# Patient Record
Sex: FEMALE | Race: WHITE | NOT HISPANIC OR LATINO | Employment: UNEMPLOYED | URBAN - METROPOLITAN AREA
[De-identification: names, ages, dates, MRNs, and addresses within clinical notes are randomized per-mention and may not be internally consistent; named-entity substitution may affect disease eponyms.]

---

## 2017-01-10 ENCOUNTER — ALLSCRIPTS OFFICE VISIT (OUTPATIENT)
Dept: OTHER | Facility: OTHER | Age: 57
End: 2017-01-10

## 2017-01-10 DIAGNOSIS — M25.50 PAIN IN JOINT: ICD-10-CM

## 2017-01-10 DIAGNOSIS — R53.83 OTHER FATIGUE: ICD-10-CM

## 2017-01-10 DIAGNOSIS — E04.1 NONTOXIC SINGLE THYROID NODULE: ICD-10-CM

## 2017-01-27 ENCOUNTER — APPOINTMENT (OUTPATIENT)
Dept: LAB | Facility: CLINIC | Age: 57
End: 2017-01-27
Payer: COMMERCIAL

## 2017-01-27 ENCOUNTER — TRANSCRIBE ORDERS (OUTPATIENT)
Dept: LAB | Facility: CLINIC | Age: 57
End: 2017-01-27

## 2017-01-27 ENCOUNTER — ALLSCRIPTS OFFICE VISIT (OUTPATIENT)
Dept: OTHER | Facility: OTHER | Age: 57
End: 2017-01-27

## 2017-01-27 DIAGNOSIS — M25.50 PAIN IN JOINT: ICD-10-CM

## 2017-01-27 DIAGNOSIS — R53.83 OTHER FATIGUE: ICD-10-CM

## 2017-01-27 LAB
25(OH)D3 SERPL-MCNC: 29.8 NG/ML (ref 30–100)
ALBUMIN SERPL BCP-MCNC: 3.4 G/DL (ref 3.5–5)
ALP SERPL-CCNC: 78 U/L (ref 46–116)
ALT SERPL W P-5'-P-CCNC: 26 U/L (ref 12–78)
ANION GAP SERPL CALCULATED.3IONS-SCNC: 7 MMOL/L (ref 4–13)
AST SERPL W P-5'-P-CCNC: 13 U/L (ref 5–45)
BASOPHILS # BLD AUTO: 0.01 THOUSANDS/ΜL (ref 0–0.1)
BASOPHILS NFR BLD AUTO: 0 % (ref 0–1)
BILIRUB SERPL-MCNC: 0.3 MG/DL (ref 0.2–1)
BUN SERPL-MCNC: 15 MG/DL (ref 5–25)
CALCIUM SERPL-MCNC: 8.8 MG/DL (ref 8.3–10.1)
CHLORIDE SERPL-SCNC: 102 MMOL/L (ref 100–108)
CO2 SERPL-SCNC: 29 MMOL/L (ref 21–32)
CREAT SERPL-MCNC: 0.63 MG/DL (ref 0.6–1.3)
CRP SERPL QL: 32.2 MG/L
EOSINOPHIL # BLD AUTO: 0.13 THOUSAND/ΜL (ref 0–0.61)
EOSINOPHIL NFR BLD AUTO: 3 % (ref 0–6)
ERYTHROCYTE [DISTWIDTH] IN BLOOD BY AUTOMATED COUNT: 13 % (ref 11.6–15.1)
ERYTHROCYTE [SEDIMENTATION RATE] IN BLOOD: 32 MM/HOUR (ref 0–20)
GFR SERPL CREATININE-BSD FRML MDRD: >60 ML/MIN/1.73SQ M
GLUCOSE SERPL-MCNC: 97 MG/DL (ref 65–140)
HCT VFR BLD AUTO: 42.3 % (ref 34.8–46.1)
HGB BLD-MCNC: 14 G/DL (ref 11.5–15.4)
LYMPHOCYTES # BLD AUTO: 1.02 THOUSANDS/ΜL (ref 0.6–4.47)
LYMPHOCYTES NFR BLD AUTO: 20 % (ref 14–44)
MCH RBC QN AUTO: 28.7 PG (ref 26.8–34.3)
MCHC RBC AUTO-ENTMCNC: 33.1 G/DL (ref 31.4–37.4)
MCV RBC AUTO: 87 FL (ref 82–98)
MONOCYTES # BLD AUTO: 0.42 THOUSAND/ΜL (ref 0.17–1.22)
MONOCYTES NFR BLD AUTO: 8 % (ref 4–12)
NEUTROPHILS # BLD AUTO: 3.64 THOUSANDS/ΜL (ref 1.85–7.62)
NEUTS SEG NFR BLD AUTO: 69 % (ref 43–75)
PLATELET # BLD AUTO: 202 THOUSANDS/UL (ref 149–390)
PMV BLD AUTO: 9.6 FL (ref 8.9–12.7)
POTASSIUM SERPL-SCNC: 4.1 MMOL/L (ref 3.5–5.3)
PROT SERPL-MCNC: 7.7 G/DL (ref 6.4–8.2)
RBC # BLD AUTO: 4.88 MILLION/UL (ref 3.81–5.12)
SODIUM SERPL-SCNC: 138 MMOL/L (ref 136–145)
TSH SERPL DL<=0.05 MIU/L-ACNC: 3.1 UIU/ML (ref 0.36–3.74)
WBC # BLD AUTO: 5.22 THOUSAND/UL (ref 4.31–10.16)

## 2017-01-27 PROCEDURE — 86705 HEP B CORE ANTIBODY IGM: CPT

## 2017-01-27 PROCEDURE — 86235 NUCLEAR ANTIGEN ANTIBODY: CPT

## 2017-01-27 PROCEDURE — 85652 RBC SED RATE AUTOMATED: CPT

## 2017-01-27 PROCEDURE — 82306 VITAMIN D 25 HYDROXY: CPT

## 2017-01-27 PROCEDURE — 80053 COMPREHEN METABOLIC PANEL: CPT

## 2017-01-27 PROCEDURE — 86430 RHEUMATOID FACTOR TEST QUAL: CPT

## 2017-01-27 PROCEDURE — 87340 HEPATITIS B SURFACE AG IA: CPT

## 2017-01-27 PROCEDURE — 86038 ANTINUCLEAR ANTIBODIES: CPT

## 2017-01-27 PROCEDURE — 86803 HEPATITIS C AB TEST: CPT

## 2017-01-27 PROCEDURE — 86200 CCP ANTIBODY: CPT

## 2017-01-27 PROCEDURE — 36415 COLL VENOUS BLD VENIPUNCTURE: CPT

## 2017-01-27 PROCEDURE — 85025 COMPLETE CBC W/AUTO DIFF WBC: CPT

## 2017-01-27 PROCEDURE — 87389 HIV-1 AG W/HIV-1&-2 AB AG IA: CPT

## 2017-01-27 PROCEDURE — 86704 HEP B CORE ANTIBODY TOTAL: CPT

## 2017-01-27 PROCEDURE — 84443 ASSAY THYROID STIM HORMONE: CPT

## 2017-01-27 PROCEDURE — 86140 C-REACTIVE PROTEIN: CPT

## 2017-01-27 PROCEDURE — 81374 HLA I TYPING 1 ANTIGEN LR: CPT

## 2017-01-28 LAB
ENA SS-A AB SER-ACNC: <0.2 AI (ref 0–0.9)
ENA SS-B AB SER-ACNC: <0.2 AI (ref 0–0.9)
HBV CORE AB SER QL: NORMAL
HBV CORE IGM SER QL: NORMAL
HBV SURFACE AG SER QL: NORMAL
HCV AB SER QL: NORMAL

## 2017-01-30 LAB
CCP IGA+IGG SERPL IA-ACNC: 7 UNITS (ref 0–19)
HIV 1+2 AB+HIV1 P24 AG SERPL QL IA: NORMAL
RHEUMATOID FACT SER QL LA: NEGATIVE
RYE IGE QN: NEGATIVE

## 2017-02-01 LAB — HLA-B27 QL NAA+PROBE: NEGATIVE

## 2017-02-02 ENCOUNTER — GENERIC CONVERSION - ENCOUNTER (OUTPATIENT)
Dept: OTHER | Facility: OTHER | Age: 57
End: 2017-02-02

## 2017-03-09 ENCOUNTER — TRANSCRIBE ORDERS (OUTPATIENT)
Dept: ADMINISTRATIVE | Age: 57
End: 2017-03-09

## 2017-03-09 ENCOUNTER — APPOINTMENT (OUTPATIENT)
Dept: LAB | Age: 57
End: 2017-03-09
Payer: COMMERCIAL

## 2017-03-09 ENCOUNTER — GENERIC CONVERSION - ENCOUNTER (OUTPATIENT)
Dept: OTHER | Facility: OTHER | Age: 57
End: 2017-03-09

## 2017-03-09 ENCOUNTER — HOSPITAL ENCOUNTER (OUTPATIENT)
Dept: RADIOLOGY | Age: 57
Discharge: HOME | End: 2017-03-09
Payer: COMMERCIAL

## 2017-03-09 DIAGNOSIS — E04.1 NONTOXIC SINGLE THYROID NODULE: ICD-10-CM

## 2017-03-09 LAB — TSH SERPL DL<=0.05 MIU/L-ACNC: 2.62 UIU/ML (ref 0.36–3.74)

## 2017-03-09 PROCEDURE — 36415 COLL VENOUS BLD VENIPUNCTURE: CPT

## 2017-03-09 PROCEDURE — 84443 ASSAY THYROID STIM HORMONE: CPT

## 2017-03-09 PROCEDURE — 76536 US EXAM OF HEAD AND NECK: CPT

## 2017-03-10 ENCOUNTER — GENERIC CONVERSION - ENCOUNTER (OUTPATIENT)
Dept: OTHER | Facility: OTHER | Age: 57
End: 2017-03-10

## 2017-04-06 ENCOUNTER — TRANSCRIBE ORDERS (OUTPATIENT)
Dept: LAB | Facility: CLINIC | Age: 57
End: 2017-04-06

## 2017-04-06 ENCOUNTER — ALLSCRIPTS OFFICE VISIT (OUTPATIENT)
Dept: OTHER | Facility: OTHER | Age: 57
End: 2017-04-06

## 2017-04-06 ENCOUNTER — APPOINTMENT (OUTPATIENT)
Dept: LAB | Facility: CLINIC | Age: 57
End: 2017-04-06
Payer: COMMERCIAL

## 2017-04-06 DIAGNOSIS — M35.9 SYSTEMIC INVOLVEMENT OF CONNECTIVE TISSUE (HCC): ICD-10-CM

## 2017-04-06 DIAGNOSIS — E78.5 HYPERLIPIDEMIA: ICD-10-CM

## 2017-04-06 LAB
ALBUMIN SERPL BCP-MCNC: 3.6 G/DL (ref 3.5–5)
ALP SERPL-CCNC: 68 U/L (ref 46–116)
ALT SERPL W P-5'-P-CCNC: 25 U/L (ref 12–78)
ANION GAP SERPL CALCULATED.3IONS-SCNC: 9 MMOL/L (ref 4–13)
AST SERPL W P-5'-P-CCNC: 16 U/L (ref 5–45)
BASOPHILS # BLD AUTO: 0 THOUSANDS/ΜL (ref 0–0.1)
BASOPHILS NFR BLD AUTO: 0 % (ref 0–1)
BILIRUB SERPL-MCNC: 0.4 MG/DL (ref 0.2–1)
BUN SERPL-MCNC: 16 MG/DL (ref 5–25)
CALCIUM SERPL-MCNC: 9.1 MG/DL (ref 8.3–10.1)
CHLORIDE SERPL-SCNC: 105 MMOL/L (ref 100–108)
CO2 SERPL-SCNC: 28 MMOL/L (ref 21–32)
CREAT SERPL-MCNC: 0.83 MG/DL (ref 0.6–1.3)
CRP SERPL QL: 18.3 MG/L
EOSINOPHIL # BLD AUTO: 0.15 THOUSAND/ΜL (ref 0–0.61)
EOSINOPHIL NFR BLD AUTO: 4 % (ref 0–6)
ERYTHROCYTE [DISTWIDTH] IN BLOOD BY AUTOMATED COUNT: 13.4 % (ref 11.6–15.1)
ERYTHROCYTE [SEDIMENTATION RATE] IN BLOOD: 20 MM/HOUR (ref 0–20)
GFR SERPL CREATININE-BSD FRML MDRD: >60 ML/MIN/1.73SQ M
GLUCOSE SERPL-MCNC: 90 MG/DL (ref 65–140)
HCT VFR BLD AUTO: 43.6 % (ref 34.8–46.1)
HGB BLD-MCNC: 14.3 G/DL (ref 11.5–15.4)
LYMPHOCYTES # BLD AUTO: 0.9 THOUSANDS/ΜL (ref 0.6–4.47)
LYMPHOCYTES NFR BLD AUTO: 22 % (ref 14–44)
MCH RBC QN AUTO: 28.5 PG (ref 26.8–34.3)
MCHC RBC AUTO-ENTMCNC: 32.8 G/DL (ref 31.4–37.4)
MCV RBC AUTO: 87 FL (ref 82–98)
MONOCYTES # BLD AUTO: 0.44 THOUSAND/ΜL (ref 0.17–1.22)
MONOCYTES NFR BLD AUTO: 11 % (ref 4–12)
NEUTROPHILS # BLD AUTO: 2.54 THOUSANDS/ΜL (ref 1.85–7.62)
NEUTS SEG NFR BLD AUTO: 63 % (ref 43–75)
PLATELET # BLD AUTO: 166 THOUSANDS/UL (ref 149–390)
PMV BLD AUTO: 10.2 FL (ref 8.9–12.7)
POTASSIUM SERPL-SCNC: 4.3 MMOL/L (ref 3.5–5.3)
PROT SERPL-MCNC: 7.4 G/DL (ref 6.4–8.2)
RBC # BLD AUTO: 5.01 MILLION/UL (ref 3.81–5.12)
SODIUM SERPL-SCNC: 142 MMOL/L (ref 136–145)
WBC # BLD AUTO: 4.03 THOUSAND/UL (ref 4.31–10.16)

## 2017-04-06 PROCEDURE — 85652 RBC SED RATE AUTOMATED: CPT

## 2017-04-06 PROCEDURE — 80053 COMPREHEN METABOLIC PANEL: CPT

## 2017-04-06 PROCEDURE — 86140 C-REACTIVE PROTEIN: CPT

## 2017-04-06 PROCEDURE — 85025 COMPLETE CBC W/AUTO DIFF WBC: CPT

## 2017-04-06 PROCEDURE — 36415 COLL VENOUS BLD VENIPUNCTURE: CPT

## 2017-06-01 ENCOUNTER — APPOINTMENT (OUTPATIENT)
Dept: LAB | Facility: CLINIC | Age: 57
End: 2017-06-01
Payer: COMMERCIAL

## 2017-06-01 ENCOUNTER — TRANSCRIBE ORDERS (OUTPATIENT)
Dept: LAB | Facility: CLINIC | Age: 57
End: 2017-06-01

## 2017-06-01 ENCOUNTER — ALLSCRIPTS OFFICE VISIT (OUTPATIENT)
Dept: OTHER | Facility: OTHER | Age: 57
End: 2017-06-01

## 2017-06-01 DIAGNOSIS — M35.9 SYSTEMIC INVOLVEMENT OF CONNECTIVE TISSUE (HCC): ICD-10-CM

## 2017-06-01 DIAGNOSIS — E89.0 POSTSURGICAL HYPOTHYROIDISM: ICD-10-CM

## 2017-06-01 DIAGNOSIS — E89.0 POSTSURGICAL HYPOTHYROIDISM: Primary | ICD-10-CM

## 2017-06-01 DIAGNOSIS — E78.5 HYPERLIPIDEMIA: ICD-10-CM

## 2017-06-01 LAB
ALBUMIN SERPL BCP-MCNC: 3.5 G/DL (ref 3.5–5)
ALP SERPL-CCNC: 66 U/L (ref 46–116)
ALT SERPL W P-5'-P-CCNC: 29 U/L (ref 12–78)
ANION GAP SERPL CALCULATED.3IONS-SCNC: 6 MMOL/L (ref 4–13)
AST SERPL W P-5'-P-CCNC: 16 U/L (ref 5–45)
BASOPHILS # BLD AUTO: 0.01 THOUSANDS/ΜL (ref 0–0.1)
BASOPHILS NFR BLD AUTO: 0 % (ref 0–1)
BILIRUB SERPL-MCNC: 0.4 MG/DL (ref 0.2–1)
BUN SERPL-MCNC: 12 MG/DL (ref 5–25)
CALCIUM SERPL-MCNC: 9.2 MG/DL (ref 8.3–10.1)
CHLORIDE SERPL-SCNC: 105 MMOL/L (ref 100–108)
CO2 SERPL-SCNC: 31 MMOL/L (ref 21–32)
CREAT SERPL-MCNC: 0.86 MG/DL (ref 0.6–1.3)
CRP SERPL QL: 17.6 MG/L
EOSINOPHIL # BLD AUTO: 0.11 THOUSAND/ΜL (ref 0–0.61)
EOSINOPHIL NFR BLD AUTO: 3 % (ref 0–6)
ERYTHROCYTE [DISTWIDTH] IN BLOOD BY AUTOMATED COUNT: 13.4 % (ref 11.6–15.1)
ERYTHROCYTE [SEDIMENTATION RATE] IN BLOOD: 14 MM/HOUR (ref 0–20)
GFR SERPL CREATININE-BSD FRML MDRD: >60 ML/MIN/1.73SQ M
GLUCOSE P FAST SERPL-MCNC: 88 MG/DL (ref 65–99)
HCT VFR BLD AUTO: 42.7 % (ref 34.8–46.1)
HGB BLD-MCNC: 14.1 G/DL (ref 11.5–15.4)
LYMPHOCYTES # BLD AUTO: 1.05 THOUSANDS/ΜL (ref 0.6–4.47)
LYMPHOCYTES NFR BLD AUTO: 28 % (ref 14–44)
MCH RBC QN AUTO: 28.9 PG (ref 26.8–34.3)
MCHC RBC AUTO-ENTMCNC: 33 G/DL (ref 31.4–37.4)
MCV RBC AUTO: 88 FL (ref 82–98)
MONOCYTES # BLD AUTO: 0.43 THOUSAND/ΜL (ref 0.17–1.22)
MONOCYTES NFR BLD AUTO: 11 % (ref 4–12)
NEUTROPHILS # BLD AUTO: 2.16 THOUSANDS/ΜL (ref 1.85–7.62)
NEUTS SEG NFR BLD AUTO: 58 % (ref 43–75)
PLATELET # BLD AUTO: 163 THOUSANDS/UL (ref 149–390)
PMV BLD AUTO: 10.3 FL (ref 8.9–12.7)
POTASSIUM SERPL-SCNC: 4 MMOL/L (ref 3.5–5.3)
PROT SERPL-MCNC: 7.3 G/DL (ref 6.4–8.2)
RBC # BLD AUTO: 4.88 MILLION/UL (ref 3.81–5.12)
SODIUM SERPL-SCNC: 142 MMOL/L (ref 136–145)
TSH SERPL DL<=0.05 MIU/L-ACNC: 3.04 UIU/ML (ref 0.36–3.74)
WBC # BLD AUTO: 3.76 THOUSAND/UL (ref 4.31–10.16)

## 2017-06-01 PROCEDURE — 86140 C-REACTIVE PROTEIN: CPT

## 2017-06-01 PROCEDURE — 36415 COLL VENOUS BLD VENIPUNCTURE: CPT

## 2017-06-01 PROCEDURE — 85025 COMPLETE CBC W/AUTO DIFF WBC: CPT

## 2017-06-01 PROCEDURE — 80053 COMPREHEN METABOLIC PANEL: CPT

## 2017-06-01 PROCEDURE — 85652 RBC SED RATE AUTOMATED: CPT

## 2017-06-01 PROCEDURE — 84443 ASSAY THYROID STIM HORMONE: CPT

## 2017-06-02 ENCOUNTER — GENERIC CONVERSION - ENCOUNTER (OUTPATIENT)
Dept: OTHER | Facility: OTHER | Age: 57
End: 2017-06-02

## 2017-07-19 ENCOUNTER — ALLSCRIPTS OFFICE VISIT (OUTPATIENT)
Dept: OTHER | Facility: OTHER | Age: 57
End: 2017-07-19

## 2017-08-03 ENCOUNTER — APPOINTMENT (OUTPATIENT)
Dept: LAB | Facility: CLINIC | Age: 57
End: 2017-08-03
Payer: COMMERCIAL

## 2017-08-03 ENCOUNTER — ALLSCRIPTS OFFICE VISIT (OUTPATIENT)
Dept: OTHER | Facility: OTHER | Age: 57
End: 2017-08-03

## 2017-08-03 DIAGNOSIS — E78.5 HYPERLIPIDEMIA: ICD-10-CM

## 2017-08-03 DIAGNOSIS — M35.9 SYSTEMIC INVOLVEMENT OF CONNECTIVE TISSUE (HCC): ICD-10-CM

## 2017-08-03 LAB
ALBUMIN SERPL BCP-MCNC: 3.4 G/DL (ref 3.5–5)
ALP SERPL-CCNC: 71 U/L (ref 46–116)
ALT SERPL W P-5'-P-CCNC: 25 U/L (ref 12–78)
ANION GAP SERPL CALCULATED.3IONS-SCNC: 7 MMOL/L (ref 4–13)
AST SERPL W P-5'-P-CCNC: 13 U/L (ref 5–45)
BASOPHILS # BLD AUTO: 0.02 THOUSANDS/ΜL (ref 0–0.1)
BASOPHILS NFR BLD AUTO: 1 % (ref 0–1)
BILIRUB SERPL-MCNC: 0.5 MG/DL (ref 0.2–1)
BUN SERPL-MCNC: 14 MG/DL (ref 5–25)
CALCIUM SERPL-MCNC: 9.1 MG/DL (ref 8.3–10.1)
CHLORIDE SERPL-SCNC: 105 MMOL/L (ref 100–108)
CO2 SERPL-SCNC: 30 MMOL/L (ref 21–32)
CREAT SERPL-MCNC: 0.9 MG/DL (ref 0.6–1.3)
CRP SERPL QL: 12.8 MG/L
EOSINOPHIL # BLD AUTO: 0.11 THOUSAND/ΜL (ref 0–0.61)
EOSINOPHIL NFR BLD AUTO: 3 % (ref 0–6)
ERYTHROCYTE [DISTWIDTH] IN BLOOD BY AUTOMATED COUNT: 13.2 % (ref 11.6–15.1)
ERYTHROCYTE [SEDIMENTATION RATE] IN BLOOD: 16 MM/HOUR (ref 0–20)
GFR SERPL CREATININE-BSD FRML MDRD: 73 ML/MIN/1.73SQ M
GLUCOSE P FAST SERPL-MCNC: 92 MG/DL (ref 65–99)
HCT VFR BLD AUTO: 43.6 % (ref 34.8–46.1)
HGB BLD-MCNC: 14.1 G/DL (ref 11.5–15.4)
LYMPHOCYTES # BLD AUTO: 0.91 THOUSANDS/ΜL (ref 0.6–4.47)
LYMPHOCYTES NFR BLD AUTO: 26 % (ref 14–44)
MCH RBC QN AUTO: 28.7 PG (ref 26.8–34.3)
MCHC RBC AUTO-ENTMCNC: 32.3 G/DL (ref 31.4–37.4)
MCV RBC AUTO: 89 FL (ref 82–98)
MONOCYTES # BLD AUTO: 0.33 THOUSAND/ΜL (ref 0.17–1.22)
MONOCYTES NFR BLD AUTO: 9 % (ref 4–12)
NEUTROPHILS # BLD AUTO: 2.18 THOUSANDS/ΜL (ref 1.85–7.62)
NEUTS SEG NFR BLD AUTO: 61 % (ref 43–75)
PLATELET # BLD AUTO: 167 THOUSANDS/UL (ref 149–390)
PMV BLD AUTO: 9.8 FL (ref 8.9–12.7)
POTASSIUM SERPL-SCNC: 4.2 MMOL/L (ref 3.5–5.3)
PROT SERPL-MCNC: 7.3 G/DL (ref 6.4–8.2)
RBC # BLD AUTO: 4.91 MILLION/UL (ref 3.81–5.12)
SODIUM SERPL-SCNC: 142 MMOL/L (ref 136–145)
WBC # BLD AUTO: 3.55 THOUSAND/UL (ref 4.31–10.16)

## 2017-08-03 PROCEDURE — 36415 COLL VENOUS BLD VENIPUNCTURE: CPT

## 2017-08-03 PROCEDURE — 85652 RBC SED RATE AUTOMATED: CPT

## 2017-08-03 PROCEDURE — 85025 COMPLETE CBC W/AUTO DIFF WBC: CPT

## 2017-08-03 PROCEDURE — 80053 COMPREHEN METABOLIC PANEL: CPT

## 2017-08-03 PROCEDURE — 86140 C-REACTIVE PROTEIN: CPT

## 2017-10-26 ENCOUNTER — APPOINTMENT (OUTPATIENT)
Dept: LAB | Facility: CLINIC | Age: 57
End: 2017-10-26
Payer: COMMERCIAL

## 2017-10-26 ENCOUNTER — TRANSCRIBE ORDERS (OUTPATIENT)
Dept: LAB | Facility: CLINIC | Age: 57
End: 2017-10-26

## 2017-10-26 ENCOUNTER — ALLSCRIPTS OFFICE VISIT (OUTPATIENT)
Dept: OTHER | Facility: OTHER | Age: 57
End: 2017-10-26

## 2017-10-26 DIAGNOSIS — E78.5 HYPERLIPIDEMIA: ICD-10-CM

## 2017-10-26 DIAGNOSIS — M35.9 SYSTEMIC INVOLVEMENT OF CONNECTIVE TISSUE (HCC): ICD-10-CM

## 2017-10-26 LAB
ALBUMIN SERPL BCP-MCNC: 3.3 G/DL (ref 3.5–5)
ALP SERPL-CCNC: 84 U/L (ref 46–116)
ALT SERPL W P-5'-P-CCNC: 38 U/L (ref 12–78)
ANION GAP SERPL CALCULATED.3IONS-SCNC: 6 MMOL/L (ref 4–13)
AST SERPL W P-5'-P-CCNC: 13 U/L (ref 5–45)
BASOPHILS # BLD AUTO: 0.01 THOUSANDS/ΜL (ref 0–0.1)
BASOPHILS NFR BLD AUTO: 0 % (ref 0–1)
BILIRUB SERPL-MCNC: 0.4 MG/DL (ref 0.2–1)
BUN SERPL-MCNC: 16 MG/DL (ref 5–25)
CALCIUM SERPL-MCNC: 9 MG/DL (ref 8.3–10.1)
CHLORIDE SERPL-SCNC: 105 MMOL/L (ref 100–108)
CO2 SERPL-SCNC: 30 MMOL/L (ref 21–32)
CREAT SERPL-MCNC: 0.9 MG/DL (ref 0.6–1.3)
CRP SERPL QL: 12.9 MG/L
EOSINOPHIL # BLD AUTO: 0.12 THOUSAND/ΜL (ref 0–0.61)
EOSINOPHIL NFR BLD AUTO: 3 % (ref 0–6)
ERYTHROCYTE [DISTWIDTH] IN BLOOD BY AUTOMATED COUNT: 13.2 % (ref 11.6–15.1)
ERYTHROCYTE [SEDIMENTATION RATE] IN BLOOD: 11 MM/HOUR (ref 0–20)
GFR SERPL CREATININE-BSD FRML MDRD: 73 ML/MIN/1.73SQ M
GLUCOSE SERPL-MCNC: 76 MG/DL (ref 65–140)
HCT VFR BLD AUTO: 43.4 % (ref 34.8–46.1)
HGB BLD-MCNC: 13.9 G/DL (ref 11.5–15.4)
LYMPHOCYTES # BLD AUTO: 1.04 THOUSANDS/ΜL (ref 0.6–4.47)
LYMPHOCYTES NFR BLD AUTO: 28 % (ref 14–44)
MCH RBC QN AUTO: 28.7 PG (ref 26.8–34.3)
MCHC RBC AUTO-ENTMCNC: 32 G/DL (ref 31.4–37.4)
MCV RBC AUTO: 90 FL (ref 82–98)
MONOCYTES # BLD AUTO: 0.25 THOUSAND/ΜL (ref 0.17–1.22)
MONOCYTES NFR BLD AUTO: 7 % (ref 4–12)
NEUTROPHILS # BLD AUTO: 2.29 THOUSANDS/ΜL (ref 1.85–7.62)
NEUTS SEG NFR BLD AUTO: 62 % (ref 43–75)
PLATELET # BLD AUTO: 164 THOUSANDS/UL (ref 149–390)
PMV BLD AUTO: 9.9 FL (ref 8.9–12.7)
POTASSIUM SERPL-SCNC: 4 MMOL/L (ref 3.5–5.3)
PROT SERPL-MCNC: 7.1 G/DL (ref 6.4–8.2)
RBC # BLD AUTO: 4.85 MILLION/UL (ref 3.81–5.12)
SODIUM SERPL-SCNC: 141 MMOL/L (ref 136–145)
WBC # BLD AUTO: 3.71 THOUSAND/UL (ref 4.31–10.16)

## 2017-10-26 PROCEDURE — 80053 COMPREHEN METABOLIC PANEL: CPT

## 2017-10-26 PROCEDURE — 86140 C-REACTIVE PROTEIN: CPT

## 2017-10-26 PROCEDURE — 85652 RBC SED RATE AUTOMATED: CPT

## 2017-10-26 PROCEDURE — 36415 COLL VENOUS BLD VENIPUNCTURE: CPT

## 2017-10-26 PROCEDURE — 85025 COMPLETE CBC W/AUTO DIFF WBC: CPT

## 2017-12-20 ENCOUNTER — ALLSCRIPTS OFFICE VISIT (OUTPATIENT)
Dept: OTHER | Facility: OTHER | Age: 57
End: 2017-12-20

## 2017-12-20 DIAGNOSIS — Z12.31 ENCOUNTER FOR SCREENING MAMMOGRAM FOR MALIGNANT NEOPLASM OF BREAST: ICD-10-CM

## 2017-12-21 ENCOUNTER — LAB CONVERSION - ENCOUNTER (OUTPATIENT)
Dept: OTHER | Facility: OTHER | Age: 57
End: 2017-12-21

## 2017-12-21 LAB
ADDITIONAL INFORMATION (HISTORICAL): NORMAL
ADEQUACY: (HISTORICAL): NORMAL
COMMENT (HISTORICAL): NORMAL
CYTOTECHNOLOGIST: (HISTORICAL): NORMAL
INTERPRETATION (HISTORICAL): NORMAL
LMP (HISTORICAL): NORMAL
PREV. BX: (HISTORICAL): NORMAL
PREV. PAP (HISTORICAL): NORMAL
SOURCE (HISTORICAL): NORMAL

## 2017-12-21 NOTE — PROGRESS NOTES
Assessment   1  Encounter for routine gynecological examination (V72 31) (Z01 419)   2  Pap smear, as part of routine gynecological examination (V76 2) (Z01 419)   3  Visit for screening mammogram (V76 12) (Z12 31)    Plan   Encounter for routine gynecological examination    · Follow-up visit in 1 year Evaluation and Treatment  Follow-up  Status: Hold For -    Scheduling  Requested for: 43Jmp6392   Ordered; For: Encounter for routine gynecological examination; Ordered By: Dianne Hunt Performed:  Due: 20MTI6856  Pap smear, as part of routine gynecological examination    · (1) THIN PREP PAP WITH IMAGING; Status: In Progress - Specimen/Data    Collected,Retrospective By Protocol Authorization;   Done: 55TIV3399   Perform:[facility]; Order Comments:cervical, endocervical; Due:93Awi7183; Last Updated Lana Shelter; 12/20/2017 11:01:48 AM;Ordered; For:Pap smear, as part of routine gynecological examination; Ordered By:Crispin Alejandro; Maturation index required? : No  : 10/27/2017  HPV? : if ASCUS  Visit for screening mammogram    · * MAMMO SCREENING BILATERAL W CAD; Status:Hold For - Scheduling,Retrospective    By Protocol Authorization; Requested for:[id number];    Perform:[facility]; Due:48Zgv4960; Last Updated [facility]ana [facility]ter; 12/20/2017 11:01:49 AM;Ordered; For:Visit for screening mammogram; Ordered By:Crispin Alejandro; Discussion/Summary   healthy adult female Currently, she eats a healthy diet  cervical cancer screening is current Pap test was done today Breast cancer screening: monthly self breast exam was advised, mammogram is current and mammogram has been ordered  Colorectal cancer screening: the risks and benefits of colorectal cancer screening were discussed  Advice and education were given regarding nutrition, aerobic exercise, calcium supplements, vitamin D supplements and sunscreen use         Normal GYN Exam   Stressed   ordered   SMear DOne   GYN Exam in 1 year   if any problems develop during the interim        The patient has the current Goals: [address]  The patent has the current Barriers: None  Patient is able to Self-Care  PATIENT EDUCATION RECORD She was given the following educational materials:  Idea for a [employer]  The treatment plan was reviewed with the patient/guardian  The patient/guardian understands and agrees with the treatment plan       Self Referrals: No      Chief Complaint   ANNUAL EXAM  has no problem or concerns      History of Present Illness   HPI: Has had problems with her Knees this year     Occasional bleeding No other episodes     Some vaginal dryness     Hot flashes present     Normal Bowel and Bladder habits     No pelvic or abdominal pain          [facility]: The patient is being seen for a gynecology evaluation  The last health maintenance visit was 1 year(s) ago  General Health: The patient's health since the last visit is described as good  She has regular dental visits  -- She denies vision problems  -- She denies hearing loss  Lifestyle:  She consumes a diverse and healthy diet  -- She does not have any weight concerns  -- She exercises regularly  -- She does not use tobacco -- She denies drug use  Reproductive health: the patient is postmenopausal     Screening: cancer screening reviewed and current  metabolic screening reviewed and current  risk screening reviewed and current  Review of Systems        Constitutional: No fever, no chills, feels well, no tiredness, no recent weight gain or loss  ENT: no ear ache, no loss of hearing, no nosebleeds or nasal discharge, no sore throat or hoarseness  Cardiovascular: no complaints of slow or fast heart rate, no chest pain, no palpitations, no leg claudication or lower extremity edema  Respiratory: no complaints of shortness of breath, no wheezing, no dyspnea on exertion, no orthopnea or PND        Breasts: no complaints of breast pain, breast lump or nipple discharge  Gastrointestinal: no complaints of abdominal pain, no constipation, no nausea or diarrhea, no vomiting, no bloody stools  Genitourinary: no complaints of dysuria, no incontinence, no pelvic pain, no dysmenorrhea, no vaginal discharge or abnormal vaginal bleeding  Musculoskeletal: no complaints of arthralgia, no myalgia, no joint swelling or stiffness, no limb pain or swelling  Integumentary: no complaints of skin rash or lesion, no itching or dry skin, no skin wounds  Neurological: no complaints of headache, no confusion, no numbness or tingling, no dizziness or fainting  Active Problems   1  Abnormal uterine bleeding (AUB) (626 9) (N93 9)   2  Arthritis (716 90) (M19 90)   3  Arthritis of both knees (716 96) (M17 0)   4  Bilateral knee pain (719 46) (M25 561,M25 562)   5  Encounter for routine gynecological examination (V72 31) (Z01 419)   6  Encounter for screening colonoscopy (V76 51) (Z12 11)   7  Encounter for screening for malignant neoplasm of colon (V76 51) (Z12 11)   8  Fatigue (780 79) (R53 83)   9  Hyperlipidemia (272 4) (E78 5)   10  Impaired fasting glucose (790 21) (R73 01)   11  Irregular heart beats (427 9) (I49 9)   12  Laboratory examination ordered as part of a routine general medical examination      (V72 62) (Z00 00)   13  Left knee pain (719 46) (M25 562)   14  Menorrhagia (626 2) (N92 0)   15  Multiple thyroid nodules (241 1) (E04 2)   16  Need for prophylactic vaccination and inoculation against influenza (V04 81) (Z23)   17  Obesity (BMI 30-39 9) (278 00) (E66 9)   18  Pap smear, as part of routine gynecological examination (V76 2) (Z01 419)   19  Polyarthralgia (719 49) (M25 50)   20  Right knee pain (719 46) (M25 561)   21  Undifferentiated connective tissue disease (710 9) (M35 9)   22   Visit for screening mammogram (V76 12) (Z12 31)    Past Medical History    · History of Elevated blood sugar (790 29) (R73 9)   · History of [place] 7 (V22 2) (Z33 1)   · History of chest pain (V13 89) (A04 475)    Surgical History    · History of Biopsy Thyroid Using Percutaneous Core Needle   · History of  Section   · History of Foot Surgery   · History of Thyroid Surgery Thyroid Lobectomy Right Lobe    Family History   Mother    · Family history of Graves' disease (V18 19) (Z83 49)   · Family history of hyperlipidemia (V18 19) (Z83 49)   · Family history of hypertension (V17 49) (Z82 49)   · Family history of osteoporosis (V17 81) (Z80 61)  Father    · Family history of aortic stenosis (V17 49) (Z82 49)   · Family history of hyperlipidemia (V18 19) (Z83 49)   · Family history of hypertension (V17 49) (Z82 49)   · FH: CABG (coronary artery bypass surgery) (V17 3) (Z84 89)   · Family history of Pacemaker Placement   · Family history of S/P TAVR (transcatheter aortic valve replacement)  Grandfather    · Family history of myocardial infarction (V17 3) (Z82 49)   · FHx: stroke (V17 1) (Z82 3)    Social History    · Employed   · Feels safe at home   ·    · Never smoked cigarettes (V49 89) (Z78 9)   · No alcohol use   · Six children   · Denied: History of Tobacco use    Current Meds    1  Calcium-Magnesium 250-125 MG Oral Tablet; Take 1 daily; Therapy: 57MFO2824 to (Evaluate:2017) Recorded   2  Cranberry 300 MG Oral Tablet; TAKE AS DIRECTED; Therapy: 22IHQ1207 to Recorded   3  Daily Multiple Vitamins Oral Tablet; Therapy: (Recorded:07Ocj2515) to Recorded   4  Fiber CAPS; Therapy: (Recorded:90Ahz6090) to Recorded   5  Fish Oil CAPS; Therapy: (Recorded:67Bzb5629) to Recorded   6  Hydroxychloroquine Sulfate 200 MG Oral Tablet; take 2 tablets daily with food; Therapy: 80MSB5157 to (Evaluate:2018)  Requested for: 2017; Last     Rx:2017 Ordered   7  Magnesium TABS; Therapy: (Recorded:69Tsp0860) to Recorded   8  Motrin  MG Oral Tablet; TAKE 3 TABLET Twice daily PRN pain;      Therapy: (Recorded:2017) to Recorded   9  Probiotic Oral Capsule; Therapy: (Recorded:52Rdq1689) to Recorded   10  Vitamin C 500 MG Oral Tablet; TAKE 1 TABLET DAILY; Therapy: 52NPV2517 to Recorded   11  Vitamin D3 1000 UNIT Oral Tablet; TAKE 1 TABLET DAILY; Therapy: 72JLT8248 to (Evaluate:08Ykq9016) Recorded    Allergies   1  No Known Drug Allergies  2  No Known Environmental Allergies   3  No Known Food Allergies    Vitals    Recorded: 51PTU1330 18:92YD   Systolic 795   Diastolic 78   Height 4 ft 11 in   Weight 182 lb    BMI Calculated 36 76   BSA Calculated 1 77   LMP 22Oct2017     Physical Exam        Constitutional      General appearance: No acute distress, well appearing and well nourished  Neck      Neck: Normal, supple, trachea midline, no masses  Thyroid: Normal, no thyromegaly  Pulmonary      Respiratory effort: No increased work of breathing or signs of respiratory distress  Auscultation of lungs: Clear to auscultation  Cardiovascular      Auscultation of heart: Normal rate and rhythm, normal S1 and S2, no murmurs  Peripheral vascular exam: Normal pulses Throughout  Genitourinary      External genitalia: Normal and no lesions appreciated  Vagina: Normal, no lesions or dryness appreciated  Urethra: Normal        Urethral meatus: Normal        Bladder: Normal, soft, non-tender and no prolapse or masses appreciated  Cervix: Normal, no palpable masses  Uterus: Normal, non-tender, not enlarged, and no palpable masses  Adnexa/parametria: Normal, non-tender and no fullness or masses appreciated  Anus, perineum, and rectum: Normal sphincter tone, no masses, and no prolapse  Chest      Breasts: Normal and no dimpling or skin changes noted  Abdomen      Abdomen: Normal, non-tender, and no organomegaly noted  Liver and spleen: No hepatomegaly or splenomegaly  Examination for hernias: No hernias appreciated         Stool sample for occult blood: Negative  Lymphatic      Palpation of lymph nodes in neck, axillae, groin and/or other locations: No lymphadenopathy or masses noted  Skin      Skin and subcutaneous tissue: Normal skin turgor and no rashes  Palpation of skin and subcutaneous tissue: Normal        Psychiatric      Orientation to person, place, and time: Normal        Mood and affect: Normal        Provider Comments   Provider Comments: Son is applying to college  for    Daughter Lex Hooper got  [facility]  is a      Boy in [place] [facility] getting  in May in [place] 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

## 2018-01-01 DIAGNOSIS — E04.2 NONTOXIC MULTINODULAR GOITER: ICD-10-CM

## 2018-01-01 DIAGNOSIS — E78.5 HYPERLIPIDEMIA: ICD-10-CM

## 2018-01-09 NOTE — RESULT NOTES
Message   Her thyroid ultrasound shows stable nodules  Verified Results  US THYROID [id number] 09:02AM Christel Cornejo Order Number: [id number]    - Patient Instructions: To schedule this appointment, please contact Central Scheduling at [phone]  Test Name Result Flag Reference   US THYROID (Report)     THYROID ULTRASOUND     INDICATION: Follow-up nodules status post right hemithyroidectomy     COMPARISON: 7/12/2016     TECHNIQUE:  Ultrasound of the thyroid was performed with a high frequency linear transducer in transverse and sagittal planes including volumetric imaging sweeps as well as traditional still imaging technique  FINDINGS:   Normal homogeneous smooth echotexture  The patient is status post right hemithyroidectomy  Again seen is a hypoechoic nodule in the thyroidectomy bed measuring 0 5 x 0 6 x 1 0 cm, previously 0 6 x 0 6 x 1 0 cm  Left gland: 1 4 x 1 6 x 3 8 cm  Right upper pole nodule measuring 0 7 x 0 9 x 1 0 cm  Spongiform nodule  Unchanged from prior  VERY LOW SUSPICION PATTERN (estimated risk of malignancy < 3%) consider FNA >/= 2 cm versus observation  Isthmus:    No nodules  IMPRESSION:      1  Stable hypoechoic nodular focus in the right thyroidectomy bed  2  Stable spongiform nodule on the left               Workstation performed: [id number]     Signed by:   Lucy Sow MD   3/10/17       Signatures   Electronically signed by : VANESSA Sotomayor ; Mar 10 2017  1:45PM EST                       (Author)

## 2018-01-10 NOTE — RESULT NOTES
Message   #1  Please call the patient with the results of her thyroid test       #2  Her thyroid test is normal and I recommend that she continue with her current levothyroxine dosage, until her next office visit  #3  You may leave a message, if her communication consent allows for it  Verified Results  (1) TSH WITH FT4 REFLEX 25MME6515 01:16PM Aliveshoes     Test Name Result Flag Reference   TSH 2 180 uIU/mL  0 358-3 740   Patients undergoing fluorescein dye angiography may retain small amounts of fluorescein in the body for 48-72 hours post procedure  Samples containing fluorescein can produce falsely depressed TSH values  If the patient had this procedure,a specimen should be resubmitted post fluorescein clearance          The recommended reference ranges for TSH during pregnancy are as follows:  First trimester 0 1 to 2 5 uIU/mL  Second trimester  0 2 to 3 0 uIU/mL  Third trimester 0 3 to 3 0 uIU/m

## 2018-01-10 NOTE — RESULT NOTES
Verified Results  (1) TSH WITH FT4 REFLEX 30Jun2016 11:39AM Emigdio Mendez     Test Name Result Flag Reference   TSH 2 440 uIU/mL  0 358-3 740   Patients undergoing fluorescein dye angiography may retain small amounts of fluorescein in the body for 48-72 hours post procedure  Samples containing fluorescein can produce falsely depressed TSH values  If the patient had this procedure,a specimen should be resubmitted post fluorescein clearance            The recommended reference ranges for TSH during pregnancy are as follows:  First trimester 0 1 to 2 5 uIU/mL  Second trimester  0 2 to 3 0 uIU/mL  Third trimester 0 3 to 3 0 uIU/m

## 2018-01-10 NOTE — MISCELLANEOUS
Message  GI Reminder Recall [address] [place] [address] [address]:   Date: 09/29/2016   Dear Archie Gilmore:     Review of our records shows you are due for the following: Colonoscopy  Please call the following office to schedule your appointment:   [address] [facility], [address] [facility], [address] [facility] [phone]  We look forward to hearing from you!      Sincerely,         Signatures   Electronically signed by : Bipin Hunt, ; Sep 29 2016  1:51PM EST                       (Author)

## 2018-01-11 ENCOUNTER — LAB (OUTPATIENT)
Dept: LAB | Facility: CLINIC | Age: 58
End: 2018-01-11
Payer: COMMERCIAL

## 2018-01-11 ENCOUNTER — GENERIC CONVERSION - ENCOUNTER (OUTPATIENT)
Dept: OTHER | Facility: OTHER | Age: 58
End: 2018-01-11

## 2018-01-11 ENCOUNTER — ALLSCRIPTS OFFICE VISIT (OUTPATIENT)
Dept: OTHER | Facility: OTHER | Age: 58
End: 2018-01-11

## 2018-01-11 DIAGNOSIS — E78.5 HYPERLIPIDEMIA: ICD-10-CM

## 2018-01-11 DIAGNOSIS — E04.2 NONTOXIC MULTINODULAR GOITER: ICD-10-CM

## 2018-01-11 LAB
CHOLEST SERPL-MCNC: 229 MG/DL (ref 50–200)
HDLC SERPL-MCNC: 60 MG/DL (ref 40–60)
LDLC SERPL CALC-MCNC: 148 MG/DL (ref 0–100)
TRIGL SERPL-MCNC: 104 MG/DL
TSH SERPL DL<=0.05 MIU/L-ACNC: 2.6 UIU/ML (ref 0.36–3.74)

## 2018-01-11 PROCEDURE — 80061 LIPID PANEL: CPT

## 2018-01-11 PROCEDURE — 36415 COLL VENOUS BLD VENIPUNCTURE: CPT

## 2018-01-11 PROCEDURE — 84443 ASSAY THYROID STIM HORMONE: CPT

## 2018-01-11 NOTE — PROGRESS NOTES
Assessment    1  Undifferentiated connective tissue disease (710 9) (M35 9)   2  Hyperlipidemia (272 4) (E78 5)   3  Obesity (BMI 30-39 9) (278 00) (E66 9)   4  Bilateral knee pain (719 46) (M25 561,M25 562)    Plan    1  Call [phone] if: The pain seems worse ; Status:Complete;   Done: 29WWO6600   2  Call [phone] if: The symptoms seem worse ; Status:Complete;   Done:   53MZA3665   3  Call [phone] if: You have questions or concerns about your problem ;   Status:Complete;   Done: 49GCI6989   4  (1) CBC/PLT/DIFF; Status:Resulted - Requires Verification;   Done: 82JDH4198 10:16AM   5  (1) COMPREHENSIVE METABOLIC PANEL; Status:Resulted - Requires Verification;     Done: 48FBQ3351 10:16AM   6  (1) C-REACTIVE PROTEIN; Status:Resulted - Requires Verification;   Done: 92GJP4218   10:16AM   7  (1) SED RATE; Status:Resulted - Requires Verification;   Done: 73WPE9993 10:16AM   8  Follow-up visit in 3 months Evaluation and Treatment  Follow-up  Status: Complete    Done: 26ISZ7096    9  Hydroxychloroquine Sulfate 200 MG Oral Tablet; take 2 tablets daily with food    Discussion/Summary    This is a 80-year-old female presenting today for follow-up for an undifferentiated connective tissue disease  Patient states that she has been feeling better the last few weeks  She states that she continues to have discomfort in both knees but denies any further joint pain  She states that while on vacation in June she was walking a decent amount and did notice discomfort in the knees, ankles, and feet  She did have some swelling however that swelling did resolve and she has no obvious joint swelling at this time  She has morning stiffness in the knees lasting an hour and a half  She describes some nonrestorative sleep as well as fatigue but denies difficulty with sleep  She is utilizing hydroxychloroquine and her last eye exam was March 2017  On physical examination, there is no active synovitis   Patient has normal passive range of motion of both upper and lower extremities  There is crepitus of the knees  Patient has no joint tenderness at this time  Patient's most recent labs reveal a CRP to be elevated at 17 6 as well as leukocytosis  Otherwise patient's CBC, CMP, and ESR within normal range  At this time patient's history and physical examination is most consistent with an undifferentiated connective tissue disease which appears to be active at this time despite hydroxychloroquine  We will obtain a CBC, CMP, CRP, and ESR at this time for further evaluation as patient states that she has been feeling better  She was however provided with information about methotrexate at the last appointment however she would not like to proceed at this time  Patient will return to the office in 3 months time for further evaluation however contact the office in the interim if she has any further questions or concerns  Counseling  Rheumatology Counseling Documentation: The patient was counseled regarding diagnostic results, instructions for management, prognosis, patient and family education, risks and benefits of treatment options and importance of compliance with treatment  Chief Complaint  F/U UCTD   Patient is here today for follow up of chronic conditions described in HPI  History of Present Illness  Patient is in the office today for follow up for UCTD  feeling better the last few weeks  pain in the both knees  no other joint pain  On vacation in June with walking in the knees, ankles, and feet  no obvious joint swelling  +Am stiffness in the knees x 1 5 hours  no difficulty with sleep  +some non restorative sleep  +fatigue  Taking HCQ  Last eye exam: march, 2017  RAPID3: 5 3 /30      Review of Systems    Constitutional: fatigue, but no fever, no recent weight gain, no chills and no anorexia    The patient presents with complaints of recent 12 lb weight loss (intentional )     HEENT: blurred vision and dryness mouth, but no double vision, no amaurosis fugax, no eye pain, no erythema eye(s), no mouth sores, not feeling congested and no sore throat    The patient presents with complaints of dryness of the eyes  Symptoms are improved by artificial tears  Cardiovascular: swelling in the arms or legs, but no chest pain or pressure and no dyspnea on exertion  Respiratory: no shortness of breath and no pleurisy    The patient presents with complaints of unusual or persistent cough, described as dry  Gastrointestinal: constipation, but no abdominal pain, no nausea, no vomiting, no heartburn, no melena and no BRBPR    The patient presents with complaints of diarrhea (alternating with constipation )  Genitourinary: no foamy urine, but no dysuria and no hematuria  Musculoskeletal: as noted in HPI  Integumentary no rash, no Raynaud's, no alopecia, no nail changes and no photosensitivity  Endocrine no polyuria and no polydipsia  Hematologic/Lymphatic: no unusual bleeding and no tendency for easy bruising  Neurological: headache, tingling and weakness, but no vertigo or dizziness  Psychiatric: insomnia and non-restorative sleep  Active Problems    1  Abnormal uterine bleeding (AUB) (626 9) (N93 9)   2  Arthritis (716 90) (M19 90)   3  Arthritis of both knees (716 96) (M17 0)   4  Bilateral knee pain (719 46) (M25 561,M25 562)   5  Encounter for routine gynecological examination (V72 31) (Z01 419)   6  Encounter for screening colonoscopy (V76 51) (Z12 11)   7  Encounter for screening for malignant neoplasm of colon (V76 51) (Z12 11)   8  Fatigue (780 79) (R53 83)   9  Hyperlipidemia (272 4) (E78 5)   10  Impaired fasting glucose (790 21) (R73 01)   11  Irregular heart beats (427 9) (I49 9)   12  Laboratory examination ordered as part of a routine general medical examination    (V72 62) (Z00 00)   13  Left knee pain (719 46) (M25 562)   14  Menorrhagia (626 2) (N92 0)   15   Multiple thyroid nodules (241 1) (E04 2)   16  Need for prophylactic vaccination and inoculation against influenza (V04 81) (Z23)   17  Obesity (BMI 30-39 9) (278 00) (E66 9)   18  Pap smear, as part of routine gynecological examination (V76 2) (Z01 419)   19  Polyarthralgia (719 49) (M25 50)   20  Right knee pain (719 46) (M25 561)   21  Undifferentiated connective tissue disease (710 9) (M35 9)   22  Visit for screening mammogram (V76 12) (Z12 31)    Past Medical History    1  History of Elevated blood sugar (790 29) (R73 9)   2  History of  7 (V22 2) (Z33 1)   3  History of chest pain (V13 89) (Z14 161)    The active problems and past medical history were reviewed and updated today  Surgical History    1  History of Biopsy Thyroid Using Percutaneous Core Needle   2  History of  Section   3  History of Foot Surgery   4  History of Thyroid Surgery Thyroid Lobectomy Right Lobe    The surgical history was reviewed and updated today  Family History  Mother    1  Family history of Graves' disease (V18 19) (Z83 49)   2  Family history of hyperlipidemia (V18 19) (Z83 49)   3  Family history of hypertension (V17 49) (Z82 49)   4  Family history of osteoporosis (V17 81) (Z82 62)  Father    5  Family history of aortic stenosis (V17 49) (Z82 49)   6  Family history of hyperlipidemia (V18 19) (Z83 49)   7  Family history of hypertension (V17 49) (Z82 49)   8  FH: CABG (coronary artery bypass surgery) (V17 3) (Z84 89)   9  Family history of Pacemaker Placement   10  Family history of S/P TAVR (transcatheter aortic valve replacement)  Grandfather    11  Family history of myocardial infarction (V17 3) (Z82 49)   12  FHx: stroke (V17 1) (Z82 3)    The family history was reviewed and updated today  Social History    · Employed   · Feels safe at home   ·    · Never smoked cigarettes (V49 89) (Z78 9)   · No alcohol use   · Six children   · Denied: History of Tobacco use  The social history was reviewed and updated today   The social history was reviewed and is unchanged  Current Meds   1  Calcium-Magnesium 250-125 MG Oral Tablet; Take 1 daily; Therapy: 30QGW1803 to (Evaluate:04Jan2017) Recorded   2  Cranberry 300 MG Oral Tablet; TAKE AS DIRECTED; Therapy: 71OBP0514 to Recorded   3  Daily Multiple Vitamins Oral Tablet; Therapy: (Recorded:60Rfj2049) to Recorded   4  Fiber CAPS; Therapy: (Recorded:54Omv8524) to Recorded   5  Fish Oil CAPS; Therapy: (Recorded:98Ewa1474) to Recorded   6  Hydroxychloroquine Sulfate 200 MG Oral Tablet; take 2 tablets daily with food; Therapy: 19RGN6578 to (Evaluate:08Oct2017)  Requested for: 69EUB4927; Last   Rx:10Rbb6692 Ordered   7  Magnesium TABS; Therapy: (Recorded:83Lax2825) to Recorded   8  Motrin  MG Oral Tablet; TAKE 3 TABLET Twice daily PRN pain; Therapy: ([phone]) to Recorded   9  Probiotic Oral Capsule; Therapy: (Recorded:58Miu5948) to Recorded   10  Vitamin C 500 MG Oral Tablet; TAKE 1 TABLET DAILY; Therapy: 47ZBM2219 to Recorded   11  Vitamin D3 1000 UNIT Oral Tablet; TAKE 1 TABLET DAILY; Therapy: 17NOO3532 to (Jose Juan Solorzano) Recorded    The medication list was reviewed and updated today  Immunizations  Influenza --- Melchor Webster: Temporarily Deferred: Patient reports item recently done, Through work - Fall  of 2015     Allergies    1  No Known Drug Allergies    2  No Known Environmental Allergies   3  No Known Food Allergies    Vitals  Signs   Recorded: 03Aug2017 09:57AM   Heart Rate: 80  Systolic: 149  Diastolic: 70  Height: 4 ft 11 in  Weight: 176 lb 6 oz  BMI Calculated: 35 62  BSA Calculated: 1 75    Physical Exam    Constitutional   General appearance: Abnormal   obese  Eyes   Conjunctiva and lids: No swelling, erythema or discharge  Pupils and irises: Equal, round and reactive to light      Ears, Nose, Mouth, and Throat   External inspection of ears and nose: Normal     Oropharynx: Normal with no erythema, edema, exudate lesions, or ulcers  Pulmonary   Respiratory effort: No increased work of breathing or signs of respiratory distress  Auscultation of lungs: Clear to auscultation  Cardiovascular   Auscultation of heart: Normal rate and rhythm, normal S1 and S2, without murmurs  Examination of extremities for edema and/or varicosities: Normal     Lymphatic   Palpation of lymph nodes in neck: No lymphadenopathy  Psychiatric   Orientation to person, place, and time: Normal     Mood and affect: Normal         Right Upper Extremity: Normal ROM  Left Upper Extremity: Normal ROM  Right Lower Extremity: Normal ROM  Left Lower Extremity: Normal ROM  Musculoskeletal - Joints, bones, and muscles: Abnormal  Palpation - C5, C6 and C7 tenderness, bilateral knee increased warmth and bilateral knee crepitus  Right hand: All MCP, PIP and DIP joints are normal  Left Hand: All MCP, PIP and DIP joints are normal    Right foot: All MTP, PIP and DIP joints are normal  Left foot: All MTP, PIP and DIP joints are normal       Results/Data  (1) CBC/PLT/DIFF 47Roj2054 10:16AM Andrew Petit   TW Order Number: [id number]     Test Name Result Flag Reference   WBC COUNT 3 55 Thousand/uL L 4 31-10 16   RBC COUNT 4 91 Million/uL  3 81-5 12   HEMOGLOBIN 14 1 g/dL  11 5-15 4   HEMATOCRIT 43 6 %  34 8-46  1   MCV 89 fL  82-98   MCH 28 7 pg  26 8-34 3   MCHC 32 3 g/dL  31 4-37 4   RDW 13 2 %  11 6-15 1   MPV 9 8 fL  8 9-12 7   PLATELET COUNT 147 Thousands/uL  149-390   NEUTROPHILS RELATIVE PERCENT 61 %  43-75   LYMPHOCYTES RELATIVE PERCENT 26 %  14-44   MONOCYTES RELATIVE PERCENT 9 %  4-12   EOSINOPHILS RELATIVE PERCENT 3 %  0-6   BASOPHILS RELATIVE PERCENT 1 %  0-1   NEUTROPHILS ABSOLUTE COUNT 2 18 Thousands/? ??L  1 85-7 62   LYMPHOCYTES ABSOLUTE COUNT 0 91 Thousands/? ??L  0 60-4 47   MONOCYTES ABSOLUTE COUNT 0 33 Thousand/? ??L  0 17-1 22   EOSINOPHILS ABSOLUTE COUNT 0 11 Thousand/? ??L  0 00-0 61   BASOPHILS ABSOLUTE COUNT 0 02 Thousands/? ? ? L 0  00-0 10     (1) COMPREHENSIVE METABOLIC PANEL 42LVG7126 14:06KM Geo[employer] Order Number: [id number]     Test Name Result Flag Reference   SODIUM 142 mmol/L  136-145   POTASSIUM 4 2 mmol/L  3 5-5 3   CHLORIDE 105 mmol/L  100-108   CARBON DIOXIDE 30 mmol/L  21-32   ANION GAP (CALC) 7 mmol/L  4-13   BLOOD UREA NITROGEN 14 mg/dL  5-25   CREATININE 0 90 mg/dL  0 60-1 30   Standardized to IDMS reference method   CALCIUM 9 1 mg/dL  8 3-10 1   BILI, TOTAL 0 50 mg/dL  0 20-1 00   ALK PHOSPHATAS 71 U/L     ALT (SGPT) 25 U/L  12-78   AST(SGOT) 13 U/L  5-45   ALBUMIN 3 4 g/dL L 3 5-5 0   TOTAL PROTEIN 7 3 g/dL  6 4-8 2   eGFR 73 ml/min/1 73sq m     National Kidney Disease Education Program recommendations are as follows:  GFR calculation is accurate only with a steady state creatinine  Chronic Kidney disease less than 60 ml/min/1 73 sq  meters  Kidney failure less than 15 ml/min/1 73 sq  meters  GLUCOSE FASTING 92 mg/dL  65-99     (1) C-REACTIVE PROTEIN 42Gfy8672 10:16AM GeoP[employer] Order Number: [id number]     Test Name Result Flag Reference   C-REACT PROTEIN 12 8 mg/L H <3 0     (1) SED RATE 29Amr2191 10:16AM Geo[employer] Order Number: [id number]     Test Name Result Flag Reference   SED RATE 16 mm/hour  0-20     (1) CBC/PLT/DIFF 63GQX4627 11:31AM Geo[employer] Order Number: [id number]     Test Name Result Flag Reference   WBC COUNT 3 76 Thousand/uL L 4 31-10 16   RBC COUNT 4 88 Million/uL  3 81-5 12   HEMOGLOBIN 14 1 g/dL  11 5-15 4   HEMATOCRIT 42 7 %  34 8-46  1   MCV 88 fL  82-98   MCH 28 9 pg  26 8-34 3   MCHC 33 0 g/dL  31 4-37 4   RDW 13 4 %  11 6-15 1   MPV 10 3 fL  8 9-12 7   PLATELET COUNT 874 Thousands/uL  149-390   NEUTROPHILS RELATIVE PERCENT 58 %  43-75   LYMPHOCYTES RELATIVE PERCENT 28 %  14-44   MONOCYTES RELATIVE PERCENT 11 %  4-12   EOSINOPHILS RELATIVE PERCENT 3 %  0-6   BASOPHILS RELATIVE PERCENT 0 %  0-1   NEUTROPHILS ABSOLUTE COUNT 2 16 Thousands/? ??L  1 85-7 62   LYMPHOCYTES ABSOLUTE COUNT 1 05 Thousands/? ??L  0 60-4 47   MONOCYTES ABSOLUTE COUNT 0 43 Thousand/? ??L  0 17-1 22   EOSINOPHILS ABSOLUTE COUNT 0 11 Thousand/? ??L  0 00-0 61   BASOPHILS ABSOLUTE COUNT 0 01 Thousands/? ??L  0 00-0 10     (1) COMPREHENSIVE METABOLIC PANEL 76IEA9333 47:37HY Fly Virgen   TW Order Number: [id number]     Test Name Result Flag Reference   SODIUM 142 mmol/L  136-145   POTASSIUM 4 0 mmol/L  3 5-5 3   CHLORIDE 105 mmol/L  100-108   CARBON DIOXIDE 31 mmol/L  21-32   ANION GAP (CALC) 6 mmol/L  4-13   BLOOD UREA NITROGEN 12 mg/dL  5-25   CREATININE 0 86 mg/dL  0 60-1 30   Standardized to IDMS reference method   CALCIUM 9 2 mg/dL  8 3-10 1   BILI, TOTAL 0 40 mg/dL  0 20-1 00   ALK PHOSPHATAS 66 U/L     ALT (SGPT) 29 U/L  12-78   AST(SGOT) 16 U/L  5-45   ALBUMIN 3 5 g/dL  3 5-5 0   TOTAL PROTEIN 7 3 g/dL  6 4-8 2   eGFR Non-African American      >60 0 ml/min/1 73sq [facility] Disease Education Program recommendations are as follows:  GFR calculation is accurate only with a steady state creatinine  Chronic Kidney disease less than 60 ml/min/1 73 sq  meters  Kidney failure less than 15 ml/min/1 73 sq  meters  GLUCOSE FASTING 88 mg/dL  65-99     (1) C-REACTIVE PROTEIN 01Jun2017 11:31AM Fyl Virgen   TW Order Number: [id number]     Test Name Result Flag Reference   C-REACT PROTEIN 17 6 mg/L H <3 0     (1) SED RATE 01Jun2017 11:31AM Fly Virgen   TW Order Number: [id number]     Test Name Result Flag Reference   SED RATE 14 mm/hour  0-20       Attending Note  Collaborating Physician: I did not interview and examine the patient, I discussed the case with the Advanced Practitioner and reviewed the note and I agree with the Advanced Practitioner note        Future Appointments    Date/Time Provider Specialty Site   10/26/2017 09:20 AM Fly Virgen, [facility]   01/11/2018 10:00 AM VANESSA Jackson  Internal Medicine [facility]     Signatures   Electronically signed by : Akshat Villa [facility];  Aug  3 2017 10:36AM EST                       (Author)    Electronically signed by : Escobar Martinez DO; Aug  3 2017 12:43PM EST                       (Author)

## 2018-01-11 NOTE — PROGRESS NOTES
Assessment    1  Undifferentiated connective tissue disease (710 9) (M35 9)   2  Hyperlipidemia (272 4) (E78 5)   3  Obesity (BMI 30-39 9) (278 00) (E66 9)   4  Bilateral knee pain (719 46) (M25 561,M25 562)    Plan     1  Call [phone] if: The pain seems worse ; Status:Complete;   Done: 2017   2  Call [phone] if: The symptoms seem worse ; Status:Complete;   Done:   2017   3  Call [phone] if: You have questions or concerns about your problem ;   Status:Complete;   Done: 2017   4  Follow-up visit in 3 months Evaluation and Treatment  Follow-up  Status: Complete    Done: [id number]    7  Hydroxychloroquine Sulfate 200 MG Oral Tablet; take 2 tablets daily with food    (1) CBC/PLT/DIFF; Status:Resulted - Requires Verification;   Done: 23KCD5953 12:00AM  [id number]; Ordered;    For:Hyperlipidemia, Undifferentiated connective tissue disease; Ordered By:Maria Alejandra Valiente;   (1) COMPREHENSIVE METABOLIC PANEL; Status:Resulted - Requires Verification;   Done: 99SAM2665 12:00AM  [id number]; Ordered;    For:Hyperlipidemia, Undifferentiated connective tissue disease; Ordered By:Maria Alejandra Valiente;   (1) C-REACTIVE PROTEIN; Status:Resulted - Requires Verification;   Done: 28JKZ7096 12:00AM  [id number]; Ordered;    For:Hyperlipidemia, Undifferentiated connective tissue disease; Ordered By:Maria Alejandra Valiente;   (1) SED RATE; Status:Resulted - Requires Verification;   Done: 19ERM2564 12:00AM  [id number]; Ordered;    For:Hyperlipidemia, Undifferentiated connective tissue disease; Ordered By:Maria Alejandra Valiente; Discussion/Summary    This is a 72-year-old female presenting today for follow-up for an undifferentiated connective tissue disease  Patient states that she's been feeling well since last appointment however continues to have pain mostly with walking and activity  She describes pain to be mostly in her knees and feet but she does have occasional wrist discomfort   She does note numbness in the feet as well as weakness of the risks and the ankles intermittently  She does note swelling of the knees and wrists as well as morning stiffness lasting about an hour  She also describes stiffness with prolonged sitting  She is no difficulty with sleep but does describe nonrestorative sleep as well as fatigue  Her last eye exam was within last year  She is utilizing hydroxychloroquine  On physical examination, there is no active synovitis  Patient has normal passive range of motion of both upper and lower extremities  She does have some cervical spine tenderness as well as increased warmth of both knees  There is crepitus in the knees  At this time patient's history and physical examination is most consistent with an undifferentiated connective tissue disease which appears to be active at this time with the use of hydroxychloroquine  The patient does not wish to make any further changes at this time in her medication regimen  She was given options of methotrexate at the last appointment  Patient will plan to return to the office in 3 months time for further evaluation however contact the office in the interim if she has any further questions or concerns  We will obtain a CBC, CMP, CRP, and ESR before her next follow-up however she will contact our office if she has any further questions or concerns or would like to starting a medication in the interim  Counseling  Rheumatology Counseling Documentation: The patient was counseled regarding instructions for management, prognosis, patient and family education, risks and benefits of treatment options and importance of compliance with treatment  Chief Complaint  F/U UCTD   Patient is here today for follow up of chronic conditions described in HPI  History of Present Illness  Patient is in the office today for follow up for UCTD  Feeling okay at this time  Still with pain with walking and activity  Pain in the knees and the feet  Numbness in the feet   +Some fatigue  Weakness in the wrists and ankles intermittently  +Swelling in the knees and wrists  +Am stiffness or prolonged sitting x 1 hour  No difficulty with sleep  +Non restorative sleep  Last eye exam: within the year  Taking HCQ  RAPID3: 11 3/30      Review of Systems    Constitutional: "hot flashes", recent 6 lb weight gain and fatigue, but no fever, no chills and no anorexia    The patient presents with complaints of no recent weight loss (intentional )  HEENT: blurred vision and dryness mouth, but no double vision, no amaurosis fugax, no eye pain, no erythema eye(s), no mouth sores, not feeling congested and no sore throat    The patient presents with complaints of dryness of the eyes  Symptoms are improved by artificial tears  Cardiovascular: dyspnea on exertion and swelling in the arms or legs    The patient presents with complaints of chest pain or pressure (related to stress)  Respiratory: no shortness of breath and no pleurisy    The patient presents with complaints of no unusual or persistent cough, described as dry  Gastrointestinal: constipation, but no abdominal pain, no nausea, no vomiting, no heartburn, no melena and no BRBPR    The patient presents with complaints of diarrhea (alternating with constipation )  Genitourinary: no foamy urine, but no dysuria and no hematuria  Musculoskeletal: as noted in HPI  Integumentary rash, but no Raynaud's, no alopecia, no nail changes and no photosensitivity  Endocrine no polyuria and no polydipsia  Hematologic/Lymphatic: no unusual bleeding and no tendency for easy bruising  Neurological: headache, tingling and weakness, but no vertigo or dizziness  Psychiatric: insomnia and non-restorative sleep  Active Problems    1  Abnormal uterine bleeding (AUB) (626 9) (N93 9)   2  Arthritis (716 90) (M19 90)   3  Arthritis of both knees (716 96) (M17 0)   4  Bilateral knee pain (719 46) (M25 561,M25 562)   5   Encounter for routine gynecological examination (V72 31) (Z01 419)   6  Encounter for screening colonoscopy (V76 51) (Z12 11)   7  Encounter for screening for malignant neoplasm of colon (V76 51) (Z12 11)   8  Fatigue (780 79) (R53 83)   9  Hyperlipidemia (272 4) (E78 5)   10  Impaired fasting glucose (790 21) (R73 01)   11  Irregular heart beats (427 9) (I49 9)   12  Laboratory examination ordered as part of a routine general medical examination    (V72 62) (Z00 00)   13  Left knee pain (719 46) (M25 562)   14  Menorrhagia (626 2) (N92 0)   15  Multiple thyroid nodules (241 1) (E04 2)   16  Need for prophylactic vaccination and inoculation against influenza (V04 81) (Z23)   17  Obesity (BMI 30-39 9) (278 00) (E66 9)   18  Pap smear, as part of routine gynecological examination (V76 2) (Z01 419)   19  Polyarthralgia (719 49) (M25 50)   20  Right knee pain (719 46) (M25 561)   21  Undifferentiated connective tissue disease (710 9) (M35 9)   22  Visit for screening mammogram (V76 12) (Z12 31)    Past Medical History    1  History of Elevated blood sugar (790 29) (R73 9)   2  History of  7 (V22 2) (Z33 1)   3  History of chest pain (V13 89) (Z41 371)    The active problems and past medical history were reviewed and updated today  Surgical History    1  History of Biopsy Thyroid Using Percutaneous Core Needle   2  History of  Section   3  History of Foot Surgery   4  History of Thyroid Surgery Thyroid Lobectomy Right Lobe    The surgical history was reviewed and updated today  Family History  Mother    1  Family history of Graves' disease (V18 19) (Z83 49)   2  Family history of hyperlipidemia (V18 19) (Z83 49)   3  Family history of hypertension (V17 49) (Z82 49)   4  Family history of osteoporosis (V17 81) (Z82 62)  Father    5  Family history of aortic stenosis (V17 49) (Z82 49)   6  Family history of hyperlipidemia (V18 19) (Z83 49)   7  Family history of hypertension (V17 49) (Z82 49)   8   FH: CABG (coronary artery bypass surgery) (V17 3) (Z84 89)   9  Family history of Pacemaker Placement   10  Family history of S/P TAVR (transcatheter aortic valve replacement)  Grandfather    11  Family history of myocardial infarction (V17 3) (Z82 49)   12  FHx: stroke (V17 1) (Z82 3)    The family history was reviewed and updated today  Social History    · Employed   · Feels safe at home   ·    · Never smoked cigarettes (V49 89) (Z78 9)   · No alcohol use   · Six children   · Denied: History of Tobacco use  The social history was reviewed and updated today  The social history was reviewed and is unchanged  Current Meds   1  Calcium-Magnesium 250-125 MG Oral Tablet; Take 1 daily; Therapy: 25QSD7888 to (Evaluate:04Jan2017) Recorded   2  Cranberry 300 MG Oral Tablet; TAKE AS DIRECTED; Therapy: 18TCE3119 to Recorded   3  Daily Multiple Vitamins Oral Tablet; Therapy: (Recorded:02Ilo2585) to Recorded   4  Fiber CAPS; Therapy: (Recorded:21Dmv8569) to Recorded   5  Fish Oil CAPS; Therapy: (Recorded:55Twv2555) to Recorded   6  Hydroxychloroquine Sulfate 200 MG Oral Tablet; take 2 tablets daily with food; Therapy: 71CXQ2546 to (Paty Carrillo)  Requested for: 03Aug2017; Last   Rx:03Aug2017 Ordered   7  Magnesium TABS; Therapy: (Recorded:64Kue8175) to Recorded   8  Motrin  MG Oral Tablet; TAKE 3 TABLET Twice daily PRN pain; Therapy: (Jorge Pina) to Recorded   9  Probiotic Oral Capsule; Therapy: (Recorded:06Eek5211) to Recorded   10  Vitamin C 500 MG Oral Tablet; TAKE 1 TABLET DAILY; Therapy: 51ZML2914 to Recorded   11  Vitamin D3 1000 UNIT Oral Tablet; TAKE 1 TABLET DAILY; Therapy: 73ADD3691 to (Alexis Genao) Recorded    The medication list was reviewed and updated today  Immunizations  Influenza --- Samantha Maryann: Temporarily Deferred: Patient reports item recently done, Through work - Fall  of 2015     Allergies    1  No Known Drug Allergies    2   No Known Environmental Allergies   3  No Known Food Allergies    Vitals  Signs   Recorded: 18GCX8592 09:32AM   Heart Rate: 74  Systolic: 705  Diastolic: 70  Height: 4 ft 11 in  Weight: 182 lb   BMI Calculated: 36 76  BSA Calculated: 1 77    Physical Exam    Constitutional   General appearance: Abnormal   obese  Eyes   Conjunctiva and lids: No swelling, erythema or discharge  Pupils and irises: Equal, round and reactive to light  Ears, Nose, Mouth, and Throat   External inspection of ears and nose: Normal     Oropharynx: Normal with no erythema, edema, exudate lesions, or ulcers  Pulmonary   Respiratory effort: No increased work of breathing or signs of respiratory distress  Auscultation of lungs: Clear to auscultation  Cardiovascular   Auscultation of heart: Normal rate and rhythm, normal S1 and S2, without murmurs  Examination of extremities for edema and/or varicosities: Normal     Lymphatic   Palpation of lymph nodes in neck: No lymphadenopathy  Psychiatric   Orientation to person, place, and time: Normal     Mood and affect: Normal         Right Upper Extremity: Normal ROM  Left Upper Extremity: Normal ROM  Right Lower Extremity: Normal ROM  Left Lower Extremity: Normal ROM  Musculoskeletal - Joints, bones, and muscles: Abnormal  Palpation - C5, C6 and C7 tenderness, bilateral knee increased warmth and bilateral knee crepitus  Right hand: All MCP, PIP and DIP joints are normal  Left Hand: All MCP, PIP and DIP joints are normal    Right foot: All MTP, PIP and DIP joints are normal  Left foot: All MTP, PIP and DIP joints are normal       Future Appointments    Date/Time Provider Specialty Site   01/25/2018 09:30 AM Yessi Rebolledo [facility]   01/11/2018 10:00 AM VANESSA Silver   Internal Medicine [facility]     Signatures   Electronically signed by : Rosanne Middleton [facility]; Oct 26 2017  9:53AM EST                       (Author)    Electronically signed by :  VANESSA Lundberg ; Nov 7 2017  6:16PM EST                       (Author)

## 2018-01-12 VITALS
WEIGHT: 182 LBS | DIASTOLIC BLOOD PRESSURE: 70 MMHG | BODY MASS INDEX: 36.69 KG/M2 | SYSTOLIC BLOOD PRESSURE: 118 MMHG | HEIGHT: 59 IN | HEART RATE: 74 BPM

## 2018-01-12 NOTE — PROGRESS NOTES
Assessment    1  Undifferentiated connective tissue disease (710 9) (M35 9)   2  Hyperlipidemia (272 4) (E78 5)   3  Obesity (BMI 30-39 9) (278 00) (E66 9)   4  Bilateral knee pain (719 46) (M25 561,M25 562)    Plan    1  (1) CBC/PLT/DIFF; Status:Active; Requested for:99Ccr8889;    2  (1) COMPREHENSIVE METABOLIC PANEL; Status:Active; Requested for:13Ctu0182;    3  (1) C-REACTIVE PROTEIN; Status:Active; Requested for:29Unb7734;    4  (1) SED RATE; Status:Active; Requested for:06Apr2017;    5  Call [phone] if: The pain seems worse ; Status:Complete;   Done: 93MXN9008   6  Call [phone] if: The symptoms seem worse ; Status:Complete;   Done:   20FSU1941   7  Call [phone] if: You have questions or concerns about your problem ;   Status:Complete;   Done: 88AYN4365   8  Follow-up visit in 2 months Evaluation and Treatment  Follow-up  Status: Complete    Done: 45BHA5796    Discussion/Summary    This is a 51-year-old female presenting today for an undifferentiated connective tissue disease  Patient states that she did not start hydroxychloroquine as she did not feel the need to  She states that she was feeling well up until last week when she developed more severe joint pain  She describes her pain to be in bilateral knees, ankles, feet, and wrists  She also describes left hip pain  She denies any obvious joint swelling but does describe morning stiffness to last about an hour and a half  She does describe severe fatigue  She denies difficulty with sleep or nonrestorative sleep  She's been utilizing Aleve or Motrin or Tylenol depending on the day for her joint pain with minimal relief  On physical examination, there is no active synovitis  Patient does have tenderness of the MCPs and PIPs bilaterally  She also has tenderness of bilateral wrists as well as the left knee and left ankle  Patient does have increased warmth of the left knee as well as crepitus of knees   Patient's labs reveal a significantly elevated CRP of 32 2 as well as a slightly elevated sedimentation rate of 32  Patient's vitamin D was slightly low at 29 8  All other autoimmune serologies were negative and patient had a negative hepatitis panel and HIV  At this time patient's history and physical examination as well as laboratory studies are most consistent with an undifferentiated connective tissue disease which appears to be active at this time off of treatment  Patient would like to repeat blood work including a CBC, CMP, CRP, and ESR at this time  Patient states that she would like to wait until she receives the results of her updated blood work and if inflammation remains elevated she would like to proceed with the start of hydroxychloroquine  The medication was discussed at length with her and she was provided with a handout about hydroxychloroquine  She was made aware she needs an eye exam at least once a year and she did have one last week  Patient will plan to return to the office in 2 months time however will contact the office in the interim to follow-up on her lab work as well as for questions or concerns  Counseling  Rheumatology Counseling Documentation: The patient was counseled regarding diagnostic results, instructions for management, prognosis, patient and family education, risks and benefits of treatment options and importance of compliance with treatment  The following educational handouts were provided: Hydroxychloroquine  Chief Complaint  F/U UCTD   Patient is here today for follow up of chronic conditions described in HPI  History of Present Illness  Patient is in the office today for follow up for UCTD  Did not start HCQ due to feeling okay  Last week started to get worse  Pain in the knees, wrists, ankle, and feet  Also with left hip pain  +Fatigue  no obvious joint swelling  AM stiffness x 1 5 hour  No difficulty with sleep  No non restorative sleep   For severe pain she is using Aleve or Motrin or Tylenol with minimal relief  RAPID3: 8 5/30      Review of Systems    Constitutional: fatigue and recent 4 lb weight loss, but no fever, no recent weight gain, no chills and no anorexia  HEENT: no blurred vision, no double vision, no amaurosis fugax, no eye pain, no erythema eye(s), no dryness mouth, no mouth sores, not feeling congested and no sore throat    The patient presents with complaints of dryness of the eyes  Symptoms are improved by artificial tears  Cardiovascular: dyspnea on exertion, but no chest pain or pressure and no swelling in the arms or legs  Respiratory: no unusual or persistent cough, no shortness of breath and no pleurisy  Gastrointestinal: no abdominal pain, no nausea, no vomiting, no heartburn, no diarrhea, no constipation, no melena and no BRBPR  Genitourinary: No foamy urine, but no dysuria and no hematuria  Musculoskeletal: as noted in HPI  Integumentary no rash, no Raynaud's, no alopecia, no nail changes and no photosensitivity  Endocrine no polyuria and no polydipsia  Hematologic/Lymphatic: no unusual bleeding and no tendency for easy bruising  Neurological: headache, tingling and weakness, but no vertigo or dizziness  Psychiatric: no insomnia and no non-restorative sleep  Active Problems    1  Abnormal uterine bleeding (AUB) (626 9) (N93 9)   2  Arthritis (716 90) (M19 90)   3  Arthritis of both knees (716 96) (M17 0)   4  Bilateral knee pain (719 46) (M25 561,M25 562)   5  Encounter for routine gynecological examination (V72 31) (Z01 419)   6  Encounter for screening colonoscopy (V76 51) (Z12 11)   7  Encounter for screening for malignant neoplasm of colon (V76 51) (Z12 11)   8  Fatigue (780 79) (R53 83)   9  Hyperlipidemia (272 4) (E78 5)   10  Impaired fasting glucose (790 21) (R73 01)   11  Irregular heart beats (427 9) (I49 9)   12  Laboratory examination ordered as part of a routine general medical examination    (V72 62) (Z00 00)   13   Left knee pain (719 46) (M25 562)   14  Left thyroid nodule (241 0) (E04 1)   15  Menorrhagia (626 2) (N92 0)   16  Need for prophylactic vaccination and inoculation against influenza (V04 81) (Z23)   17  Obesity (BMI 30-39 9) (278 00) (E66 9)   18  Pap smear, as part of routine gynecological examination (V76 2) (Z01 419)   19  Polyarthralgia (719 49) (M25 50)   20  Postsurgical hypothyroidism (244 0) (E89 0)   21  Right knee pain (719 46) (M25 561)   22  Undifferentiated connective tissue disease (710 9) (M35 9)   23  Visit for screening mammogram (V76 12) (Z12 31)    Past Medical History    1  History of Elevated blood sugar (790 29) (R73 9)   2  History of  7 (V22 2) (Z33 1)   3  History of chest pain (V13 89) (W24 215)    The active problems and past medical history were reviewed and updated today  Surgical History    1  History of Biopsy Thyroid Using Percutaneous Core Needle   2  History of  Section   3  History of Foot Surgery   4  History of Thyroid Surgery Thyroid Lobectomy Right Lobe    The surgical history was reviewed and updated today  Family History  Mother    1  Family history of Graves' disease (V18 19) (Z83 49)   2  Family history of hyperlipidemia (V18 19) (Z83 49)   3  Family history of hypertension (V17 49) (Z82 49)   4  Family history of osteoporosis (V17 81) (Z82 62)  Father    5  Family history of aortic stenosis (V17 49) (Z82 49)   6  Family history of hyperlipidemia (V18 19) (Z83 49)   7  Family history of hypertension (V17 49) (Z82 49)   8  FH: CABG (coronary artery bypass surgery) (V17 3) (Z84 89)   9  Family history of Pacemaker Placement   10  Family history of S/P TAVR (transcatheter aortic valve replacement)  Grandfather    11  Family history of myocardial infarction (V17 3) (Z82 49)   12  FHx: stroke (V17 1) (Z82 3)    The family history was reviewed and updated today         Social History    · Employed   · Feels safe at home   ·    · Never smoked cigarettes (V49 89) (Z78 9)   · No alcohol use   · Six children   · Denied: History of Tobacco use  The social history was reviewed and updated today  The social history was reviewed and is unchanged  Current Meds   1  Calcium-Magnesium 250-125 MG Oral Tablet; Take 1 daily; Therapy: 53OTY5326 to (Evaluate:75Epm9474) Recorded   2  Cranberry 300 MG Oral Tablet; TAKE AS DIRECTED; Therapy: 30CUC4473 to Recorded   3  Daily Multiple Vitamins Oral Tablet; Therapy: (Recorded:41Stl0475) to Recorded   4  Fiber CAPS; Therapy: (Recorded:38Sdv5046) to Recorded   5  Fish Oil CAPS; Therapy: (Recorded:02Oqb4140) to Recorded   6  Magnesium TABS; Therapy: (Recorded:44Mbt1647) to Recorded   7  Motrin  MG Oral Tablet; TAKE 3 TABLET Twice daily PRN pain; Therapy: (608 676 542) to Recorded   8  Probiotic Oral Capsule; Therapy: (Recorded:50Nwq2511) to Recorded   9  Vitamin C 500 MG Oral Tablet; TAKE 1 TABLET DAILY; Therapy: 19YVL8917 to Recorded   10  Vitamin D3 1000 UNIT Oral Tablet; TAKE 1 TABLET DAILY; Therapy: 37GFG7592 to (Stephanie Ang) Recorded    The medication list was reviewed and updated today  Immunizations  Influenza --- [place]: Temporarily Deferred: Patient reports item recently done, Through work - Fall  of 2015     Allergies    1  No Known Drug Allergies    2  No Known Environmental Allergies   3  No Known Food Allergies    Vitals  Signs   Recorded: 24HLF1742 10:02AM   Heart Rate: 82  Systolic: 623  Diastolic: 74  Weight: 302 lb   BMI Calculated: 38 17  BSA Calculated: 1 8    Physical Exam    Constitutional   General appearance: Abnormal   obese  Eyes   Conjunctiva and lids: No swelling, erythema or discharge  Pupils and irises: Equal, round and reactive to light  Ears, Nose, Mouth, and Throat   External inspection of ears and nose: Normal     Oropharynx: Normal with no erythema, edema, exudate lesions, or ulcers      Pulmonary   Respiratory effort: No increased work of breathing or signs of respiratory distress  Auscultation of lungs: Clear to auscultation  Cardiovascular   Auscultation of heart: Normal rate and rhythm, normal S1 and S2, without murmurs  Examination of extremities for edema and/or varicosities: Normal     Lymphatic   Palpation of lymph nodes in neck: No lymphadenopathy  Psychiatric   Orientation to person, place, and time: Normal     Mood and affect: Normal         Right wrist tenderness  Left wrist tenderness  Left ankle tenderness  Left knee tenderness  Right Upper Extremity: Normal ROM  Left Upper Extremity: Normal ROM  Right Lower Extremity: Normal ROM  Left Lower Extremity: Normal ROM  Musculoskeletal - Joints, bones, and muscles: Abnormal  Palpation - left knee increased warmth and bilateral knee crepitus  The patient has tenderness in all MCP and PIP joints of the right hand  The patient has tenderness in all MCP and PIP joints of the left hand  Right foot: All MTP, PIP and DIP joints are normal  Left foot: All MTP, PIP and DIP joints are normal       Results/Data  (1) DELIA SCREEN W/REFLEX TO TITER/PATTERN 93WLD3297 10:26AM [facility] Order Number: [id number]     Test Name Result Flag Reference   DELIA SCREEN  Negative  Negative     (1) CBC/PLT/DIFF 52EDH1007 10:26AM [facility] Order Number: [id number]     Test Name Result Flag Reference   WBC COUNT 5 22 Thousand/uL  4 31-10 16   RBC COUNT 4 88 Million/uL  3 81-5 12   HEMOGLOBIN 14 0 g/dL  11 5-15 4   HEMATOCRIT 42 3 %  34 8-46  1   MCV 87 fL  82-98   MCH 28 7 pg  26 8-34 3   MCHC 33 1 g/dL  31 4-37 4   RDW 13 0 %  11 6-15 1   MPV 9 6 fL  8 9-12 7   PLATELET COUNT 622 Thousands/uL  149-390   NEUTROPHILS RELATIVE PERCENT 69 %  43-75   LYMPHOCYTES RELATIVE PERCENT 20 %  14-44   MONOCYTES RELATIVE PERCENT 8 %  4-12   EOSINOPHILS RELATIVE PERCENT 3 %  0-6   BASOPHILS RELATIVE PERCENT 0 %  0-1   NEUTROPHILS ABSOLUTE COUNT 3 64 Thousands/? L 1  85-7 62   LYMPHOCYTES ABSOLUTE COUNT 1 02 Thousands/?L  0 60-4 47   MONOCYTES ABSOLUTE COUNT 0 42 Thousand/?L  0 17-1 22   EOSINOPHILS ABSOLUTE COUNT 0 13 Thousand/?L  0 00-0 61   BASOPHILS ABSOLUTE COUNT 0 01 Thousands/?L  0 00-0 10   - Patient Instructions: This bloodwork is non-fasting  Please drink two glasses of water morning of bloodwork  - Patient Instructions: This bloodwork is non-fasting  Please drink two glasses of water morning of bloodwork  (1) CHRONIC HEPATITIS PANEL 28BFL9794 10:26AM Racquel Medrano    Order Number: [id number]     Test Name Result Flag Reference   HEPATITIS B SURFACE ANTIGEN Non-reactive  Non-reactive, NonReactive - Confirmed   HEPATITIS C ANTIBODY Non-reactive  Non-reactive   HEPATITIS B CORE IGM ANTIBODY Non-reactive  Non-reactive   HEPATITIS B CORE TOTAL ANTIBODY Non-reactive  Non-reactive     (1) COMPREHENSIVE METABOLIC PANEL 40MUG8625 23:42NR Olga Lidia Clifton Order Number: [id number]     Test Name Result Flag Reference   GLUCOSE,RANDM 97 mg/dL     If the patient is fasting, the ADA then defines impaired fasting glucose as > 100 mg/dL and diabetes as > or equal to 123 mg/dL     SODIUM 138 mmol/L  136-145   POTASSIUM 4 1 mmol/L  3 5-5 3   CHLORIDE 102 mmol/L  100-108   CARBON DIOXIDE 29 mmol/L  21-32   ANION GAP (CALC) 7 mmol/L  4-13   BLOOD UREA NITROGEN 15 mg/dL  5-25   CREATININE 0 63 mg/dL  0 60-1 30   Standardized to IDMS reference method   CALCIUM 8 8 mg/dL  8 3-10 1   BILI, TOTAL 0 30 mg/dL  0 20-1 00   ALK PHOSPHATAS 78 U/L     ALT (SGPT) 26 U/L  12-78   AST(SGOT) 13 U/L  5-45   ALBUMIN 3 4 g/dL L 3 5-5 0   TOTAL PROTEIN 7 7 g/dL  6 4-8 2   eGFR Non-African American      >60 0 ml/min/1 73sq [facility] Disease Education Program recommendations are as follows:  GFR calculation is accurate only with a steady state creatinine  Chronic Kidney disease less than 60 ml/min/1 73 sq  meters  Kidney failure less than 15 ml/min/1 73 sq  meters  (1) C-REACTIVE PROTEIN 82CZN7737 10:26AM [facility] Order Number: [id number]     Test Name Result Flag Reference   C-REACT PROTEIN 32 2 mg/L H <5 8     (1) CYCLIC CITRULLINATED PEPTIDE 45HAV5125 10:26AM [facility]   TW Order Number: [id number]     Test Name Result Flag Reference   CYCLIC CITRULLINATED PEPTIDE 7 units  0 - 19   Negative               <20                            Weak positive      20 - 39                            Moderate positive  40 - 59                            Strong positive        >59    Performed at:  [address]  [id number]  : Claudia Rasmussen MD, Phone:  [phone]     (1) HIV AG/AB Francisco Ip GEN 06PTC9037 10:26AM [facility] Order Number: [id number]     Test Name Result Flag Reference   HIV 1/2 AND P24 Non-Reactive  Non-Reactive   This test detects HIV 1, HIV2 and p24 Antigen  (1) HLA B27 26QIH7319 10:26AM [facility] Order Number: [id number]     Test Name Result Flag Reference   HLA B27 Negative     HLA-B*27 Negative  HLA allele interpretation for all loci based on IMGT/HLA  database version 3 25  [facility] CLIA ID Number [id number]  This test was performed using PCR (Polymerase Chain Reaction)/SSOP  (Sequence Specific Oligonucleotide Probes) technique  SBT (Sequence  Based Typing) and/or SSP (Sequence Specific Primers) may be used as  supplemental methods when necessary  Please contact HLA Customer  Service at [phone] if you have any questions     Director of [address] [place] [address],[address] Floor Laboratory   Dr Jacklyn Salazar, PhD    Performed at:  [address]  [id number]  : Suly Cano PhD, Phone:  [phone]     (1) RHEUMATOID FACTOR SCREEN 31XHY8858 10:26AM [facility]der [facility] Order Number: [id number]     Test Name Result Flag Reference   RHEUMATOID FACTOR Negative  Negative     (1) SED RATE 98EDW1108 10:26AM Katia Lindo    Order Number: [id number]     Test Name Result Flag Reference   SED RATE 32 mm/hour H 0-20     (1) SJOGRENS ANTIBODIES 14FEZ6576 10:26AM Katia Lindo    Order Number: [id number]     Test Name Result Flag Reference   SS-A <0 2 AI  0 0 - 0 9   SS-B <0 2 AI  0 0 - 0 9   Performed at:  [facility] [address]  [id number]  : Matheus Thomas MD, Phone:  [phone]     (1) TSH WITH FT4 REFLEX 63IPS2673 10:26AM Katia Lindo    Order Number: [id number]     Test Name Result Flag Reference   TSH 3 097 uIU/mL  0 358-3 740   Patients undergoing fluorescein dye angiography may retain small amounts of fluorescein in the body for 48-72 hours post procedure  Samples containing fluorescein can produce falsely depressed TSH values  If the patient had this procedure,a specimen should be resubmitted post fluorescein clearance  The recommended reference ranges for TSH during pregnancy are as follows:  First trimester 0 1 to 2 5 uIU/mL  Second trimester  0 2 to 3 0 uIU/mL  Third trimester 0 3 to 3 0 uIU/m     (1) VITAMIN D 25-HYDROXY 27Jan2017 10:26AM Katia Lindo    Order Number: [id number]     Test Name Result Flag Reference   VIT D 25-HYDROX 29 8 ng/mL L 30 0-100 0   This assay is a certified procedure of the CDC Vitamin D Standardization Certification Program (VDSCP)     Deficiency <20ng/ml   Insufficiency 20-30ng/ml   Sufficient  ng/ml     *Patients undergoing fluorescein dye angiography may retain small amounts of fluorescein in the body for 48-72 hours post procedure  Samples containing fluorescein can produce falsely elevated Vitamin D values  If the patient had this procedure, a specimen should be resubmitted post fluorescein clearance         Attending Note  Collaborating Physician: I did not interview and examine the patient, I discussed the case with the Advanced Practitioner and reviewed the note and I agree with the Advanced Practitioner note  Future Appointments    Date/Time Provider Specialty Site   06/01/2017 10:20 AM LUCY Novak Rheumatology [facility]   07/14/2017 10:30 AM VANESSA Jackson  [facility]   Electronically signed by : LUCY Dee; Apr 6 2017 10:35AM EST                       (Author)    Electronically signed by : Escobar Martinez DO;  Apr 6 2017 11:15AM EST                       (Author)

## 2018-01-12 NOTE — PROGRESS NOTES
Assessment    1  Undifferentiated connective tissue disease (710 9) (M35 9)   2  Hyperlipidemia (272 4) (E78 5)   3  Obesity (BMI 30-39 9) (278 00) (E66 9)   4  Bilateral knee pain (719 46) (M25 561,M25 562)    Plan    1  (1) CBC/PLT/DIFF; Status:Active; Requested JHO:24UJA8648;    2  (1) COMPREHENSIVE METABOLIC PANEL; Status:Active; Requested SIN:37SLC4628;    3  (1) C-REACTIVE PROTEIN; Status:Active; Requested XWU:14REO8657;    4  (1) SED RATE; Status:Active; Requested DMJ:49QYU4229;    5  Call [phone] if: The pain seems worse ; Status:Complete;   Done: 49GFD0937   6  Call [phone] if: The symptoms seem worse ; Status:Complete;   Done:   54FXM2557   7  Call [phone] if: You have questions or concerns about your problem ;   Status:Complete;   Done: 42LJA7306   8  Follow-up visit in 2 months Evaluation and Treatment  Follow-up  Status: Hold For -   Scheduling  Requested for: 01Jun2017    9  Hydroxychloroquine Sulfate 200 MG Oral Tablet; TAKE 2 TABLETS DAILY WITH   FOOD    Discussion/Summary    This is a 61-year-old female presenting today for follow-up for an undifferentiated connective tissue disease  The patient states that she's been feeling about the same since last appointment  She's been utilizing hydroxychloroquine 400 mg for the last 2 months  She is uncertain whether she has noticed any change in her pain  She does note discomfort in her knees as well as her ankles, hands, and wrists  She does note swelling in her knees as well but denies any further obvious joint swelling  She does have morning stiffness lasting about an hour and does note stiffness with prolonged sitting  She does describe nonrestorative sleep as well as fatigue but denies difficulty with sleep  Her last eye exam was 2 months ago   On physical examination, patient does have mild synovitis of the second MCP of the left hand as well as the left wrist  In addition she does have tenderness of the cervical spine as well as bilateral knees  She does have increased warmth and crepitus of the knees with mild synovitis and tenderness of the ankles  At this time patient's history and physical examination is most consistent with an undifferentiated connective tissue disease which appears to be mildly active at this time with the use of hydroxychloroquine  Patient will have updated lab work including a CBC, CMP, CRP, and ESR  She will continue on her current dose of hydroxychloroquine and she was provided with information in regards to methotrexate  The medication was discussed with her and if patient's inflammatory markers continue to remain elevated and she continues to have joint pain we may consider this medication at the next appointment  She will plan to return to the office in 2 months time however contact the office in the interim if she has any further questions or concerns  Counseling  Rheumatology Counseling Documentation: The patient was counseled regarding instructions for management, prognosis, patient and family education, risks and benefits of treatment options and importance of compliance with treatment  The following educational handouts were provided: Methotrexate  Chief Complaint  F/U UCTD   Patient is here today for follow up of chronic conditions described in HPI  History of Present Illness  Patient is in the office today for follow up for UCTD  Feeling about the same at this time  has been taking HCQ x 2 months  pain in the ankles and knees and also with hands and wrists  +Swelling in the knees  no other obvious joint swelling  +Am stiffness x 1 hour  Also with prolonged sitting  No difficulty with sleep  +non restorative sleep  +fatigue  Last eye exam: About two months ago  RAPID3:8 7 /30      Review of Systems    Constitutional: "hot flashes", fatigue and recent 10 lb weight loss, but no fever, no recent weight gain, no chills and no anorexia     HEENT: erythema eye(s), but no blurred vision, no double vision, no amaurosis fugax, no eye pain, no dryness mouth, no mouth sores, not feeling congested and no sore throat    The patient presents with complaints of dryness of the eyes  Symptoms are improved by artificial tears  Cardiovascular: swelling in the arms or legs, but no chest pain or pressure and no dyspnea on exertion  Respiratory: no unusual or persistent cough, no shortness of breath and no pleurisy  Gastrointestinal: no abdominal pain, no nausea, no vomiting, no heartburn, no diarrhea, no constipation, no melena and no BRBPR  Genitourinary: No foamy urine, but no dysuria and no hematuria  Musculoskeletal: as noted in HPI  Integumentary no rash, no Raynaud's, no alopecia, no nail changes and no photosensitivity  Endocrine no polyuria and no polydipsia  Hematologic/Lymphatic: no unusual bleeding and no tendency for easy bruising  Neurological: headache, tingling and weakness, but no vertigo or dizziness  Psychiatric: insomnia and non-restorative sleep  Active Problems    1  Abnormal uterine bleeding (AUB) (626 9) (N93 9)   2  Arthritis (716 90) (M19 90)   3  Arthritis of both knees (716 96) (M17 0)   4  Bilateral knee pain (719 46) (M25 561,M25 562)   5  Encounter for routine gynecological examination (V72 31) (Z01 419)   6  Encounter for screening colonoscopy (V76 51) (Z12 11)   7  Encounter for screening for malignant neoplasm of colon (V76 51) (Z12 11)   8  Fatigue (780 79) (R53 83)   9  Hyperlipidemia (272 4) (E78 5)   10  Impaired fasting glucose (790 21) (R73 01)   11  Irregular heart beats (427 9) (I49 9)   12  Laboratory examination ordered as part of a routine general medical examination    (V72 62) (Z00 00)   13  Left knee pain (719 46) (M25 562)   14  Left thyroid nodule (241 0) (E04 1)   15  Menorrhagia (626 2) (N92 0)   16  Need for prophylactic vaccination and inoculation against influenza (V04 81) (Z23)   17  Obesity (BMI 30-39 9) (278 00) (E66 9)   18   Pap smear, as part of routine gynecological examination (V76 2) (Z01 419)   19  Polyarthralgia (719 49) (M25 50)   20  Postsurgical hypothyroidism (244 0) (E89 0)   21  Right knee pain (719 46) (M25 561)   22  Undifferentiated connective tissue disease (710 9) (M35 9)   23  Visit for screening mammogram (V76 12) (Z12 31)    Past Medical History    1  History of Elevated blood sugar (790 29) (R73 9)   2  History of  7 (V22 2) (Z33 1)   3  History of chest pain (V13 89) (S83 217)    The active problems and past medical history were reviewed and updated today  Surgical History    1  History of Biopsy Thyroid Using Percutaneous Core Needle   2  History of  Section   3  History of Foot Surgery   4  History of Thyroid Surgery Thyroid Lobectomy Right Lobe    The surgical history was reviewed and updated today  Family History  Mother    1  Family history of Graves' disease (V18 19) (Z83 49)   2  Family history of hyperlipidemia (V18 19) (Z83 49)   3  Family history of hypertension (V17 49) (Z82 49)   4  Family history of osteoporosis (V17 81) (Z82 62)  Father    5  Family history of aortic stenosis (V17 49) (Z82 49)   6  Family history of hyperlipidemia (V18 19) (Z83 49)   7  Family history of hypertension (V17 49) (Z82 49)   8  FH: CABG (coronary artery bypass surgery) (V17 3) (Z84 89)   9  Family history of Pacemaker Placement   10  Family history of S/P TAVR (transcatheter aortic valve replacement)  Grandfather    11  Family history of myocardial infarction (V17 3) (Z82 49)   12  FHx: stroke (V17 1) (Z82 3)    The family history was reviewed and updated today  Social History    · Employed   · Feels safe at home   ·    · Never smoked cigarettes (V49 89) (Z78 9)   · No alcohol use   · Six children   · Denied: History of Tobacco use  The social history was reviewed and updated today  The social history was reviewed and is unchanged  Current Meds   1   Calcium-Magnesium 250-125 MG Oral Tablet; Take 1 daily; Therapy: 16ZQR4186 to (Evaluate:04Jan2017) Recorded   2  Cranberry 300 MG Oral Tablet; TAKE AS DIRECTED; Therapy: 21LJD6095 to Recorded   3  Daily Multiple Vitamins Oral Tablet; Therapy: (Recorded:53Wls1970) to Recorded   4  Fiber CAPS; Therapy: (Recorded:15Tyx5816) to Recorded   5  Fish Oil CAPS; Therapy: (Recorded:64Iuo6226) to Recorded   6  Hydroxychloroquine Sulfate 200 MG Oral Tablet; TAKE 2 TABLETS DAILY WITH FOOD; Therapy: 70OWY1598 to (Evaluate:85Wib4574); Last Rx:06Apr2017 Ordered   7  Magnesium TABS; Therapy: (Recorded:24Bgz3817) to Recorded   8  Motrin  MG Oral Tablet; TAKE 3 TABLET Twice daily PRN pain; Therapy: (Sandy Resendez) to Recorded   9  Probiotic Oral Capsule; Therapy: (Recorded:87Smk8893) to Recorded   10  Vitamin C 500 MG Oral Tablet; TAKE 1 TABLET DAILY; Therapy: 59GHV9869 to Recorded   11  Vitamin D3 1000 UNIT Oral Tablet; TAKE 1 TABLET DAILY; Therapy: 22GFM6599 to (Ricardo Airam) Recorded    The medication list was reviewed and updated today  Immunizations  Influenza --- Zeny Husk: Temporarily Deferred: Patient reports item recently done, Through work - Fall  of 2015     Allergies    1  No Known Drug Allergies    2  No Known Environmental Allergies   3  No Known Food Allergies    Vitals  Signs   Recorded: 01Jun2017 10:36AM   Heart Rate: 80  Systolic: 780  Diastolic: 68  Height: 4 ft 11 in  Weight: 179 lb 2 oz  BMI Calculated: 36 18  BSA Calculated: 1 76    Physical Exam    Constitutional   General appearance: Abnormal   obese  Eyes   Conjunctiva and lids: No swelling, erythema or discharge  Pupils and irises: Equal, round and reactive to light  Ears, Nose, Mouth, and Throat   External inspection of ears and nose: Normal     Oropharynx: Normal with no erythema, edema, exudate lesions, or ulcers  Pulmonary   Respiratory effort: No increased work of breathing or signs of respiratory distress      Auscultation of lungs: Clear to auscultation  Cardiovascular   Auscultation of heart: Normal rate and rhythm, normal S1 and S2, without murmurs  Examination of extremities for edema and/or varicosities: Normal     Lymphatic   Palpation of lymph nodes in neck: No lymphadenopathy  Psychiatric   Orientation to person, place, and time: Normal     Mood and affect: Normal       Left 2nd MCP tenderness and swelling  Left wrist tenderness and swelling  Right knee tenderness  Left knee tenderness  Musculoskeletal - Joints, bones, and muscles: Abnormal  Palpation - C5, C6 and C7 tenderness, bilateral knee increased warmth and bilateral knee crepitus  Right foot: All MTP, PIP and DIP joints are normal  Left foot: All MTP, PIP and DIP joints are normal       Attending Note  Collaborating Physician: I did not interview and examine the patient, I discussed the case with the Advanced Practitioner and reviewed the note and I agree with the Advanced Practitioner note  Future Appointments    Date/Time Provider Specialty Site   08/03/2017 09:40 AM Rowan Luque, [facility] Rheumatology [facility]   07/14/2017 10:30 AM VANESSA Jackson   [facility]   Electronically signed by : Akshat Villa [facility]; Jun 1 2017 10:54AM EST                       (Author)    Electronically signed by : Escobar Martinez DO; Jun 1 2017 12:09PM EST                       (Author)

## 2018-01-12 NOTE — RESULT NOTES
Verified Results  (1) TSH WITH FT4 REFLEX [id number] 09:25AM [place]   TW Order Number: [id number]     Test Name Result Flag Reference   TSH 2 620 uIU/mL  0 358-3 740   Patients undergoing fluorescein dye angiography may retain small amounts of fluorescein in the body for 48-72 hours post procedure  Samples containing fluorescein can produce falsely depressed TSH values  If the patient had this procedure,a specimen should be resubmitted post fluorescein clearance            The recommended reference ranges for TSH during pregnancy are as follows:  First trimester 0 1 to 2 5 uIU/mL  Second trimester  0 2 to 3 0 uIU/mL  Third trimester 0 3 to 3 0 uIU/m       Discussion/Summary   Your thyroid function test is normal      Signatures   Electronically signed by : VANESSA Funk ; Mar  9 2017  3:48PM EST                       (Author)

## 2018-01-13 VITALS
RESPIRATION RATE: 6 BRPM | OXYGEN SATURATION: 98 % | SYSTOLIC BLOOD PRESSURE: 111 MMHG | BODY MASS INDEX: 38.51 KG/M2 | TEMPERATURE: 98.2 F | HEIGHT: 59 IN | WEIGHT: 191 LBS | HEART RATE: 96 BPM | DIASTOLIC BLOOD PRESSURE: 54 MMHG

## 2018-01-13 NOTE — MISCELLANEOUS
Message   Recorded as Task   Date: 01/10/2016 01:49 AM, Created By: Taryn Sr   Task Name: Follow Up   Assigned To: Taryn Sr   Regarding Patient: Melba Dolan, Status: In Progress   Marquez Scruggs - 10 Saud 2016 1:49 AM     TASK CREATED  #1  Please check for a tetanus vaccination that she states she received at a visit to [facility] when she received stitches a few years ago  Alcario Olson - 11 Jan 2016 4:47 PM     TASK IN PROGRESS   Tatiana Majano - 19 Jan 2016 10:32 AM     TASK EDITED  No record of vaccine   Dara Osorio - 19 Jan 2016 11:10 AM     TASK REASSIGNED: Previously Assigned To [address] [place] [address] - 21 Jan 2016 11:04 AM     TASK EDITED  [address] HAS NO RECORD OF TETANUS VACCINE   Taryn Sr - 13 Mar 2016 3:27 PM     TASK EDITED  CTN        Active Problems    1  Abnormal uterine bleeding (AUB) (626 9) (N93 9)   2  Arthritis (716 90) (M19 90)   3  Bilateral knee pain (719 46) (M25 561,M25 562)   4  Encounter for routine gynecological examination (V72 31) (Z01 419)   5  Fatigue (780 79) (R53 83)   6  Hyperlipidemia (272 4) (E78 5)   7  Irregular heart beats (427 9) (I49 9)   8  Left thyroid nodule (241 0) (E04 1)   9  Menorrhagia (626 2) (N92 0)   10  Need for prophylactic vaccination and inoculation against influenza (V04 81) (Z23)   11  Pap smear, as part of routine gynecological examination (V76 2) (Z01 419)   12  Postsurgical hypothyroidism (244 0) (E89 0)   13  Visit for screening mammogram (V76 12) (Z12 31)    Current Meds   1  Calcium TABS; Therapy: (Recorded:51Pwb9781) to Recorded   2  Calcium-Magnesium 250-125 MG Oral Tablet; Take 1 daily; Therapy: 10QOF2907 to (Evaluate:04Jan2017) Recorded   3  Cranberry 300 MG Oral Tablet; TAKE AS DIRECTED; Therapy: 44ENN9919 to Recorded   4  Daily Multiple Vitamins Oral Tablet; Therapy: (Recorded:95Rdw2310) to Recorded   5  Fiber CAPS; Therapy: (Recorded:99Wln8552) to Recorded   6   Fish Oil CAPS;   Therapy: (Recorded:03Uxq4252) to Recorded   7  Levothyroxine Sodium 50 MCG Oral Tablet; Take 1 tablet daily on an empty stomach; Therapy: 43KLV8287 to (Sera Varma)  Requested for: 96OAY4731; Last   NH:95PYO9748 Ordered   8  Magnesium TABS; Therapy: (Recorded:29Rzp0368) to Recorded   9  Probiotic Oral Capsule; Therapy: (Recorded:17Zgo2438) to Recorded   10  Vitamin C 500 MG Oral Tablet; TAKE 1 TABLET DAILY; Therapy: 50WDG8564 to Recorded   11  Vitamin D3 1000 UNIT Oral Tablet; TAKE 1 TABLET DAILY; Therapy: 52XZG6043 to (Evaluate:64Uie1231) Recorded    Allergies    1  No Known Drug Allergies    2  No Known Environmental Allergies   3  No Known Food Allergies    Signatures   Electronically signed by :  Raul Wilkinson MD; Mar 13 2016  3:27PM EST

## 2018-01-13 NOTE — PROGRESS NOTES
Assessment   1  Undifferentiated connective tissue disease (710 9) (M35 9)   2  Fatigue (780 79) (R53 83)   3  Obesity (BMI 30-39 9) (278 00) (E66 9)   4  Hyperlipidemia (272 4) (E78 5)   5  Multiple thyroid nodules (241 1) (E04 2)    Plan   Multiple thyroid nodules    · US THYROID; Status:Hold For - Scheduling; Requested for:01Mar2018; Undifferentiated connective tissue disease    · 2 - Odell FINK, [facility]  (Rheumatology) Co-Management  *  Status: Hold For - Scheduling     Requested for: 28TVX8318  Care Summary provided  : Yes   · 2 - Arpan FINK, [facility]  (Rheumatology) Co-Management  *  Status: Hold For - Scheduling     Requested for: 54LUJ4351  Care Summary provided  : Yes    Discussion/Summary   Discussion Summary:    She will have the lipids and TSH checked as previously ordered for thyroid US in March stressed the importance of weight loss to her to improve her joint aches, fatigue is thinking of stopping the Plaquenil with rheum CRP remains elevated, she did not want to try MTX 6months      Counseling Documentation With Imm: The patient was counseled regarding diagnostic results,-- impressions  Medication SE Review and Pt Understands Tx: Possible side effects of new medications were reviewed with the patient/guardian today  The treatment plan was reviewed with the patient/guardian  The patient/guardian understands and agrees with the treatment plan      Chief Complaint   Chief Complaint Free Text Note Form: Patient presents to office today for a follow-up  Chief Complaint Chronic Condition [facility]: Patient is here today for follow up of chronic conditions described in HPI  History of Present Illness   HPI: Joint aches dexter the knees have not improved with Plaquenil into f/u with new rheumatologist  aches limit her exercise feels tired even with working part time to poor diet            Review of Systems   Complete-Female:      Constitutional: recent weight gain-- and-- feeling tired  Cardiovascular: no chest pain-- and-- no palpitations  Respiratory: no shortness of breath-- and-- no cough  Gastrointestinal: no abdominal pain  Musculoskeletal: as noted in HPI  Active Problems   1  Abnormal uterine bleeding (AUB) (626 9) (N93 9)   2  Arthritis of both knees (716 96) (M17 0)   3  Bilateral knee pain (719 46) (M25 561,M25 562)   4  Encounter for routine gynecological examination (V72 31) (Z01 419)   5  Encounter for screening colonoscopy (V76 51) (Z12 11)   6  Encounter for screening for malignant neoplasm of colon (V76 51) (Z12 11)   7  Fatigue (780 79) (R53 83)   8  Hyperlipidemia (272 4) (E78 5)   9  Impaired fasting glucose (790 21) (R73 01)   10  Irregular heart beats (427 9) (I49 9)   11  Laboratory examination ordered as part of a routine general medical examination (V72 62) (Z00 00)   12  Left knee pain (719 46) (M25 562)   13  Menorrhagia (626 2) (N92 0)   14  Multiple thyroid nodules (241 1) (E04 2)   15  Need for prophylactic vaccination and inoculation against influenza (V04 81) (Z23)   16  Obesity (BMI 30-39 9) (278 00) (E66 9)   17  Pap smear, as part of routine gynecological examination (V76 2) (Z01 419)   18  Polyarthralgia (719 49) (M25 50)   19  Right knee pain (719 46) (M25 561)   20  Undifferentiated connective tissue disease (710 9) (M35 9)   21  Visit for screening mammogram (V76 12) (Z12 31)    Past Medical History   1  History of Elevated blood sugar (790 29) (R73 9)   2  History of  7 (V22 2) (Z33 1)   3  History of chest pain (V13 89) (A51 377)  Active Problems And Past Medical History Reviewed: The active problems and past medical history were reviewed and updated today  Surgical History   1  History of Biopsy Thyroid Using Percutaneous Core Needle   2  History of  Section   3  History of Foot Surgery   4  History of Thyroid Surgery Thyroid Lobectomy Right Lobe  Surgical History Reviewed:     The surgical history was reviewed and updated today  Family History   Mother    1  Family history of Graves' disease (V18 19) (Z83 49)   2  Family history of hyperlipidemia (V18 19) (Z83 49)   3  Family history of hypertension (V17 49) (Z82 49)   4  Family history of osteoporosis (V17 81) (Z82 62)  Father    5  Family history of aortic stenosis (V17 49) (Z82 49)   6  Family history of hyperlipidemia (V18 19) (Z83 49)   7  Family history of hypertension (V17 49) (Z82 49)   8  FH: CABG (coronary artery bypass surgery) (V17 3) (Z84 89)   9  Family history of Pacemaker Placement   10  Family history of S/P TAVR (transcatheter aortic valve replacement)  Grandfather    11  Family history of myocardial infarction (V17 3) (Z82 49)   12  FHx: stroke (V17 1) (Z82 3)  Family History Reviewed: The family history was reviewed and updated today  Social History    · Employed   · Feels safe at home   ·    · Never smoked cigarettes (V49 89) (Z78 9)   · No alcohol use   · Six children   · Denied: History of Tobacco use  Social History Reviewed: The social history was reviewed and updated today  Current Meds    1  Calcium-Magnesium 250-125 MG Oral Tablet; Take 1 daily; Therapy: 10NHZ4412 to (Evaluate:04Jan2017) Recorded   2  Cranberry 300 MG Oral Tablet; TAKE AS DIRECTED; Therapy: 43PPI4500 to Recorded   3  Daily Multiple Vitamins Oral Tablet; Therapy: (Recorded:44Tcy7598) to Recorded   4  Fiber CAPS; Therapy: (Recorded:06Jdj1123) to Recorded   5  Fish Oil CAPS; Therapy: (Recorded:47Ypm6763) to Recorded   6  Hydroxychloroquine Sulfate 200 MG Oral Tablet; take 2 tablets daily with food; Therapy: 79TED7881 to (Evaluate:24Jan2018)  Requested for: 26Oct2017; Last Rx:26Oct2017     Ordered   7  Magnesium TABS; Therapy: (Recorded:68Kex0143) to Recorded   8  Motrin  MG Oral Tablet; TAKE 3 TABLET Twice daily PRN pain; Therapy: (77 385 106) to Recorded   9  Probiotic Oral Capsule;      Therapy: (Recorded:97Sqa0231) to Recorded   10  Vitamin C 500 MG Oral Tablet; TAKE 1 TABLET DAILY; Therapy: 36ZTT7491 to Recorded   11  Vitamin D3 1000 UNIT Oral Tablet; TAKE 1 TABLET DAILY; Therapy: 15PPL2013 to (Tiffani Zamora) Recorded  Medication List Reviewed: The medication list was reviewed and updated today  Allergies   1  No Known Drug Allergies  2  No Known Environmental Allergies   3  No Known Food Allergies    Vitals   Vital Signs    Recorded: 06WWD1023 10:12AM   Heart Rate 87   Systolic 182   Diastolic 74   Height 4 ft 11 in   Weight 189 lb 6 oz   BMI Calculated 38 25   BSA Calculated 1 8   O2 Saturation 97     Physical Exam        Constitutional      General appearance: No acute distress, well appearing and well nourished  obese  Ears, Nose, Mouth, and Throat      Otoscopic examination: Tympanic membranes translucent with normal light reflex  Canals patent without erythema  Oropharynx: Normal with no erythema, edema, exudate or lesions  Pulmonary      Respiratory effort: No increased work of breathing or signs of respiratory distress  Auscultation of lungs: Clear to auscultation  Cardiovascular      Auscultation of heart: Normal rate and rhythm, normal S1 and S2, without murmurs  Examination of extremities for edema and/or varicosities: Normal        Abdomen      Abdomen: Non-tender, no masses  Lymphatic      Palpation of lymph nodes in neck: No lymphadenopathy         Musculoskeletal      Gait and station: Normal        Psychiatric      Orientation to person, place, and time: Normal        Mood and affect: Normal           Signatures    Electronically signed by : VANESSA Estrada ; Jan 12 2018 12:34AM EST                       (Author)

## 2018-01-13 NOTE — MISCELLANEOUS
Message  Spoke with patient via telephone regarding her lab work  Patient would like to begin hydroxychloroquine 400 mg daily at this time  Medication was discussed at length with her and she was made aware that she will need an eye exam at least once a year  She states that her last eye exam was in January 2016  The patient's prescription was sent to the pharmacy and she will schedule for a two-month follow-up  Patient voiced understanding and thanked me        Signatures   Electronically signed by : LUCY Schilling; Feb 2 2017  1:48PM EST                       (Author)

## 2018-01-13 NOTE — RESULT NOTES
Message   #1  Please call the patient with the results of her laboratory testing  #2  Her thyroid test, TSH, is NORMAL         #3  I recommend that she continue WITHOUT medication and repeat another thyroid function blood test in 2 months  #4  It is okay to leave a message, if her communication consent allows it  Verified Results  (1) TSH WITH FT4 REFLEX [id number] 11:53AM Ane Stack   Patients undergoing fluorescein dye angiography may retain small amounts of fluorescein in the body for 48-72 hours post procedure  Samples containing fluorescein can produce falsely depressed TSH values  If the patient had this procedure,a specimen should be resubmitted post fluorescein clearance          The recommended reference ranges for TSH during pregnancy are as follows:  First trimester 0 1 to 2 5 uIU/mL  Second trimester  0 2 to 3 0 uIU/mL  Third trimester 0 3 to 3 0 uIU/m     Test Name Result Flag Reference   TSH 2 630 uIU/mL  0 358-3 740

## 2018-01-14 VITALS
WEIGHT: 179.13 LBS | DIASTOLIC BLOOD PRESSURE: 68 MMHG | HEART RATE: 80 BPM | HEIGHT: 59 IN | BODY MASS INDEX: 36.11 KG/M2 | SYSTOLIC BLOOD PRESSURE: 106 MMHG

## 2018-01-14 VITALS
SYSTOLIC BLOOD PRESSURE: 102 MMHG | WEIGHT: 176.38 LBS | HEART RATE: 80 BPM | BODY MASS INDEX: 35.56 KG/M2 | HEIGHT: 59 IN | DIASTOLIC BLOOD PRESSURE: 70 MMHG

## 2018-01-14 VITALS
DIASTOLIC BLOOD PRESSURE: 74 MMHG | SYSTOLIC BLOOD PRESSURE: 110 MMHG | HEART RATE: 82 BPM | BODY MASS INDEX: 38.17 KG/M2 | WEIGHT: 189 LBS

## 2018-01-14 VITALS
WEIGHT: 177.25 LBS | HEIGHT: 59 IN | DIASTOLIC BLOOD PRESSURE: 72 MMHG | BODY MASS INDEX: 35.73 KG/M2 | OXYGEN SATURATION: 98 % | HEART RATE: 82 BPM | SYSTOLIC BLOOD PRESSURE: 116 MMHG

## 2018-01-14 VITALS
DIASTOLIC BLOOD PRESSURE: 78 MMHG | SYSTOLIC BLOOD PRESSURE: 122 MMHG | WEIGHT: 193 LBS | HEART RATE: 80 BPM | BODY MASS INDEX: 38.98 KG/M2

## 2018-01-14 NOTE — RESULT NOTES
Message   #1  Please call the patient with the results of her laboratory testing  #2  Her thyroid function test looks well  #3  I advise her to continue with her current medication and repeat another blood test for her thyroid function in March 2016  #4  She should have a lab slip, already  Verified Results  (1) TSH WITH FT4 REFLEX 17MKY6405 10:01PM Clarissa Batch   Patients undergoing fluorescein dye angiography may retain small amounts of fluorescein in the body for 48-72 hours post procedure  Samples containing fluorescein can produce falsely depressed TSH values  If the patient had this procedure,a specimen should be resubmitted post fluorescein clearance          The recommended reference ranges for TSH during pregnancy are as follows:  First trimester 0 1 to 2 5 uIU/mL  Second trimester  0 2 to 3 0 uIU/mL  Third trimester 0 3 to 3 0 uIU/m     Test Name Result Flag Reference   TSH 2 470 uIU/mL  0 358-3 740

## 2018-01-14 NOTE — PROGRESS NOTES
Chief Complaint  Patient called in with complaints of pain in her knees, right calf and the pain is affecting her left hip, patient says she's had the pain for over a month and its gradually getting worst  patient states the pain in the calf is worst when she's walking, she also states that she has swelling in her calf  Per U71 if the patient has swelling he advise she goes to the nearest ER, if she does not have swelling we can set her up for an appointment  Patient then stated that she does not have swelling, she refused ER and wants to be seen in our office  Patient advised we will call he tomorrow if any openings are available  Addendum-she was scheduled for most visit on May 19 at 11 AM  BK  Active Problems    1  Abnormal uterine bleeding (AUB) (626 9) (N93 9)   2  Arthritis (716 90) (M19 90)   3  Bilateral knee pain (719 46) (M25 561,M25 562)   4  Encounter for routine gynecological examination (V72 31) (Z01 419)   5  Fatigue (780 79) (R53 83)   6  Hyperlipidemia (272 4) (E78 5)   7  Irregular heart beats (427 9) (I49 9)   8  Left thyroid nodule (241 0) (E04 1)   9  Menorrhagia (626 2) (N92 0)   10  Need for prophylactic vaccination and inoculation against influenza (V04 81) (Z23)   11  Pap smear, as part of routine gynecological examination (V76 2) (Z01 419)   12  Postsurgical hypothyroidism (244 0) (E89 0)   13  Visit for screening mammogram (V76 12) (Z12 31)    Current Meds   1  Calcium TABS; Therapy: (Recorded:70Vjc8776) to Recorded   2  Calcium-Magnesium 250-125 MG Oral Tablet; Take 1 daily; Therapy: 96ETH7619 to (Evaluate:95Ion5084) Recorded   3  Cranberry 300 MG Oral Tablet; TAKE AS DIRECTED; Therapy: 97IYQ0473 to Recorded   4  Daily Multiple Vitamins Oral Tablet; Therapy: (Recorded:84Wgv8500) to Recorded   5  Fiber CAPS; Therapy: (Recorded:56Ijs6010) to Recorded   6  Fish Oil CAPS; Therapy: (Recorded:80Etq3776) to Recorded   7  Levothyroxine Sodium 50 MCG Oral Tablet;  Take 1 tablet daily on an empty stomach; Therapy: 00PYM7088 to (Radha Talbot)  Requested for: 34MQL9956; Last   RD:99KZR1301 Ordered   8  Magnesium TABS; Therapy: (Recorded:21Pey6117) to Recorded   9  Probiotic Oral Capsule; Therapy: (Recorded:96Qqt5792) to Recorded   10  Vitamin C 500 MG Oral Tablet; TAKE 1 TABLET DAILY; Therapy: 57KXA1832 to Recorded   11  Vitamin D3 1000 UNIT Oral Tablet; TAKE 1 TABLET DAILY; Therapy: 54IKK4412 to (Evaluate:25Tbu0582) Recorded    Allergies    1  No Known Drug Allergies    2  No Known Environmental Allergies   3  No Known Food Allergies    Future Appointments    Date/Time Provider Specialty Site   05/19/2016 11:00 AM Shila Mancilla MD [facility]   07/05/2016 10:30 AM Shila Mancilla MD [facility]     Signatures   Electronically signed by :  Melvin Eisenmenger, MD; May 18 2016 10:31PM EST                       (Author)

## 2018-01-14 NOTE — PROGRESS NOTES
Assessment    1  Arthritis (716 90) (M19 90)   2  Polyarthralgia (719 49) (M25 50)   3  Fatigue (780 79) (R53 83)   4  Left thyroid nodule (241 0) (E04 1)   5  Obesity (BMI 30-39 9) (278 00) (E66 9)    Plan  Fatigue, Polyarthralgia    · (1) DELIA SCREEN W/REFLEX TO TITER/PATTERN; Status:Active; Requested  [id number];    · (1) CBC/PLT/DIFF; Status:Active; Requested EMJ:21VHT3374;    · (1) CHRONIC HEPATITIS PANEL; Status:Active; Requested NGN:49NBN0556;    · (1) COMPREHENSIVE METABOLIC PANEL; Status:Active; Requested DXP:23KNA3279;    · (1) C-REACTIVE PROTEIN; Status:Active; Requested ZPO:81JFR2214;    · (1) CYCLIC CITRULLINATED PEPTIDE; Status:Active; Requested FZT:59JEM6510;    · (1) HIV AG/AB COMBO, 4TH GEN; [Do Not Release]; Status:Active; Requested  XFI:20YVZ5639;    · (1) HLA B27; Status:Active; Requested LLY:10OKC3945;    · (1) RHEUMATOID FACTOR SCREEN; Status:Active; Requested LXD:75OIE0691;    · (1) SED RATE; Status:Active; Requested [id number];    · (1) SJOGRENS ANTIBODIES; Status:Active; Requested LLE:15GGJ4592;    · (1) TSH WITH FT4 REFLEX; Status:Active; Requested IQW:77RWY2417;    · (1) VITAMIN D 25-HYDROXY; Status:Active; Requested RQS:16UBK2923;    · Follow-up PRN Evaluation and Treatment  Follow-up  Status: Complete  Done:  57ALD2307  Polyarthralgia    · Call [phone] if: The pain seems worse ; Status:Complete;   Done: 86AFE5414   · Call [phone] if: The symptoms seem worse ; Status:Complete;   Done:  11USA4409   · Call [phone] if: You have questions or concerns about your problem ;  Status:Complete;   Done: 82VSN3857    Discussion/Summary  Patient is able to Self-Care  Counseling  Rheumatology Counseling Documentation: The patient was counseled regarding diagnostic results, instructions for management and impressions        Chief Complaint  patient is here to establish care      History of Present Illness  Patient reports that about 3 years ago she returned to working part time  She worked at [employer] and because the job required her to be on her feet constantly, she developed knee pain  She ended up receiving a cortisone injection to help with the pain  Three to four months later, she started developing knee pain bilaterally  She underwent xrays of her knees b/l and it showed "bone on bone " She also admits to follow up with Dr Lanis Opitz and Dr Christian Quiles to receive injections of her knees and tells me they did not alleviate any of her pain  She currently uses meloxicam, Tylenol, Motrin and naproxen prn for pain  She usually takes arthritis Tylenol 2x/day x 1-2 times per week  She has a difficult time walking up and down the stairs because of the pain  Cold and rainy weather makes it worse  She admits to stiffness in the morning lasting about 2 hours  She admits to feeling tired and fatigued, b/l ankle pain, pain in her feet, pain in both knees, pain in her wrists and fingers  Expresses she has a history of osteoarthritis in her feet, which she follows up with Pod for  Review of Systems    Constitutional: no fever and no chills  HEENT: no blurred vision, no double vision, no dryness of the eyes, no hearing loss, no dryness mouth and no mouth sores  Cardiovascular: no chest pain or pressure and no palpitations present  Respiratory: no shortness of breath and no wheezing  Gastrointestinal: no nausea, no vomiting, no diarrhea, no constipation, no melena and no BRBPR  Genitourinary: no dysuria and no hematuria  Musculoskeletal: +hip pain occasionally, arthralgias, joint swelling and pain while walking  Integumentary no rash, no Raynaud's, no alopecia and no nail changes  Endocrine no polyuria, no polydipsia and no polyphagia  Hematologic/Lymphatic: no unusual bleeding and no tendency for easy bruising  Neurological: headache  Psychiatric: anxiety, but no depression  ROS reviewed  Active Problems    1   Abnormal uterine bleeding (AUB) (353 6) (N93 9)   2  Arthritis (716 90) (M19 90)   3  Arthritis of both knees (716 96) (M19 90)   4  Bilateral knee pain (719 46) (M25 561,M25 562)   5  Encounter for routine gynecological examination (V72 31) (Z01 419)   6  Encounter for screening colonoscopy (V76 51) (Z12 11)   7  Encounter for screening for malignant neoplasm of colon (V76 51) (Z12 11)   8  Fatigue (780 79) (R53 83)   9  Hyperlipidemia (272 4) (E78 5)   10  Impaired fasting glucose (790 21) (R73 01)   11  Irregular heart beats (427 9) (I49 9)   12  Laboratory examination ordered as part of a routine general medical examination    (V72 62) (Z00 00)   13  Left knee pain (719 46) (M25 562)   14  Left thyroid nodule (241 0) (E04 1)   15  Menorrhagia (626 2) (N92 0)   16  Need for prophylactic vaccination and inoculation against influenza (V04 81) (Z23)   17  Obesity (BMI 30-39 9) (278 00) (E66 9)   18  Pap smear, as part of routine gynecological examination (V76 2) (Z01 419)   19  Postsurgical hypothyroidism (244 0) (E89 0)   20  Right knee pain (719 46) (M25 561)   21  Visit for screening mammogram (V76 12) (Z12 31)    Past Medical History    1  History of Elevated blood sugar (790 29) (R73 9)   2  History of  7 (V22 2) (Z33 1)   3  History of chest pain (V13 89) (A51 963)    Surgical History    1  History of Biopsy Thyroid Using Percutaneous Core Needle   2  History of  Section   3  History of Foot Surgery   4  History of Thyroid Surgery Thyroid Lobectomy Right Lobe    Family History  Mother    1  Family history of Graves' disease (V18 19) (Z83 49)   2  Family history of hyperlipidemia (V18 19) (Z83 49)   3  Family history of hypertension (V17 49) (Z82 49)   4  Family history of osteoporosis (V17 81) (Z82 62)  Father    5  Family history of aortic stenosis (V17 49) (Z82 49)   6  Family history of hyperlipidemia (V18 19) (Z83 49)   7  Family history of hypertension (V17 49) (Z82 49)   8   FH: CABG (coronary artery bypass surgery) (V17 3) (Z84 89)   9  Family history of Pacemaker Placement   10  Family history of S/P TAVR (transcatheter aortic valve replacement)  Grandfather    11  Family history of myocardial infarction (V17 3) (Z82 49)   12  FHx: stroke (V17 1) (Z82 3)    Social History    · Employed   · Feels safe at home   ·    · Never smoked cigarettes (V49 89) (Z78 9)   · No alcohol use   · Six children   · Denied: History of Tobacco use    Current Meds   1  Calcium-Magnesium 250-125 MG Oral Tablet; Take 1 daily; Therapy: 49MWU0774 to (Evaluate:04Jan2017) Recorded   2  Cranberry 300 MG Oral Tablet; TAKE AS DIRECTED; Therapy: 98QIJ7480 to Recorded   3  Daily Multiple Vitamins Oral Tablet; Therapy: (Recorded:12Fac8416) to Recorded   4  Fiber CAPS; Therapy: (Recorded:83Hdc4661) to Recorded   5  Fish Oil CAPS; Therapy: (Recorded:81Pwx5309) to Recorded   6  Magnesium TABS; Therapy: (Recorded:68Pyi3390) to Recorded   7  Motrin  MG Oral Tablet; TAKE 3 TABLET Twice daily PRN pain; Therapy: (Colletta Leavell) to Recorded   8  Probiotic Oral Capsule; Therapy: (Recorded:55Ols3147) to Recorded   9  Vitamin C 500 MG Oral Tablet; TAKE 1 TABLET DAILY; Therapy: 23DUR9795 to Recorded   10  Vitamin D3 1000 UNIT Oral Tablet; TAKE 1 TABLET DAILY; Therapy: 57QZG5988 to (Evaluate:68Ndi2594) Recorded    Allergies    1  No Known Drug Allergies    2  No Known Environmental Allergies   3  No Known Food Allergies    Vitals  Signs   Recorded: 78DWT0617 09:17AM   Heart Rate: 80  Systolic: 482  Diastolic: 78  Weight: 043 lb   BMI Calculated: 38 98  BSA Calculated: 1 82    Physical Exam    Constitutional   General appearance: No acute distress, well appearing and well nourished  Eyes   Conjunctiva and lids: No swelling, erythema or discharge   +glasses  Pupils and irises: Equal, round and reactive to light      Ears, Nose, Mouth, and Throat   External inspection of ears and nose: Normal     Otoscopic examination: Tympanic membranes translucent with normal light reflex  Canals patent without erythema  Oropharynx: Normal with no erythema, edema, exudate lesions, or ulcers  Pulmonary   Respiratory effort: No increased work of breathing or signs of respiratory distress  Auscultation of lungs: Clear to auscultation  Cardiovascular   Auscultation of heart: Normal rate and rhythm, normal S1 and S2, without murmurs  Psychiatric   Orientation to person, place, and time: Normal     Mood and affect: Abnormal   +flat mood; flat affect  Additional Exam:  ABD: +obese  Right Upper Extremity: Normal examination  Normal ROM  Normal strength  Left Upper Extremity: Normal examination  Normal ROM  Normal strength  Right Lower Extremity: Normal examination  Normal ROM  Normal strength  Left Lower Extremity: Normal examination  Normal ROM  Normal strength  Constitutional - General appearance: Normal    Skin - Skin and subcutaneous tissue: Normal    Neurologic - Cranial nerves: Normal    Additional Findings - +warmth over both knees  Right hand: All MCP, PIP and DIP joints are normal  Left Hand: All MCP, PIP and DIP joints are normal    Right foot: All MTP, PIP and DIP joints are normal  Left foot: All MTP, PIP and DIP joints are normal       Attending Note  Attending Note: I interviewed and examined the patient, I discussed the case with the Resident and reviewed the Resident's note, I supervised the Resident and I agree with the Resident management plan as it was presented to me  Level of Participation: I was present in clinic and examined the patient  Patient's History: Ms Eugene Dailey is a 45-year-old  female who presents the office with a history of multiple arthralgias which have developed over the last 3 years  She states that she was previously working at [employer] her she was standing on her feet, at which time she developed knee pain   The symptoms initially began in her right knee, at which time she received a cortisone injection with some relief  Several months later, she began to develop pain in both of her knees  She had x-rays, and was told that she had "bone on bone" arthritis  She did see Dr Gene Galvan as well as Dr Sam Aponte and has received cortisone injections as well as viscosupplementation injections without any relief of her pain  She is currently alternating meloxicam, Tylenol, Motrin, and naproxen as needed for her pain  She does feel the Motrin is most helpful  She does have difficulty going up and down stairs because of her pain  She also reports that inclement weather seems to worsen her pain  She also reports morning stiffness typically lasting 2 hours before improvement  She reports significant fatigue, as well as pain in her ankles, feet, wrists, and hands  She has been told that she has arthritis in her feet, for which she follows with podiatry  Key Parts of the Exam: On exam, there is no active synovitis  She does have mild tenderness to palpation of the PIPs of the hands, as well as the bilateral knees  There is also mild tenderness to palpation of the bilateral ankles  No recent labs were available for review today  I personally reviewed an x-ray of her left knee which showed mild tricompartmental osteoarthritis, but joint space was relatively preserved  Diagnosis and Plan: At this time, her history and exam are not consistent with any inflammatory arthropathy, but she does raise concerns about a possible autoimmune cause for her symptoms  Therefore, I will check some laboratory studies to further investigate this  She does feel the Motrin is helpful at this time, so I will not make any changes to her medications  I advised her to contact me approximately 1 week after the laboratory studies are drawn so we may review them and make further clinical decisions   If she does have any abnormalities that suggest an underlying autoimmune arthropathy, we will address it accordingly and schedule her for a follow-up evaluation  She will call if she has any additional questions or concerns  I agree with the Resident's note  Future Appointments    Date/Time Provider Specialty Site   07/14/2017 10:30 AM VANESSA Coulter   [facility]     Signatures   Electronically signed by : Anitha Wooten DO; Jan 27 2017  2:02PM EST                       (Author)

## 2018-01-15 NOTE — RESULT NOTES
Message   #1  Please call the patient with the results of her thyroid blood test    #2  Her thyroid function test is normal    #3  I advise her to continue with her current care, until her office visit next month with Dr Maisha Resendiz  #4  You may leave a message, if her communication consent allows for it  Verified Results  (1) TSH WITH FT4 REFLEX [id number] 11:09AM Anuja Villavicencio     Test Name Result Flag Reference   TSH 2 300 uIU/mL  0 358-3 740   Patients undergoing fluorescein dye angiography may retain small amounts of fluorescein in the body for 48-72 hours post procedure  Samples containing fluorescein can produce falsely depressed TSH values  If the patient had this procedure,a specimen should be resubmitted post fluorescein clearance            The recommended reference ranges for TSH during pregnancy are as follows:  First trimester 0 1 to 2 5 uIU/mL  Second trimester  0 2 to 3 0 uIU/mL  Third trimester 0 3 to 3 0 uIU/m

## 2018-01-15 NOTE — RESULT NOTES
Verified Results  (1) TSH WITH FT4 REFLEX 42Otg4910 01:26PM Lyndon Turner     Test Name Result Flag Reference   TSH 1 970 uIU/mL  0 358-3 740   Patients undergoing fluorescein dye angiography may retain small amounts of fluorescein in the body for 48-72 hours post procedure  Samples containing fluorescein can produce falsely depressed TSH values  If the patient had this procedure,a specimen should be resubmitted post fluorescein clearance  The recommended reference ranges for TSH during pregnancy are as follows:  First trimester 0 1 to 2 5 uIU/mL  Second trimester  0 2 to 3 0 uIU/mL  Third trimester 0 3 to 3 0 uIU/m     (1) CBC/PLT/DIFF 22Rad2070 10:52AM Elizabeth Shaper Order Number: [id number]  TW Order Number: [id number]     Test Name Result Flag Reference   WBC COUNT 4 30 Thousand/uL L 4 31-10 16   RBC COUNT 4 62 Million/uL  3 81-5 12   HEMOGLOBIN 13 3 g/dL  11 5-15 4   HEMATOCRIT 40 4 %  34 8-46  1   MCV 87 fL  82-98   MCH 28 8 pg  26 8-34 3   MCHC 32 9 g/dL  31 4-37 4   RDW 13 5 %  11 6-15 1   MPV 10 1 fL  8 9-12 7   PLATELET COUNT 763 Thousands/uL  149-390   nRBC AUTOMATED 0 /100 WBCs     NEUTROPHILS RELATIVE PERCENT 63 %  43-75   LYMPHOCYTES RELATIVE PERCENT 25 %  14-44   MONOCYTES RELATIVE PERCENT 9 %  4-12   EOSINOPHILS RELATIVE PERCENT 3 %  0-6   BASOPHILS RELATIVE PERCENT 0 %  0-1   NEUTROPHILS ABSOLUTE COUNT 2 70 Thousands/?L  1 85-7 62   LYMPHOCYTES ABSOLUTE COUNT 1 09 Thousands/?L  0 60-4 47   MONOCYTES ABSOLUTE COUNT 0 37 Thousand/?L  0 17-1 22   EOSINOPHILS ABSOLUTE COUNT 0 12 Thousand/?L  0 00-0 61   BASOPHILS ABSOLUTE COUNT 0 01 Thousands/?L  0 00-0 10     (1) COMPREHENSIVE METABOLIC PANEL 45UEZ3879 90:78JW Elizabeth Hermanr Order Number: [id number]   Order Number: [id number] Order Number: [id number]     Test Name Result Flag Reference   GLUCOSE,RANDM 85 mg/dL     If the patient is fasting, the ADA then defines impaired fasting glucose as > 100 mg/dL and diabetes as > or equal to 123 mg/dL  SODIUM 137 mmol/L  136-145   POTASSIUM 4 2 mmol/L  3 5-5 3   CHLORIDE 105 mmol/L  100-108   CARBON DIOXIDE 27 mmol/L  21-32   ANION GAP (CALC) 5 mmol/L  4-13   BLOOD UREA NITROGEN 12 mg/dL  5-25   CREATININE 0 72 mg/dL  0 60-1 30   Standardized to IDMS reference method   CALCIUM 8 5 mg/dL  8 3-10 1   BILI, TOTAL 0 39 mg/dL  0 20-1 00   ALK PHOSPHATAS 68 U/L     ALT (SGPT) 21 U/L  12-78   AST(SGOT) 12 U/L  5-45   ALBUMIN 3 2 g/dL L 3 5-5 0   TOTAL PROTEIN 7 2 g/dL  6 4-8 2   eGFR Non-African American      >60 0 ml/min/1 73sq [facility] Disease Education Program recommendations are as follows:  GFR calculation is accurate only with a steady state creatinine  Chronic Kidney disease less than 60 ml/min/1 73 sq  meters  Kidney failure less than 15 ml/min/1 73 sq  meters       (1) HEP C ANTIBODY 25Vuh7582 10:52AM Harleen Poplin Order Number: [id number]  TW Order Number: [id number]     Test Name Result Flag Reference   HEPATITIS C ANTIBODY Non-reactive  Non-reactive     (1) LIPID PANEL FASTING W DIRECT LDL REFLEX 19AXE4003 10:52AM Harleen Poplin Order Number: [id number]  TW Order Number: [id number] Order Number: [id number]     Test Name Result Flag Reference   CHOLESTEROL 222 mg/dL H    LDL CHOLESTEROL CALCULATED 154 mg/dL H 0-100   Triglyceride:         Normal              <150 mg/dl       Borderline High    150-199 mg/dl       High               200-499 mg/dl       Very High          >499 mg/dl  Cholesterol:         Desirable        <200 mg/dl      Borderline High  200-239 mg/dl      High             >239 mg/dl  HDL Cholesterol:        High    >59 mg/dL      Low     <41 mg/dL  LDL Cholesterol:        Optimal          <100 mg/dl        Near Optimal     100-129 mg/dl        Above Optimal          Borderline High   130-159 mg/dl          High 160-189 mg/dl          Very High        >189 mg/dl  LDL CALCULATED:    This screening LDL is a calculated result  It does not have the accuracy of the Direct Measured LDL in the monitoring of patients with hyperlipidemia and/or statin therapy  Direct Measure LDL (KHS588) must be ordered separately in these patients  TRIGLYCERIDES 122 mg/dL  <=150   Specimen collection should occur prior to N-Acetylcysteine or Metamizole administration due to the potential for falsely depressed results  HDL,DIRECT 44 mg/dL  40-60   Specimen collection should occur prior to Metamizole administration due to the potential for falsely depressed results  US THYROID [id number] 10:32AM Vazquez Renteria Order Number: [id number]     Test Name Result Flag Reference   US THYROID (Report)     THYROID ULTRASOUND     INDICATION: Nontoxic single thyroid nodule  History of partial right-sided thyroidectomy  COMPARISON: Outside ultrasound April 9, 2014     TECHNIQUE:  Ultrasound of the thyroid was performed with a high frequency linear transducer in transverse and sagittal planes including volumetric imaging sweeps as well as traditional still imaging technique  FINDINGS:   Thyroid parenchyma is diffusely heterogeneous in echotexture  Right gland:    Postoperative changes, consistent with partial thyroidectomy  There has been excision of the previously identified nodule  No significant residual thyroid tissue is seen  There is a 1 0 x 0 6 x 0 6 cm hypoechoic nodule in the right thyroid bed    (previously 1 0 x 0 6 x 1 0 cm)  Left gland: 3 8 x 1 5 x 1 4 cm  1 1 x 1 0 x 0 7 cm hypoechoic, solid nodule with intranodular flow in the upper pole with faint microcalcifications (previously 0 9 x 0 6 x 0 9 cm)  Isthmus: 0 3 cm in AP dimension  No dominant nodules  IMPRESSION:   1  Postoperative changes with partial right-sided thyroidectomy  No normal-appearing tissue is seen in the right thyroid bed  Stable hypoechoic nodule  2  No significant change in left-sided thyroid nodule  (According to the society of Radiologists In [address] published in Radiology in December 2005 [phone]) ultrasound guided FNA should be considered for nodules with microcalcifications greater than or equal to 1 cm,    predominantly or completely solid nodules or nodules with coarse calcifications greater than or equal to 1 5 cm, and mixed cystic and solid nodules greater than or equal to 2 cm   Substantial growth from the prior ultrasound may warrant ultrasound guided    FNA as well )       Workstation performed: [id number]     Signed by:   La Noonan DO   7/13/16

## 2018-01-16 NOTE — RESULT NOTES
Verified Results  (1) TSH WITH FT4 REFLEX 01Jun2017 11:31AM Whitney Weldon     Test Name Result Flag Reference   TSH 3 043 uIU/mL  0 358-3 740   Patients undergoing fluorescein dye angiography may retain small amounts of fluorescein in the body for 48-72 hours post procedure  Samples containing fluorescein can produce falsely depressed TSH values  If the patient had this procedure,a specimen should be resubmitted post fluorescein clearance            The recommended reference ranges for TSH during pregnancy are as follows:  First trimester 0 1 to 2 5 uIU/mL  Second trimester  0 2 to 3 0 uIU/mL  Third trimester 0 3 to 3 0 uIU/m       Discussion/Summary   Your blood test for the thyroid is normal     Signatures   Electronically signed by : VANESSA Hernandez ; Jun 2 2017 12:53PM EST                       (Author)

## 2018-01-17 NOTE — PROGRESS NOTES
Assessment    1  Encounter for preventive health examination (V70 0) (Z00 00)   2  History of Thyroid Surgery Thyroid Lobectomy Right Lobe   3  History of Foot Surgery   · Bunionectomy   4  History of  Section   5     6  Never smoked cigarettes (V49 89) (Z78 9)   7  No alcohol use   8  Family history of hyperlipidemia (V18 19) (Z83 49) : Mother, Father   5  Family history of hypertension (V17 49) (Z82 49) : Mother, Father   8  Family history of Graves' disease (V18 19) (Z83 49) : Mother   6  Family history of osteoporosis (V17 81) (Z82 62) : Mother   15  Family history of S/P TAVR (transcatheter aortic valve replacement) : Father   15  FH: CABG (coronary artery bypass surgery) (V17 3) (B37 36) : Father   15  Family history of aortic stenosis (V17 49) (Z82 49) : Father   13  Family history of Pacemaker Placement : Father   12  FHx: stroke (V17 1) (Z82 3) : Grandfather   17  Left thyroid nodule (241 0) (E04 1)   18  Impaired fasting glucose (790 21) (R73 01)   19  Hyperlipidemia (272 4) (E78 5)   · borderline   20  Laboratory examination ordered as part of a routine general medical examination    (V72 62) (Z00 00)   21  Encounter for screening for malignant neoplasm of colon (V76 51) (Z12 11)   22  Postsurgical hypothyroidism (244 0) (E89 0)    Plan  Encounter for screening for malignant neoplasm of colon    · COLONOSCOPY; Status:Active;  Requested for:03Xlu5338;    · 1 - Richy Richmond DO (Gastroenterology) Physician Referral  Consult  Status: Active   Requested for: [address] Summary provided  : Yes  Health Maintenance    · Always use a seat belt and shoulder strap when riding or driving a motor vehicle ;  Status:Complete;   Done: 35DTG5994   · Brush your teeth freq1 and floss at least once a day ; Status:Complete;   Done:  33TEQ3605   · Use a sun block product with an SPF of 15 or more ; Status:Complete;   Done: 68YIG6734   · We recommend a colonoscopy ; Status:Complete;   Done: 42PIE8344   · We recommend routine visits to a dentist ; Status:Complete;   Done: 84IYK4761   · Call [phone] if: You find a new or different kind of lump in your breast ;  Status:Complete;   Done: 62IPT8261   · Call [phone] if: You have any bleeding from the vagina ; Status:Complete;    Done: 17SHG1176   · Call [phone] if: You have any warning signs of skin cancer ; Status:Complete;    Done: 28JWB2078   · Call 911 if: You experience a new kind of chest pain (angina) or pressure ;  Status:Complete;   Done: 64TUD4382  Health Maintenance, Hyperlipidemia, Impaired fasting glucose, Laboratory examination  ordered as part of a routine general medical examination    · (1) HEP C ANTIBODY; Status:Active; Requested for:51Wej6340;    · (1) LIPID PANEL FASTING W DIRECT LDL REFLEX; Status:Active; Requested  for:40Ztb4778;   Hyperlipidemia, Impaired fasting glucose, Laboratory examination ordered as part of a  routine general medical examination    · (1) CBC/PLT/DIFF; Status:Active; Requested for:44Ajd1434;    · (1) COMPREHENSIVE METABOLIC PANEL; Status:Active; Requested for:92Riq3811;   Left thyroid nodule    · US THYROID; Status:Hold For - Scheduling; Requested for:22Bvw2577;   Postsurgical hypothyroidism    · (1) TSH WITH FT4 REFLEX; Status:Active; Requested for:26Dwu5962;     Discussion/Summary  health maintenance visit healthy adult female Currently, she eats a healthy diet and has an inadequate exercise regimen  cervical cancer screening is current Breast cancer screening: mammogram is current  Colorectal cancer screening: the risks and benefits of colorectal cancer screening were discussed and colonoscopy has been ordered  The immunizations are up to date and tetnus booster at [facility] for dog bite  Advice and education were given regarding weight loss, sunscreen use and seat belt use  Patient discussion: discussed with the patient  Chief Complaint  The patient presents for a wellness visit  History of Present Illness  HM, Adult Female: The patient is being seen for a health maintenance evaluation  The last health maintenance visit was 1 year(s) ago  Social History: Household members include spouse, 3 daughter(s) and 1 son(s)  She is   Work status: working part-time and occupation: Cardiac tech  The patient has never smoked cigarettes  She reports rare alcohol use  She has never used illicit drugs  General Health: The patient's health since the last visit is described as good  She has regular dental visits  The patient flosses occasionally  Dental problems: no tooth pain and no caries  She denies vision problems  Vision care includes wearing glasses  She denies hearing loss  Immunizations status: up to date  Lifestyle:  She consumes a diverse and healthy diet  She has weight concerns  Weight control issues: overweight  She does not exercise regularly  She does not use tobacco  She denies alcohol use  She denies drug use  Reproductive health: the patient is perimenopausal   she reports abnormal menses  Screening: Cervical cancer screening includes a pap smear performed last year  Breast cancer screening includes a mammogram performed last year  She hasn't been previously screened for colorectal cancer  Safety elements used: seat belt, safe driving habits, sunscreen, smoke detector and carbon monoxide detector  HPI: She is a pleasant 53-year-old lady who presents for a wellness visit  She is following with her orthopedic specialist regarding her right knee pain  She is doing physical therapy and feeling better by report  She notes that her heart rate rises when she is under emotional stress  It generally runs in the 90s by report  She reports seeing a cardiologist years ago and having a normal stress echocardiogram performed  She reports receiving a tetanus booster at [facility] after being bitten by a dog  She has been monitoring her TSH level   She has not needed to start thyroid supplementation  She received the flu shot at work  She works as a cardiology technician performing stress tests  She Did Look into [employer] and the Medical Weight Loss Program Being Offered by [facility]  The Distance Is an Issue by Report  Her Daughter is Being Treated for Anorexia  She Is Following with Her Gynecologist on a Regular Basis  She Has Never Had a Colonoscopy Performed  Review of Systems    Cardiovascular: fast heart rate, but as noted in HPI, the heart rate was not slow, no chest pain, no intermittent leg claudication, no palpitations and no lower extremity edema  Respiratory: No complaints of shortness of breath, no wheezing, no cough, no SOB on exertion, no orthopnea, no PND  Gastrointestinal: No complaints of abdominal pain, no constipation, no nausea or vomiting, no diarrhea, no bloody stools  Genitourinary: unexplained vaginal bleeding, but no dysuria, no pelvic pain, no vaginal discharge, no incontinence and no dysmenorrhea  ROS reviewed  Active Problems    1  Abnormal uterine bleeding (AUB) (626 9) (N93 9)   2  Arthritis (716 90) (M19 90)   3  Bilateral knee pain (719 46) (M25 561,M25 562)   4  Encounter for routine gynecological examination () (Z01 419)   5  Fatigue (780 79) (R53 83)   6  Hyperlipidemia (272 4) (E78 5)   7  Irregular heart beats (427 9) (I49 9)   8  Left thyroid nodule (241 0) (E04 1)   9  Menorrhagia (626 2) (N92 0)   10  Need for prophylactic vaccination and inoculation against influenza (V04 81) (Z23)   11  Pap smear, as part of routine gynecological examination (V76 2) (Z01 419)   12  Postsurgical hypothyroidism (244 0) (E89 0)   13  Right knee pain (719 46) (M25 561)   14   Visit for screening mammogram (V76 12) (Z12 31)    Past Medical History    · History of Elevated blood sugar (790 29) (R73 9)   · History of  7 (V22 2) (Z33 1)   · History of chest pain (V13 89) (M17 787)    Surgical History    · History of Biopsy Thyroid Using Percutaneous Core Needle   · History of  Section   · History of Foot Surgery   · History of Thyroid Surgery Thyroid Lobectomy Right Lobe    Family History  Mother    · Family history of Graves' disease (V18 19) (Z83 49)   · Family history of hyperlipidemia (V18 19) (Z83 49)   · Family history of hypertension (V17 49) (Z82 49)   · Family history of osteoporosis (V17 81) (Z80 61)  Father    · Family history of aortic stenosis (V17 49) (Z82 49)   · Family history of hyperlipidemia (V18 19) (Z83 49)   · Family history of hypertension (V17 49) (Z82 49)   · FH: CABG (coronary artery bypass surgery) (V17 3) (Z84 89)   · Family history of Pacemaker Placement   · Family history of S/P TAVR (transcatheter aortic valve replacement)  Grandfather    · Family history of myocardial infarction (V17 3) (Z82 49)   · FHx: stroke (V17 1) (Z82 3)    Social History    · Employed   · Feels safe at home   ·    · Never smoked cigarettes (V49 89) (Z78 9)   · No alcohol use   · Six children   · Denied: History of Tobacco use    Current Meds   1  Calcium-Magnesium 250-125 MG Oral Tablet; Take 1 daily; Therapy: 64TWW1745 to (Evaluate:2017) Recorded   2  Cranberry 300 MG Oral Tablet; TAKE AS DIRECTED; Therapy: 96JLH4008 to Recorded   3  Daily Multiple Vitamins Oral Tablet; Therapy: (Recorded:48Oiw9315) to Recorded   4  Fiber CAPS; Therapy: (Recorded:24Kes8131) to Recorded   5  Fish Oil CAPS; Therapy: (Recorded:50Xyu8439) to Recorded   6  Magnesium TABS; Therapy: (Recorded:66Yuo8476) to Recorded   7  Probiotic Oral Capsule; Therapy: (Recorded:82Jhs8689) to Recorded   8  Vitamin C 500 MG Oral Tablet; TAKE 1 TABLET DAILY; Therapy: 89DGS6324 to Recorded   9  Vitamin D3 1000 UNIT Oral Tablet; TAKE 1 TABLET DAILY; Therapy: 21ZYU0507 to (Evaluate:14Pzb5808) Recorded    Allergies    1  No Known Drug Allergies    2  No Known Environmental Allergies   3   No Known Food Allergies    Vitals   Recorded: 63RSE0107 11:53AM Recorded: 05BXW5487 10:59AM   Temperature  97 7 F, Oral   Heart Rate  78   Respiration  16   Systolic  803, RUE, Sitting   Diastolic  74, RUE, Sitting   Height 4 ft 11 in 4 ft 8 5 in   Weight  198 lb    BMI Calculated 39 99 43 61   BSA Calculated 1 84 1 78     Physical Exam    Constitutional   General appearance: No acute distress, well appearing and well nourished  well developed, appears healthy, comfortable, well nourished, clothing appropriate, rested, not exhausted, well hydrated and appearance reflects stated age  Eyes   Conjunctiva and lids: No swelling, erythema or discharge  Ears, Nose, Mouth, and Throat   Hearing: Normal     Neck   Neck: Supple, symmetric, trachea midline, no masses  The neck was supple  The neck was not swollen and was not warm  the trachea was midline  Thyroid: Normal, no thyromegaly  There were no thyroid nodules  Pulmonary   Respiratory effort: No increased work of breathing or signs of respiratory distress  Respiratory rate: normal  Assessment of respiratory effort revealed normal rhythm and effort  Auscultation of lungs: Clear to auscultation  no rales or crackles were heard bilaterally  no rhonchi  no friction rub  no wheezing  no diminished breath sounds  no bronchial breath sounds  Cardiovascular   Auscultation of heart: Normal rate and rhythm, normal S1 and S2, no murmurs  The heart rate was normal  The rhythm was regular  no murmurs were heard  Examination of extremities for edema and/or varicosities: Normal   no pretibial edema  Abdomen   Abdomen: Non-tender, no masses  The abdomen was flat  The abdomen was soft and nontender  no masses palpated  Liver and spleen: No hepatomegaly or splenomegaly  No hepatosplenomegaly     Psychiatric   Judgment and insight: Normal     Mood and affect: Normal        Results/Data  (1) TSH WITH FT4 REFLEX 30Jun2016 11:39AM Zennaomie Pentbeti     Test Name Result Flag Reference   TSH 2 440 uIU/mL  0 358-3 740   Patients undergoing fluorescein dye angiography may retain small amounts of fluorescein in the body for 48-72 hours post procedure  Samples containing fluorescein can produce falsely depressed TSH values  If the patient had this procedure,a specimen should be resubmitted post fluorescein clearance  The recommended reference ranges for TSH during pregnancy are as follows:  First trimester 0 1 to 2 5 uIU/mL  Second trimester  0 2 to 3 0 uIU/mL  Third trimester 0 3 to 3 0 uIU/m       Provider Comments   #1  Health maintenance-a history and a physical exam were performed for her during her office visit  Her allergy history, medication history, social history, family history, and past surgical history were reviewed with her and updated, as needed  A TLC care guide from the computer program was given to her to take to review  Laboratory testings were ordered  She will continue with annual wellness visits  #2  Postsurgical hypothyroidism-her TSH level has been normal without medication  She will continue with clinical and laboratory surveillance  #3  GYN/breast care-she is following with her gynecologist for preventative care  #4  Colorectal cancer screening-I advised her to have a colonoscopy performed and referred her to a GI specialist for preventative care  #5  Vaccine care-she is up-to-date on her tetanus vaccination by report  She will receive the seasonal influenza vaccine at work by report  #6  Knee pain/issues-she is following with orthopedic specialist for care  #7  Report of her heart rate being fast when she is under emotional stress-she reports being evaluated by a cardiologist  We will continue withh clinical surveillance  #8  Impaired fasting glucose-her current status is unknown  She will perform laboratory testing  #9  Hyperlipidemia-her current status is unknown  She will perform laboratory testing  I did give her a step one American Heart Association diet to take to review and follow   She will continue with TLC and laboratory surveillance  She is not interested in taking medication on a preventative basis by report  #10  Weight issues/obesity-she is pursuing TLC for weight loss  She is considering the medical weight loss program at [address]  #11  Left-sided thyroid nodules-I will have her perform a thyroid ultrasound to update her imaging  #12  I advised her to follow-up in 6 months or sooner, if needed  This note was proofread on July 12, 2016  Future Appointments    Date/Time Provider Specialty Site   01/10/2017 09:30 AM VANESSA Aldrich  [facility]     Signatures   Electronically signed by :  Sanchez Ribera MD; Jul 12 2016  7:19AM EST                       (Author)

## 2018-01-23 VITALS
SYSTOLIC BLOOD PRESSURE: 116 MMHG | BODY MASS INDEX: 38.18 KG/M2 | DIASTOLIC BLOOD PRESSURE: 74 MMHG | HEIGHT: 59 IN | WEIGHT: 189.38 LBS | OXYGEN SATURATION: 97 % | HEART RATE: 87 BPM

## 2018-01-23 VITALS
WEIGHT: 182 LBS | HEIGHT: 59 IN | DIASTOLIC BLOOD PRESSURE: 78 MMHG | BODY MASS INDEX: 36.69 KG/M2 | SYSTOLIC BLOOD PRESSURE: 116 MMHG

## 2018-02-26 NOTE — RESULT NOTES
Verified Results  (1) LIPID PANEL FASTING W DIRECT LDL REFLEX [id number] 11:02AM Ruthe Rings Order Number: [id number]     Test Name Result Flag Reference   CHOLESTEROL 229 mg/dL H    Cholesterol:    Desirable <200 mg/dl    Borderline 200-239 mg/dl    High>239   LDL CHOLESTEROL CALCULATED 148 mg/dL H 0-100   This screening LDL is a calculated result  It does not have the accuracy of the Direct Measured LDL in the monitoring of patients with hyperlipidemia and/or statin therapy  Direct Measure LDL (BNG362) must be ordered separately in these patients  Triglyceride:        Normal <150 mg/dl   Borderline High 150-199 mg/dl   High 200-499 mg/dl   Very High >499 mg/dl   TRIGLYCERIDES 104 mg/dL  <=150   Specimen collection should occur prior to N-Acetylcysteine or Metamizole administration due to the potential for falsely depressed results  HDL,DIRECT 60 mg/dL  40-60   HDL Cholesterol:    High>59 mg/dL    Low <41 mg/dL  HDL Cholesterol:    High>59 mg/dL    Low <41 mg/dL     (1) TSH WITH FT4 REFLEX [id number] 11:02AM [facility] [email or web address] Order Number: [id number]     Test Name Result Flag Reference   TSH 2 600 uIU/mL  0 358-3 740   Patients undergoing fluorescein dye angiography may retain small amounts of fluorescein in the body for 48-72 hours post procedure  Samples containing fluorescein can produce falsely depressed TSH values  If the patient had this procedure,a specimen should be resubmitted post fluorescein clearance  The recommended reference ranges for TSH during pregnancy are as follows:  First trimester 0 1 to 2 5 uIU/mL  Second trimester  0 2 to 3 0 uIU/mL  Third trimester 0 3 to 3 0 uIU/m       Discussion/Summary   Your cholesterol is slightly elevated   Your thyroid function is normal      Signatures   Electronically signed by : VANESSA Valdivia ; Jan 11 2018 10:31PM EST                       (Author)

## 2018-02-28 ENCOUNTER — HOSPITAL ENCOUNTER (OUTPATIENT)
Dept: RADIOLOGY | Age: 58
Discharge: HOME | End: 2018-02-28
Payer: COMMERCIAL

## 2018-02-28 ENCOUNTER — TRANSCRIBE ORDERS (OUTPATIENT)
Dept: ADMINISTRATIVE | Age: 58
End: 2018-02-28

## 2018-02-28 ENCOUNTER — APPOINTMENT (OUTPATIENT)
Dept: LAB | Age: 58
End: 2018-02-28
Payer: COMMERCIAL

## 2018-02-28 DIAGNOSIS — M17.0 PRIMARY OSTEOARTHRITIS OF BOTH KNEES: ICD-10-CM

## 2018-02-28 DIAGNOSIS — E04.2 NONTOXIC MULTINODULAR GOITER: ICD-10-CM

## 2018-02-28 DIAGNOSIS — R53.83 OTHER FATIGUE: ICD-10-CM

## 2018-02-28 DIAGNOSIS — E55.9 AVITAMINOSIS D: ICD-10-CM

## 2018-02-28 DIAGNOSIS — M06.4 INFLAMMATORY POLYARTHROPATHY (HCC): ICD-10-CM

## 2018-02-28 DIAGNOSIS — M17.0 PRIMARY OSTEOARTHRITIS OF BOTH KNEES: Primary | ICD-10-CM

## 2018-02-28 LAB
25(OH)D3 SERPL-MCNC: 30.2 NG/ML (ref 30–100)
CRP SERPL QL: 11.5 MG/L
ERYTHROCYTE [SEDIMENTATION RATE] IN BLOOD: 16 MM/HOUR (ref 0–20)

## 2018-02-28 PROCEDURE — 36415 COLL VENOUS BLD VENIPUNCTURE: CPT

## 2018-02-28 PROCEDURE — 86800 THYROGLOBULIN ANTIBODY: CPT

## 2018-02-28 PROCEDURE — 86430 RHEUMATOID FACTOR TEST QUAL: CPT

## 2018-02-28 PROCEDURE — 86235 NUCLEAR ANTIGEN ANTIBODY: CPT

## 2018-02-28 PROCEDURE — 86140 C-REACTIVE PROTEIN: CPT

## 2018-02-28 PROCEDURE — 85652 RBC SED RATE AUTOMATED: CPT

## 2018-02-28 PROCEDURE — 82306 VITAMIN D 25 HYDROXY: CPT

## 2018-02-28 PROCEDURE — 76536 US EXAM OF HEAD AND NECK: CPT

## 2018-02-28 PROCEDURE — 86038 ANTINUCLEAR ANTIBODIES: CPT

## 2018-03-01 DIAGNOSIS — E04.2 NONTOXIC MULTINODULAR GOITER: ICD-10-CM

## 2018-03-01 LAB
ENA SS-A AB SER-ACNC: <0.2 AI (ref 0–0.9)
ENA SS-B AB SER-ACNC: <0.2 AI (ref 0–0.9)
RHEUMATOID FACT SER QL LA: NEGATIVE
THYROGLOB AB SERPL-ACNC: <1 IU/ML (ref 0–0.9)

## 2018-03-02 LAB — RYE IGE QN: NEGATIVE

## 2018-06-19 ENCOUNTER — TRANSCRIBE ORDERS (OUTPATIENT)
Dept: LAB | Age: 58
End: 2018-06-19

## 2018-06-19 ENCOUNTER — APPOINTMENT (OUTPATIENT)
Dept: LAB | Age: 58
End: 2018-06-19
Payer: COMMERCIAL

## 2018-06-19 DIAGNOSIS — M17.0 PRIMARY OSTEOARTHRITIS OF BOTH KNEES: ICD-10-CM

## 2018-06-19 DIAGNOSIS — R53.83 FATIGUE, UNSPECIFIED TYPE: ICD-10-CM

## 2018-06-19 DIAGNOSIS — M17.0 PRIMARY OSTEOARTHRITIS OF BOTH KNEES: Primary | ICD-10-CM

## 2018-06-19 LAB — CRP SERPL QL: 14.5 MG/L

## 2018-06-19 PROCEDURE — 86140 C-REACTIVE PROTEIN: CPT

## 2018-06-19 PROCEDURE — 36415 COLL VENOUS BLD VENIPUNCTURE: CPT

## 2018-07-03 ENCOUNTER — OFFICE VISIT (OUTPATIENT)
Dept: OBGYN CLINIC | Facility: CLINIC | Age: 58
End: 2018-07-03
Payer: COMMERCIAL

## 2018-07-03 VITALS — WEIGHT: 188 LBS | BODY MASS INDEX: 37.97 KG/M2 | SYSTOLIC BLOOD PRESSURE: 116 MMHG | DIASTOLIC BLOOD PRESSURE: 60 MMHG

## 2018-07-03 DIAGNOSIS — Z01.419 ENCOUNTER FOR GYNECOLOGICAL EXAMINATION: ICD-10-CM

## 2018-07-03 DIAGNOSIS — R39.9 UTI SYMPTOMS: Primary | ICD-10-CM

## 2018-07-03 LAB
SL AMB  POCT GLUCOSE, UA: NEGATIVE
SL AMB LEUKOCYTE ESTERASE,UA: NEGATIVE
SL AMB POCT BILIRUBIN,UA: NEGATIVE
SL AMB POCT BLOOD,UA: POSITIVE
SL AMB POCT CLARITY,UA: NEGATIVE
SL AMB POCT COLOR,UA: NEGATIVE
SL AMB POCT KETONES,UA: NEGATIVE
SL AMB POCT NITRITE,UA: NEGATIVE
SL AMB POCT PH,UA: NEGATIVE
SL AMB POCT SPECIFIC GRAVITY,UA: NEGATIVE
SL AMB POCT URINE PROTEIN: NEGATIVE
SL AMB POCT UROBILINOGEN: NEGATIVE

## 2018-07-03 PROCEDURE — 81002 URINALYSIS NONAUTO W/O SCOPE: CPT | Performed by: OBSTETRICS & GYNECOLOGY

## 2018-07-03 PROCEDURE — 99214 OFFICE O/P EST MOD 30 MIN: CPT | Performed by: OBSTETRICS & GYNECOLOGY

## 2018-07-03 RX ORDER — MULTIVITAMIN WITH IRON
TABLET ORAL
COMMUNITY

## 2018-07-03 RX ORDER — ASCORBIC ACID 100 MG
1 TABLET,CHEWABLE ORAL DAILY
COMMUNITY
Start: 2016-01-10

## 2018-07-03 RX ORDER — IBUPROFEN 200 MG
TABLET ORAL 2 TIMES DAILY PRN
COMMUNITY
End: 2019-01-11 | Stop reason: ALTCHOICE

## 2018-07-03 RX ORDER — CALCIUM POLYCARBOPHIL 625 MG 625 MG/1
TABLET ORAL
COMMUNITY
End: 2019-01-11 | Stop reason: ALTCHOICE

## 2018-07-03 RX ORDER — NITROFURANTOIN 25; 75 MG/1; MG/1
100 CAPSULE ORAL 2 TIMES DAILY
Qty: 14 CAPSULE | Refills: 1 | Status: SHIPPED | OUTPATIENT
Start: 2018-07-03 | End: 2019-11-04 | Stop reason: ALTCHOICE

## 2018-07-03 NOTE — PATIENT INSTRUCTIONS
Importance of vitamin D and calcium supplements  Discussed importance of sun block use during periods of prolonged sun exposure  Encouraged increase PO fluid intake and cranberry juice  Patient will call office for any problems, concerns, or issues which may arise during the interim

## 2018-07-03 NOTE — PROGRESS NOTES
Assessment/Plan:      Diagnoses and all orders for this visit:    UTI symptoms  -     POCT urine dip  - send for C&S   -     nitrofurantoin (MACROBID) 100 mg capsule; Take 1 capsule (100 mg total) by mouth 2 (two) times a day with one refill if needed  - Follow up if no symptom relief     Other orders  -     Calcium Carbonate-Vit D-Min (CALCIUM 1200 PO); Take by mouth daily  -     Cholecalciferol (VITAMIN D3) 1000 UNIT/SPRAY LIQD; Take 5,000 Units by mouth  -     Cranberry 300 MG tablet; Take by mouth  -     Multiple Vitamins-Minerals (MULTIVITAL-M) TABS; Take by mouth  -     calcium polycarbophil (FIBER-CAPS) 625 mg tablet; Take by mouth  -     Omega-3 Fatty Acids (FISH OIL) 645 MG CAPS; Take by mouth  -     Magnesium 100 MG TABS; Take by mouth  -     ibuprofen (MOTRIN IB) 200 mg tablet; Take by mouth 2 (two) times a day as needed  -     Probiotic Product (PROBIOTIC-10) CAPS; Take by mouth  -     Ascorbic Acid (VITAMIN C) 100 MG CHEW; Chew 1 tablet daily          Subjective:     Patient ID: Shayna Stuart is a 56 y o  female  Ms Mukund Villavicencio is a 55 yo female complaining of urinary burning, frequency, and urgency starting 3 days ago  Patient reports blood in urine, pelvic pain, and back pain, ongoing for past 3 days  Denies fever and chills  Has taken no medications for burning or pain  Reports no recent urinary tract infections or other urinary issues for past several years  No menses since last October  Denies changes in bowel habits  No recent hospitalizations or surgeries in the last year  Patient denies any history of diabetes  Review of Systems   Constitutional: Negative  Gastrointestinal: Positive for abdominal distention and abdominal pain  Genitourinary: Positive for dysuria, frequency, hematuria, pelvic pain and urgency  Objective:     Physical Exam   Constitutional: She is oriented to person, place, and time  She appears well-developed and well-nourished  No distress     HENT: Head: Normocephalic and atraumatic  Eyes: Conjunctivae are normal    Cardiovascular: Normal rate, regular rhythm and normal heart sounds  Exam reveals no gallop and no friction rub  No murmur heard  Pulmonary/Chest: Effort normal and breath sounds normal  No respiratory distress  She has no wheezes  She has no rales  She exhibits no tenderness  Abdominal: Soft  Normal appearance and bowel sounds are normal  She exhibits no mass  There is tenderness  There is no rebound and no guarding  Mild pelvic on palpation, negative costovertebral angle tenderness bilaterally   Genitourinary: Rectum normal, vagina normal and uterus normal  Pelvic exam was performed with patient supine  Right adnexum displays no mass, no tenderness and no fullness  Left adnexum displays no mass, no tenderness and no fullness  No vaginal discharge found  Genitourinary Comments: No blood noted in vagina   Lymphadenopathy:     She has no cervical adenopathy  She has no axillary adenopathy  Right: No inguinal and no supraclavicular adenopathy present  Left: No inguinal and no supraclavicular adenopathy present  Neurological: She is alert and oriented to person, place, and time  Skin: Skin is intact  No rash noted  She is not diaphoretic  Psychiatric: She has a normal mood and affect   Her speech is normal and behavior is normal  Judgment and thought content normal  Cognition and memory are normal

## 2018-07-27 ENCOUNTER — OFFICE VISIT (OUTPATIENT)
Dept: INTERNAL MEDICINE CLINIC | Facility: CLINIC | Age: 58
End: 2018-07-27
Payer: COMMERCIAL

## 2018-07-27 VITALS
SYSTOLIC BLOOD PRESSURE: 118 MMHG | WEIGHT: 190.2 LBS | OXYGEN SATURATION: 98 % | BODY MASS INDEX: 38.34 KG/M2 | HEART RATE: 64 BPM | HEIGHT: 59 IN | DIASTOLIC BLOOD PRESSURE: 66 MMHG

## 2018-07-27 DIAGNOSIS — R31.0 GROSS HEMATURIA: ICD-10-CM

## 2018-07-27 DIAGNOSIS — M25.50 POLYARTHRALGIA: Primary | ICD-10-CM

## 2018-07-27 DIAGNOSIS — E04.2 NONTOXIC MULTINODULAR GOITER: ICD-10-CM

## 2018-07-27 DIAGNOSIS — R73.01 IMPAIRED FASTING GLUCOSE: ICD-10-CM

## 2018-07-27 DIAGNOSIS — E78.2 MIXED HYPERLIPIDEMIA: ICD-10-CM

## 2018-07-27 DIAGNOSIS — E66.9 OBESITY (BMI 30-39.9): ICD-10-CM

## 2018-07-27 DIAGNOSIS — R53.83 OTHER FATIGUE: ICD-10-CM

## 2018-07-27 PROBLEM — M35.9 UNDIFFERENTIATED CONNECTIVE TISSUE DISEASE (HCC): Status: ACTIVE | Noted: 2017-02-02

## 2018-07-27 LAB
BILIRUB UR QL STRIP: NEGATIVE
CLARITY UR: CLEAR
COLOR UR: YELLOW
GLUCOSE UR STRIP-MCNC: NEGATIVE MG/DL
HGB UR QL STRIP.AUTO: NEGATIVE
KETONES UR STRIP-MCNC: NEGATIVE MG/DL
LEUKOCYTE ESTERASE UR QL STRIP: NEGATIVE
NITRITE UR QL STRIP: NEGATIVE
PH UR STRIP.AUTO: 6.5 [PH] (ref 4.5–8)
PROT UR STRIP-MCNC: NEGATIVE MG/DL
SP GR UR STRIP.AUTO: 1.01 (ref 1–1.03)
UROBILINOGEN UR QL STRIP.AUTO: 0.2 E.U./DL

## 2018-07-27 PROCEDURE — 99214 OFFICE O/P EST MOD 30 MIN: CPT | Performed by: INTERNAL MEDICINE

## 2018-07-27 PROCEDURE — 3008F BODY MASS INDEX DOCD: CPT | Performed by: INTERNAL MEDICINE

## 2018-07-27 PROCEDURE — 1036F TOBACCO NON-USER: CPT | Performed by: INTERNAL MEDICINE

## 2018-07-27 PROCEDURE — 81003 URINALYSIS AUTO W/O SCOPE: CPT | Performed by: INTERNAL MEDICINE

## 2018-07-27 NOTE — PROGRESS NOTES
Assessment/Plan:  Understands importance of weight loss to improve her symptoms  Obtain labs     Problem List Items Addressed This Visit     Fatigue    Hyperlipidemia    Relevant Orders    Lipid panel    Impaired fasting glucose    Nontoxic multinodular goiter    Relevant Orders    CBC    Comprehensive metabolic panel    TSH, 3rd generation with Free T4 reflex    T3    T4, free    Obesity (BMI 30-39  9)    Polyarthralgia - Primary    Gross hematuria            Subjective:      Patient ID: Duke Russo is a 56 y o  female  HPI  Since her last visit, she stopped Plaquenil and saw Dr Sally Laurent  She was taken off Plaquenil  Previously diagnosed with undifferentiated CTD  CRP is high but she says she was not convinced she has an autoimmune disorder  Recent episode of gross hematuria treated with Macrobid  Resolved   Denies dysuria  Would like T4 and T3 checked  She tried [employer] and lost 8lbs only over 3months  Frustrated with her weight    The following portions of the patient's history were reviewed and updated as appropriate: allergies, current medications, past family history, past medical history, past social history, past surgical history and problem list     Review of Systems   Constitutional: Negative for fatigue, fever and unexpected weight change  HENT: Negative for ear pain, hearing loss, sinus pain, sinus pressure and sore throat  Respiratory: Negative for cough, shortness of breath and wheezing  Cardiovascular: Positive for palpitations (occasional )  Negative for chest pain and leg swelling  Gastrointestinal: Negative for abdominal pain, constipation, diarrhea, nausea and vomiting  Musculoskeletal: Positive for arthralgias  Negative for myalgias  Objective:      /66   Pulse 64   Ht 4' 11" (1 499 m)   Wt 86 3 kg (190 lb 3 2 oz)   SpO2 98%   BMI 38 42 kg/m²          Physical Exam   Constitutional: She is oriented to person, place, and time   She appears well-developed and well-nourished  HENT:   Head: Normocephalic and atraumatic  Right Ear: External ear normal    Left Ear: External ear normal    Mouth/Throat: Oropharynx is clear and moist    Eyes: Conjunctivae are normal    Neck: Neck supple  Cardiovascular: Normal rate, regular rhythm and normal heart sounds  No murmur heard  Pulmonary/Chest: Effort normal and breath sounds normal  No respiratory distress  She has no wheezes  She has no rales  Abdominal: Soft  Bowel sounds are normal  She exhibits no distension and no mass  There is no tenderness  There is no rebound and no guarding  Musculoskeletal: Normal range of motion  Neurological: She is alert and oriented to person, place, and time  Skin: Skin is warm and dry  Psychiatric: She has a normal mood and affect   Her behavior is normal  Judgment and thought content normal

## 2018-10-12 ENCOUNTER — TRANSCRIBE ORDERS (OUTPATIENT)
Dept: LAB | Facility: CLINIC | Age: 58
End: 2018-10-12

## 2018-10-12 ENCOUNTER — APPOINTMENT (OUTPATIENT)
Dept: LAB | Facility: CLINIC | Age: 58
End: 2018-10-12
Payer: COMMERCIAL

## 2018-10-12 DIAGNOSIS — M17.0 PRIMARY OSTEOARTHRITIS OF BOTH KNEES: Primary | ICD-10-CM

## 2018-10-12 DIAGNOSIS — R53.83 FATIGUE, UNSPECIFIED TYPE: ICD-10-CM

## 2018-10-12 DIAGNOSIS — R79.82 ELEVATED C-REACTIVE PROTEIN (CRP): ICD-10-CM

## 2018-10-12 LAB
ALBUMIN SERPL BCP-MCNC: 3.6 G/DL (ref 3.5–5)
ALP SERPL-CCNC: 74 U/L (ref 46–116)
ALT SERPL W P-5'-P-CCNC: 28 U/L (ref 12–78)
ANION GAP SERPL CALCULATED.3IONS-SCNC: 1 MMOL/L (ref 4–13)
AST SERPL W P-5'-P-CCNC: 12 U/L (ref 5–45)
BILIRUB SERPL-MCNC: 0.53 MG/DL (ref 0.2–1)
BUN SERPL-MCNC: 16 MG/DL (ref 5–25)
CALCIUM SERPL-MCNC: 8.9 MG/DL (ref 8.3–10.1)
CHLORIDE SERPL-SCNC: 105 MMOL/L (ref 100–108)
CHOLEST SERPL-MCNC: 190 MG/DL (ref 50–200)
CO2 SERPL-SCNC: 30 MMOL/L (ref 21–32)
CREAT SERPL-MCNC: 0.83 MG/DL (ref 0.6–1.3)
CRP SERPL QL: 13.2 MG/L
ERYTHROCYTE [DISTWIDTH] IN BLOOD BY AUTOMATED COUNT: 12.6 % (ref 11.6–15.1)
GFR SERPL CREATININE-BSD FRML MDRD: 80 ML/MIN/1.73SQ M
GLUCOSE P FAST SERPL-MCNC: 98 MG/DL (ref 65–99)
HCT VFR BLD AUTO: 44.9 % (ref 34.8–46.1)
HDLC SERPL-MCNC: 41 MG/DL (ref 40–60)
HGB BLD-MCNC: 14.5 G/DL (ref 11.5–15.4)
LDLC SERPL CALC-MCNC: 128 MG/DL (ref 0–100)
MCH RBC QN AUTO: 29.2 PG (ref 26.8–34.3)
MCHC RBC AUTO-ENTMCNC: 32.3 G/DL (ref 31.4–37.4)
MCV RBC AUTO: 91 FL (ref 82–98)
NONHDLC SERPL-MCNC: 149 MG/DL
PLATELET # BLD AUTO: 170 THOUSANDS/UL (ref 149–390)
PMV BLD AUTO: 10.6 FL (ref 8.9–12.7)
POTASSIUM SERPL-SCNC: 4.4 MMOL/L (ref 3.5–5.3)
PROT SERPL-MCNC: 7.7 G/DL (ref 6.4–8.2)
RBC # BLD AUTO: 4.96 MILLION/UL (ref 3.81–5.12)
SODIUM SERPL-SCNC: 136 MMOL/L (ref 136–145)
T3 SERPL-MCNC: 1 NG/ML (ref 0.6–1.8)
T4 FREE SERPL-MCNC: 0.89 NG/DL (ref 0.76–1.46)
TRIGL SERPL-MCNC: 105 MG/DL
TSH SERPL DL<=0.05 MIU/L-ACNC: 2.97 UIU/ML (ref 0.36–3.74)
WBC # BLD AUTO: 4.09 THOUSAND/UL (ref 4.31–10.16)

## 2018-10-12 PROCEDURE — 80061 LIPID PANEL: CPT | Performed by: INTERNAL MEDICINE

## 2018-10-12 PROCEDURE — 80053 COMPREHEN METABOLIC PANEL: CPT | Performed by: INTERNAL MEDICINE

## 2018-10-12 PROCEDURE — 36415 COLL VENOUS BLD VENIPUNCTURE: CPT | Performed by: INTERNAL MEDICINE

## 2018-10-12 PROCEDURE — 84439 ASSAY OF FREE THYROXINE: CPT | Performed by: INTERNAL MEDICINE

## 2018-10-12 PROCEDURE — 85027 COMPLETE CBC AUTOMATED: CPT | Performed by: INTERNAL MEDICINE

## 2018-10-12 PROCEDURE — 86140 C-REACTIVE PROTEIN: CPT

## 2018-10-12 PROCEDURE — 84443 ASSAY THYROID STIM HORMONE: CPT | Performed by: INTERNAL MEDICINE

## 2018-10-12 PROCEDURE — 84480 ASSAY TRIIODOTHYRONINE (T3): CPT | Performed by: INTERNAL MEDICINE

## 2018-10-22 ENCOUNTER — TELEPHONE (OUTPATIENT)
Dept: OBGYN CLINIC | Facility: CLINIC | Age: 58
End: 2018-10-22

## 2018-10-22 NOTE — TELEPHONE ENCOUNTER
Left message for patient, she will need to schedule appt, cannot refill medication without being seen

## 2019-02-01 ENCOUNTER — OFFICE VISIT (OUTPATIENT)
Dept: INTERNAL MEDICINE CLINIC | Facility: CLINIC | Age: 59
End: 2019-02-01
Payer: COMMERCIAL

## 2019-02-01 VITALS
HEIGHT: 59 IN | HEART RATE: 76 BPM | BODY MASS INDEX: 37.17 KG/M2 | WEIGHT: 184.4 LBS | SYSTOLIC BLOOD PRESSURE: 114 MMHG | OXYGEN SATURATION: 99 % | DIASTOLIC BLOOD PRESSURE: 78 MMHG | TEMPERATURE: 98.7 F

## 2019-02-01 DIAGNOSIS — M25.50 POLYARTHRALGIA: ICD-10-CM

## 2019-02-01 DIAGNOSIS — E04.2 NONTOXIC MULTINODULAR GOITER: ICD-10-CM

## 2019-02-01 DIAGNOSIS — E66.9 OBESITY (BMI 30-39.9): Primary | ICD-10-CM

## 2019-02-01 DIAGNOSIS — M35.9 UNDIFFERENTIATED CONNECTIVE TISSUE DISEASE (HCC): ICD-10-CM

## 2019-02-01 PROBLEM — R31.0 GROSS HEMATURIA: Status: RESOLVED | Noted: 2018-07-27 | Resolved: 2019-02-01

## 2019-02-01 PROCEDURE — 3008F BODY MASS INDEX DOCD: CPT | Performed by: INTERNAL MEDICINE

## 2019-02-01 PROCEDURE — 99214 OFFICE O/P EST MOD 30 MIN: CPT | Performed by: INTERNAL MEDICINE

## 2019-02-01 NOTE — ASSESSMENT & PLAN NOTE
BMI Counseling: Body mass index is 37 24 kg/m²  Discussed the patient's BMI with her  The BMI is above average  BMI counseling and education was provided to the patient  Nutrition recommendations include moderation in carbohydrate intake

## 2019-02-01 NOTE — PATIENT INSTRUCTIONS
Look into Adacel /Tdap (tetanus + whooping cough)  Vaccine  Consider getting Shingrix (new shingles vaccine)

## 2019-02-01 NOTE — PROGRESS NOTES
Assessment/Plan:    Nontoxic multinodular goiter  Continue to monitor with US  TSH monitored too    Obesity (BMI 30-39  9)  BMI Counseling: Body mass index is 37 24 kg/m²  Discussed the patient's BMI with her  The BMI is above average  BMI counseling and education was provided to the patient  Nutrition recommendations include moderation in carbohydrate intake  Undifferentiated connective tissue disease ([address] )  Used to be on Plaquenil which was discontinued by her rheumatologist  No active joint pain    Discussed Adacel especially since she is in contact with infants in the hospital  She will look into this at [facility]  Consider Shingrix     Problem List Items Addressed This Visit        Endocrine    Nontoxic multinodular goiter     Continue to monitor with US  TSH monitored too         Relevant Orders    US thyroid    TSH, 3rd generation with Free T4 reflex       Other    Polyarthralgia    Relevant Orders    CBC and differential    Comprehensive metabolic panel    Lipid panel    TSH, 3rd generation with Free T4 reflex    Obesity (BMI 30-39 9) - Primary     BMI Counseling: Body mass index is 37 24 kg/m²  Discussed the patient's BMI with her  The BMI is above average  BMI counseling and education was provided to the patient  Nutrition recommendations include moderation in carbohydrate intake  Relevant Orders    CBC and differential    Comprehensive metabolic panel    Lipid panel    TSH, 3rd generation with Free T4 reflex    Undifferentiated connective tissue disease ([address] )     Used to be on Plaquenil which was discontinued by her rheumatologist  No active joint pain                 Subjective:      Patient ID: Armani Howe is a 66 y o  female  HPI  Here for a follow up  Doing well overall  She has had issues with her weight   She was on Nutrisystem and lost a little weight   She then followed Weight Watchers and lost more weight  She is not following a diet at this time      Switched jobs  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[employer] [facility] ([employer])  She was diagnosed with undifferentiated CTD and  was on Plaquenil  Dr Saul Galvan eventually discontinued this  She denies any active joint aches, swelling  Recent abscess that was I and D'd at urgent care  She has finished a round of PCN    The following portions of the patient's history were reviewed and updated as appropriate: allergies, current medications, past family history, past social history, past surgical history and problem list     Review of Systems   Constitutional: Negative for fatigue, fever and unexpected weight change  HENT: Negative for ear pain, hearing loss, sinus pain, sinus pressure and sore throat  Respiratory: Negative for cough, shortness of breath and wheezing  Cardiovascular: Negative for chest pain, palpitations and leg swelling  Gastrointestinal: Negative for abdominal pain, constipation, diarrhea, nausea and vomiting  Musculoskeletal: Negative for arthralgias and myalgias  Neurological: Negative for dizziness and headaches  Objective:      /78   Pulse 76   Temp 98 7 °F (37 1 °C)   Ht 4' 11" (1 499 m)   Wt 83 6 kg (184 lb 6 4 oz)   SpO2 99%   BMI 37 24 kg/m²          Physical Exam   Constitutional: She is oriented to person, place, and time  She appears well-developed and well-nourished  HENT:   Head: Normocephalic and atraumatic  Right Ear: External ear normal    Left Ear: External ear normal    Mouth/Throat: Oropharynx is clear and moist    Eyes: Conjunctivae are normal    Neck: Neck supple  Cardiovascular: Normal rate, regular rhythm and normal heart sounds  No murmur heard  Pulmonary/Chest: Effort normal and breath sounds normal  No respiratory distress  She has no wheezes  She has no rales  Abdominal: Soft  Bowel sounds are normal  She exhibits no distension and no mass  There is no tenderness  There is no rebound and no guarding  Musculoskeletal: Normal range of motion  Neurological: She is alert and oriented to person, place, and time  Skin: Skin is warm and dry  Healed  Closed wound on the left inner thigh   Psychiatric: She has a normal mood and affect   Her behavior is normal  Judgment and thought content normal

## 2019-03-27 ENCOUNTER — HOSPITAL ENCOUNTER (OUTPATIENT)
Dept: RADIOLOGY | Age: 59
Discharge: HOME | End: 2019-03-27
Payer: COMMERCIAL

## 2019-03-27 DIAGNOSIS — E04.2 NONTOXIC MULTINODULAR GOITER: ICD-10-CM

## 2019-03-27 DIAGNOSIS — E04.2 NONTOXIC MULTINODULAR GOITER: Primary | ICD-10-CM

## 2019-03-27 PROCEDURE — 76536 US EXAM OF HEAD AND NECK: CPT

## 2019-04-06 DIAGNOSIS — Z12.31 ENCOUNTER FOR SCREENING MAMMOGRAM FOR MALIGNANT NEOPLASM OF BREAST: ICD-10-CM

## 2019-04-09 ENCOUNTER — ANNUAL EXAM (OUTPATIENT)
Dept: OBGYN CLINIC | Facility: CLINIC | Age: 59
End: 2019-04-09
Payer: COMMERCIAL

## 2019-04-09 VITALS — BODY MASS INDEX: 38.78 KG/M2 | WEIGHT: 192 LBS | DIASTOLIC BLOOD PRESSURE: 76 MMHG | SYSTOLIC BLOOD PRESSURE: 118 MMHG

## 2019-04-09 DIAGNOSIS — Z12.39 BREAST CANCER SCREENING: ICD-10-CM

## 2019-04-09 DIAGNOSIS — Z01.419 ENCOUNTER FOR GYNECOLOGICAL EXAMINATION: ICD-10-CM

## 2019-04-09 DIAGNOSIS — Z01.419 PAP SMEAR, AS PART OF ROUTINE GYNECOLOGICAL EXAMINATION: Primary | ICD-10-CM

## 2019-04-09 DIAGNOSIS — R39.9 LOWER URINARY TRACT SYMPTOMS (LUTS): ICD-10-CM

## 2019-04-09 LAB
SL AMB  POCT GLUCOSE, UA: ABNORMAL
SL AMB LEUKOCYTE ESTERASE,UA: 70
SL AMB POCT BILIRUBIN,UA: 1
SL AMB POCT BLOOD,UA: ABNORMAL
SL AMB POCT CLARITY,UA: ABNORMAL
SL AMB POCT COLOR,UA: YELLOW
SL AMB POCT KETONES,UA: 5
SL AMB POCT NITRITE,UA: ABNORMAL
SL AMB POCT PH,UA: 5
SL AMB POCT SPECIFIC GRAVITY,UA: 1.01
SL AMB POCT URINE PROTEIN: 15
SL AMB POCT UROBILINOGEN: 0.2

## 2019-04-09 PROCEDURE — 81002 URINALYSIS NONAUTO W/O SCOPE: CPT | Performed by: OBSTETRICS & GYNECOLOGY

## 2019-04-09 PROCEDURE — 99396 PREV VISIT EST AGE 40-64: CPT | Performed by: OBSTETRICS & GYNECOLOGY

## 2019-04-09 RX ORDER — GLUCOSAMINE/CHONDR SU A SOD 750-600 MG
1 TABLET ORAL
COMMUNITY
End: 2021-04-21

## 2019-04-09 RX ORDER — MELOXICAM 15 MG/1
15 TABLET ORAL
COMMUNITY
End: 2019-11-04 | Stop reason: ALTCHOICE

## 2019-04-11 ENCOUNTER — TELEPHONE (OUTPATIENT)
Dept: OBGYN CLINIC | Facility: CLINIC | Age: 59
End: 2019-04-11

## 2019-04-12 LAB
CLINICAL INFO: NORMAL
CYTO CVX: NORMAL
DATE PREVIOUS BX: NORMAL
HPV I/H RISK 1 DNA CVX QL PROBE+SIG AMP: NOT DETECTED
LMP START DATE: NORMAL
SL AMB PREV. PAP:: NORMAL
SPECIMEN SOURCE CVX/VAG CYTO: NORMAL

## 2019-10-17 ENCOUNTER — APPOINTMENT (OUTPATIENT)
Dept: LAB | Age: 59
End: 2019-10-17
Payer: COMMERCIAL

## 2019-10-17 DIAGNOSIS — E66.9 OBESITY (BMI 30-39.9): ICD-10-CM

## 2019-10-17 DIAGNOSIS — M25.50 POLYARTHRALGIA: ICD-10-CM

## 2019-10-17 DIAGNOSIS — E04.2 NONTOXIC MULTINODULAR GOITER: ICD-10-CM

## 2019-10-17 LAB
ALBUMIN SERPL BCP-MCNC: 4 G/DL (ref 3.5–5)
ALP SERPL-CCNC: 76 U/L (ref 46–116)
ALT SERPL W P-5'-P-CCNC: 24 U/L (ref 12–78)
ANION GAP SERPL CALCULATED.3IONS-SCNC: 6 MMOL/L (ref 4–13)
AST SERPL W P-5'-P-CCNC: 13 U/L (ref 5–45)
BASOPHILS # BLD AUTO: 0.01 THOUSANDS/ΜL (ref 0–0.1)
BASOPHILS NFR BLD AUTO: 0 % (ref 0–1)
BILIRUB SERPL-MCNC: 0.48 MG/DL (ref 0.2–1)
BUN SERPL-MCNC: 12 MG/DL (ref 5–25)
CALCIUM SERPL-MCNC: 9.1 MG/DL (ref 8.3–10.1)
CHLORIDE SERPL-SCNC: 110 MMOL/L (ref 100–108)
CHOLEST SERPL-MCNC: 261 MG/DL (ref 50–200)
CO2 SERPL-SCNC: 25 MMOL/L (ref 21–32)
CREAT SERPL-MCNC: 0.74 MG/DL (ref 0.6–1.3)
EOSINOPHIL # BLD AUTO: 0.12 THOUSAND/ΜL (ref 0–0.61)
EOSINOPHIL NFR BLD AUTO: 3 % (ref 0–6)
ERYTHROCYTE [DISTWIDTH] IN BLOOD BY AUTOMATED COUNT: 12.5 % (ref 11.6–15.1)
GFR SERPL CREATININE-BSD FRML MDRD: 91 ML/MIN/1.73SQ M
GLUCOSE P FAST SERPL-MCNC: 98 MG/DL (ref 65–99)
HCT VFR BLD AUTO: 45.5 % (ref 34.8–46.1)
HDLC SERPL-MCNC: 52 MG/DL (ref 40–60)
HGB BLD-MCNC: 14.7 G/DL (ref 11.5–15.4)
IMM GRANULOCYTES # BLD AUTO: 0.02 THOUSAND/UL (ref 0–0.2)
IMM GRANULOCYTES NFR BLD AUTO: 1 % (ref 0–2)
LDLC SERPL CALC-MCNC: 187 MG/DL (ref 0–100)
LYMPHOCYTES # BLD AUTO: 0.94 THOUSANDS/ΜL (ref 0.6–4.47)
LYMPHOCYTES NFR BLD AUTO: 24 % (ref 14–44)
MCH RBC QN AUTO: 28.9 PG (ref 26.8–34.3)
MCHC RBC AUTO-ENTMCNC: 32.3 G/DL (ref 31.4–37.4)
MCV RBC AUTO: 89 FL (ref 82–98)
MONOCYTES # BLD AUTO: 0.39 THOUSAND/ΜL (ref 0.17–1.22)
MONOCYTES NFR BLD AUTO: 10 % (ref 4–12)
NEUTROPHILS # BLD AUTO: 2.39 THOUSANDS/ΜL (ref 1.85–7.62)
NEUTS SEG NFR BLD AUTO: 62 % (ref 43–75)
NONHDLC SERPL-MCNC: 209 MG/DL
NRBC BLD AUTO-RTO: 0 /100 WBCS
PLATELET # BLD AUTO: 170 THOUSANDS/UL (ref 149–390)
PMV BLD AUTO: 9.7 FL (ref 8.9–12.7)
POTASSIUM SERPL-SCNC: 4.1 MMOL/L (ref 3.5–5.3)
PROT SERPL-MCNC: 7.6 G/DL (ref 6.4–8.2)
RBC # BLD AUTO: 5.09 MILLION/UL (ref 3.81–5.12)
SODIUM SERPL-SCNC: 141 MMOL/L (ref 136–145)
TRIGL SERPL-MCNC: 112 MG/DL
TSH SERPL DL<=0.05 MIU/L-ACNC: 1.64 UIU/ML (ref 0.36–3.74)
WBC # BLD AUTO: 3.87 THOUSAND/UL (ref 4.31–10.16)

## 2019-10-17 PROCEDURE — 80053 COMPREHEN METABOLIC PANEL: CPT

## 2019-10-17 PROCEDURE — 80061 LIPID PANEL: CPT

## 2019-10-17 PROCEDURE — 85025 COMPLETE CBC W/AUTO DIFF WBC: CPT

## 2019-10-17 PROCEDURE — 36415 COLL VENOUS BLD VENIPUNCTURE: CPT

## 2019-10-17 PROCEDURE — 84443 ASSAY THYROID STIM HORMONE: CPT

## 2019-11-04 ENCOUNTER — OFFICE VISIT (OUTPATIENT)
Dept: INTERNAL MEDICINE CLINIC | Facility: CLINIC | Age: 59
End: 2019-11-04
Payer: COMMERCIAL

## 2019-11-04 VITALS
DIASTOLIC BLOOD PRESSURE: 76 MMHG | SYSTOLIC BLOOD PRESSURE: 110 MMHG | HEIGHT: 59 IN | BODY MASS INDEX: 40.08 KG/M2 | WEIGHT: 198.8 LBS | OXYGEN SATURATION: 98 % | HEART RATE: 94 BPM

## 2019-11-04 DIAGNOSIS — R30.0 DYSURIA: ICD-10-CM

## 2019-11-04 DIAGNOSIS — E04.2 NONTOXIC MULTINODULAR GOITER: ICD-10-CM

## 2019-11-04 DIAGNOSIS — M35.9 UNDIFFERENTIATED CONNECTIVE TISSUE DISEASE (HCC): ICD-10-CM

## 2019-11-04 DIAGNOSIS — R73.01 IMPAIRED FASTING GLUCOSE: ICD-10-CM

## 2019-11-04 DIAGNOSIS — E66.01 MORBID OBESITY (HCC): Primary | ICD-10-CM

## 2019-11-04 DIAGNOSIS — E78.2 MIXED HYPERLIPIDEMIA: ICD-10-CM

## 2019-11-04 LAB
BACTERIA UR QL AUTO: ABNORMAL /HPF
BILIRUB UR QL STRIP: NEGATIVE
CLARITY UR: ABNORMAL
COLOR UR: YELLOW
GLUCOSE UR STRIP-MCNC: NEGATIVE MG/DL
HGB UR QL STRIP.AUTO: NEGATIVE
HYALINE CASTS #/AREA URNS LPF: ABNORMAL /LPF
KETONES UR STRIP-MCNC: NEGATIVE MG/DL
LEUKOCYTE ESTERASE UR QL STRIP: ABNORMAL
NITRITE UR QL STRIP: NEGATIVE
NON-SQ EPI CELLS URNS QL MICRO: ABNORMAL /HPF
PH UR STRIP.AUTO: 7.5 [PH]
PROT UR STRIP-MCNC: NEGATIVE MG/DL
RBC #/AREA URNS AUTO: ABNORMAL /HPF
SL AMB  POCT GLUCOSE, UA: NEGATIVE
SL AMB LEUKOCYTE ESTERASE,UA: ABNORMAL
SL AMB POCT BILIRUBIN,UA: NEGATIVE
SL AMB POCT BLOOD,UA: NEGATIVE
SL AMB POCT CLARITY,UA: ABNORMAL
SL AMB POCT COLOR,UA: YELLOW
SL AMB POCT KETONES,UA: NEGATIVE
SL AMB POCT NITRITE,UA: NEGATIVE
SL AMB POCT PH,UA: 7.5
SL AMB POCT SPECIFIC GRAVITY,UA: 1.01
SL AMB POCT URINE PROTEIN: NEGATIVE
SL AMB POCT UROBILINOGEN: NEGATIVE
SP GR UR STRIP.AUTO: 1.01 (ref 1–1.03)
UROBILINOGEN UR QL STRIP.AUTO: 1 E.U./DL
WBC #/AREA URNS AUTO: ABNORMAL /HPF

## 2019-11-04 PROCEDURE — 81001 URINALYSIS AUTO W/SCOPE: CPT | Performed by: INTERNAL MEDICINE

## 2019-11-04 PROCEDURE — 87086 URINE CULTURE/COLONY COUNT: CPT | Performed by: INTERNAL MEDICINE

## 2019-11-04 PROCEDURE — 3008F BODY MASS INDEX DOCD: CPT | Performed by: INTERNAL MEDICINE

## 2019-11-04 PROCEDURE — 81003 URINALYSIS AUTO W/O SCOPE: CPT | Performed by: INTERNAL MEDICINE

## 2019-11-04 PROCEDURE — 99214 OFFICE O/P EST MOD 30 MIN: CPT | Performed by: INTERNAL MEDICINE

## 2019-11-04 RX ORDER — TURMERIC ROOT EXTRACT 500 MG
1 TABLET ORAL
COMMUNITY

## 2019-11-04 RX ORDER — PYRIDOXINE HCL (VITAMIN B6) 100 MG
1 TABLET ORAL
COMMUNITY

## 2019-11-04 NOTE — ASSESSMENT & PLAN NOTE
Stable  BMI Counseling: Body mass index is 40 15 kg/m²  The BMI is above normal  Nutrition recommendations include moderation in carbohydrate intake  Patient referred to nutritionist due to patient being morbidly obese  Patient referred to bariatric surgery due to patient being morbidly obese

## 2019-11-04 NOTE — PROGRESS NOTES
Assessment/Plan:    Impaired fasting glucose  Stable  BMI Counseling: Body mass index is 40 15 kg/m²  The BMI is above normal  Nutrition recommendations include moderation in carbohydrate intake  Patient referred to nutritionist due to patient being morbidly obese  Patient referred to bariatric surgery due to patient being morbidly obese  Nontoxic multinodular goiter  US in 2021    Hyperlipidemia  10y CVD risk 2 2 %  BMI Counseling: Body mass index is 40 15 kg/m²  The BMI is above normal  Nutrition recommendations include reducing intake of saturated fat and trans fat and reducing intake of cholesterol  Problem List Items Addressed This Visit        Endocrine    Impaired fasting glucose     Stable  BMI Counseling: Body mass index is 40 15 kg/m²  The BMI is above normal  Nutrition recommendations include moderation in carbohydrate intake  Patient referred to nutritionist due to patient being morbidly obese  Patient referred to bariatric surgery due to patient being morbidly obese  Nontoxic multinodular goiter     US in 2021            Other    Undifferentiated connective tissue disease ([facility] )    Relevant Orders    C-reactive protein    Sedimentation rate, automated    Hyperlipidemia     10y CVD risk 2 2 %  BMI Counseling: Body mass index is 40 15 kg/m²  The BMI is above normal  Nutrition recommendations include reducing intake of saturated fat and trans fat and reducing intake of cholesterol  Relevant Orders    Lipid panel    Basic metabolic panel    Morbid obesity ([facility] ) - Primary    Relevant Orders    Ambulatory referral to Bariatric Surgery      Other Visit Diagnoses     Dysuria        Relevant Orders    Ambulatory referral to Urology    UA w Reflex to Microscopic w Reflex to Culture - Clinic Collect    POCT urine dip auto non-scope (Completed)            Subjective:      Patient ID: Ana Leblanc is a 66 y o  female      HPI  Here for a follow up  Cholesterol is very high TC 261  HDL 52   + weight gain, poor diet when under stress and is under stress at work  Knee pain also limiting exercise  She has been on a few commercial diets and is unable to sustain them  She is interested in weight loss surgery  Older daughter with anorexia and she has a teenage daughter living with her at home  She is worried about what this would mean to them if she had surgery  Dysuria, frequency, urgency on and off x 2 weeks  She has had this in the past and culture was negative    The following portions of the patient's history were reviewed and updated as appropriate: allergies, current medications, past family history, past social history, past surgical history and problem list     Review of Systems   Constitutional: Negative for fatigue, fever and unexpected weight change  HENT: Negative for ear pain, hearing loss, sinus pressure, sinus pain and sore throat  Respiratory: Negative for cough, shortness of breath and wheezing  Cardiovascular: Negative for chest pain, palpitations and leg swelling  Gastrointestinal: Negative for abdominal pain, constipation, diarrhea, nausea and vomiting  Musculoskeletal: Positive for arthralgias  Negative for myalgias  Neurological: Negative for dizziness and headaches  Objective:      /76   Pulse 94   Ht 4' 11" (1 499 m)   Wt 90 2 kg (198 lb 12 8 oz)   SpO2 98%   BMI 40 15 kg/m²          Physical Exam   Constitutional: She is oriented to person, place, and time  She appears well-developed and well-nourished  HENT:   Head: Normocephalic and atraumatic  Right Ear: External ear normal    Left Ear: External ear normal    Mouth/Throat: Oropharynx is clear and moist    Eyes: Conjunctivae are normal    Neck: Neck supple  Cardiovascular: Normal rate, regular rhythm and normal heart sounds  No murmur heard  Pulmonary/Chest: Effort normal and breath sounds normal  No respiratory distress  She has no wheezes  She has no rales  Abdominal: Soft  She exhibits no distension and no mass  There is no tenderness  There is no rebound and no guarding  Neurological: She is alert and oriented to person, place, and time  Skin: Skin is warm and dry  Psychiatric: She has a normal mood and affect   Her behavior is normal  Judgment and thought content normal

## 2019-11-04 NOTE — ASSESSMENT & PLAN NOTE
10y CVD risk 2 2 %  BMI Counseling: Body mass index is 40 15 kg/m²  The BMI is above normal  Nutrition recommendations include reducing intake of saturated fat and trans fat and reducing intake of cholesterol

## 2019-11-05 LAB — BACTERIA UR CULT: NORMAL

## 2019-12-11 NOTE — PROGRESS NOTES
12/12/2019    Christiana Rucker  1960  [id number]        Assessment  -Dysuria    Discussion/Plan  Sarah Soto is a 64 y o  female who presents in consultation     -Urine dip in office today shows no evidence of infection  Trace blood noted will send for UA with microscopic and culture  Advised patient that someone from office will call with any significant findings      -We discussed that cause of dysuria without presence of urinary tract infection is likely secondary to vaginal atrophy  Patient is postmenopausal   I recommend starting topical Estrace , however she defers and is not interested in this medication  She is primarily concerned about occasional suprapubic pressure and bloating  I recommend obtaining a renal ultrasound PVR to ensure there is no anatomical cause for her symptoms  If findings are negative, I recommend follow up with GYN  -Patient will return in 3 months for re-evaluation and to review results of ultrasound  She was instructed to call with any issues   -All questions answered, patients agree with plan     History of Present Illness  64 y o  female who presents in consultation for dysuria  Patient reports longstanding symptom of dysuria, suprapubic pressure, and bloating for at least 2 years  She states it is intermittent and can last from a few hours to a few weeks  Patient states that PCP obtains urine testing during episode, but is always negative for infection  She was recently evaluated by PCP on 11/4/19  Urine microscopic and culture showed no evidence of hematuria or infection  Patient denies any additional lower urinary tract symptoms, or gross hematuria  She denies any prior urologic history, surgical intervention, or instrumentation  Patient asymptomatic at this time  Review of Systems  Review of Systems   Constitutional: Negative  HENT: Negative  Respiratory: Negative  Cardiovascular: Negative  Gastrointestinal: Negative      Genitourinary: Negative for decreased urine volume, difficulty urinating, flank pain, frequency, hematuria and urgency  Musculoskeletal: Negative  Skin: Negative  Neurological: Negative  Psychiatric/Behavioral: Negative          Past Medical History  Past Medical History:   Diagnosis Date    Chest pain 07/01/2014    Elevated blood sugar 07/01/2014       Past Social History  Past Surgical History:   Procedure Laterality Date    BIOPSY CORE NEEDLE      Thyroid Using Percutaneous Core Needle    FOOT SURGERY      As a teenager - Bunionectomy - Dr Emil Salvador - Last Assessed: July 5, 2016    THYROID LOBECTOMY Right 09/01/2015    Dr Luther Lloyd: Right Lobe - Last Assessed: July 5, 2016 Dr Emil Salvador       Past Family History  Family History   Problem Relation Age of Onset   Gema Fu' disease Mother     Hyperlipidemia Mother     Hypertension Mother     Osteoporosis Mother     Aortic stenosis Father     Hyperlipidemia Father     Hypertension Father     Coronary artery disease Father         Coronary Artery Bypass Graft (CABG)    Heart disease Father         Pacemaker Placement    Valvular heart disease Father         S/P Transcatheter Aortic Valve Replacement (TAVR)    Heart attack Other         Myocardial Infarction    Stroke Other        Past Social history  Social History     Socioeconomic History    Marital status: /Civil Union     Spouse name: Not on file    Number of children: 10    Years of education: Not on file    Highest education level: Not on file   Occupational History    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   Social Needs    Financial resource strain: Not on file    Food insecurity:     Worry: Not on file     Inability: Not on file    Transportation needs:     Medical: Not on file     Non-medical: Not on file   Tobacco Use    Smoking status: Never Smoker    Smokeless tobacco: Never Used   Substance and Sexual Activity    Alcohol use: No    Drug use: No    Sexual activity: Not on file   Lifestyle    Physical activity:     Days per week: Not on file     Minutes per session: Not on file    Stress: Not on file   Relationships    Social connections:     Talks on phone: Not on file     Gets together: Not on file     Attends Orthodox service: Not on file     Active member of club or organization: Not on file     Attends meetings of clubs or organizations: Not on file     Relationship status: Not on file    Intimate partner violence:     Fear of current or ex partner: Not on file     Emotionally abused: Not on file     Physically abused: Not on file     Forced sexual activity: Not on file   Other Topics Concern    Not on file   Social History Narrative    Feels safe at home       Current Medications  Current Outpatient Medications   Medication Sig Dispense Refill    Ascorbic Acid (VITAMIN C) 100 MG CHEW Chew 1 tablet daily      Calcium Carbonate-Vit D-Min (CALCIUM 1200 PO) Take by mouth daily      Cholecalciferol 82149 units TABS Take 200 Units by mouth       Cranberry 500 MG CAPS Take 1 capsule by mouth      Glucosamine HCl 1500 MG TABS Take 1 tablet by mouth      IRON PO 1 tablet      LACTOBACILLUS PO Take 1 capsule by mouth      Magnesium 250 MG TABS Take by mouth       Multiple Vitamins-Minerals (MULTIVITAL-M) TABS Take by mouth      OMEGA-3 FATTY ACIDS  mg      Probiotic Product (PROBIOTIC-10) CAPS Take by mouth      psyllium (METAMUCIL) 0 52 g capsule Take 0 52 g by mouth daily      Turmeric 500 MG TABS Take 1 capsule by mouth       No current facility-administered medications for this visit  Allergies  No Known Allergies    Past medical history, social history, family history, medications and allergies were reviewed      Vitals  Vitals:    12/12/19 1508   BP: 120/80   BP Location: Left arm   Patient Position: Sitting   Cuff Size: Adult   Pulse: 95   Weight: 90 7 kg (200 lb)   Height: 4' 11" (1 499 m)       Physical Exam  Physical Exam   Constitutional: She is oriented to person, place, and time  She appears well-developed and well-nourished  HENT:   Head: Normocephalic  Eyes: Pupils are equal, round, and reactive to light  Neck: Normal range of motion  Cardiovascular: Normal rate and regular rhythm  Pulmonary/Chest: Effort normal    Abdominal: Soft  Normal appearance  She exhibits no distension  There is no tenderness  There is no CVA tenderness  Genitourinary:   Genitourinary Comments: No suprapubic discomfort or distention   Musculoskeletal: Normal range of motion  Neurological: She is alert and oriented to person, place, and time  Skin: Skin is warm and dry  Psychiatric: She has a normal mood and affect   Her behavior is normal  Judgment and thought content normal        Results    I have personally reviewed all pertinent lab results and reviewed with patient  Lab Results   Component Value Date    CALCIUM 9 1 10/17/2019    K 4 1 10/17/2019    CO2 25 10/17/2019     (H) 10/17/2019    BUN 12 10/17/2019    CREATININE 0 74 10/17/2019     Lab Results   Component Value Date    WBC 3 87 (L) 10/17/2019    HGB 14 7 10/17/2019    HCT 45 5 10/17/2019    MCV 89 10/17/2019     10/17/2019     Recent Results (from the past 1 hour(s))   POCT urine dip    Collection Time: 12/12/19  3:19 PM   Result Value Ref Range    LEUKOCYTE ESTERASE,UA neg     NITRITE,UA neg     SL AMB POCT UROBILINOGEN 0 2     POCT URINE PROTEIN neg      PH,UA 6 0     BLOOD,UA trace     SPECIFIC GRAVITY,UA 1 005     KETONES,UA neg     BILIRUBIN,UA neg     GLUCOSE, UA neg      COLOR,UA bright yellow     CLARITY,UA clear

## 2019-12-12 ENCOUNTER — CONSULT (OUTPATIENT)
Dept: UROLOGY | Facility: AMBULATORY SURGERY CENTER | Age: 59
End: 2019-12-12
Payer: COMMERCIAL

## 2019-12-12 VITALS
HEART RATE: 95 BPM | WEIGHT: 200 LBS | BODY MASS INDEX: 40.32 KG/M2 | HEIGHT: 59 IN | SYSTOLIC BLOOD PRESSURE: 120 MMHG | DIASTOLIC BLOOD PRESSURE: 80 MMHG

## 2019-12-12 DIAGNOSIS — R30.0 DYSURIA: Primary | ICD-10-CM

## 2019-12-12 LAB
BACTERIA UR QL AUTO: ABNORMAL /HPF
BILIRUB UR QL STRIP: NEGATIVE
CLARITY UR: CLEAR
COLOR UR: YELLOW
GLUCOSE UR STRIP-MCNC: NEGATIVE MG/DL
HGB UR QL STRIP.AUTO: NEGATIVE
HYALINE CASTS #/AREA URNS LPF: ABNORMAL /LPF
KETONES UR STRIP-MCNC: NEGATIVE MG/DL
LEUKOCYTE ESTERASE UR QL STRIP: ABNORMAL
NITRITE UR QL STRIP: NEGATIVE
NON-SQ EPI CELLS URNS QL MICRO: ABNORMAL /HPF
PH UR STRIP.AUTO: 6.5 [PH]
PROT UR STRIP-MCNC: NEGATIVE MG/DL
RBC #/AREA URNS AUTO: ABNORMAL /HPF
SL AMB  POCT GLUCOSE, UA: NORMAL
SL AMB LEUKOCYTE ESTERASE,UA: NORMAL
SL AMB POCT BILIRUBIN,UA: NORMAL
SL AMB POCT BLOOD,UA: NORMAL
SL AMB POCT CLARITY,UA: CLEAR
SL AMB POCT COLOR,UA: NORMAL
SL AMB POCT KETONES,UA: NORMAL
SL AMB POCT NITRITE,UA: NORMAL
SL AMB POCT PH,UA: 6
SL AMB POCT SPECIFIC GRAVITY,UA: 1
SL AMB POCT URINE PROTEIN: NORMAL
SL AMB POCT UROBILINOGEN: 0.2
SP GR UR STRIP.AUTO: 1.01 (ref 1–1.03)
UROBILINOGEN UR QL STRIP.AUTO: 0.2 E.U./DL
WBC #/AREA URNS AUTO: ABNORMAL /HPF

## 2019-12-12 PROCEDURE — 81002 URINALYSIS NONAUTO W/O SCOPE: CPT | Performed by: NURSE PRACTITIONER

## 2019-12-12 PROCEDURE — 99204 OFFICE O/P NEW MOD 45 MIN: CPT | Performed by: NURSE PRACTITIONER

## 2019-12-12 PROCEDURE — 81001 URINALYSIS AUTO W/SCOPE: CPT | Performed by: NURSE PRACTITIONER

## 2019-12-12 PROCEDURE — 87086 URINE CULTURE/COLONY COUNT: CPT | Performed by: NURSE PRACTITIONER

## 2019-12-13 LAB — BACTERIA UR CULT: NORMAL

## 2019-12-17 ENCOUNTER — HOSPITAL ENCOUNTER (OUTPATIENT)
Dept: RADIOLOGY | Age: 59
Discharge: HOME | End: 2019-12-17
Payer: COMMERCIAL

## 2019-12-17 DIAGNOSIS — R30.0 DYSURIA: ICD-10-CM

## 2019-12-17 PROCEDURE — 51798 US URINE CAPACITY MEASURE: CPT

## 2019-12-24 ENCOUNTER — TELEPHONE (OUTPATIENT)
Dept: UROLOGY | Facility: AMBULATORY SURGERY CENTER | Age: 59
End: 2019-12-24

## 2019-12-24 NOTE — TELEPHONE ENCOUNTER
Patient recently seen in consultation on 12/12/2019  She had reported suprapubic pressure and dysuria  Inform patient that results of renal ultrasound was unremarkable  Recommend follow up with GYN  Patient's next appointment in February 2020  Will reassess her symptoms at that time

## 2019-12-24 NOTE — TELEPHONE ENCOUNTER
Seda Cherry called back and results were given that uu/s was normal and that she should follow up with gyn - She understood

## 2019-12-30 ENCOUNTER — CLINICAL SUPPORT (OUTPATIENT)
Dept: BARIATRICS | Facility: CLINIC | Age: 59
End: 2019-12-30

## 2019-12-30 VITALS
BODY MASS INDEX: 40.52 KG/M2 | HEIGHT: 59 IN | DIASTOLIC BLOOD PRESSURE: 80 MMHG | WEIGHT: 201 LBS | HEART RATE: 77 BPM | TEMPERATURE: 96 F | SYSTOLIC BLOOD PRESSURE: 124 MMHG

## 2019-12-30 DIAGNOSIS — E66.01 MORBID OBESITY (HCC): Primary | ICD-10-CM

## 2019-12-30 PROCEDURE — RECHECK: Performed by: DIETITIAN, REGISTERED

## 2019-12-30 NOTE — PROGRESS NOTES
Bariatric Nutrition Assessment Note    Type of surgery    Preop 4 months  Surgery Date: TBD- Tentative May 2020  Surgeon: Dr Vinny Shine  64 y o   female     Wt with BMI of 25: 122 4lbs  Pre-Op Excess Wt: 78 6lbs  /80 (BP Location: Right arm, Patient Position: Sitting)   Pulse 77   Temp (!) 96 °F (35 6 °C) (Tympanic)   Ht 4' 10 75" (1 492 m)   Wt 91 2 kg (201 lb)   BMI 40 94 kg/m²     [facility] Equation:  0929 kcal/day=weight maintenance  1178 kcal/day= 1lb/wk wt loss      Weight History   Onset of Obesity: Adult: after having children  Family history of obesity: Yes: grandmother  Wt Loss Attempts: Behavioral Counseling (Hypnosis, Overeaters Anonymous, etc)  Commercial Programs ([employer], Michell Strange, etc )  FAD Diets (Cabbage soup, Grapefruit, Cleanse, etc )  High Protein/Low CHO diets (Atkins, Union, etc )  Meal Replacements (Medifast, Slim Fast, etc )  Nutrition Counseling with RD  Self Created Diets (Portion Control, Healthy Food Choices, etc )  Patient has tried the above for 6 months or more with insufficient weight loss or weight regain, which is why patient has requested to be evaluated for weight loss surgery today  Maximum Wt Lost: 30lbs      Review of History and Medications   Past Medical History:   Diagnosis Date    Chest pain 2014    Disease of thyroid gland     Elevated blood sugar 2014    Hypercholesteremia     Hyperlipidemia     Osteoarthritis      Past Surgical History:   Procedure Laterality Date    BIOPSY CORE NEEDLE      Thyroid Using Percutaneous Core Needle     SECTION      FOOT SURGERY      As a teenager - Bunionectomy - Dr Mike Gonsales - Last Assessed: 2016    THYROID LOBECTOMY Right 2015    Dr Isabela Giraldo: Right Lobe - Last Assessed: 2016 Dr Cramer Ket History     Socioeconomic History    Marital status: /Civil Union     Spouse name: None    Number of children: 10    Years of education: None    Highest education level: None   Occupational History    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   Social Needs    Financial resource strain: None    Food insecurity:     Worry: None     Inability: None    Transportation needs:     Medical: None     Non-medical: None   Tobacco Use    Smoking status: Never Smoker    Smokeless tobacco: Never Used   Substance and Sexual Activity    Alcohol use: No    Drug use: No    Sexual activity: None   Lifestyle    Physical activity:     Days per week: None     Minutes per session: None    Stress: None   Relationships    Social connections:     Talks on phone: None     Gets together: None     Attends Anglican service: None     Active member of club or organization: None     Attends meetings of clubs or organizations: None     Relationship status: None    Intimate partner violence:     Fear of current or ex partner: None     Emotionally abused: None     Physically abused: None     Forced sexual activity: None   Other Topics Concern    None   Social History Narrative    Feels safe at home       Current Outpatient Medications:     Ascorbic Acid (VITAMIN C) 100 MG CHEW, Chew 1 tablet daily, Disp: , Rfl:     Calcium Carbonate-Vit D-Min (CALCIUM 1200 PO), Take by mouth daily, Disp: , Rfl:     Cholecalciferol 46109 units TABS, Take 200 Units by mouth , Disp: , Rfl:     Cranberry 500 MG CAPS, Take 1 capsule by mouth, Disp: , Rfl:     LACTOBACILLUS PO, Take 1 capsule by mouth, Disp: , Rfl:     Magnesium 250 MG TABS, Take by mouth , Disp: , Rfl:     Multiple Vitamins-Minerals (MULTIVITAL-M) TABS, Take by mouth, Disp: , Rfl:     OMEGA-3 FATTY ACIDS PO, 500 mg, Disp: , Rfl:     Probiotic Product (PROBIOTIC-10) CAPS, Take by mouth, Disp: , Rfl:     Turmeric 500 MG TABS, Take 1 capsule by mouth, Disp: , Rfl:     Glucosamine HCl 1500 MG TABS, Take 1 tablet by mouth, Disp: , Rfl:     IRON PO, 1 tablet, Disp: , Rfl:     psyllium (METAMUCIL) 0 52 g capsule, Take 0 52 g by mouth daily, Disp: , Rfl:   Food Intake and Lifestyle Assessment   Food Intake Assessment completed via food log brought by patient  Breakfast: large mug hot tea with stevia and milk or honey  1-2 piece toast with butter, 4oz OJ  Snack: Kind bar   Lunch: lentil soup with crackers OR sweet and sour chicken with tortilla chips   Snack: pretzels OR banana OR yogurt  Dinner: water, pasta with meat sauce OR beef with broccoli OR pork chop with bakes potato  Snack: 1 mug of hot tea, water  Beverage intake: water, juice, diet soda and coffee/tea  Protein supplement: none  Estimated protein intake per day: 30-60g  Estimated fluid intake per day: 64oz  Meals eaten away from home: 1-2 lunches, 1 dinner weekly  Typical meal pattern: 3 meals per day and 2 snacks per day  Eating Behaviors: Consumption of high calorie/ high fat foods, Large portion sizes, Frequent snacking/ grazing and Mindless eating  Food allergies or intolerances: No Known Allergies  Cultural or Protestant considerations: none noted    Physical Assessment  Physical Activity  Types of exercise: None  Housework  Current physical limitations: knee pain- states she needs B/L TKR  Has treadmill and bike in her basement  Psychosocial Assessment   Support systems: friend(s) relative(s)  Socioeconomic factors: none noted    Nutrition Diagnosis  Diagnosis: Overweight / Obesity (NC-3 3) and Excessive energy intake (NI-1 5)  Related to: Physical inactivity and Excessive energy intake  As Evidenced by: BMI >25 and Excessive energy intake     Nutrition Prescription: Recommend the following diet  Regular    Interventions and Teaching   Discussed pre-op and post-op nutrition guidelines  Patient educated and handouts provided    Surgical changes to stomach / GI  Capacity of post-surgery stomach  Diet progression  Adequate hydration  Sugar and fat restriction to decrease "dumping syndrome"  Expected weight loss  Weight loss plateaus/ possibility of weight regain  Exercise  Suggestions for pre-op diet  Nutrition considerations after surgery  Protein supplements  Meal planning and preparation  Appropriate carbohydrate, protein, and fat intake, and food/fluid choices to maximize safe weight loss, nutrient intake, and tolerance   Dietary and lifestyle changes  Possible problems with poor eating habits  Techniques for self monitoring and keeping daily food journal  Potential for food intolerance after surgery, and ways to deal with them including: lactose intolerance, nausea, reflux, vomiting, diarrhea, food intolerance, appetite changes, gas  Vitamin / Mineral supplementation:  Pt is currently taking: Vitamin C, Calcium, Vitamin D3, Cranberry, Glucosamine, Iron, Lactobacillus, Magnesium, Omega-3, Turmeric, Metamucil, Multivitamin  Patient provided with gym coupon for [employer] Assessment: Yes    Education provided to: patient    Barriers to learning: No barriers identified  Readiness to change: contemplation and preparation    Prior research on procedure: internet and friends or family    Comprehension: needs reinforcement and verbalizes understanding     Expected Compliance: good  Recommendations  Pt is an appropriate candidate for surgery   Yes    Evaluation / Monitoring  Dietitian to Monitor: Eating pattern as discussed Tolerance of nutrition prescription Body weight Lab values Physical activity    Goals  Eliminate sugar sweetened beverages, Food journal, Exercise 30 minutes 5 times per week, Complete lession plans 1-6, Eat 3 meals per day and Eliminate mindless snacking    Time Spent:   1 Hour

## 2019-12-30 NOTE — PROGRESS NOTES
Bariatric Behavioral Health Evaluation    Presenting Problem patient here to improve health, increase mobility, reduce chronic pain and prevent family disease  Is the patient seeking Bariatric Surgery Eval? Yes  If yes how long have you researched this surgery option  Patient has two friends who had surgery and did well  Realizes Post- Op Requirements? Yes     Pre-morbid level of function and history of present illness: patient has medical issues  Psychiatric/Psychological Treatment Diagnosis: Patient denies Axis I diagnosis and treatment  Outpatient Counselor No    Psychiatrist No     Have you had Inpatient Treatment? No    Family Constellation (include relationship with each and Psych/Med HX)    Domestic Violence No    Additional comments/stressors related to family/relationships/peer support     Physical/Psychological Assessment:     Appearance: appropriate  Sociability: average  Affect: appropriate  Mood: calm  Thought Process: coherent  Speech: normal  Content: no impairment  Orientation: person  Yes , place  Yes , time  Yes , normal attention span  Yes , normal memory  Yes   and normal judgement  Yes   Insight: emotional  fair    Risk Assessment:     none    Recommendations: Recommended for surgery  yes    Risk of Harm to Self or Others: none reported     Observation:     Interviews this interview only  Access to weapons no     Based on the previous information, the client presents the following risk of harm to self or others: low     Note Patient denies Axis I diagnosis and treatment  Patient educated regarding the impact of nicotine and alcohol on the post surgery bariatric patient  Patient meets criteria for surgery at this program and is referred to the surgeon       BARIATRIC SURGERY EDUCATION CHECKLIST    I have received education related to my bariatric surgery process and understand:    Patients may be required to complete a psychiatric evaluation and receive clearance for surgery from their psychiatrist     Patients who undergo weight loss surgery are at higher risk of increased mental health concerns and suicide attempts  Patients may be required to complete a full substance abuse evaluation and then complete all treatment recommendations prior to surgery  If diagnosis of abuse/dependence results, patient may be required to remain sober for one (1) year before having bariatric surgery  Patients on psychiatric medications should check with their provider to discuss psychiatric medications and the changes in absorption  Patient should discuss all time release medications with provider and take all medications as prescribed  The recommendation is that there is no use of  any tobacco products, Hookah or  vapes for the bariatric post-operation patient  Bariatric surgery patients should not consume alcohol as a post-operative patient as it may increase risk of numerous health conditions including but not limited to alcohol abuse and ulcers  There is a possibility of weight regain if patient does not follow all program guidelines and recommendations  Bariatric surgery patients should exercise thirty (30) to sixty (60) minutes per day to maintain post-surgical weight loss  Research indicates that bariatric patients are more successful when they see a therapist for up to two (2) years post-op  Patients will follow all medical and dietary recommendations provided  Patient will keep all scheduled appointments and follow up with their physician for a minimum of five (5) years  Patient will take all vitamins as recommended  Post-operative vitamins are life-long  Patient reviewed Bariatric Surgery Education Checklist and agrees they have received education on these issues

## 2020-05-12 ENCOUNTER — OFFICE VISIT (OUTPATIENT)
Dept: INTERNAL MEDICINE CLINIC | Facility: CLINIC | Age: 60
End: 2020-05-12
Payer: COMMERCIAL

## 2020-05-12 VITALS
HEIGHT: 58 IN | SYSTOLIC BLOOD PRESSURE: 124 MMHG | BODY MASS INDEX: 39.67 KG/M2 | WEIGHT: 189 LBS | OXYGEN SATURATION: 99 % | HEART RATE: 81 BPM | DIASTOLIC BLOOD PRESSURE: 72 MMHG | TEMPERATURE: 96.7 F

## 2020-05-12 DIAGNOSIS — Z12.31 ENCOUNTER FOR SCREENING MAMMOGRAM FOR BREAST CANCER: ICD-10-CM

## 2020-05-12 DIAGNOSIS — R53.83 FATIGUE, UNSPECIFIED TYPE: Primary | ICD-10-CM

## 2020-05-12 DIAGNOSIS — Z23 NEED FOR VACCINATION: ICD-10-CM

## 2020-05-12 DIAGNOSIS — M35.9 UNDIFFERENTIATED CONNECTIVE TISSUE DISEASE (HCC): ICD-10-CM

## 2020-05-12 DIAGNOSIS — E66.01 MORBID OBESITY (HCC): ICD-10-CM

## 2020-05-12 DIAGNOSIS — E04.2 NONTOXIC MULTINODULAR GOITER: ICD-10-CM

## 2020-05-12 PROCEDURE — 3008F BODY MASS INDEX DOCD: CPT | Performed by: INTERNAL MEDICINE

## 2020-05-12 PROCEDURE — 90715 TDAP VACCINE 7 YRS/> IM: CPT

## 2020-05-12 PROCEDURE — 1036F TOBACCO NON-USER: CPT | Performed by: INTERNAL MEDICINE

## 2020-05-12 PROCEDURE — 99214 OFFICE O/P EST MOD 30 MIN: CPT | Performed by: INTERNAL MEDICINE

## 2020-05-12 PROCEDURE — 90471 IMMUNIZATION ADMIN: CPT

## 2020-09-16 ENCOUNTER — APPOINTMENT (OUTPATIENT)
Dept: LAB | Age: 60
End: 2020-09-16
Payer: COMMERCIAL

## 2020-09-16 DIAGNOSIS — E78.2 MIXED HYPERLIPIDEMIA: ICD-10-CM

## 2020-09-16 DIAGNOSIS — M35.9 UNDIFFERENTIATED CONNECTIVE TISSUE DISEASE (HCC): ICD-10-CM

## 2020-09-16 LAB
ANION GAP SERPL CALCULATED.3IONS-SCNC: 3 MMOL/L (ref 4–13)
BUN SERPL-MCNC: 16 MG/DL (ref 5–25)
CALCIUM SERPL-MCNC: 9 MG/DL (ref 8.3–10.1)
CHLORIDE SERPL-SCNC: 108 MMOL/L (ref 100–108)
CHOLEST SERPL-MCNC: 235 MG/DL (ref 50–200)
CO2 SERPL-SCNC: 28 MMOL/L (ref 21–32)
CREAT SERPL-MCNC: 0.74 MG/DL (ref 0.6–1.3)
CRP SERPL QL: 18.2 MG/L
ERYTHROCYTE [SEDIMENTATION RATE] IN BLOOD: 25 MM/HOUR (ref 0–29)
GFR SERPL CREATININE-BSD FRML MDRD: 90 ML/MIN/1.73SQ M
GLUCOSE P FAST SERPL-MCNC: 96 MG/DL (ref 65–99)
HDLC SERPL-MCNC: 54 MG/DL
LDLC SERPL CALC-MCNC: 158 MG/DL (ref 0–100)
NONHDLC SERPL-MCNC: 181 MG/DL
POTASSIUM SERPL-SCNC: 4.2 MMOL/L (ref 3.5–5.3)
SODIUM SERPL-SCNC: 139 MMOL/L (ref 136–145)
TRIGL SERPL-MCNC: 114 MG/DL
TSH SERPL DL<=0.05 MIU/L-ACNC: 3.03 UIU/ML (ref 0.36–3.74)

## 2020-09-16 PROCEDURE — 80061 LIPID PANEL: CPT

## 2020-09-16 PROCEDURE — 85652 RBC SED RATE AUTOMATED: CPT

## 2020-09-16 PROCEDURE — 84443 ASSAY THYROID STIM HORMONE: CPT | Performed by: INTERNAL MEDICINE

## 2020-09-16 PROCEDURE — 80048 BASIC METABOLIC PNL TOTAL CA: CPT

## 2020-09-16 PROCEDURE — 86140 C-REACTIVE PROTEIN: CPT

## 2020-09-16 PROCEDURE — 36415 COLL VENOUS BLD VENIPUNCTURE: CPT

## 2020-10-22 ENCOUNTER — ANNUAL EXAM (OUTPATIENT)
Dept: OBGYN CLINIC | Facility: CLINIC | Age: 60
End: 2020-10-22
Payer: COMMERCIAL

## 2020-10-22 VITALS
SYSTOLIC BLOOD PRESSURE: 130 MMHG | WEIGHT: 199 LBS | HEIGHT: 59 IN | TEMPERATURE: 98.2 F | BODY MASS INDEX: 40.12 KG/M2 | DIASTOLIC BLOOD PRESSURE: 82 MMHG

## 2020-10-22 DIAGNOSIS — Z01.419 PAP SMEAR, AS PART OF ROUTINE GYNECOLOGICAL EXAMINATION: ICD-10-CM

## 2020-10-22 DIAGNOSIS — Z12.31 ENCOUNTER FOR SCREENING MAMMOGRAM FOR MALIGNANT NEOPLASM OF BREAST: ICD-10-CM

## 2020-10-22 DIAGNOSIS — Z01.419 ENCNTR FOR GYN EXAM (GENERAL) (ROUTINE) W/O ABN FINDINGS: Primary | ICD-10-CM

## 2020-10-22 PROCEDURE — 99396 PREV VISIT EST AGE 40-64: CPT | Performed by: OBSTETRICS & GYNECOLOGY

## 2020-10-22 PROCEDURE — 1036F TOBACCO NON-USER: CPT | Performed by: OBSTETRICS & GYNECOLOGY

## 2020-10-28 LAB
CLINICAL INFO: NORMAL
CYTO CVX: NORMAL
DATE PREVIOUS BX: NORMAL
LMP START DATE: NORMAL
SL AMB PREV. PAP:: NORMAL
SPECIMEN SOURCE CVX/VAG CYTO: NORMAL

## 2020-11-04 DIAGNOSIS — Z12.31 ENCOUNTER FOR SCREENING MAMMOGRAM FOR MALIGNANT NEOPLASM OF BREAST: ICD-10-CM

## 2021-01-21 DIAGNOSIS — Z23 ENCOUNTER FOR IMMUNIZATION: ICD-10-CM

## 2021-01-29 ENCOUNTER — IMMUNIZATIONS (OUTPATIENT)
Dept: FAMILY MEDICINE CLINIC | Facility: HOSPITAL | Age: 61
End: 2021-01-29

## 2021-01-29 DIAGNOSIS — Z23 ENCOUNTER FOR IMMUNIZATION: Primary | ICD-10-CM

## 2021-01-29 PROCEDURE — 91301 SARS-COV-2 / COVID-19 MRNA VACCINE (MODERNA) 100 MCG: CPT

## 2021-01-29 PROCEDURE — 0011A SARS-COV-2 / COVID-19 MRNA VACCINE (MODERNA) 100 MCG: CPT

## 2021-02-26 ENCOUNTER — IMMUNIZATIONS (OUTPATIENT)
Dept: FAMILY MEDICINE CLINIC | Facility: HOSPITAL | Age: 61
End: 2021-02-26

## 2021-02-26 DIAGNOSIS — Z23 ENCOUNTER FOR IMMUNIZATION: Primary | ICD-10-CM

## 2021-02-26 PROCEDURE — 91301 SARS-COV-2 / COVID-19 MRNA VACCINE (MODERNA) 100 MCG: CPT

## 2021-02-26 PROCEDURE — 0012A SARS-COV-2 / COVID-19 MRNA VACCINE (MODERNA) 100 MCG: CPT

## 2021-04-21 ENCOUNTER — CONSULT (OUTPATIENT)
Dept: SURGERY | Facility: CLINIC | Age: 61
End: 2021-04-21
Payer: COMMERCIAL

## 2021-04-21 ENCOUNTER — OFFICE VISIT (OUTPATIENT)
Dept: INTERNAL MEDICINE CLINIC | Facility: CLINIC | Age: 61
End: 2021-04-21
Payer: COMMERCIAL

## 2021-04-21 VITALS
HEIGHT: 59 IN | HEART RATE: 83 BPM | BODY MASS INDEX: 36.45 KG/M2 | SYSTOLIC BLOOD PRESSURE: 108 MMHG | DIASTOLIC BLOOD PRESSURE: 72 MMHG | OXYGEN SATURATION: 99 % | WEIGHT: 180.8 LBS

## 2021-04-21 VITALS — WEIGHT: 190 LBS | BODY MASS INDEX: 38.3 KG/M2 | HEIGHT: 59 IN

## 2021-04-21 DIAGNOSIS — L02.416 ABSCESS OF LEFT THIGH: Primary | ICD-10-CM

## 2021-04-21 PROCEDURE — 3008F BODY MASS INDEX DOCD: CPT | Performed by: SURGERY

## 2021-04-21 PROCEDURE — 10060 I&D ABSCESS SIMPLE/SINGLE: CPT | Performed by: SURGERY

## 2021-04-21 PROCEDURE — 99213 OFFICE O/P EST LOW 20 MIN: CPT | Performed by: INTERNAL MEDICINE

## 2021-04-21 PROCEDURE — 3725F SCREEN DEPRESSION PERFORMED: CPT | Performed by: INTERNAL MEDICINE

## 2021-04-21 PROCEDURE — 99242 OFF/OP CONSLTJ NEW/EST SF 20: CPT | Performed by: SURGERY

## 2021-04-21 PROCEDURE — 1036F TOBACCO NON-USER: CPT | Performed by: INTERNAL MEDICINE

## 2021-04-21 RX ORDER — DOXYCYCLINE HYCLATE 100 MG/1
100 CAPSULE ORAL EVERY 12 HOURS SCHEDULED
Qty: 20 CAPSULE | Refills: 0 | Status: SHIPPED | OUTPATIENT
Start: 2021-04-21 | End: 2021-05-01

## 2021-04-21 NOTE — PROGRESS NOTES
Assessment/Plan:       Problem List Items Addressed This Visit     None      Visit Diagnoses     Abscess of left thigh    -  Primary    Relevant Medications    doxycycline hyclate (VIBRAMYCIN) 100 mg capsule    Other Relevant Orders    Ambulatory referral to General Surgery            Subjective:      Patient ID: Blanca Staley is a 64 y o  female  HPI  A few days ago started with an abscess in the left inner thigh  This has grown in size  Using neosporin and warm compress  Temps 99s  Similar issue 2 years ang I and D at urgent care    The following portions of the patient's history were reviewed and updated as appropriate: allergies, current medications, past family history, past medical history, past social history, past surgical history and problem list     Review of Systems   Constitutional: Positive for fever  Negative for chills  Skin:        abscess         Objective:      /72   Pulse 83   Ht 4' 11" (1 499 m)   Wt 82 kg (180 lb 12 8 oz)   SpO2 99%   BMI 36 52 kg/m²          Physical Exam  Constitutional:       General: She is not in acute distress  Appearance: She is obese  She is not ill-appearing, toxic-appearing or diaphoretic  Pulmonary:      Effort: No respiratory distress  Skin:     Comments: Large abscess left inner thigh   Neurological:      Mental Status: She is oriented to person, place, and time     Psychiatric:         Mood and Affect: Mood normal          Behavior: Behavior normal

## 2021-04-21 NOTE — PROGRESS NOTES
Assessment/Plan:    Diagnoses and all orders for this visit:    Abscess of left thigh  -     Ambulatory referral to General Surgery      Incision and drainage performed here in the office with patient's permission  Packing placed  Local wound care instructions given  Will see back in 3 weeks further evaluate    Subjective:      Patient ID: Mar White is a 64 y o  female  Patient presents for evaluation of an abscess on her upper inner left thigh  She has had the abscess for 3 to 4 days  The abscess is red, sore and painful  Abscess  This is a new problem  The current episode started in the past 7 days  The problem occurs daily  The problem has been gradually worsening  The following portions of the patient's history were reviewed and updated as appropriate:     She  has a past medical history of Chest pain (2014), Disease of thyroid gland, Elevated blood sugar (2014), Hypercholesteremia, Hyperlipidemia, and Osteoarthritis  She  has a past surgical history that includes BIOPSY CORE NEEDLE; Foot surgery; Thyroid lobectomy (Right, 2015); and  section  Her family history includes Aortic stenosis in her father; Coronary artery disease in her father; Kate Rothschild' disease in her mother; Heart attack in her other; Heart disease in her father; Hyperlipidemia in her father and mother; Hypertension in her father and mother; Osteoporosis in her mother; Stroke in her other; Valvular heart disease in her father  She  reports that she has never smoked  She has never used smokeless tobacco  She reports that she does not drink alcohol or use drugs    Current Outpatient Medications   Medication Sig Dispense Refill    Ascorbic Acid (VITAMIN C) 100 MG CHEW Chew 1 tablet daily      Calcium Carbonate-Vit D-Min (CALCIUM 1200 PO) Take by mouth daily      Cholecalciferol 60229 units TABS Take 200 Units by mouth       Cranberry 500 MG CAPS Take 1 capsule by mouth      doxycycline hyclate (VIBRAMYCIN) 100 mg capsule Take 1 capsule (100 mg total) by mouth every 12 (twelve) hours for 10 days 20 capsule 0    LACTOBACILLUS PO Take 1 capsule by mouth      Magnesium 250 MG TABS Take by mouth       Multiple Vitamins-Minerals (MULTIVITAL-M) TABS Take by mouth      OMEGA-3 FATTY ACIDS  mg      Probiotic Product (PROBIOTIC-10) CAPS Take by mouth      psyllium (METAMUCIL) 0 52 g capsule Take 0 52 g by mouth daily      Turmeric 500 MG TABS Take 1 capsule by mouth       No current facility-administered medications for this visit  She has No Known Allergies       Review of Systems      Objective:      Ht 4' 11" (1 499 m)   Wt 86 2 kg (190 lb)   BMI 38 38 kg/m²          Physical Exam  Skin:     General: Skin is warm and dry  Comments: Abscess of in her upper left thigh  Risks and benefits for incision and drainage were discussed with her and she agreed to proceed  Incision and Drainage    Date/Time: 4/21/2021 4:26 PM  Performed by: Sunshine Waddell DO  Authorized by: Sunshine Waddell DO   Universal Protocol:  Consent: Verbal consent obtained  Risks and benefits: risks, benefits and alternatives were discussed  Consent given by: patient  Time out: Immediately prior to procedure a "time out" was called to verify the correct patient, procedure, equipment, support staff and site/side marked as required  Patient understanding: patient states understanding of the procedure being performed  Patient identity confirmed: verbally with patient      Location:     Type:  Abscess    Location:  Lower extremity    Lower extremity location:  L leg  Pre-procedure details:     Procedure prep: Alcohol  Procedure details:     Complexity:  Intermediate    Incision types:  Cruciate    Scalpel blade:  11    Approach:  Open    Incision depth:  Subcutaneous    Wound management:  Probed and deloculated    Drainage:  Bloody and purulent    Drainage amount:   Moderate    Wound treatment:  Wound left open Packing materials:  1/2 in gauze  Post-procedure details:     Patient tolerance of procedure:   Tolerated well, no immediate complications

## 2021-04-23 ENCOUNTER — HOSPITAL ENCOUNTER (OUTPATIENT)
Dept: RADIOLOGY | Age: 61
Discharge: HOME | End: 2021-04-23
Payer: COMMERCIAL

## 2021-04-23 DIAGNOSIS — E04.2 NONTOXIC MULTINODULAR GOITER: ICD-10-CM

## 2021-04-23 PROCEDURE — 76536 US EXAM OF HEAD AND NECK: CPT

## 2021-05-12 ENCOUNTER — PROCEDURE VISIT (OUTPATIENT)
Dept: SURGERY | Facility: CLINIC | Age: 61
End: 2021-05-12
Payer: COMMERCIAL

## 2021-05-12 DIAGNOSIS — L02.416 ABSCESS OF LEFT THIGH: Primary | ICD-10-CM

## 2021-05-12 PROCEDURE — 99212 OFFICE O/P EST SF 10 MIN: CPT | Performed by: SURGERY

## 2021-05-12 NOTE — LETTER
May 12, 2021     [place]  [address] [address]  [address] [place] [address]    Patient: Sintia Hamm   YOB: 1960   Date of Visit: 2021       Dear Dr Geri Farias: Thank you for referring Sintia Hamm to me for evaluation  Below are my notes for this consultation  If you have questions, please do not hesitate to call me  I look forward to following your patient along with you  Sincerely,        Nuvia Mcconnell DO        CC: No Recipients  Nuvia Mcconnell DO  2021  1:00 PM  Sign when Signing Visit  Assessment/Plan:    Diagnoses and all orders for this visit:    Abscess of left thigh      Well-healed incision and drainage site  Offered excision of this area as this was the 2nd time this has occurred for her  She opts for observation  We will be happy to see her if she has any changes in the future  Subjective:      Patient ID: Sintia Hamm is a 64 y o  female  Presents for follow up to I&D left thigh abscess 2021  Well healed, denies redness or drainage  Dramatic improvement  She had a similar infectious process several years ago which required drainage  The following portions of the patient's history were reviewed and updated as appropriate:     She  has a past medical history of Chest pain (2014), Disease of thyroid gland, Elevated blood sugar (2014), Hypercholesteremia, Hyperlipidemia, and Osteoarthritis  She  has a past surgical history that includes BIOPSY CORE NEEDLE; Foot surgery; Thyroid lobectomy (Right, 2015); and  section  Her family history includes Aortic stenosis in her father; Coronary artery disease in her father; Ivory Traore' disease in her mother; Heart attack in her other; Heart disease in her father; Hyperlipidemia in her father and mother; Hypertension in her father and mother; Osteoporosis in her mother; Stroke in her other; Valvular heart disease in her father  She  reports that she has never smoked   She has never used smokeless tobacco  She reports that she does not drink alcohol or use drugs  Current Outpatient Medications   Medication Sig Dispense Refill    Ascorbic Acid (VITAMIN C) 100 MG CHEW Chew 1 tablet daily      Calcium Carbonate-Vit D-Min (CALCIUM 1200 PO) Take by mouth daily      Cholecalciferol 08744 units TABS Take 200 Units by mouth       Cranberry 500 MG CAPS Take 1 capsule by mouth      LACTOBACILLUS PO Take 1 capsule by mouth      Magnesium 250 MG TABS Take by mouth       Multiple Vitamins-Minerals (MULTIVITAL-M) TABS Take by mouth      OMEGA-3 FATTY ACIDS  mg      Probiotic Product (PROBIOTIC-10) CAPS Take by mouth      psyllium (METAMUCIL) 0 52 g capsule Take 0 52 g by mouth daily      Turmeric 500 MG TABS Take 1 capsule by mouth       No current facility-administered medications for this visit  She has No Known Allergies       Review of Systems      Objective: There were no vitals taken for this visit  Physical Exam  Skin:     General: Skin is warm and dry  Comments: Well-healed incision and drainage site of mid inner left thigh    No signs of infection

## 2021-05-12 NOTE — PROGRESS NOTES
Assessment/Plan:    Diagnoses and all orders for this visit:    Abscess of left thigh      Well-healed incision and drainage site  Offered excision of this area as this was the 2nd time this has occurred for her  She opts for observation  We will be happy to see her if she has any changes in the future  Subjective:      Patient ID: Jennifer Glass is a 64 y o  female  Presents for follow up to I&D left thigh abscess 2021  Well healed, denies redness or drainage  Dramatic improvement  She had a similar infectious process several years ago which required drainage  The following portions of the patient's history were reviewed and updated as appropriate:     She  has a past medical history of Chest pain (2014), Disease of thyroid gland, Elevated blood sugar (2014), Hypercholesteremia, Hyperlipidemia, and Osteoarthritis  She  has a past surgical history that includes BIOPSY CORE NEEDLE; Foot surgery; Thyroid lobectomy (Right, 2015); and  section  Her family history includes Aortic stenosis in her father; Coronary artery disease in her father; Kennis Chalk' disease in her mother; Heart attack in her other; Heart disease in her father; Hyperlipidemia in her father and mother; Hypertension in her father and mother; Osteoporosis in her mother; Stroke in her other; Valvular heart disease in her father  She  reports that she has never smoked  She has never used smokeless tobacco  She reports that she does not drink alcohol or use drugs    Current Outpatient Medications   Medication Sig Dispense Refill    Ascorbic Acid (VITAMIN C) 100 MG CHEW Chew 1 tablet daily      Calcium Carbonate-Vit D-Min (CALCIUM 1200 PO) Take by mouth daily      Cholecalciferol 19309 units TABS Take 200 Units by mouth       Cranberry 500 MG CAPS Take 1 capsule by mouth      LACTOBACILLUS PO Take 1 capsule by mouth      Magnesium 250 MG TABS Take by mouth       Multiple Vitamins-Minerals (MULTIVITAL-M) TABS Take by mouth      OMEGA-3 FATTY ACIDS  mg      Probiotic Product (PROBIOTIC-10) CAPS Take by mouth      psyllium (METAMUCIL) 0 52 g capsule Take 0 52 g by mouth daily      Turmeric 500 MG TABS Take 1 capsule by mouth       No current facility-administered medications for this visit  She has No Known Allergies       Review of Systems      Objective: There were no vitals taken for this visit  Physical Exam  Skin:     General: Skin is warm and dry  Comments: Well-healed incision and drainage site of mid inner left thigh    No signs of infection

## 2021-05-25 ENCOUNTER — TELEPHONE (OUTPATIENT)
Dept: OBGYN CLINIC | Facility: HOSPITAL | Age: 61
End: 2021-05-25

## 2021-05-25 ENCOUNTER — OFFICE VISIT (OUTPATIENT)
Dept: RHEUMATOLOGY | Facility: CLINIC | Age: 61
End: 2021-05-25
Payer: COMMERCIAL

## 2021-05-25 ENCOUNTER — TRANSCRIBE ORDERS (OUTPATIENT)
Dept: LAB | Facility: CLINIC | Age: 61
End: 2021-05-25

## 2021-05-25 ENCOUNTER — APPOINTMENT (OUTPATIENT)
Dept: LAB | Facility: CLINIC | Age: 61
End: 2021-05-25
Payer: COMMERCIAL

## 2021-05-25 VITALS
WEIGHT: 190 LBS | HEIGHT: 59 IN | BODY MASS INDEX: 38.3 KG/M2 | SYSTOLIC BLOOD PRESSURE: 142 MMHG | DIASTOLIC BLOOD PRESSURE: 99 MMHG | HEART RATE: 65 BPM

## 2021-05-25 DIAGNOSIS — M17.10 ARTHRITIS OF KNEE: ICD-10-CM

## 2021-05-25 DIAGNOSIS — M35.9 UNDIFFERENTIATED CONNECTIVE TISSUE DISEASE (HCC): ICD-10-CM

## 2021-05-25 DIAGNOSIS — M17.10 ARTHRITIS OF KNEE: Primary | ICD-10-CM

## 2021-05-25 LAB
ALBUMIN SERPL BCP-MCNC: 3.6 G/DL (ref 3.5–5)
ALP SERPL-CCNC: 82 U/L (ref 46–116)
ALT SERPL W P-5'-P-CCNC: 25 U/L (ref 12–78)
ANION GAP SERPL CALCULATED.3IONS-SCNC: 7 MMOL/L (ref 4–13)
AST SERPL W P-5'-P-CCNC: 17 U/L (ref 5–45)
BACTERIA UR QL AUTO: ABNORMAL /HPF
BASOPHILS # BLD AUTO: 0.01 THOUSANDS/ΜL (ref 0–0.1)
BASOPHILS NFR BLD AUTO: 0 % (ref 0–1)
BILIRUB SERPL-MCNC: 0.38 MG/DL (ref 0.2–1)
BILIRUB UR QL STRIP: NEGATIVE
BUN SERPL-MCNC: 13 MG/DL (ref 5–25)
C3 SERPL-MCNC: 132 MG/DL (ref 90–180)
C4 SERPL-MCNC: 34 MG/DL (ref 10–40)
CALCIUM SERPL-MCNC: 9.3 MG/DL (ref 8.3–10.1)
CHLORIDE SERPL-SCNC: 103 MMOL/L (ref 100–108)
CK SERPL-CCNC: 76 U/L (ref 26–192)
CLARITY UR: CLEAR
CO2 SERPL-SCNC: 29 MMOL/L (ref 21–32)
COLOR UR: YELLOW
CREAT SERPL-MCNC: 0.78 MG/DL (ref 0.6–1.3)
CRP SERPL QL: 29.1 MG/L
EOSINOPHIL # BLD AUTO: 0.14 THOUSAND/ΜL (ref 0–0.61)
EOSINOPHIL NFR BLD AUTO: 3 % (ref 0–6)
ERYTHROCYTE [DISTWIDTH] IN BLOOD BY AUTOMATED COUNT: 13.1 % (ref 11.6–15.1)
ERYTHROCYTE [SEDIMENTATION RATE] IN BLOOD: 53 MM/HOUR (ref 0–29)
GFR SERPL CREATININE-BSD FRML MDRD: 84 ML/MIN/1.73SQ M
GLUCOSE P FAST SERPL-MCNC: 93 MG/DL (ref 65–99)
GLUCOSE UR STRIP-MCNC: NEGATIVE MG/DL
HCT VFR BLD AUTO: 44.6 % (ref 34.8–46.1)
HGB BLD-MCNC: 14.9 G/DL (ref 11.5–15.4)
HGB UR QL STRIP.AUTO: NEGATIVE
IMM GRANULOCYTES # BLD AUTO: 0.01 THOUSAND/UL (ref 0–0.2)
IMM GRANULOCYTES NFR BLD AUTO: 0 % (ref 0–2)
KETONES UR STRIP-MCNC: NEGATIVE MG/DL
LEUKOCYTE ESTERASE UR QL STRIP: ABNORMAL
LYMPHOCYTES # BLD AUTO: 1.24 THOUSANDS/ΜL (ref 0.6–4.47)
LYMPHOCYTES NFR BLD AUTO: 25 % (ref 14–44)
MCH RBC QN AUTO: 29.9 PG (ref 26.8–34.3)
MCHC RBC AUTO-ENTMCNC: 33.4 G/DL (ref 31.4–37.4)
MCV RBC AUTO: 89 FL (ref 82–98)
MONOCYTES # BLD AUTO: 0.44 THOUSAND/ΜL (ref 0.17–1.22)
MONOCYTES NFR BLD AUTO: 9 % (ref 4–12)
NEUTROPHILS # BLD AUTO: 3.12 THOUSANDS/ΜL (ref 1.85–7.62)
NEUTS SEG NFR BLD AUTO: 63 % (ref 43–75)
NITRITE UR QL STRIP: NEGATIVE
NON-SQ EPI CELLS URNS QL MICRO: ABNORMAL /HPF
NRBC BLD AUTO-RTO: 0 /100 WBCS
PH UR STRIP.AUTO: 6 [PH]
PLATELET # BLD AUTO: 188 THOUSANDS/UL (ref 149–390)
PMV BLD AUTO: 10 FL (ref 8.9–12.7)
POTASSIUM SERPL-SCNC: 4.1 MMOL/L (ref 3.5–5.3)
PROT SERPL-MCNC: 7.9 G/DL (ref 6.4–8.2)
PROT UR STRIP-MCNC: NEGATIVE MG/DL
RBC # BLD AUTO: 4.99 MILLION/UL (ref 3.81–5.12)
RBC #/AREA URNS AUTO: ABNORMAL /HPF
SODIUM SERPL-SCNC: 139 MMOL/L (ref 136–145)
SP GR UR STRIP.AUTO: 1.01 (ref 1–1.03)
TSH SERPL DL<=0.05 MIU/L-ACNC: 2.32 UIU/ML (ref 0.36–3.74)
UROBILINOGEN UR QL STRIP.AUTO: 0.2 E.U./DL
VIT B12 SERPL-MCNC: 1047 PG/ML (ref 100–900)
WBC # BLD AUTO: 4.96 THOUSAND/UL (ref 4.31–10.16)
WBC #/AREA URNS AUTO: ABNORMAL /HPF

## 2021-05-25 PROCEDURE — 82085 ASSAY OF ALDOLASE: CPT

## 2021-05-25 PROCEDURE — 85652 RBC SED RATE AUTOMATED: CPT

## 2021-05-25 PROCEDURE — 82550 ASSAY OF CK (CPK): CPT

## 2021-05-25 PROCEDURE — 84443 ASSAY THYROID STIM HORMONE: CPT

## 2021-05-25 PROCEDURE — 85025 COMPLETE CBC W/AUTO DIFF WBC: CPT

## 2021-05-25 PROCEDURE — 36415 COLL VENOUS BLD VENIPUNCTURE: CPT

## 2021-05-25 PROCEDURE — 86200 CCP ANTIBODY: CPT

## 2021-05-25 PROCEDURE — 86160 COMPLEMENT ANTIGEN: CPT

## 2021-05-25 PROCEDURE — 82607 VITAMIN B-12: CPT

## 2021-05-25 PROCEDURE — 86140 C-REACTIVE PROTEIN: CPT

## 2021-05-25 PROCEDURE — 86038 ANTINUCLEAR ANTIBODIES: CPT

## 2021-05-25 PROCEDURE — 86235 NUCLEAR ANTIGEN ANTIBODY: CPT

## 2021-05-25 PROCEDURE — 99244 OFF/OP CNSLTJ NEW/EST MOD 40: CPT | Performed by: INTERNAL MEDICINE

## 2021-05-25 PROCEDURE — 80053 COMPREHEN METABOLIC PANEL: CPT

## 2021-05-25 PROCEDURE — 86430 RHEUMATOID FACTOR TEST QUAL: CPT

## 2021-05-25 PROCEDURE — 81001 URINALYSIS AUTO W/SCOPE: CPT | Performed by: INTERNAL MEDICINE

## 2021-05-25 RX ORDER — HYDROXYCHLOROQUINE SULFATE 200 MG/1
200 TABLET, FILM COATED ORAL
Qty: 90 TABLET | Refills: 1 | Status: SHIPPED | OUTPATIENT
Start: 2021-05-25 | End: 2021-08-03

## 2021-05-25 NOTE — LETTER
May 25, 2021     Lance Emanuel MD  [address]  [address] [place] [address]    Patient: Aniket Arroyo   YOB: 1960   Date of Visit: 5/25/2021       Dear Dr Frida Ellington: Thank you for referring Aniket Arroyo to me for evaluation  Below are my notes for this consultation  If you have questions, please do not hesitate to call me  I look forward to following your patient along with you  Sincerely,        Tha Cox MD        CC: No Recipients  Tha Cox MD  5/25/2021  2:13 PM  Signed  Rheumatology Consult   Aniket Arroyo 64 y o  female 1960    DATE: 5/25/2021    Reason for Consult: UCTD seen in the past by rheumatology  Assessment and Plan:  Undifferentiated connective tiss  disease (UCTD)--continue diet/exercise, weight control  Anti-inflammatory diet discussed  Topical NSAIDs/analgesics  Consider resuming Plaquenil  Rx called into pharmacy  Routine, yearly eye examinations  History of Present Illness:  Aniket Arroyo is a 64 y o  female Diagnosed with UCTD by rheumatology several years ago and treated successfully with Plaquenil  She had persistent hand pain and stiffness and bilateral knee pain and stiffness  Of note, her esr was normal, however, her CRP was persistently elevated  She is here to reestablish care  All previous rheum  notes reviewed in detail  All labs for the past 3 yrs  reviewed in detail as well  She was seen by the dermatologist on May 19th and that consult was reviewed as well  Review of Systems  Review of Systems   Constitutional: Negative for chills, fatigue, fever and unexpected weight change  HENT: Negative for mouth sores and trouble swallowing  Eyes: Negative for pain and visual disturbance  Respiratory: Negative for cough and shortness of breath  Cardiovascular: Negative for chest pain and leg swelling  Gastrointestinal: Negative for abdominal pain, blood in stool, constipation, diarrhea and nausea  Musculoskeletal: Positive for arthralgias and myalgias  Negative for back pain and joint swelling  Skin: Negative for color change and rash  Neurological: Negative for weakness and numbness  Hematological: Negative for adenopathy  Psychiatric/Behavioral: Negative for sleep disturbance  Allergies  No Known Allergies    Current Medications      Past Medical History  Past Medical History:   Diagnosis Date    Chest pain 2014    Disease of thyroid gland     Elevated blood sugar 2014    Hypercholesteremia     Hyperlipidemia     Osteoarthritis        Past Surgical History  Past Surgical History:   Procedure Laterality Date    BIOPSY CORE NEEDLE      Thyroid Using Percutaneous Core Needle     SECTION      FOOT SURGERY      As a teenager - Bunionectomy - Dr Riley Gillespie - Last Assessed: 2016    THYROID LOBECTOMY Right 2015    Dr Givens Plume: Right Lobe - Last Assessed: 2016 Dr Riley Gillespie       Family History  No known autoimmune or inflammatory diseases in the family  Family History   Problem Relation Age of Onset   Bridger Meals' disease Mother     Hyperlipidemia Mother     Hypertension Mother     Osteoporosis Mother     Aortic stenosis Father     Hyperlipidemia Father     Hypertension Father     Coronary artery disease Father         Coronary Artery Bypass Graft (CABG)    Heart disease Father         Pacemaker Placement    Valvular heart disease Father         S/P Transcatheter Aortic Valve Replacement (TAVR)    Heart attack Other         Myocardial Infarction    Stroke Other        Social History  Occupation: worked at [address] [place] [address] [employer] History     Substance and Sexual Activity   Alcohol Use No     Social History     Substance and Sexual Activity   Drug Use No     Social History     Tobacco Use   Smoking Status Never Smoker   Smokeless Tobacco Never Used        Objective: There were no vitals taken for this visit      Physical Exam  Musculoskeletal:        Legs:             Lab Results: I have personally reviewed pertinent reports        CBC:   , Chemistry Profile:       Invalid input(s): ALBUMIN, Coagulation Studies:   , Cardiac Studies:   , Additional Labs:   , iSTAT CHEM 8:       Invalid input(s): POTASSIUMIS, ABG:   , Toxicology:   , Last A1C/Lipid Panel/Thyroid Panel:   Lab Results   Component Value Date    TRIG 114 09/16/2020    TRIG 112 10/17/2019    CHOL 232 06/19/2015    HDL 54 09/16/2020    HDL 52 10/17/2019    LDLCALC 158 (H) 09/16/2020    LDLCALC 187 (H) 10/17/2019    EMA9AKAOXGTL 3 030 09/16/2020    Y4NORIA 1 00 10/12/2018    FREET4 0 89 10/12/2018     Lab Results   Component Value Date    CRP 18 2 (H) 09/16/2020    DELIA Negative 02/28/2018    RF Negative 02/28/2018    HEPBIGM Non-reactive 01/27/2017    HEPBCAB Non-reactive 01/27/2017    HEPCAB Non-reactive 01/27/2017           Invalid input(s): URIBILINOGEN         Imaging: I have personally reviewed pertinent films in PACS

## 2021-05-25 NOTE — PROGRESS NOTES
Rheumatology Consult   Kavon Mata 64 y o  female 1960    DATE: 5/25/2021    Reason for Consult: UCTD seen in the past by rheumatology  Assessment and Plan:  Undifferentiated connective tiss  disease (UCTD)--continue diet/exercise, weight control  Anti-inflammatory diet discussed  Topical NSAIDs/analgesics  Consider resuming Plaquenil  Rx called into pharmacy  Routine, yearly eye examinations  History of Present Illness:  Kavon Mata is a 64 y o  female Diagnosed with UCTD by rheumatology several years ago and treated successfully with Plaquenil  She had persistent hand pain and stiffness and bilateral knee pain and stiffness  Of note, her esr was normal, however, her CRP was persistently elevated  She is here to reestablish care  All previous rheum  notes reviewed in detail  All labs for the past 3 yrs  reviewed in detail as well  She was seen by the dermatologist on May 19th and that consult was reviewed as well  Review of Systems  Review of Systems   Constitutional: Negative for chills, fatigue, fever and unexpected weight change  HENT: Negative for mouth sores and trouble swallowing  Eyes: Negative for pain and visual disturbance  Respiratory: Negative for cough and shortness of breath  Cardiovascular: Negative for chest pain and leg swelling  Gastrointestinal: Negative for abdominal pain, blood in stool, constipation, diarrhea and nausea  Musculoskeletal: Positive for arthralgias and myalgias  Negative for back pain and joint swelling  Skin: Negative for color change and rash  Neurological: Negative for weakness and numbness  Hematological: Negative for adenopathy  Psychiatric/Behavioral: Negative for sleep disturbance         Allergies  No Known Allergies    Current Medications      Past Medical History  Past Medical History:   Diagnosis Date    Chest pain 07/01/2014    Disease of thyroid gland     Elevated blood sugar 07/01/2014    Hypercholesteremia     Hyperlipidemia     Osteoarthritis        Past Surgical History  Past Surgical History:   Procedure Laterality Date    BIOPSY CORE NEEDLE      Thyroid Using Percutaneous Core Needle     SECTION      FOOT SURGERY      As a teenager - Bunionectomy - Dr Abreu Morton: 2016    THYROID LOBECTOMY Right 2015    Dr Soy Aguilar: Right Lobe - Last Assessed: 2016 Dr Mckenna Llamas       Family History  No known autoimmune or inflammatory diseases in the family  Family History   Problem Relation Age of Onset   Barbara Dunnings' disease Mother     Hyperlipidemia Mother     Hypertension Mother     Osteoporosis Mother     Aortic stenosis Father     Hyperlipidemia Father     Hypertension Father     Coronary artery disease Father         Coronary Artery Bypass Graft (CABG)    Heart disease Father         Pacemaker Placement    Valvular heart disease Father         S/P Transcatheter Aortic Valve Replacement (TAVR)    Heart attack Other         Myocardial Infarction    Stroke Other        Social History  Occupation: worked at [address] [place] [address] [employer] History     Substance and Sexual Activity   Alcohol Use No     Social History     Substance and Sexual Activity   Drug Use No     Social History     Tobacco Use   Smoking Status Never Smoker   Smokeless Tobacco Never Used        Objective: There were no vitals taken for this visit  Physical Exam  Musculoskeletal:        Legs:             Lab Results: I have personally reviewed pertinent reports        CBC:   , Chemistry Profile:       Invalid input(s): ALBUMIN, Coagulation Studies:   , Cardiac Studies:   , Additional Labs:   , iSTAT CHEM 8:       Invalid input(s): POTASSIUMIS, ABG:   , Toxicology:   , Last A1C/Lipid Panel/Thyroid Panel:   Lab Results   Component Value Date    TRIG 114 2020    TRIG 112 10/17/2019    CHOL 232 2015    HDL 54 2020    HDL 52 10/17/2019    LDLCALC 158 (H) 2020 LDLCALC 187 (H) 10/17/2019    [id number] 3 030 09/16/2020    X7DWOTX 1 00 10/12/2018    FREET4 0 89 10/12/2018     Lab Results   Component Value Date    CRP 18 2 (H) 09/16/2020    DELIA Negative 02/28/2018    RF Negative 02/28/2018    HEPBIGM Non-reactive 01/27/2017    HEPBCAB Non-reactive 01/27/2017    HEPCAB Non-reactive 01/27/2017           Invalid input(s): URIBILINOGEN         Imaging: I have personally reviewed pertinent films in PACS

## 2021-05-25 NOTE — PATIENT INSTRUCTIONS
Try over the counter Aspercreme with lidocaine and apply it to your painful joints 2 times per day  You can also try over the counter Voltaren gel and apply it to your painful joints up to 4 times per day  Continue to eat healthy, stick with weight watchers and use the stationary bicycle  Start taking the hydroxychloroquine one tablet daily for the arthritis  I ordered blood work for you and I'll see you back in the office in 3 months

## 2021-05-26 LAB
ALDOLASE SERPL-CCNC: 4.7 U/L (ref 3.3–10.3)
CCP AB SER IA-ACNC: 0.8
CENTROMERE B AB SER-ACNC: <0.2 AI (ref 0–0.9)
ENA SCL70 AB SER-ACNC: <0.2 AI (ref 0–0.9)
ENA SS-A AB SER-ACNC: <0.2 AI (ref 0–0.9)
ENA SS-B AB SER-ACNC: <0.2 AI (ref 0–0.9)
RHEUMATOID FACT SER QL LA: NEGATIVE
RYE IGE QN: NEGATIVE

## 2021-06-02 ENCOUNTER — OFFICE VISIT (OUTPATIENT)
Dept: INTERNAL MEDICINE CLINIC | Facility: CLINIC | Age: 61
End: 2021-06-02
Payer: COMMERCIAL

## 2021-06-02 VITALS
WEIGHT: 186.6 LBS | BODY MASS INDEX: 37.62 KG/M2 | HEART RATE: 71 BPM | HEIGHT: 59 IN | OXYGEN SATURATION: 98 % | SYSTOLIC BLOOD PRESSURE: 110 MMHG | DIASTOLIC BLOOD PRESSURE: 68 MMHG

## 2021-06-02 DIAGNOSIS — E66.01 MORBID OBESITY (HCC): ICD-10-CM

## 2021-06-02 DIAGNOSIS — E78.2 MIXED HYPERLIPIDEMIA: ICD-10-CM

## 2021-06-02 DIAGNOSIS — M35.9 UNDIFFERENTIATED CONNECTIVE TISSUE DISEASE (HCC): ICD-10-CM

## 2021-06-02 DIAGNOSIS — E04.2 NONTOXIC MULTINODULAR GOITER: ICD-10-CM

## 2021-06-02 DIAGNOSIS — Z00.00 ANNUAL PHYSICAL EXAM: Primary | ICD-10-CM

## 2021-06-02 PROCEDURE — 3008F BODY MASS INDEX DOCD: CPT | Performed by: INTERNAL MEDICINE

## 2021-06-02 PROCEDURE — 1036F TOBACCO NON-USER: CPT | Performed by: INTERNAL MEDICINE

## 2021-06-02 PROCEDURE — 99396 PREV VISIT EST AGE 40-64: CPT | Performed by: INTERNAL MEDICINE

## 2021-06-02 NOTE — PROGRESS NOTES
[facility]    NAME: Lulu Alexis  AGE: 64 y o  SEX: female  : 1960     DATE: 2021     Assessment and Plan:     Problem List Items Addressed This Visit        Endocrine    Nontoxic multinodular goiter     Stable nodule on right thyroid bed on US in April              Other    Hyperlipidemia    Relevant Orders    Lipid Panel with Direct LDL reflex    Morbid obesity ([facility] [address] )     BMI Counseling: Body mass index is 37 69 kg/m²  The BMI is above normal  Nutrition recommendations include decreasing overall calorie intake  Exercise recommendations include exercising 3-5 times per week  Continue [employer]         Undifferentiated connective tissue disease ([facility] [address] )     F/U with rheum  Up to date with eye exam           Other Visit Diagnoses     Annual physical exam    -  Primary          Immunizations and preventive care screenings were discussed with patient today  Appropriate education was printed on patient's after visit summary  Counseling:  · Exercise: the importance of regular exercise/physical activity was discussed  Recommend exercise 3-5 times per week for at least 30 minutes  Return in about 1 year (around 2022) for Annual physical      Chief Complaint:     Chief Complaint   Patient presents with    Annual Exam      History of Present Illness:     Adult Annual Physical   Patient here for a comprehensive physical exam  The patient reports no problems  Diet and Physical Activity  · Diet/Nutrition: Weight Watchers  · Exercise: regular   Depression Screening  PHQ-9 Depression Screening    PHQ-9:   Frequency of the following problems over the past two weeks:      Little interest or pleasure in doing things: 0 - not at all  Feeling down, depressed, or hopeless: 0 - not at all       [address] [address]  · Sleep: sleeps well     · Hearing: normal - bilateral   · Vision: most recent eye exam <1 year ago and wears glasses  · Dental: regular dental visits  /GYN Health  · Patient is: postmenopausal  ·      Review of Systems:     Review of Systems   Constitutional: Negative for chills, fever and unexpected weight change  HENT: Negative for congestion and rhinorrhea  Respiratory: Negative for cough and shortness of breath  Cardiovascular: Negative for chest pain and palpitations  Gastrointestinal: Negative for abdominal pain, blood in stool, constipation, diarrhea, nausea and vomiting  Genitourinary: Negative for difficulty urinating, hematuria and vaginal bleeding  Musculoskeletal: Positive for arthralgias  Neurological: Negative for dizziness and headaches        Past Medical History:     Past Medical History:   Diagnosis Date    Chest pain 2014    Disease of thyroid gland     Elevated blood sugar 2014    Hypercholesteremia     Hyperlipidemia     Osteoarthritis       Past Surgical History:     Past Surgical History:   Procedure Laterality Date    BIOPSY CORE NEEDLE      Thyroid Using Percutaneous Core Needle     SECTION      FOOT SURGERY      As a teenager - Bunionectomy - Dr Deal Chaim: 2016    THYROID LOBECTOMY Right 2015    Dr Sifuentes Grammes: Right Lobe - Last Assessed: 2016 Dr Kalani Loza      Social History:        Social History     Socioeconomic History    Marital status: /Civil Union     Spouse name: None    Number of children: 6    Years of education: None    Highest education level: None   Occupational History    Occupation: Employed - Cardiology Technician - 2016   Social Needs    Financial resource strain: None    Food insecurity     Worry: None     Inability: None    Transportation needs     Medical: None     Non-medical: None   Tobacco Use    Smoking status: Never Smoker    Smokeless tobacco: Never Used   Substance and Sexual Activity    Alcohol use: No    Drug use: No    Sexual activity: Yes   Lifestyle    Physical activity     Days per week: None     Minutes per session: None    Stress: None   Relationships    Social connections     Talks on phone: None     Gets together: None     Attends Mandaeism service: None     Active member of club or organization: None     Attends meetings of clubs or organizations: None     Relationship status: None    Intimate partner violence     Fear of current or ex partner: None     Emotionally abused: None     Physically abused: None     Forced sexual activity: None   Other Topics Concern    None   Social History Narrative    Feels safe at home      Family History:     Family History   Problem Relation Age of Onset   Doyal Dense' disease Mother     Hyperlipidemia Mother     Hypertension Mother     Osteoporosis Mother     Aortic stenosis Father     Hyperlipidemia Father     Hypertension Father     Coronary artery disease Father         Coronary Artery Bypass Graft (CABG)    Heart disease Father         Pacemaker Placement    Valvular heart disease Father         S/P Transcatheter Aortic Valve Replacement (TAVR)    Heart attack Other         Myocardial Infarction    Stroke Other       Current Medications:     Current Outpatient Medications   Medication Sig Dispense Refill    Ascorbic Acid (VITAMIN C) 100 MG CHEW Chew 1 tablet daily      Calcium Carbonate-Vit D-Min (CALCIUM 1200 PO) Take by mouth daily      Cholecalciferol 62290 units TABS Take 200 Units by mouth       Cranberry 500 MG CAPS Take 1 capsule by mouth      hydroxychloroquine (PLAQUENIL) 200 mg tablet Take 1 tablet (200 mg total) by mouth daily with breakfast 90 tablet 1    LACTOBACILLUS PO Take 1 capsule by mouth      Magnesium 250 MG TABS Take by mouth       Multiple Vitamins-Minerals (MULTIVITAL-M) TABS Take by mouth      OMEGA-3 FATTY ACIDS  mg      Probiotic Product (PROBIOTIC-10) CAPS Take by mouth      psyllium (METAMUCIL) 0 52 g capsule Take 0 52 g by mouth daily      Turmeric 500 MG TABS Take 1 capsule by mouth       No current facility-administered medications for this visit  Allergies:     No Known Allergies   Physical Exam:     /68   Pulse 71   Ht 4' 11" (1 499 m)   Wt 84 6 kg (186 lb 9 6 oz)   SpO2 98%   BMI 37 69 kg/m²     Physical Exam  Constitutional:       General: She is not in acute distress  Appearance: She is well-developed  She is obese  She is not ill-appearing, toxic-appearing or diaphoretic  HENT:      Head: Normocephalic and atraumatic  Eyes:      Conjunctiva/sclera: Conjunctivae normal    Neck:      Musculoskeletal: Neck supple  Cardiovascular:      Rate and Rhythm: Normal rate and regular rhythm  Heart sounds: Normal heart sounds  No murmur  Pulmonary:      Effort: Pulmonary effort is normal  No respiratory distress  Breath sounds: Normal breath sounds  No stridor  No wheezing or rales  Abdominal:      General: There is no distension  Palpations: Abdomen is soft  There is no mass  Tenderness: There is no abdominal tenderness  There is no guarding or rebound  Skin:     General: Skin is warm and dry  Neurological:      Mental Status: She is alert and oriented to person, place, and time  Psychiatric:         Mood and Affect: Mood normal          Behavior: Behavior normal          Thought Content:  Thought content normal          Judgment: Judgment normal           Merlinda Abraham, MD  [facility]

## 2021-06-02 NOTE — ASSESSMENT & PLAN NOTE
BMI Counseling: Body mass index is 37 69 kg/m²  The BMI is above normal  Nutrition recommendations include decreasing overall calorie intake  Exercise recommendations include exercising 3-5 times per week    Continue [employer]

## 2021-06-02 NOTE — PATIENT INSTRUCTIONS

## 2021-08-03 ENCOUNTER — APPOINTMENT (OUTPATIENT)
Dept: LAB | Facility: AMBULARY SURGERY CENTER | Age: 61
End: 2021-08-03
Payer: COMMERCIAL

## 2021-08-03 ENCOUNTER — OFFICE VISIT (OUTPATIENT)
Dept: RHEUMATOLOGY | Facility: CLINIC | Age: 61
End: 2021-08-03
Payer: COMMERCIAL

## 2021-08-03 VITALS
BODY MASS INDEX: 37.5 KG/M2 | DIASTOLIC BLOOD PRESSURE: 60 MMHG | WEIGHT: 186 LBS | HEIGHT: 59 IN | SYSTOLIC BLOOD PRESSURE: 115 MMHG | HEART RATE: 65 BPM

## 2021-08-03 DIAGNOSIS — R53.83 FATIGUE, UNSPECIFIED TYPE: ICD-10-CM

## 2021-08-03 DIAGNOSIS — M17.10 ARTHRITIS OF KNEE: ICD-10-CM

## 2021-08-03 DIAGNOSIS — M35.9 UNDIFFERENTIATED CONNECTIVE TISSUE DISEASE (HCC): Primary | ICD-10-CM

## 2021-08-03 DIAGNOSIS — M35.9 UNDIFFERENTIATED CONNECTIVE TISSUE DISEASE (HCC): ICD-10-CM

## 2021-08-03 LAB
ALBUMIN SERPL BCP-MCNC: 3.5 G/DL (ref 3.5–5)
ALP SERPL-CCNC: 67 U/L (ref 46–116)
ALT SERPL W P-5'-P-CCNC: 27 U/L (ref 12–78)
ANION GAP SERPL CALCULATED.3IONS-SCNC: 6 MMOL/L (ref 4–13)
AST SERPL W P-5'-P-CCNC: 12 U/L (ref 5–45)
BASOPHILS # BLD AUTO: 0.01 THOUSANDS/ΜL (ref 0–0.1)
BASOPHILS NFR BLD AUTO: 0 % (ref 0–1)
BILIRUB SERPL-MCNC: 0.39 MG/DL (ref 0.2–1)
BUN SERPL-MCNC: 18 MG/DL (ref 5–25)
CALCIUM SERPL-MCNC: 9 MG/DL (ref 8.3–10.1)
CHLORIDE SERPL-SCNC: 105 MMOL/L (ref 100–108)
CO2 SERPL-SCNC: 28 MMOL/L (ref 21–32)
CREAT SERPL-MCNC: 0.68 MG/DL (ref 0.6–1.3)
CRP SERPL QL: 14.7 MG/L
EOSINOPHIL # BLD AUTO: 0.1 THOUSAND/ΜL (ref 0–0.61)
EOSINOPHIL NFR BLD AUTO: 3 % (ref 0–6)
ERYTHROCYTE [DISTWIDTH] IN BLOOD BY AUTOMATED COUNT: 12.8 % (ref 11.6–15.1)
ERYTHROCYTE [SEDIMENTATION RATE] IN BLOOD: 22 MM/HOUR (ref 0–29)
GFR SERPL CREATININE-BSD FRML MDRD: 97 ML/MIN/1.73SQ M
GLUCOSE SERPL-MCNC: 90 MG/DL (ref 65–140)
HCT VFR BLD AUTO: 43.2 % (ref 34.8–46.1)
HGB BLD-MCNC: 13.8 G/DL (ref 11.5–15.4)
IMM GRANULOCYTES # BLD AUTO: 0.01 THOUSAND/UL (ref 0–0.2)
IMM GRANULOCYTES NFR BLD AUTO: 0 % (ref 0–2)
LYMPHOCYTES # BLD AUTO: 1.03 THOUSANDS/ΜL (ref 0.6–4.47)
LYMPHOCYTES NFR BLD AUTO: 27 % (ref 14–44)
MCH RBC QN AUTO: 29.6 PG (ref 26.8–34.3)
MCHC RBC AUTO-ENTMCNC: 31.9 G/DL (ref 31.4–37.4)
MCV RBC AUTO: 93 FL (ref 82–98)
MONOCYTES # BLD AUTO: 0.45 THOUSAND/ΜL (ref 0.17–1.22)
MONOCYTES NFR BLD AUTO: 12 % (ref 4–12)
NEUTROPHILS # BLD AUTO: 2.23 THOUSANDS/ΜL (ref 1.85–7.62)
NEUTS SEG NFR BLD AUTO: 58 % (ref 43–75)
NRBC BLD AUTO-RTO: 0 /100 WBCS
PLATELET # BLD AUTO: 169 THOUSANDS/UL (ref 149–390)
PMV BLD AUTO: 10.2 FL (ref 8.9–12.7)
POTASSIUM SERPL-SCNC: 3.9 MMOL/L (ref 3.5–5.3)
PROT SERPL-MCNC: 7.3 G/DL (ref 6.4–8.2)
RBC # BLD AUTO: 4.67 MILLION/UL (ref 3.81–5.12)
SODIUM SERPL-SCNC: 139 MMOL/L (ref 136–145)
TSH SERPL DL<=0.05 MIU/L-ACNC: 2.76 UIU/ML (ref 0.36–3.74)
VIT B12 SERPL-MCNC: 905 PG/ML (ref 100–900)
WBC # BLD AUTO: 3.83 THOUSAND/UL (ref 4.31–10.16)

## 2021-08-03 PROCEDURE — 86140 C-REACTIVE PROTEIN: CPT

## 2021-08-03 PROCEDURE — 99214 OFFICE O/P EST MOD 30 MIN: CPT | Performed by: INTERNAL MEDICINE

## 2021-08-03 PROCEDURE — 36415 COLL VENOUS BLD VENIPUNCTURE: CPT

## 2021-08-03 PROCEDURE — 85025 COMPLETE CBC W/AUTO DIFF WBC: CPT

## 2021-08-03 PROCEDURE — 85652 RBC SED RATE AUTOMATED: CPT

## 2021-08-03 PROCEDURE — 80053 COMPREHEN METABOLIC PANEL: CPT

## 2021-08-03 PROCEDURE — 84443 ASSAY THYROID STIM HORMONE: CPT

## 2021-08-03 PROCEDURE — 82607 VITAMIN B-12: CPT

## 2021-08-03 PROCEDURE — 3008F BODY MASS INDEX DOCD: CPT | Performed by: INTERNAL MEDICINE

## 2021-08-03 PROCEDURE — 1036F TOBACCO NON-USER: CPT | Performed by: INTERNAL MEDICINE

## 2021-08-03 RX ORDER — HYDROXYCHLOROQUINE SULFATE 200 MG/1
400 TABLET, FILM COATED ORAL
Qty: 180 TABLET | Refills: 1 | Status: SHIPPED | OUTPATIENT
Start: 2021-08-03 | End: 2021-12-14 | Stop reason: SDUPTHER

## 2021-08-03 NOTE — PATIENT INSTRUCTIONS
Please start taking hydroxychloroquine 200mg twice every other day  Please obtain blood work prior to next visit in 3 months  Please continue with an anti-inflammatory diet and physical activity, weight loss  You may use Voltaren gel and Aspercreme topically 4x daily as needed on affected painful areas  Please obtain an ophthalmology exam as soon as possible

## 2021-08-03 NOTE — PROGRESS NOTES
Rheumatology Consult   Shade Dancer 64 y o  female 1960    DATE: 8/3/2021    Reason for Consult: UCTD seen in the past by rheumatology  Assessment and Plan:  Undifferentiated connective tiss  disease (UCTD)--continue diet/exercise, weight control  Anti-inflammatory diet discussed  Topical NSAIDs/analgesics  Increase Plaquenil to 200mg twice daily every other day  Routine, yearly eye examinations  Obtain labs prior to next visit in 3 months      History of Present Illness:  Shade Dancer is a 64 y o  female Pt is a 65yo F, Diagnosed with UCTD by rheumatology several years ago and treated successfully with Plaquenil  She had persistent hand pain and stiffness and bilateral knee pain and stiffness  Of note, her esr was normal, however, her CRP was persistently elevated  She has reestablished care with us at last visit  All previous rheum  notes reviewed in detail  All labs for the past 3 yrs  reviewed in detail as well  She was seen by the dermatologist on May 19th and that consult was reviewed as well  8/3/21: Today, still complaining of fatigue and some visual changes  Encouraged pt to obtain ophthalmology evaluation  Will check labs  Encouraged lifestyle modifications  Review of Systems  Review of Systems   Constitutional: Positive for fatigue  Negative for chills, fever and unexpected weight change  HENT: Negative for mouth sores and trouble swallowing  Eyes: Positive for visual disturbance  Negative for pain  Respiratory: Negative for cough and shortness of breath  Cardiovascular: Negative for chest pain and leg swelling  Gastrointestinal: Negative for abdominal pain, blood in stool, constipation, diarrhea and nausea  Musculoskeletal: Positive for arthralgias and myalgias  Negative for back pain and joint swelling  Skin: Negative for color change and rash  Neurological: Negative for weakness and numbness  Hematological: Negative for adenopathy  Psychiatric/Behavioral: Negative for sleep disturbance  Allergies  No Known Allergies    Current Medications      Past Medical History  Past Medical History:   Diagnosis Date    Chest pain 2014    Disease of thyroid gland     Elevated blood sugar 2014    Hypercholesteremia     Hyperlipidemia     Osteoarthritis        Past Surgical History  Past Surgical History:   Procedure Laterality Date    BIOPSY CORE NEEDLE      Thyroid Using Percutaneous Core Needle     SECTION      FOOT SURGERY      As a teenager - Bunionectomy - Dr Elizabeth Gill - Last Assessed: 2016    THYROID LOBECTOMY Right 2015    Dr Momo Coleman: Right Lobe - Last Assessed: 2016 Dr Elizabeth Gill       Family History  No known autoimmune or inflammatory diseases in the family  Family History   Problem Relation Age of Onset   Serena Bleak' disease Mother     Hyperlipidemia Mother     Hypertension Mother     Osteoporosis Mother     Aortic stenosis Father     Hyperlipidemia Father     Hypertension Father     Coronary artery disease Father         Coronary Artery Bypass Graft (CABG)    Heart disease Father         Pacemaker Placement    Valvular heart disease Father         S/P Transcatheter Aortic Valve Replacement (TAVR)    Heart attack Other         Myocardial Infarction    Stroke Other        Social History  Occupation: worked at [employer] History     Substance and Sexual Activity   Alcohol Use No     Social History     Substance and Sexual Activity   Drug Use No     Social History     Tobacco Use   Smoking Status Never Smoker   Smokeless Tobacco Never Used        Objective:  /60   Pulse 65   Ht 4' 11" (1 499 m)   Wt 84 4 kg (186 lb)   BMI 37 57 kg/m²     Physical Exam  Vitals and nursing note reviewed  Constitutional:       General: She is not in acute distress  Appearance: Normal appearance  She is obese  She is not ill-appearing or toxic-appearing     HENT: Head: Normocephalic and atraumatic  Eyes:      General: No scleral icterus  Conjunctiva/sclera: Conjunctivae normal    Cardiovascular:      Rate and Rhythm: Normal rate and regular rhythm  Pulses: Normal pulses  Heart sounds: Normal heart sounds  No murmur heard  Pulmonary:      Effort: Pulmonary effort is normal  No respiratory distress  Breath sounds: Normal breath sounds  No wheezing  Abdominal:      General: Bowel sounds are normal  There is no distension  Palpations: Abdomen is soft  Tenderness: There is no abdominal tenderness  Musculoskeletal:         General: No swelling or tenderness  Cervical back: Normal range of motion and neck supple  Legs:    Skin:     General: Skin is warm and dry  Capillary Refill: Capillary refill takes less than 2 seconds  Neurological:      General: No focal deficit present  Mental Status: She is alert and oriented to person, place, and time  Psychiatric:         Mood and Affect: Mood normal          Behavior: Behavior normal          Thought Content: Thought content normal          Judgment: Judgment normal             Lab Results: I have personally reviewed pertinent reports        CBC:   , Chemistry Profile:       Invalid input(s): ALBUMIN, Coagulation Studies:   , Cardiac Studies:   , Additional Labs:   , iSTAT CHEM 8:       Invalid input(s): POTASSIUMIS, ABG:   , Toxicology:   , Last A1C/Lipid Panel/Thyroid Panel:   Lab Results   Component Value Date    TRIG 114 09/16/2020    TRIG 112 10/17/2019    CHOL 232 06/19/2015    HDL 54 09/16/2020    HDL 52 10/17/2019    LDLCALC 158 (H) 09/16/2020    LDLCALC 187 (H) 10/17/2019    [id number] 2 323 05/25/2021    Z2VLEPL 1 00 10/12/2018    FREET4 0 89 10/12/2018     Lab Results   Component Value Date    CRP 29 1 (H) 05/25/2021    DELIA Negative 05/25/2021    RF Negative 05/25/2021    HEPBIGM Non-reactive 01/27/2017    HEPBCAB Non-reactive 01/27/2017    HEPCAB Non-reactive 01/27/2017           Invalid input(s): URIBILINOGEN         Imaging: I have personally reviewed pertinent films in PACS

## 2021-10-12 ENCOUNTER — APPOINTMENT (OUTPATIENT)
Dept: URGENT CARE | Facility: CLINIC | Age: 61
End: 2021-10-12

## 2021-10-12 DIAGNOSIS — Z00.00 PHYSICAL EXAM: ICD-10-CM

## 2021-10-12 PROCEDURE — 86735 MUMPS ANTIBODY: CPT | Performed by: PHYSICIAN ASSISTANT

## 2021-10-12 PROCEDURE — 86480 TB TEST CELL IMMUN MEASURE: CPT | Performed by: PHYSICIAN ASSISTANT

## 2021-10-12 PROCEDURE — 86762 RUBELLA ANTIBODY: CPT | Performed by: PHYSICIAN ASSISTANT

## 2021-10-12 PROCEDURE — 86787 VARICELLA-ZOSTER ANTIBODY: CPT | Performed by: PHYSICIAN ASSISTANT

## 2021-10-12 PROCEDURE — 86765 RUBEOLA ANTIBODY: CPT | Performed by: PHYSICIAN ASSISTANT

## 2021-10-13 LAB — RUBV IGG SERPL IA-ACNC: 105.2 IU/ML

## 2021-10-14 LAB
GAMMA INTERFERON BACKGROUND BLD IA-ACNC: 0.03 IU/ML
M TB IFN-G BLD-IMP: NEGATIVE
M TB IFN-G CD4+ BCKGRND COR BLD-ACNC: 0.01 IU/ML
M TB IFN-G CD4+ BCKGRND COR BLD-ACNC: 0.01 IU/ML
MITOGEN IGNF BCKGRD COR BLD-ACNC: >10 IU/ML

## 2021-10-20 LAB
MEV IGG SER QL: NORMAL
MUV IGG SER QL: NORMAL

## 2021-10-21 LAB — VZV IGG SER IA-ACNC: NORMAL

## 2021-12-14 ENCOUNTER — APPOINTMENT (OUTPATIENT)
Dept: LAB | Facility: CLINIC | Age: 61
End: 2021-12-14
Payer: COMMERCIAL

## 2021-12-14 ENCOUNTER — OFFICE VISIT (OUTPATIENT)
Dept: RHEUMATOLOGY | Facility: CLINIC | Age: 61
End: 2021-12-14
Payer: COMMERCIAL

## 2021-12-14 VITALS — BODY MASS INDEX: 37.5 KG/M2 | WEIGHT: 186 LBS | HEIGHT: 59 IN

## 2021-12-14 DIAGNOSIS — E78.2 MIXED HYPERLIPIDEMIA: ICD-10-CM

## 2021-12-14 DIAGNOSIS — M35.9 UNDIFFERENTIATED CONNECTIVE TISSUE DISEASE (HCC): ICD-10-CM

## 2021-12-14 DIAGNOSIS — M25.512 CHRONIC PAIN OF BOTH SHOULDERS: Primary | ICD-10-CM

## 2021-12-14 DIAGNOSIS — M25.511 CHRONIC PAIN OF BOTH SHOULDERS: Primary | ICD-10-CM

## 2021-12-14 DIAGNOSIS — G89.29 CHRONIC PAIN OF BOTH SHOULDERS: Primary | ICD-10-CM

## 2021-12-14 DIAGNOSIS — M17.10 ARTHRITIS OF KNEE: ICD-10-CM

## 2021-12-14 LAB
CHOLEST SERPL-MCNC: 227 MG/DL
HDLC SERPL-MCNC: 56 MG/DL
LDLC SERPL CALC-MCNC: 152 MG/DL (ref 0–100)
TRIGL SERPL-MCNC: 94 MG/DL

## 2021-12-14 PROCEDURE — 3008F BODY MASS INDEX DOCD: CPT | Performed by: INTERNAL MEDICINE

## 2021-12-14 PROCEDURE — 36415 COLL VENOUS BLD VENIPUNCTURE: CPT

## 2021-12-14 PROCEDURE — 1036F TOBACCO NON-USER: CPT | Performed by: INTERNAL MEDICINE

## 2021-12-14 PROCEDURE — 99214 OFFICE O/P EST MOD 30 MIN: CPT | Performed by: INTERNAL MEDICINE

## 2021-12-14 PROCEDURE — 80061 LIPID PANEL: CPT

## 2021-12-14 RX ORDER — HYDROXYCHLOROQUINE SULFATE 200 MG/1
400 TABLET, FILM COATED ORAL
Qty: 180 TABLET | Refills: 1 | Status: SHIPPED | OUTPATIENT
Start: 2021-12-14 | End: 2022-05-24 | Stop reason: SDUPTHER

## 2022-01-07 ENCOUNTER — ANNUAL EXAM (OUTPATIENT)
Dept: OBGYN CLINIC | Facility: CLINIC | Age: 62
End: 2022-01-07
Payer: COMMERCIAL

## 2022-01-07 VITALS
HEIGHT: 59 IN | SYSTOLIC BLOOD PRESSURE: 116 MMHG | DIASTOLIC BLOOD PRESSURE: 74 MMHG | WEIGHT: 192 LBS | BODY MASS INDEX: 38.71 KG/M2

## 2022-01-07 DIAGNOSIS — Z01.419 ENCNTR FOR GYN EXAM (GENERAL) (ROUTINE) W/O ABN FINDINGS: Primary | ICD-10-CM

## 2022-01-07 DIAGNOSIS — Z01.419 PAP SMEAR, AS PART OF ROUTINE GYNECOLOGICAL EXAMINATION: ICD-10-CM

## 2022-01-07 DIAGNOSIS — Z12.31 ENCOUNTER FOR SCREENING MAMMOGRAM FOR MALIGNANT NEOPLASM OF BREAST: ICD-10-CM

## 2022-01-07 PROCEDURE — S0612 ANNUAL GYNECOLOGICAL EXAMINA: HCPCS | Performed by: OBSTETRICS & GYNECOLOGY

## 2022-01-07 PROCEDURE — G0143 SCR C/V CYTO,THINLAYER,RESCR: HCPCS | Performed by: OBSTETRICS & GYNECOLOGY

## 2022-01-07 PROCEDURE — 3008F BODY MASS INDEX DOCD: CPT | Performed by: INTERNAL MEDICINE

## 2022-01-07 NOTE — PROGRESS NOTES
Assessment/Plan:    No problem-specific Assessment & Plan notes found for this encounter  Diagnoses and all orders for this visit:    Encntr for gyn exam (general) (routine) w/o abn findings  -     Liquid-based pap, screening    Pap smear, as part of routine gynecological examination    Encounter for screening mammogram for malignant neoplasm of breast  -     Mammo screening bilateral w 3d & cad; Future          Normal gynecological physical examination  Self-breast examination stressed  Mammogram ordered  Discussed regular exercise, healthy diet, importance of vitamin D and calcium supplements  Discussed importance of sun block use during periods of prolonged sun exposure  Patient will be seen in 1 year for routine gynecologic and medical examination  Patient will call office for any problems, concerns, or issues which may arise during the interim  Subjective:          HPI    Patient ID: Emmy Austin is a 63 y o  female who presents today for her annual gynecologic and medical examination    Pt reports no vaginal bleeding or spotting     Vasomotor symptoms: Pt reports hot flashes about twice weekly, but states they are not bothersome  Patient reports normal appetite    Patient reports normal bowel and bladder habits    Patient denies any significant pelvic or abdominal pain    Patient denies any headaches, chest pain, shortness of breath fever shakes or chills    Patient denies any COVID 19 symptoms including cough or loss of taste or smell    COVID vaccine status: Pt fully vaccinated with a booster  Medical problems: Pt followed for arthritis with rheumatology, hyperlipidemia, and thyroid disease  Suggested red rice yeast for cholesterol  Colonoscopy status: Pt reports she believes she may be due for a colonoscopy, since she had a polyp on her last screen  She will call her GI provider to inquire about her next screening      Mammogram status: Pt encouraged to continue monthly self breast exams  Orders placed into EMR system for new screening mammogram     The following portions of the patient's history were reviewed and updated as appropriate: allergies, current medications, past family history, past medical history, past social history, past surgical history and problem list     Review of Systems   Constitutional: Negative  Negative for appetite change, diaphoresis, fatigue, fever and unexpected weight change  HENT: Negative  Eyes: Negative  Respiratory: Negative  Cardiovascular: Negative  Followed for cholesterol   Gastrointestinal: Negative  Negative for abdominal pain, blood in stool, constipation, diarrhea, nausea and vomiting  Endocrine: Negative  Negative for cold intolerance and heat intolerance  Followed for thyroid   Genitourinary: Negative  Negative for dysuria, frequency, hematuria, urgency, vaginal bleeding, vaginal discharge and vaginal pain  Musculoskeletal: Negative  Followed for several rheumatologic issues   Skin: Negative  Allergic/Immunologic: Negative  Neurological: Negative  Hematological: Negative  Negative for adenopathy  Psychiatric/Behavioral: Negative  Objective: There were no vitals taken for this visit  Physical Exam  Constitutional:       Appearance: Normal appearance  She is well-developed  HENT:      Head: Normocephalic  Eyes:      Pupils: Pupils are equal, round, and reactive to light  Cardiovascular:      Rate and Rhythm: Normal rate and regular rhythm  Pulmonary:      Effort: Pulmonary effort is normal       Breath sounds: Normal breath sounds  Chest:   Breasts: Breasts are symmetrical       Right: No inverted nipple, mass, nipple discharge, skin change, tenderness or supraclavicular adenopathy  Left: No inverted nipple, mass, nipple discharge, skin change, tenderness or supraclavicular adenopathy         Abdominal:      General: Bowel sounds are normal  Palpations: Abdomen is soft  Genitourinary:     General: Normal vulva  Exam position: Supine  Labia:         Right: No rash, tenderness, lesion or injury  Left: No rash, tenderness, lesion or injury  Urethra: No urethral swelling or urethral lesion  Vagina: Normal  No erythema, tenderness or bleeding  Cervix: No discharge or friability  Uterus: Not enlarged, not fixed and not tender  Adnexa:         Right: No mass, tenderness or fullness  Left: No mass, tenderness or fullness  Rectum: Normal       Comments: Pelvic exam revealed minimal atrophic vaginitis  Good pelvic support confirmed  Musculoskeletal:         General: Normal range of motion  Cervical back: Normal range of motion and neck supple  Lymphadenopathy:      Cervical: No cervical adenopathy  Upper Body:      Right upper body: No supraclavicular adenopathy  Left upper body: No supraclavicular adenopathy  Skin:     General: Skin is warm and dry  Findings: No rash  Neurological:      General: No focal deficit present  Mental Status: She is alert and oriented to person, place, and time  Psychiatric:         Mood and Affect: Mood normal          Speech: Speech normal          Behavior: Behavior normal          Thought Content:  Thought content normal          Judgment: Judgment normal

## 2022-01-14 LAB
LAB AP GYN PRIMARY INTERPRETATION: NORMAL
Lab: NORMAL

## 2022-05-17 ENCOUNTER — APPOINTMENT (OUTPATIENT)
Dept: LAB | Facility: CLINIC | Age: 62
End: 2022-05-17
Payer: COMMERCIAL

## 2022-05-17 DIAGNOSIS — M25.511 CHRONIC PAIN OF BOTH SHOULDERS: ICD-10-CM

## 2022-05-17 DIAGNOSIS — M17.10 ARTHRITIS OF KNEE: ICD-10-CM

## 2022-05-17 DIAGNOSIS — M35.9 UNDIFFERENTIATED CONNECTIVE TISSUE DISEASE (HCC): ICD-10-CM

## 2022-05-17 DIAGNOSIS — M25.512 CHRONIC PAIN OF BOTH SHOULDERS: ICD-10-CM

## 2022-05-17 DIAGNOSIS — G89.29 CHRONIC PAIN OF BOTH SHOULDERS: ICD-10-CM

## 2022-05-17 LAB
ALBUMIN SERPL BCP-MCNC: 3.5 G/DL (ref 3.5–5)
ALP SERPL-CCNC: 66 U/L (ref 46–116)
ALT SERPL W P-5'-P-CCNC: 24 U/L (ref 12–78)
ANION GAP SERPL CALCULATED.3IONS-SCNC: 7 MMOL/L (ref 4–13)
AST SERPL W P-5'-P-CCNC: 12 U/L (ref 5–45)
BASOPHILS # BLD AUTO: 0.02 THOUSANDS/ΜL (ref 0–0.1)
BASOPHILS NFR BLD AUTO: 1 % (ref 0–1)
BILIRUB SERPL-MCNC: 0.61 MG/DL (ref 0.2–1)
BUN SERPL-MCNC: 16 MG/DL (ref 5–25)
CALCIUM SERPL-MCNC: 9 MG/DL (ref 8.3–10.1)
CHLORIDE SERPL-SCNC: 108 MMOL/L (ref 100–108)
CO2 SERPL-SCNC: 27 MMOL/L (ref 21–32)
CREAT SERPL-MCNC: 0.84 MG/DL (ref 0.6–1.3)
CRP SERPL QL: 13.2 MG/L
EOSINOPHIL # BLD AUTO: 0.09 THOUSAND/ΜL (ref 0–0.61)
EOSINOPHIL NFR BLD AUTO: 3 % (ref 0–6)
ERYTHROCYTE [DISTWIDTH] IN BLOOD BY AUTOMATED COUNT: 12.6 % (ref 11.6–15.1)
GFR SERPL CREATININE-BSD FRML MDRD: 76 ML/MIN/1.73SQ M
GLUCOSE P FAST SERPL-MCNC: 98 MG/DL (ref 65–99)
HCT VFR BLD AUTO: 45.6 % (ref 34.8–46.1)
HGB BLD-MCNC: 14.8 G/DL (ref 11.5–15.4)
IMM GRANULOCYTES # BLD AUTO: 0.01 THOUSAND/UL (ref 0–0.2)
IMM GRANULOCYTES NFR BLD AUTO: 0 % (ref 0–2)
LYMPHOCYTES # BLD AUTO: 1.01 THOUSANDS/ΜL (ref 0.6–4.47)
LYMPHOCYTES NFR BLD AUTO: 29 % (ref 14–44)
MCH RBC QN AUTO: 29.3 PG (ref 26.8–34.3)
MCHC RBC AUTO-ENTMCNC: 32.5 G/DL (ref 31.4–37.4)
MCV RBC AUTO: 90 FL (ref 82–98)
MONOCYTES # BLD AUTO: 0.36 THOUSAND/ΜL (ref 0.17–1.22)
MONOCYTES NFR BLD AUTO: 10 % (ref 4–12)
NEUTROPHILS # BLD AUTO: 2 THOUSANDS/ΜL (ref 1.85–7.62)
NEUTS SEG NFR BLD AUTO: 57 % (ref 43–75)
NRBC BLD AUTO-RTO: 0 /100 WBCS
PLATELET # BLD AUTO: 179 THOUSANDS/UL (ref 149–390)
PMV BLD AUTO: 9.8 FL (ref 8.9–12.7)
POTASSIUM SERPL-SCNC: 4.3 MMOL/L (ref 3.5–5.3)
PROT SERPL-MCNC: 7.4 G/DL (ref 6.4–8.2)
RBC # BLD AUTO: 5.05 MILLION/UL (ref 3.81–5.12)
SODIUM SERPL-SCNC: 142 MMOL/L (ref 136–145)
TSH SERPL DL<=0.05 MIU/L-ACNC: 2.65 UIU/ML (ref 0.45–4.5)
WBC # BLD AUTO: 3.49 THOUSAND/UL (ref 4.31–10.16)

## 2022-05-17 PROCEDURE — 84443 ASSAY THYROID STIM HORMONE: CPT

## 2022-05-17 PROCEDURE — 80053 COMPREHEN METABOLIC PANEL: CPT

## 2022-05-17 PROCEDURE — 85025 COMPLETE CBC W/AUTO DIFF WBC: CPT

## 2022-05-17 PROCEDURE — 36415 COLL VENOUS BLD VENIPUNCTURE: CPT

## 2022-05-17 PROCEDURE — 86140 C-REACTIVE PROTEIN: CPT

## 2022-05-24 ENCOUNTER — OFFICE VISIT (OUTPATIENT)
Dept: RHEUMATOLOGY | Facility: CLINIC | Age: 62
End: 2022-05-24
Payer: COMMERCIAL

## 2022-05-24 ENCOUNTER — HOSPITAL ENCOUNTER (OUTPATIENT)
Dept: RADIOLOGY | Facility: HOSPITAL | Age: 62
Discharge: HOME | End: 2022-05-24
Attending: INTERNAL MEDICINE
Payer: COMMERCIAL

## 2022-05-24 VITALS
HEIGHT: 59 IN | SYSTOLIC BLOOD PRESSURE: 138 MMHG | BODY MASS INDEX: 40.48 KG/M2 | WEIGHT: 200.8 LBS | DIASTOLIC BLOOD PRESSURE: 84 MMHG

## 2022-05-24 DIAGNOSIS — M17.10 ARTHRITIS OF KNEE: Primary | ICD-10-CM

## 2022-05-24 DIAGNOSIS — M17.10 ARTHRITIS OF KNEE: ICD-10-CM

## 2022-05-24 DIAGNOSIS — G56.03 BILATERAL CARPAL TUNNEL SYNDROME: ICD-10-CM

## 2022-05-24 DIAGNOSIS — M35.9 UNDIFFERENTIATED CONNECTIVE TISSUE DISEASE (HCC): ICD-10-CM

## 2022-05-24 PROCEDURE — 1036F TOBACCO NON-USER: CPT | Performed by: INTERNAL MEDICINE

## 2022-05-24 PROCEDURE — 73562 X-RAY EXAM OF KNEE 3: CPT

## 2022-05-24 PROCEDURE — 99214 OFFICE O/P EST MOD 30 MIN: CPT | Performed by: INTERNAL MEDICINE

## 2022-05-24 PROCEDURE — 3008F BODY MASS INDEX DOCD: CPT | Performed by: INTERNAL MEDICINE

## 2022-05-24 RX ORDER — HYDROXYCHLOROQUINE SULFATE 200 MG/1
400 TABLET, FILM COATED ORAL
Qty: 180 TABLET | Refills: 1 | Status: SHIPPED | OUTPATIENT
Start: 2022-05-24 | End: 2022-08-22

## 2022-05-24 NOTE — PATIENT INSTRUCTIONS
Use cock-up wrist splints that you can wear at bedtime for carpal tunnel syndrome  Take the hydroxychloroquine 2 tabs together every other day  Continue the Voltaren gel up to 4 times per day on your painful joints and Aspercreme with lidocaine 2-3 times per day as necessary

## 2022-05-24 NOTE — PROGRESS NOTES
Assessment and Plan:   UCTD, knee OA--PT for knees  Topical NSAIDs/analgesics  Increase Plaquenil to 200mg alt with 400mg daily  Cock-up wrist splints for carpal tunnel syndrome  Anti-inflammatory diet/exercise/weight loss      Rheumatic Disease Summary  As above    Here for f/u visit  Bilateral knee pain  Also with symptoms c/w carpal tunnel syndrome, bilaterally  Taking Plaquenil daily and recent opthal eval including visual field WNL  The following portions of the patient's history were reviewed and updated as appropriate: allergies, current medications, past family history, past medical history, past social history, past surgical history and problem list     Review of Systems:   Review of Systems   Constitutional: Negative for fatigue  HENT: Negative for mouth sores  Eyes: Negative for pain  Respiratory: Negative for shortness of breath  Cardiovascular: Negative for leg swelling  Musculoskeletal: Positive for arthralgias  Negative for joint swelling  Skin: Negative for rash  Neurological: Negative for weakness  Hematological: Negative for adenopathy  Psychiatric/Behavioral: Negative for sleep disturbance         Home Medications:    Current Outpatient Medications:     Ascorbic Acid (VITAMIN C) 100 MG CHEW, Chew 1 tablet daily, Disp: , Rfl:     Calcium Carbonate-Vit D-Min (CALCIUM 1200 PO), Take by mouth daily, Disp: , Rfl:     Cranberry 500 MG CAPS, Take 1 capsule by mouth, Disp: , Rfl:     hydroxychloroquine (PLAQUENIL) 200 mg tablet, Take 2 tablets (400 mg total) by mouth daily with breakfast, Disp: 180 tablet, Rfl: 1    Magnesium 250 MG TABS, Take by mouth , Disp: , Rfl:     Multiple Vitamins-Minerals (MULTIVITAL-M) TABS, Take by mouth, Disp: , Rfl:     OMEGA-3 FATTY ACIDS PO, 500 mg, Disp: , Rfl:     Probiotic Product (PROBIOTIC-10) CAPS, Take by mouth, Disp: , Rfl:     psyllium (METAMUCIL) 0 52 g capsule, Take 0 52 g by mouth daily, Disp: , Rfl:     Turmeric 500 MG TABS, Take 1 capsule by mouth, Disp: , Rfl:     Cholecalciferol 51282 units TABS, Take 200 Units by mouth , Disp: , Rfl:     LACTOBACILLUS PO, Take 1 capsule by mouth, Disp: , Rfl:     Objective:    Vitals:    05/24/22 1424   BP: 138/84   Weight: 91 1 kg (200 lb 12 8 oz)   Height: 4' 11" (1 499 m)       Physical Exam  Constitutional:       General: She is not in acute distress  HENT:      Head: Normocephalic and atraumatic  Eyes:      Conjunctiva/sclera: Conjunctivae normal    Cardiovascular:      Rate and Rhythm: Normal rate and regular rhythm  Heart sounds: S1 normal and S2 normal      No friction rub  Pulmonary:      Effort: Pulmonary effort is normal  No respiratory distress  Breath sounds: Normal breath sounds  No wheezing, rhonchi or rales  Musculoskeletal:      Cervical back: Neck supple  Right knee: Tenderness present  Left knee: Tenderness present  Comments: +bilateral pes anserinus tenderness   Skin:     Coloration: Skin is not pale  Neurological:      Mental Status: She is alert  Mental status is at baseline  Psychiatric:         Mood and Affect: Mood normal          Behavior: Behavior normal          Reviewed labs and imaging  Imaging:   No results found      Labs:   Appointment on 05/17/2022   Component Date Value Ref Range Status    WBC 05/17/2022 3 49 (A) 4 31 - 10 16 Thousand/uL Final    RBC 05/17/2022 5 05  3 81 - 5 12 Million/uL Final    Hemoglobin 05/17/2022 14 8  11 5 - 15 4 g/dL Final    Hematocrit 05/17/2022 45 6  34 8 - 46 1 % Final    MCV 05/17/2022 90  82 - 98 fL Final    MCH 05/17/2022 29 3  26 8 - 34 3 pg Final    MCHC 05/17/2022 32 5  31 4 - 37 4 g/dL Final    RDW 05/17/2022 12 6  11 6 - 15 1 % Final    MPV 05/17/2022 9 8  8 9 - 12 7 fL Final    Platelets 10/88/3030 179  149 - 390 Thousands/uL Final    nRBC 05/17/2022 0  /100 WBCs Final    Neutrophils Relative 05/17/2022 57  43 - 75 % Final    Immat GRANS % 05/17/2022 0  0 - 2 % Final  Lymphocytes Relative 05/17/2022 29  14 - 44 % Final    Monocytes Relative 05/17/2022 10  4 - 12 % Final    Eosinophils Relative 05/17/2022 3  0 - 6 % Final    Basophils Relative 05/17/2022 1  0 - 1 % Final    Neutrophils Absolute 05/17/2022 2 00  1 85 - 7 62 Thousands/µL Final    Immature Grans Absolute 05/17/2022 0 01  0 00 - 0 20 Thousand/uL Final    Lymphocytes Absolute 05/17/2022 1 01  0 60 - 4 47 Thousands/µL Final    Monocytes Absolute 05/17/2022 0 36  0 17 - 1 22 Thousand/µL Final    Eosinophils Absolute 05/17/2022 0 09  0 00 - 0 61 Thousand/µL Final    Basophils Absolute 05/17/2022 0 02  0 00 - 0 10 Thousands/µL Final    Sodium 05/17/2022 142  136 - 145 mmol/L Final    Potassium 05/17/2022 4 3  3 5 - 5 3 mmol/L Final    Chloride 05/17/2022 108  100 - 108 mmol/L Final    CO2 05/17/2022 27  21 - 32 mmol/L Final    ANION GAP 05/17/2022 7  4 - 13 mmol/L Final    BUN 05/17/2022 16  5 - 25 mg/dL Final    Creatinine 05/17/2022 0 84  0 60 - 1 30 mg/dL Final    Standardized to IDMS reference method    Glucose, Fasting 05/17/2022 98  65 - 99 mg/dL Final    Specimen collection should occur prior to Sulfasalazine administration due to the potential for falsely depressed results  Specimen collection should occur prior to Sulfapyridine administration due to the potential for falsely elevated results   Calcium 05/17/2022 9 0  8 3 - 10 1 mg/dL Final    AST 05/17/2022 12  5 - 45 U/L Final    Specimen collection should occur prior to Sulfasalazine administration due to the potential for falsely depressed results   ALT 05/17/2022 24  12 - 78 U/L Final    Specimen collection should occur prior to Sulfasalazine and/or Sulfapyridine administration due to the potential for falsely depressed results       Alkaline Phosphatase 05/17/2022 66  46 - 116 U/L Final    Total Protein 05/17/2022 7 4  6 4 - 8 2 g/dL Final    Albumin 05/17/2022 3 5  3 5 - 5 0 g/dL Final    Total Bilirubin 05/17/2022 0 61  0 20 - 1 00 mg/dL Final    Use of this assay is not recommended for patients undergoing treatment with eltrombopag due to the potential for falsely elevated results   eGFR 05/17/2022 76  ml/min/1 73sq m Final    CRP 05/17/2022 13 2 (A) <3 0 mg/L Final    TSH 3RD GENERATON 05/17/2022 2 650  0 450 - 4 500 uIU/mL Final    The recommended reference ranges for TSH during pregnancy are as follows:   First trimester 0 1 to 2 5 uIU/mL   Second trimester  0 2 to 3 0 uIU/mL   Third trimester 0 3 to 3 0 uIU/m    Note: Normal ranges may not apply to patients who are transgender, non-binary, or whose legal sex, sex at birth, and gender identity differ  Adult TSH (3rd generation) reference range follows the recommended guidelines of the American Thy[employer] Association, January, 2020  Annual Exam on 01/07/2022   Component Date Value Ref Range Status    Case Report 01/07/2022    Final                    Value:Gynecologic Cytology Report                       Case: [id number]                                  Authorizing Provider:  Deep Mckinney MD   Collected:           01/07/2022 0957              Ordering Location:     [facility] Received:            01/07/2022 0957                                     GYN                                                                          First Screen:          Gokul Genao, CT                                                         Specimen:    LIQUID-BASED PAP, SCREENING, Cervix                                                        Primary Interpretation 01/07/2022 Negative for intraepithelial lesion or malignancy   Final    Specimen Adequacy 01/07/2022 Satisfactory for evaluation  (See note)   Final    Note 01/07/2022    Final                    Value: This result contains rich text formatting which cannot be displayed here   Additional Information 01/07/2022    Final                    Value: This result contains rich text formatting which cannot be displayed here   LMP 01/07/2022    Final    This result contains rich text formatting which cannot be displayed here  Appointment on 12/14/2021   Component Date Value Ref Range Status    Cholesterol 12/14/2021 227 (A) See Comment mg/dL Final    Cholesterol:         Pediatric <18 Years        Desirable          <170 mg/dL      Borderline High    170-199 mg/dL      High               >=200 mg/dL        Adult >=18 Years            Desirable         <200 mg/dL      Borderline High   200-239 mg/dL      High              >239 mg/dL      Triglycerides 12/14/2021 94  See Comment mg/dL Final    Triglyceride:     0-9Y            <75mg/dL     10Y-17Y         <90 mg/dL       >=18Y     Normal          <150 mg/dL     Borderline High 150-199 mg/dL     High            200-499 mg/dL        Very High       >499 mg/dL    Specimen collection should occur prior to N-Acetylcysteine or Metamizole administration due to the potential for falsely depressed results   HDL, Direct 12/14/2021 56  >=50 mg/dL Final    Specimen collection should occur prior to Metamizole administration due to the potential for falsley depressed results   LDL Calculated 12/14/2021 152 (A) 0 - 100 mg/dL Final    LDL Cholesterol:     Optimal           <100 mg/dl     Near Optimal      100-129 mg/dl     Above Optimal       Borderline High 130-159 mg/dl       High            160-189 mg/dl       Very High       >189 mg/dl         This screening LDL is a calculated result  It does not have the accuracy of the Direct Measured LDL in the monitoring of patients with hyperlipidemia and/or statin therapy  Direct Measure LDL (NHW477) must be ordered separately in these patients  Appointment on 10/12/2021   Component Date Value Ref Range Status    Mumps IgG 10/12/2021 IMMUNE  IMMUNE Final    IMMUNE - Presumed immune to mumps infection      Rubeola IgG 10/12/2021 IMMUNE  IMMUNE Final    Rubella IgG Quant 10/12/2021 105 2  >9 9 IU/mL Final    Varicella IgG 10/12/2021 IMMUNE  IMMUNE Final    QFT Nil 10/12/2021 0 03  0 - 8 0 IU/ml Final    QFT TB1-NIL 10/12/2021 0 01  IU/ml Final    QFT TB2-NIL 10/12/2021 0 01  IU/ml Final    QFT Mitogen-NIL 10/12/2021 >10 00  IU/ml Final    QFT Final Interpretation 10/12/2021 Negative  Negative Final    No Interferon-gamma response to M  tuberculosis antigens detected  Infection with M  tuberculosis is unlikely  A single negative result does not exclude infection with M  tuberculosis  In patients at high risk for M  tuberculosis infection, a second test should be considered in accordance with the 2017 ATS/IDSA/CDC Clinical Practice Guidelines for Diagnosis of Tuberculosis in Adults and Children  False negative results can be a result of incorrect blood sample collection or handling of the specimen affecting lymphocyte function     Appointment on 08/03/2021   Component Date Value Ref Range Status    WBC 08/03/2021 3 83 (A) 4 31 - 10 16 Thousand/uL Final    RBC 08/03/2021 4 67  3 81 - 5 12 Million/uL Final    Hemoglobin 08/03/2021 13 8  11 5 - 15 4 g/dL Final    Hematocrit 08/03/2021 43 2  34 8 - 46 1 % Final    MCV 08/03/2021 93  82 - 98 fL Final    MCH 08/03/2021 29 6  26 8 - 34 3 pg Final    MCHC 08/03/2021 31 9  31 4 - 37 4 g/dL Final    RDW 08/03/2021 12 8  11 6 - 15 1 % Final    MPV 08/03/2021 10 2  8 9 - 12 7 fL Final    Platelets 15/88/8693 169  149 - 390 Thousands/uL Final    nRBC 08/03/2021 0  /100 WBCs Final    Neutrophils Relative 08/03/2021 58  43 - 75 % Final    Immat GRANS % 08/03/2021 0  0 - 2 % Final    Lymphocytes Relative 08/03/2021 27  14 - 44 % Final    Monocytes Relative 08/03/2021 12  4 - 12 % Final    Eosinophils Relative 08/03/2021 3  0 - 6 % Final    Basophils Relative 08/03/2021 0  0 - 1 % Final    Neutrophils Absolute 08/03/2021 2 23  1 85 - 7 62 Thousands/µL Final    Immature Grans Absolute 08/03/2021 0 01  0 00 - 0 20 Thousand/uL Final    Lymphocytes Absolute 08/03/2021 1 03  0 60 - 4 47 Thousands/µL Final    Monocytes Absolute 08/03/2021 0 45  0 17 - 1 22 Thousand/µL Final    Eosinophils Absolute 08/03/2021 0 10  0 00 - 0 61 Thousand/µL Final    Basophils Absolute 08/03/2021 0 01  0 00 - 0 10 Thousands/µL Final    Sodium 08/03/2021 139  136 - 145 mmol/L Final    Potassium 08/03/2021 3 9  3 5 - 5 3 mmol/L Final    Chloride 08/03/2021 105  100 - 108 mmol/L Final    CO2 08/03/2021 28  21 - 32 mmol/L Final    ANION GAP 08/03/2021 6  4 - 13 mmol/L Final    BUN 08/03/2021 18  5 - 25 mg/dL Final    Creatinine 08/03/2021 0 68  0 60 - 1 30 mg/dL Final    Standardized to IDMS reference method    Glucose 08/03/2021 90  65 - 140 mg/dL Final    If the patient is fasting, the ADA then defines impaired fasting glucose as > 100 mg/dL and diabetes as > or equal to 123 mg/dL  Specimen collection should occur prior to Sulfasalazine administration due to the potential for falsely depressed results  Specimen collection should occur prior to Sulfapyridine administration due to the potential for falsely elevated results   Calcium 08/03/2021 9 0  8 3 - 10 1 mg/dL Final    AST 08/03/2021 12  5 - 45 U/L Final    Specimen collection should occur prior to Sulfasalazine administration due to the potential for falsely depressed results   ALT 08/03/2021 27  12 - 78 U/L Final    Specimen collection should occur prior to Sulfasalazine and/or Sulfapyridine administration due to the potential for falsely depressed results   Alkaline Phosphatase 08/03/2021 67  46 - 116 U/L Final    Total Protein 08/03/2021 7 3  6 4 - 8 2 g/dL Final    Albumin 08/03/2021 3 5  3 5 - 5 0 g/dL Final    Total Bilirubin 08/03/2021 0 39  0 20 - 1 00 mg/dL Final    Use of this assay is not recommended for patients undergoing treatment with eltrombopag due to the potential for falsely elevated results      eGFR 08/03/2021 97  ml/min/1 73sq m Final    Vitamin B-12 08/03/2021 905 (A) 100 - 900 pg/mL Final    TSH 3RD Frederic Anes 08/03/2021 2 760  0 358 - 3 740 uIU/mL Final    The recommended reference ranges for TSH during pregnancy are as follows:   First trimester 0 1 to 2 5 uIU/mL   Second trimester  0 2 to 3 0 uIU/mL   Third trimester 0 3 to 3 0 uIU/m    Note: Normal ranges may not apply to patients who are transgender, non-binary, or whose legal sex, sex at birth, and gender identity differ      Sed Rate 08/03/2021 22  0 - 29 mm/hour Final    CRP 08/03/2021 14 7 (A) <3 0 mg/L Final   Appointment on 05/25/2021   Component Date Value Ref Range Status    Aldolase 05/25/2021 4 7  3 3 - 10 3 U/L Final    DELIA 05/25/2021 Negative  Negative Final    Scleroderma SCL-70 05/25/2021 <0 2  0 0 - 0 9 AI Final    C3 Complement 05/25/2021 132 0  90 0 - 180 0 mg/dL Final    C4, COMPLEMENT 05/25/2021 34 0  10 0 - 40 0 mg/dL Final    WBC 05/25/2021 4 96  4 31 - 10 16 Thousand/uL Final    RBC 05/25/2021 4 99  3 81 - 5 12 Million/uL Final    Hemoglobin 05/25/2021 14 9  11 5 - 15 4 g/dL Final    Hematocrit 05/25/2021 44 6  34 8 - 46 1 % Final    MCV 05/25/2021 89  82 - 98 fL Final    MCH 05/25/2021 29 9  26 8 - 34 3 pg Final    MCHC 05/25/2021 33 4  31 4 - 37 4 g/dL Final    RDW 05/25/2021 13 1  11 6 - 15 1 % Final    MPV 05/25/2021 10 0  8 9 - 12 7 fL Final    Platelets 67/65/1889 188  149 - 390 Thousands/uL Final    nRBC 05/25/2021 0  /100 WBCs Final    Neutrophils Relative 05/25/2021 63  43 - 75 % Final    Immat GRANS % 05/25/2021 0  0 - 2 % Final    Lymphocytes Relative 05/25/2021 25  14 - 44 % Final    Monocytes Relative 05/25/2021 9  4 - 12 % Final    Eosinophils Relative 05/25/2021 3  0 - 6 % Final    Basophils Relative 05/25/2021 0  0 - 1 % Final    Neutrophils Absolute 05/25/2021 3 12  1 85 - 7 62 Thousands/µL Final    Immature Grans Absolute 05/25/2021 0 01  0 00 - 0 20 Thousand/uL Final    Lymphocytes Absolute 05/25/2021 1 24  0 60 - 4 47 Thousands/µL Final    Monocytes Absolute 05/25/2021 0 44  0 17 - 1 22 Thousand/µL Final    Eosinophils Absolute 05/25/2021 0 14  0 00 - 0 61 Thousand/µL Final    Basophils Absolute 05/25/2021 0 01  0 00 - 0 10 Thousands/µL Final    Anti-Centromere B Antibodies 05/25/2021 <0 2  0 0 - 0 9 AI Final    Total CK 05/25/2021 76  26 - 192 U/L Final    Sodium 05/25/2021 139  136 - 145 mmol/L Final    Potassium 05/25/2021 4 1  3 5 - 5 3 mmol/L Final    Chloride 05/25/2021 103  100 - 108 mmol/L Final    CO2 05/25/2021 29  21 - 32 mmol/L Final    ANION GAP 05/25/2021 7  4 - 13 mmol/L Final    BUN 05/25/2021 13  5 - 25 mg/dL Final    Creatinine 05/25/2021 0 78  0 60 - 1 30 mg/dL Final    Standardized to IDMS reference method    Glucose, Fasting 05/25/2021 93  65 - 99 mg/dL Final    Specimen collection should occur prior to Sulfasalazine administration due to the potential for falsely depressed results  Specimen collection should occur prior to Sulfapyridine administration due to the potential for falsely elevated results   Calcium 05/25/2021 9 3  8 3 - 10 1 mg/dL Final    AST 05/25/2021 17  5 - 45 U/L Final    Specimen collection should occur prior to Sulfasalazine administration due to the potential for falsely depressed results   ALT 05/25/2021 25  12 - 78 U/L Final    Specimen collection should occur prior to Sulfasalazine administration due to the potential for falsely depressed results   Alkaline Phosphatase 05/25/2021 82  46 - 116 U/L Final    Total Protein 05/25/2021 7 9  6 4 - 8 2 g/dL Final    Albumin 05/25/2021 3 6  3 5 - 5 0 g/dL Final    Total Bilirubin 05/25/2021 0 38  0 20 - 1 00 mg/dL Final    Use of this assay is not recommended for patients undergoing treatment with eltrombopag due to the potential for falsely elevated results      eGFR 05/25/2021 84  ml/min/1 73sq m Final    CRP 05/25/2021 29 1 (A) <3 0 mg/L Final    Cyclic Citrullinated Peptide Ab  05/25/2021 0 8  See comment Final    Rheumatoid Factor 05/25/2021 Negative  Negative Final    SS-A (RO) Ab 05/25/2021 <0 2  0 0 - 0 9 AI Final    SS-B (LA) Ab 05/25/2021 <0 2  0 0 - 0 9 AI Final    Sed Rate 05/25/2021 53 (A) 0 - 29 mm/hour Final    TSH 3RD GENERATON 05/25/2021 2 323  0 358 - 3 740 uIU/mL Final    The recommended reference ranges for TSH during pregnancy are as follows:   First trimester 0 1 to 2 5 uIU/mL   Second trimester  0 2 to 3 0 uIU/mL   Third trimester 0 3 to 3 0 uIU/m    Note: Normal ranges may not apply to patients who are transgender, non-binary, or whose legal sex, sex at birth, and gender identity differ      Vitamin B-12 05/25/2021 1,047 (A) 100 - 900 pg/mL Final   Office Visit on 05/25/2021   Component Date Value Ref Range Status    Clarity, UA 05/25/2021 Clear   Final    Color, UA 05/25/2021 Yellow   Final    Specific Gravity, UA 05/25/2021 1 010  1 003 - 1 030 Final    pH, UA 05/25/2021 6 0  4 5, 5 0, 5 5, 6 0, 6 5, 7 0, 7 5, 8 0 Final    Glucose, UA 05/25/2021 Negative  Negative mg/dl Final    Ketones, UA 05/25/2021 Negative  Negative mg/dl Final    Blood, UA 05/25/2021 Negative  Negative Final    Protein, UA 05/25/2021 Negative  Negative mg/dl Final    Nitrite, UA 05/25/2021 Negative  Negative Final    Bilirubin, UA 05/25/2021 Negative  Negative Final    Urobilinogen, UA 05/25/2021 0 2  0 2, 1 0 E U /dl E U /dl Final    Leukocytes, UA 05/25/2021 Small (A) Negative Final    WBC, UA 05/25/2021 None Seen  None Seen, 2-4 /hpf Final    RBC, UA 05/25/2021 None Seen  None Seen, 2-4 /hpf Final    Bacteria, UA 05/25/2021 None Seen  None Seen, Occasional /hpf Final    Epithelial Cells 05/25/2021 Occasional  None Seen, Occasional /hpf Final

## 2022-06-01 ENCOUNTER — TELEPHONE (OUTPATIENT)
Dept: INTERNAL MEDICINE CLINIC | Facility: CLINIC | Age: 62
End: 2022-06-01

## 2022-06-01 NOTE — TELEPHONE ENCOUNTER
I spoke to the patient twice  She tested positive for covid but is not sick  She cancelled her appointment with Dr Baldemar Houston for Friday  I offered a virtual and she declined  She is taking cough medicine and Motrin  This is a FYI only

## 2022-06-09 ENCOUNTER — TELEPHONE (OUTPATIENT)
Dept: OBGYN CLINIC | Facility: HOSPITAL | Age: 62
End: 2022-06-09

## 2022-06-09 NOTE — TELEPHONE ENCOUNTER
Patient called, she is having a ringing in her ears x1 month and seems to be getting worse  She would like to know if this a side effect to the medication       # [phone]

## 2022-06-14 ENCOUNTER — TELEPHONE (OUTPATIENT)
Dept: OBGYN CLINIC | Facility: HOSPITAL | Age: 62
End: 2022-06-14

## 2022-06-14 NOTE — TELEPHONE ENCOUNTER
Patient dropped off forms that need to be filled out by Dr Sunny Hanna so she can go back to to work        # [phone]

## 2022-08-04 ENCOUNTER — TELEPHONE (OUTPATIENT)
Dept: INTERNAL MEDICINE CLINIC | Facility: CLINIC | Age: 62
End: 2022-08-04

## 2022-10-19 ENCOUNTER — OFFICE VISIT (OUTPATIENT)
Dept: OBGYN CLINIC | Facility: HOSPITAL | Age: 62
End: 2022-10-19
Payer: COMMERCIAL

## 2022-10-19 VITALS
HEIGHT: 59 IN | BODY MASS INDEX: 39.11 KG/M2 | WEIGHT: 194 LBS | SYSTOLIC BLOOD PRESSURE: 123 MMHG | DIASTOLIC BLOOD PRESSURE: 81 MMHG | HEART RATE: 86 BPM

## 2022-10-19 DIAGNOSIS — M21.161 GENU VARUM OF BOTH LOWER EXTREMITIES: ICD-10-CM

## 2022-10-19 DIAGNOSIS — M17.11 PRIMARY OSTEOARTHRITIS OF RIGHT KNEE: Primary | ICD-10-CM

## 2022-10-19 DIAGNOSIS — M17.0 BILATERAL PRIMARY OSTEOARTHRITIS OF KNEE: ICD-10-CM

## 2022-10-19 DIAGNOSIS — M21.162 GENU VARUM OF BOTH LOWER EXTREMITIES: ICD-10-CM

## 2022-10-19 DIAGNOSIS — M25.562 CHRONIC PAIN OF BOTH KNEES: ICD-10-CM

## 2022-10-19 DIAGNOSIS — M25.561 CHRONIC PAIN OF BOTH KNEES: ICD-10-CM

## 2022-10-19 DIAGNOSIS — G89.29 CHRONIC PAIN OF BOTH KNEES: ICD-10-CM

## 2022-10-19 PROCEDURE — 99203 OFFICE O/P NEW LOW 30 MIN: CPT | Performed by: ORTHOPAEDIC SURGERY

## 2022-10-19 RX ORDER — SODIUM CHLORIDE, SODIUM LACTATE, POTASSIUM CHLORIDE, CALCIUM CHLORIDE 600; 310; 30; 20 MG/100ML; MG/100ML; MG/100ML; MG/100ML
125 INJECTION, SOLUTION INTRAVENOUS CONTINUOUS
OUTPATIENT
Start: 2022-10-19

## 2022-10-19 RX ORDER — FERROUS SULFATE TAB EC 324 MG (65 MG FE EQUIVALENT) 324 (65 FE) MG
324 TABLET DELAYED RESPONSE ORAL
Qty: 60 TABLET | Refills: 0 | Status: SHIPPED | OUTPATIENT
Start: 2022-10-19

## 2022-10-19 RX ORDER — ASCORBIC ACID 500 MG
500 TABLET ORAL 2 TIMES DAILY
Qty: 60 TABLET | Refills: 0 | Status: SHIPPED | OUTPATIENT
Start: 2022-10-19 | End: 2022-11-18

## 2022-10-19 RX ORDER — ENOXAPARIN SODIUM 100 MG/ML
40 INJECTION SUBCUTANEOUS
Qty: 11.2 ML | Refills: 0 | Status: SHIPPED | OUTPATIENT
Start: 2022-10-19 | End: 2022-11-16

## 2022-10-19 RX ORDER — FOLIC ACID 1 MG/1
1 TABLET ORAL DAILY
Qty: 30 TABLET | Refills: 0 | Status: SHIPPED | OUTPATIENT
Start: 2022-10-19 | End: 2022-11-18

## 2022-10-19 RX ORDER — CHLORHEXIDINE GLUCONATE 0.12 MG/ML
15 RINSE ORAL ONCE
OUTPATIENT
Start: 2022-10-19 | End: 2022-10-19

## 2022-10-19 RX ORDER — CEFAZOLIN SODIUM 2 G/50ML
2000 SOLUTION INTRAVENOUS ONCE
OUTPATIENT
Start: 2022-10-19 | End: 2022-10-19

## 2022-10-19 NOTE — PROGRESS NOTES
Subjective; Adult female known to the practice and not seen for approximately 6 years  She has osteoarthritis of both knees and escalating pain  Due to the increase in pain and the fact that she is become refractory to many of the non operative treatment she presents today intent on discussing the potential for operative intervention on her behalf  She has new x-rays of the knees taken May 2022    She is accompanied by an adult male individual     Past Medical History:   Diagnosis Date   • Chest pain 2014   • Disease of thyroid gland    • Elevated blood sugar 2014   • Hypercholesteremia    • Hyperlipidemia    • Osteoarthritis        Past Surgical History:   Procedure Laterality Date   • BIOPSY CORE NEEDLE      Thyroid Using Percutaneous Core Needle   •  SECTION     • FOOT SURGERY      As a teenager - [facility] - Dr Nacho Colunga - Last Assessed: 2016   • THYROID LOBECTOMY Right 2015    Dr Antonette Berry: Right Lobe - Last Assessed: 2016 Dr Nacho Colunga       Family History   Problem Relation Age of Onset   • Graves' disease Mother    • Hyperlipidemia Mother    • Hypertension Mother    • Osteoporosis Mother    • Aortic stenosis Father    • Hyperlipidemia Father    • Hypertension Father    • Coronary artery disease Father         Coronary Artery Bypass Graft (CABG)   • Heart disease Father         Pacemaker Placement   • Valvular heart disease Father         S/P Transcatheter Aortic Valve Replacement (TAVR)   • Heart attack Other         Myocardial Infarction   • Stroke Other        Social History     Tobacco Use   • Smoking status: Never Smoker   • Smokeless tobacco: Never Used   Vaping Use   • Vaping Use: Never used   Substance Use Topics   • Alcohol use: Yes     Comment: social   • Drug use: No     Exam;    General; well-developed well-nourished   adult female    BMI 39 18  GFR greater than 60  Serum glucose normal  HEENT; NC/AT  Neck; FROM  Chest; CTA  CVS; RRR  Abdomen; soft nontender  Musculoskeletal  Varus knees, significant pain left proximal medial tibial region compared to the right  Medial compartmental pain both knees      X-rays; bilateral knee series, May 2022    Right knee bone on bone opposition of the medial compartment with significant patellofemoral changes and to a lesser extent lateral compartment as well as varus deformity  Left knee has medial compartment minimal space yet not quite bone on bone at present time again a varus knee ends angulated should, with concomitant patellofemoral changes seen best on the lateral     Impression;    Bilateral knee osteoarthritis  Genu varum both knees  Escalating knee pain    Plan;    She is offered and signed elective consent for right total knee replacement arthroplasty robotic assisted  The perioperative period surrounding total knee replacement arthroplasty was discussed in detail by the attending surgeon with the family  Pending medical clearance and laboratory assay she will next be seen in the office as a postoperative patient    Questions generated by the 2 individuals were answered in detail    Was my privilege to assist him in the delivery of this lady's care

## 2022-11-23 ENCOUNTER — APPOINTMENT (OUTPATIENT)
Dept: LAB | Facility: AMBULARY SURGERY CENTER | Age: 62
End: 2022-11-23

## 2022-11-23 DIAGNOSIS — M35.9 UNDIFFERENTIATED CONNECTIVE TISSUE DISEASE (HCC): ICD-10-CM

## 2022-11-23 DIAGNOSIS — M17.10 ARTHRITIS OF KNEE: ICD-10-CM

## 2022-11-23 LAB
ALBUMIN SERPL BCP-MCNC: 3.6 G/DL (ref 3.5–5)
ALP SERPL-CCNC: 61 U/L (ref 46–116)
ALT SERPL W P-5'-P-CCNC: 24 U/L (ref 12–78)
ANION GAP SERPL CALCULATED.3IONS-SCNC: 3 MMOL/L (ref 4–13)
AST SERPL W P-5'-P-CCNC: 12 U/L (ref 5–45)
BASOPHILS # BLD AUTO: 0.02 THOUSANDS/ÂΜL (ref 0–0.1)
BASOPHILS NFR BLD AUTO: 1 % (ref 0–1)
BILIRUB SERPL-MCNC: 0.55 MG/DL (ref 0.2–1)
BUN SERPL-MCNC: 12 MG/DL (ref 5–25)
CALCIUM SERPL-MCNC: 9.5 MG/DL (ref 8.3–10.1)
CHLORIDE SERPL-SCNC: 108 MMOL/L (ref 96–108)
CO2 SERPL-SCNC: 28 MMOL/L (ref 21–32)
CREAT SERPL-MCNC: 0.74 MG/DL (ref 0.6–1.3)
CRP SERPL QL: 15.5 MG/L
EOSINOPHIL # BLD AUTO: 0.11 THOUSAND/ÂΜL (ref 0–0.61)
EOSINOPHIL NFR BLD AUTO: 4 % (ref 0–6)
ERYTHROCYTE [DISTWIDTH] IN BLOOD BY AUTOMATED COUNT: 13 % (ref 11.6–15.1)
GFR SERPL CREATININE-BSD FRML MDRD: 88 ML/MIN/1.73SQ M
GLUCOSE SERPL-MCNC: 106 MG/DL (ref 65–140)
HCT VFR BLD AUTO: 43.7 % (ref 34.8–46.1)
HGB BLD-MCNC: 14.2 G/DL (ref 11.5–15.4)
IMM GRANULOCYTES # BLD AUTO: 0 THOUSAND/UL (ref 0–0.2)
IMM GRANULOCYTES NFR BLD AUTO: 0 % (ref 0–2)
LYMPHOCYTES # BLD AUTO: 0.98 THOUSANDS/ÂΜL (ref 0.6–4.47)
LYMPHOCYTES NFR BLD AUTO: 31 % (ref 14–44)
MCH RBC QN AUTO: 29.2 PG (ref 26.8–34.3)
MCHC RBC AUTO-ENTMCNC: 32.5 G/DL (ref 31.4–37.4)
MCV RBC AUTO: 90 FL (ref 82–98)
MONOCYTES # BLD AUTO: 0.39 THOUSAND/ÂΜL (ref 0.17–1.22)
MONOCYTES NFR BLD AUTO: 12 % (ref 4–12)
NEUTROPHILS # BLD AUTO: 1.68 THOUSANDS/ÂΜL (ref 1.85–7.62)
NEUTS SEG NFR BLD AUTO: 52 % (ref 43–75)
NRBC BLD AUTO-RTO: 0 /100 WBCS
PLATELET # BLD AUTO: 155 THOUSANDS/UL (ref 149–390)
PMV BLD AUTO: 10.2 FL (ref 8.9–12.7)
POTASSIUM SERPL-SCNC: 4 MMOL/L (ref 3.5–5.3)
PROT SERPL-MCNC: 7.3 G/DL (ref 6.4–8.4)
RBC # BLD AUTO: 4.87 MILLION/UL (ref 3.81–5.12)
SODIUM SERPL-SCNC: 139 MMOL/L (ref 135–147)
WBC # BLD AUTO: 3.18 THOUSAND/UL (ref 4.31–10.16)

## 2022-12-06 ENCOUNTER — OFFICE VISIT (OUTPATIENT)
Dept: RHEUMATOLOGY | Facility: CLINIC | Age: 62
End: 2022-12-06

## 2022-12-06 VITALS
DIASTOLIC BLOOD PRESSURE: 82 MMHG | SYSTOLIC BLOOD PRESSURE: 130 MMHG | HEIGHT: 59 IN | WEIGHT: 192.8 LBS | BODY MASS INDEX: 38.87 KG/M2

## 2022-12-06 DIAGNOSIS — M17.10 ARTHRITIS OF KNEE: ICD-10-CM

## 2022-12-06 DIAGNOSIS — H93.13 TINNITUS OF BOTH EARS: ICD-10-CM

## 2022-12-06 DIAGNOSIS — M35.9 UNDIFFERENTIATED CONNECTIVE TISSUE DISEASE (HCC): Primary | ICD-10-CM

## 2022-12-06 NOTE — PROGRESS NOTES
Assessment and Plan:   UCTD--continue Plaquenil daily and increase the dose to 400mg daily  Yearly opthal evaluations including visual field exams  Evaluation by ENT given tinnitus  Proceed with right total knee arthroplasty  Anti-inflammatory diet/exercise  Topical NSAIDs/analgesics PRN joint pain  Check labs in 4 mos  and f/u in 5 mos  or sooner if necessary      Rheumatic Disease Summary  As above    Here for f/u visit  Taking Plaquenil 200mg daily  Scheduled for right total knee replacement in Jan 2023  She continue to have tinnitus despite holding the Plaquenil  She visits the ophthalmologist every 6 months  Persistently elevated CRP         The following portions of the patient's history were reviewed and updated as appropriate: allergies, current medications, past family history, past medical history, past social history, past surgical history and problem list     Review of Systems:   Review of Systems   Constitutional: Negative for fatigue  HENT: Negative for mouth sores  Eyes: Negative for pain  Respiratory: Negative for shortness of breath  Cardiovascular: Negative for leg swelling  Musculoskeletal: Positive for arthralgias  Negative for joint swelling  Skin: Negative for rash  Neurological: Negative for weakness  Hematological: Negative for adenopathy  Psychiatric/Behavioral: Negative for sleep disturbance         Home Medications:    Current Outpatient Medications:   •  ascorbic acid (VITAMIN C) 500 mg tablet, Take 1 tablet (500 mg total) by mouth 2 (two) times a day for 60 doses, Disp: 60 tablet, Rfl: 0  •  Cholecalciferol 125 MCG (5000 UT) capsule, Take 5,000 Units by mouth daily, Disp: , Rfl:   •  COVID-19 At Home Test (COVID-19 OTC Antigen 1-Pack) KIT, , Disp: , Rfl:   •  enoxaparin (LOVENOX) 40 mg/0 4 mL, Inject 0 4 mL (40 mg total) under the skin daily in the early morning for 28 days, Disp: 11 2 mL, Rfl: 0  •  ferrous sulfate 324 (65 Fe) mg, Take 1 tablet (324 mg total) by mouth 2 (two) times a day before meals Take 1 pill BID, PRE-OP with Vit C, may be constipatory, Disp: 60 tablet, Rfl: 0  •  folic acid (FOLVITE) 1 mg tablet, Take 1 tablet (1 mg total) by mouth daily for 30 doses Take 1 pill po Qday PRE-OP, with Vit C, and Iron, Disp: 30 tablet, Rfl: 0  •  hydroxychloroquine (PLAQUENIL) 200 mg tablet, Take 2 tablets (400 mg total) by mouth daily with breakfast, Disp: 180 tablet, Rfl: 1  •  Multiple Vitamins-Minerals (MULTIVITAL-M) TABS, Take by mouth, Disp: , Rfl:   •  OMEGA-3 FATTY ACIDS PO, 500 mg, Disp: , Rfl:   •  Probiotic Product (PROBIOTIC-10) CAPS, Take by mouth, Disp: , Rfl:   •  psyllium (METAMUCIL) 0 52 g capsule, Take 0 52 g by mouth daily, Disp: , Rfl:   •  Turmeric 500 MG TABS, Take 1 capsule by mouth, Disp: , Rfl:     Objective: There were no vitals filed for this visit  Physical Exam  Constitutional:       General: She is not in acute distress  HENT:      Head: Normocephalic and atraumatic  Eyes:      Conjunctiva/sclera: Conjunctivae normal    Cardiovascular:      Rate and Rhythm: Normal rate and regular rhythm  Heart sounds: S1 normal and S2 normal      No friction rub  Pulmonary:      Effort: Pulmonary effort is normal  No respiratory distress  Breath sounds: Normal breath sounds  No wheezing, rhonchi or rales  Musculoskeletal:      Cervical back: Neck supple  Right knee: Crepitus present  Left knee: Crepitus present  Skin:     Coloration: Skin is not pale  Neurological:      Mental Status: She is alert  Mental status is at baseline  Psychiatric:         Mood and Affect: Mood normal          Behavior: Behavior normal          Reviewed labs and imaging  Imaging:   No results found      Labs:   Appointment on 11/23/2022   Component Date Value Ref Range Status   • Sodium 11/23/2022 139  135 - 147 mmol/L Final   • Potassium 11/23/2022 4 0  3 5 - 5 3 mmol/L Final   • Chloride 11/23/2022 108  96 - 108 mmol/L Final   • CO2 11/23/2022 28  21 - 32 mmol/L Final   • ANION GAP 11/23/2022 3 (L)  4 - 13 mmol/L Final   • BUN 11/23/2022 12  5 - 25 mg/dL Final   • Creatinine 11/23/2022 0 74  0 60 - 1 30 mg/dL Final    Standardized to IDMS reference method   • Glucose 11/23/2022 106  65 - 140 mg/dL Final    If the patient is fasting, the ADA then defines impaired fasting glucose as > 100 mg/dL and diabetes as > or equal to 123 mg/dL  Specimen collection should occur prior to Sulfasalazine administration due to the potential for falsely depressed results  Specimen collection should occur prior to Sulfapyridine administration due to the potential for falsely elevated results  • Calcium 11/23/2022 9 5  8 3 - 10 1 mg/dL Final   • AST 11/23/2022 12  5 - 45 U/L Final    Specimen collection should occur prior to Sulfasalazine administration due to the potential for falsely depressed results  • ALT 11/23/2022 24  12 - 78 U/L Final    Specimen collection should occur prior to Sulfasalazine and/or Sulfapyridine administration due to the potential for falsely depressed results  • Alkaline Phosphatase 11/23/2022 61  46 - 116 U/L Final   • Total Protein 11/23/2022 7 3  6 4 - 8 4 g/dL Final   • Albumin 11/23/2022 3 6  3 5 - 5 0 g/dL Final   • Total Bilirubin 11/23/2022 0 55  0 20 - 1 00 mg/dL Final    Use of this assay is not recommended for patients undergoing treatment with eltrombopag due to the potential for falsely elevated results     • eGFR 11/23/2022 88  ml/min/1 73sq m Final   • CRP 11/23/2022 15 5 (H)  <3 0 mg/L Final   • WBC 11/23/2022 3 18 (L)  4 31 - 10 16 Thousand/uL Final   • RBC 11/23/2022 4 87  3 81 - 5 12 Million/uL Final   • Hemoglobin 11/23/2022 14 2  11 5 - 15 4 g/dL Final   • Hematocrit 11/23/2022 43 7  34 8 - 46 1 % Final   • MCV 11/23/2022 90  82 - 98 fL Final   • MCH 11/23/2022 29 2  26 8 - 34 3 pg Final   • MCHC 11/23/2022 32 5  31 4 - 37 4 g/dL Final   • RDW 11/23/2022 13 0  11 6 - 15 1 % Final   • MPV 11/23/2022 10 2  8 9 - 12 7 fL Final   • Platelets 13/43/6133 155  149 - 390 Thousands/uL Final   • nRBC 11/23/2022 0  /100 WBCs Final   • Neutrophils Relative 11/23/2022 52  43 - 75 % Final   • Immat GRANS % 11/23/2022 0  0 - 2 % Final   • Lymphocytes Relative 11/23/2022 31  14 - 44 % Final   • Monocytes Relative 11/23/2022 12  4 - 12 % Final   • Eosinophils Relative 11/23/2022 4  0 - 6 % Final   • Basophils Relative 11/23/2022 1  0 - 1 % Final   • Neutrophils Absolute 11/23/2022 1 68 (L)  1 85 - 7 62 Thousands/µL Final   • Immature Grans Absolute 11/23/2022 0 00  0 00 - 0 20 Thousand/uL Final   • Lymphocytes Absolute 11/23/2022 0 98  0 60 - 4 47 Thousands/µL Final   • Monocytes Absolute 11/23/2022 0 39  0 17 - 1 22 Thousand/µL Final   • Eosinophils Absolute 11/23/2022 0 11  0 00 - 0 61 Thousand/µL Final   • Basophils Absolute 11/23/2022 0 02  0 00 - 0 10 Thousands/µL Final   Appointment on 05/17/2022   Component Date Value Ref Range Status   • WBC 05/17/2022 3 49 (L)  4 31 - 10 16 Thousand/uL Final   • RBC 05/17/2022 5 05  3 81 - 5 12 Million/uL Final   • Hemoglobin 05/17/2022 14 8  11 5 - 15 4 g/dL Final   • Hematocrit 05/17/2022 45 6  34 8 - 46 1 % Final   • MCV 05/17/2022 90  82 - 98 fL Final   • MCH 05/17/2022 29 3  26 8 - 34 3 pg Final   • MCHC 05/17/2022 32 5  31 4 - 37 4 g/dL Final   • RDW 05/17/2022 12 6  11 6 - 15 1 % Final   • MPV 05/17/2022 9 8  8 9 - 12 7 fL Final   • Platelets 81/18/1778 179  149 - 390 Thousands/uL Final   • nRBC 05/17/2022 0  /100 WBCs Final   • Neutrophils Relative 05/17/2022 57  43 - 75 % Final   • Immat GRANS % 05/17/2022 0  0 - 2 % Final   • Lymphocytes Relative 05/17/2022 29  14 - 44 % Final   • Monocytes Relative 05/17/2022 10  4 - 12 % Final   • Eosinophils Relative 05/17/2022 3  0 - 6 % Final   • Basophils Relative 05/17/2022 1  0 - 1 % Final   • Neutrophils Absolute 05/17/2022 2 00  1 85 - 7 62 Thousands/µL Final   • Immature Grans Absolute 05/17/2022 0 01  0 00 - 0 20 Thousand/uL Final   • Lymphocytes Absolute 05/17/2022 1 01  0 60 - 4 47 Thousands/µL Final   • Monocytes Absolute 05/17/2022 0 36  0 17 - 1 22 Thousand/µL Final   • Eosinophils Absolute 05/17/2022 0 09  0 00 - 0 61 Thousand/µL Final   • Basophils Absolute 05/17/2022 0 02  0 00 - 0 10 Thousands/µL Final   • Sodium 05/17/2022 142  136 - 145 mmol/L Final   • Potassium 05/17/2022 4 3  3 5 - 5 3 mmol/L Final   • Chloride 05/17/2022 108  100 - 108 mmol/L Final   • CO2 05/17/2022 27  21 - 32 mmol/L Final   • ANION GAP 05/17/2022 7  4 - 13 mmol/L Final   • BUN 05/17/2022 16  5 - 25 mg/dL Final   • Creatinine 05/17/2022 0 84  0 60 - 1 30 mg/dL Final    Standardized to IDMS reference method   • Glucose, Fasting 05/17/2022 98  65 - 99 mg/dL Final    Specimen collection should occur prior to Sulfasalazine administration due to the potential for falsely depressed results  Specimen collection should occur prior to Sulfapyridine administration due to the potential for falsely elevated results  • Calcium 05/17/2022 9 0  8 3 - 10 1 mg/dL Final   • AST 05/17/2022 12  5 - 45 U/L Final    Specimen collection should occur prior to Sulfasalazine administration due to the potential for falsely depressed results  • ALT 05/17/2022 24  12 - 78 U/L Final    Specimen collection should occur prior to Sulfasalazine and/or Sulfapyridine administration due to the potential for falsely depressed results  • Alkaline Phosphatase 05/17/2022 66  46 - 116 U/L Final   • Total Protein 05/17/2022 7 4  6 4 - 8 2 g/dL Final   • Albumin 05/17/2022 3 5  3 5 - 5 0 g/dL Final   • Total Bilirubin 05/17/2022 0 61  0 20 - 1 00 mg/dL Final    Use of this assay is not recommended for patients undergoing treatment with eltrombopag due to the potential for falsely elevated results     • eGFR 05/17/2022 76  ml/min/1 73sq m Final   • CRP 05/17/2022 13 2 (H)  <3 0 mg/L Final   • TSH 3RD GENERATON 05/17/2022 2 650  0 450 - 4 500 uIU/mL Final    The recommended reference ranges for TSH during pregnancy are as follows:   First trimester 0 1 to 2 5 uIU/mL   Second trimester  0 2 to 3 0 uIU/mL   Third trimester 0 3 to 3 0 uIU/m    Note: Normal ranges may not apply to patients who are transgender, non-binary, or whose legal sex, sex at birth, and gender identity differ  Adult TSH (3rd generation) reference range follows the recommended guidelines of the American Thyroid Association, January, 2020  Annual Exam on 01/07/2022   Component Date Value Ref Range Status   • Case Report 01/07/2022    Final                    Value:Gynecologic Cytology Report                       Case: [id number]                                  Authorizing Provider:  John Lares MD   Collected:           01/07/2022 0957              Ordering Location:     [facility] Received:            01/07/2022 0957                                     GYN                                                                          First Screen:          ADA Espinal                                                         Specimen:    LIQUID-BASED PAP, SCREENING, Cervix                                                       • Primary Interpretation 01/07/2022 Negative for intraepithelial lesion or malignancy   Final   • Specimen Adequacy 01/07/2022 Satisfactory for evaluation  (See note)   Final   • Note 01/07/2022    Final                    Value: This result contains rich text formatting which cannot be displayed here  • Additional Information 01/07/2022    Final                    Value: This result contains rich text formatting which cannot be displayed here  • LMP 01/07/2022    Final    This result contains rich text formatting which cannot be displayed here     Appointment on 12/14/2021   Component Date Value Ref Range Status   • Cholesterol 12/14/2021 227 (H)  See Comment mg/dL Final    Cholesterol:         Pediatric <18 Years        Desirable          <170 mg/dL      Borderline High    170-199 mg/dL      High               >=200 mg/dL        Adult >=18 Years            Desirable         <200 mg/dL      Borderline High   200-239 mg/dL      High              >239 mg/dL     • Triglycerides 12/14/2021 94  See Comment mg/dL Final    Triglyceride:     0-9Y            <75mg/dL     10Y-17Y         <90 mg/dL       >=18Y     Normal          <150 mg/dL     Borderline High 150-199 mg/dL     High            200-499 mg/dL        Very High       >499 mg/dL    Specimen collection should occur prior to N-Acetylcysteine or Metamizole administration due to the potential for falsely depressed results  • HDL, Direct 12/14/2021 56  >=50 mg/dL Final    Specimen collection should occur prior to Metamizole administration due to the potential for falsley depressed results  • LDL Calculated 12/14/2021 152 (H)  0 - 100 mg/dL Final    LDL Cholesterol:     Optimal           <100 mg/dl     Near Optimal      100-129 mg/dl     Above Optimal       Borderline High 130-159 mg/dl       High            160-189 mg/dl       Very High       >189 mg/dl         This screening LDL is a calculated result  It does not have the accuracy of the Direct Measured LDL in the monitoring of patients with hyperlipidemia and/or statin therapy  Direct Measure LDL (MOA815) must be ordered separately in these patients

## 2022-12-06 NOTE — PATIENT INSTRUCTIONS
Please follow up with the ophthalmologist because you are taking Plaquenil  Try to increase the dose to 2 tablets daily  Please make an appointment to see the ENT doctor because of the ringing in your ears  Continue to eat healthy and exercise

## 2022-12-23 ENCOUNTER — OFFICE VISIT (OUTPATIENT)
Dept: INTERNAL MEDICINE CLINIC | Facility: CLINIC | Age: 62
End: 2022-12-23

## 2022-12-23 VITALS
WEIGHT: 190.8 LBS | DIASTOLIC BLOOD PRESSURE: 80 MMHG | RESPIRATION RATE: 16 BRPM | BODY MASS INDEX: 38.47 KG/M2 | OXYGEN SATURATION: 97 % | HEIGHT: 59 IN | TEMPERATURE: 97.3 F | HEART RATE: 75 BPM | SYSTOLIC BLOOD PRESSURE: 124 MMHG

## 2022-12-23 DIAGNOSIS — E78.2 MIXED HYPERLIPIDEMIA: ICD-10-CM

## 2022-12-23 DIAGNOSIS — Z00.00 ANNUAL PHYSICAL EXAM: Primary | ICD-10-CM

## 2022-12-23 NOTE — PATIENT INSTRUCTIONS

## 2022-12-23 NOTE — PROGRESS NOTES
[facility]    NAME: Celia Griffith  AGE: 63 y o  SEX: female  : 1960     DATE: 2022     Assessment and Plan:     Problem List Items Addressed This Visit        Other    Hyperlipidemia     Agreeable to stain therapy         Relevant Orders    CT coronary calcium score    Lipid Panel with Direct LDL reflex   Other Visit Diagnoses     Annual physical exam    -  Primary          Immunizations and preventive care screenings were discussed with patient today  Appropriate education was printed on patient's after visit summary  Counseling:  · Exercise: the importance of regular exercise/physical activity was discussed  Recommend exercise 3-5 times per week for at least 30 minutes  BMI Counseling: Body mass index is 38 54 kg/m²  The BMI is above normal  Nutrition recommendations include encouraging healthy choices of fruits and vegetables, moderation in carbohydrate intake and reducing intake of saturated and trans fat  Exercise recommendations include exercising 3-5 times per week  Rationale for BMI follow-up plan is due to patient being overweight or obese  Depression Screening and Follow-up Plan: Patient was screened for depression during today's encounter  They screened negative with a PHQ-2 score of 0  Return in about 1 year (around 2023)  Chief Complaint:     Chief Complaint   Patient presents with   • Physical Exam     Patient is being seen for an annual physical exam        History of Present Illness:     Adult Annual Physical   Patient here for a comprehensive physical exam  The patient reports problems - knee OA, TKR  Unsure if she will have her knee replacement since her  was just dx with prostate CA    Diet and Physical Activity  · Diet/Nutrition: [employer] and has lost 10lbs  · Exercise: stationary bike and weights        Depression Screening  PHQ-2/9 Depression Screening    Little interest or pleasure in doing things: 0 - not at all  Feeling down, depressed, or hopeless: 0 - not at all  PHQ-2 Score: 0  PHQ-2 Interpretation: Negative depression screen       General Health  · Sleep: sleeps well  · Hearing: normal - bilateral   · Vision: goes for regular eye exams  · Dental: regular dental visits  /GYN Health  · Patient is: postmenopausal  ·      Review of Systems:     Review of Systems   Constitutional: Negative for fever  HENT: Positive for tinnitus (chronic)  Negative for congestion and hearing loss  Respiratory: Negative for cough and shortness of breath  Cardiovascular: Negative for chest pain and palpitations  Gastrointestinal: Negative for abdominal pain, constipation and diarrhea  Genitourinary: Negative for difficulty urinating  Musculoskeletal: Positive for arthralgias        Past Medical History:     Past Medical History:   Diagnosis Date   • Chest pain 2014   • Disease of thyroid gland    • Elevated blood sugar 2014   • Hypercholesteremia    • Hyperlipidemia    • Osteoarthritis       Past Surgical History:     Past Surgical History:   Procedure Laterality Date   • BIOPSY CORE NEEDLE      Thyroid Using Percutaneous Core Needle   •  SECTION     • FOOT SURGERY      As a teenager - Bunionectomy - Dr Brice Aas: 2016   • THYROID LOBECTOMY Right 2015    Dr Bubba Gordon: Right Lobe - Last Assessed: 2016 Dr Delma Boyle      Social History:     Social History     Socioeconomic History   • Marital status: /Civil Union     Spouse name: None   • Number of children: 6   • Years of education: None   • Highest education level: None   Occupational History   • Occupation: Employed - Cardiology Technician - 2016   Tobacco Use   • Smoking status: Never   • Smokeless tobacco: Never   Vaping Use   • Vaping Use: Never used   Substance and Sexual Activity   • Alcohol use: Yes     Comment: social   • Drug use: No   • Sexual activity: Yes     Partners: Male     Birth control/protection: Post-menopausal   Other Topics Concern   • None   Social History Narrative    Feels safe at home     Social Determinants of Health     Financial Resource Strain: Not on file   Food Insecurity: Not on file   Transportation Needs: Not on file   Physical Activity: Not on file   Stress: Not on file   Social Connections: Not on file   Intimate Partner Violence: Not on file   Housing Stability: Not on file      Family History:     Family History   Problem Relation Age of Onset   • Graves' disease Mother    • Hyperlipidemia Mother    • Hypertension Mother    • Osteoporosis Mother    • Aortic stenosis Father    • Hyperlipidemia Father    • Hypertension Father    • Coronary artery disease Father         Coronary Artery Bypass Graft (CABG)   • Heart disease Father         Pacemaker Placement   • Valvular heart disease Father         S/P Transcatheter Aortic Valve Replacement (TAVR)   • Heart attack Other         Myocardial Infarction   • Stroke Other       Current Medications:     Current Outpatient Medications   Medication Sig Dispense Refill   • Cholecalciferol 125 MCG (5000 UT) capsule Take 5,000 Units by mouth daily     • COVID-19 At Home Test (COVID-19 OTC Antigen 1-Pack) KIT      • hydroxychloroquine (PLAQUENIL) 200 mg tablet Take 2 tablets (400 mg total) by mouth daily with breakfast (Patient taking differently: Take 200 mg by mouth daily with breakfast One tablet daily  ) 180 tablet 1   • Multiple Vitamins-Minerals (MULTIVITAL-M) TABS Take by mouth     • OMEGA-3 FATTY ACIDS  mg     • Probiotic Product (PROBIOTIC-10) CAPS Take by mouth     • psyllium (METAMUCIL) 0 52 g capsule Take 0 52 g by mouth daily     • Turmeric 500 MG TABS Take 1 capsule by mouth     • ascorbic acid (VITAMIN C) 500 mg tablet Take 1 tablet (500 mg total) by mouth 2 (two) times a day for 60 doses (Patient not taking: Reported on 12/23/2022) 60 tablet 0   • enoxaparin (LOVENOX) 40 mg/0 4 mL Inject 0 4 mL (40 mg total) under the skin daily in the early morning for 28 days (Patient not taking: Reported on 12/23/2022) 11 2 mL 0   • ferrous sulfate 324 (65 Fe) mg Take 1 tablet (324 mg total) by mouth 2 (two) times a day before meals Take 1 pill BID, PRE-OP with Vit C, may be constipatory (Patient not taking: Reported on 12/23/2022) 60 tablet 0   • folic acid (FOLVITE) 1 mg tablet Take 1 tablet (1 mg total) by mouth daily for 30 doses Take 1 pill po Qday PRE-OP, with Vit C, and Iron (Patient not taking: Reported on 12/23/2022) 30 tablet 0     No current facility-administered medications for this visit  Allergies:     No Known Allergies   Physical Exam:     /80 (BP Location: Left arm, Patient Position: Sitting, Cuff Size: Large)   Pulse 75   Temp (!) 97 3 °F (36 3 °C) (Tympanic)   Resp 16   Ht 4' 11" (1 499 m)   Wt 86 5 kg (190 lb 12 8 oz)   LMP 10/27/2017 (Exact Date)   SpO2 97%   BMI 38 54 kg/m²     Physical Exam  Constitutional:       General: She is not in acute distress  Appearance: She is well-developed  She is obese  She is not ill-appearing, toxic-appearing or diaphoretic  HENT:      Head: Normocephalic and atraumatic  Right Ear: External ear normal  There is no impacted cerumen  Left Ear: External ear normal  There is no impacted cerumen  Eyes:      Conjunctiva/sclera: Conjunctivae normal    Cardiovascular:      Rate and Rhythm: Normal rate and regular rhythm  Heart sounds: Normal heart sounds  No murmur heard  Pulmonary:      Effort: Pulmonary effort is normal  No respiratory distress  Breath sounds: Normal breath sounds  No stridor  No wheezing or rales  Abdominal:      General: There is no distension  Palpations: Abdomen is soft  There is no mass  Tenderness: There is no abdominal tenderness  There is no guarding or rebound  Musculoskeletal:      Cervical back: Neck supple        Right lower leg: No edema  Left lower leg: No edema  Skin:     General: Skin is warm and dry  Neurological:      Mental Status: She is alert and oriented to person, place, and time  Gait: Gait abnormal    Psychiatric:         Behavior: Behavior normal          Thought Content:  Thought content normal          Judgment: Judgment normal           Amita De La Vega MD  [facility]

## 2023-01-03 ENCOUNTER — TELEPHONE (OUTPATIENT)
Dept: OBGYN CLINIC | Facility: HOSPITAL | Age: 63
End: 2023-01-03

## 2023-01-03 NOTE — TELEPHONE ENCOUNTER
Received call from patient indicating she needs to cancel her upcoming R TKA surgery with Dr Michael Alford on 1/30/23, due to some family matters  Patient isn't sure when she will be able to move forward with surgery, but will call back when able to move forward  Patient is aware that she may need to see Dr Michael Alford prior to scheduling surgery

## 2023-01-16 ENCOUNTER — HOSPITAL ENCOUNTER (OUTPATIENT)
Dept: CT IMAGING | Facility: HOSPITAL | Age: 63
Discharge: HOME | End: 2023-01-16

## 2023-01-16 DIAGNOSIS — E78.2 MIXED HYPERLIPIDEMIA: ICD-10-CM

## 2023-02-20 ENCOUNTER — ANNUAL EXAM (OUTPATIENT)
Dept: OBGYN CLINIC | Facility: CLINIC | Age: 63
End: 2023-02-20

## 2023-02-20 VITALS
SYSTOLIC BLOOD PRESSURE: 120 MMHG | BODY MASS INDEX: 37.74 KG/M2 | HEIGHT: 59 IN | WEIGHT: 187.2 LBS | DIASTOLIC BLOOD PRESSURE: 80 MMHG

## 2023-02-20 DIAGNOSIS — Z01.419 ENCOUNTER FOR GYNECOLOGICAL EXAMINATION WITHOUT ABNORMAL FINDING: Primary | ICD-10-CM

## 2023-02-20 DIAGNOSIS — Z12.31 ENCOUNTER FOR SCREENING MAMMOGRAM FOR MALIGNANT NEOPLASM OF BREAST: ICD-10-CM

## 2023-02-20 DIAGNOSIS — Z01.419 PAP SMEAR, AS PART OF ROUTINE GYNECOLOGICAL EXAMINATION: ICD-10-CM

## 2023-02-20 DIAGNOSIS — N95.2 ATROPHIC VAGINITIS: ICD-10-CM

## 2023-02-21 NOTE — PROGRESS NOTES
Assessment/Plan:    No problem-specific Assessment & Plan notes found for this encounter  Diagnoses and all orders for this visit:    Encounter for gynecological examination without abnormal finding  -     Liquid-based pap, screening  -     Molecular Hold Sample    Pap smear, as part of routine gynecological examination    Encounter for screening mammogram for malignant neoplasm of breast  -     Mammo screening bilateral w 3d & cad; Future    Atrophic vaginitis          Normal gynecological physical examination  Self-breast examination stressed  Mammogram ordered  Discussed regular exercise, healthy diet, importance of vitamin D and calcium supplements  Discussed importance of sun block use during periods of prolonged sun exposure  Patient will be seen in 1 year for routine gynecologic and medical examination  Patient will call office for any problems, concerns, or issues which may arise during the interim  Subjective:      Zahra Rbuio is a 63-year-old female with a PMH of hypothyroidism, HLD, and elevated blood sugar presenting to the office for an annual exam  She has no specific complaints today and feels well overall  She experiences occasional hot flashes that she states are manageable  She denies fever, chest pain, SOB, abdominal pain, or pelvic pain  HPI    Patient ID: Zahra Rubio is a 63 y o  female who presents today for her annual gynecologic and medical examination    Menstrual status: postmenopausal    Vasomotor symptoms: Occasional hot flashes    Patient reports normal appetite    Patient reports normal bowel and bladder habits    Patient denies any significant pelvic or abdominal pain    Patient denies any headaches, chest pain, shortness of breath fever shakes or chills    Patient denies any COVID 19 symptoms including cough or loss of taste or smell    COVID vaccine status: Patient has been vaccinated for Covid       Medical problems: Hypothyroidism, HLD, elevated blood sugar    Colonoscopy status: Last colonoscopy was in 2018  Patient states that she receives colonoscopies every five years and is due for another one soon  Mammogram status: Patient receives screening mammograms yearly  The following portions of the patient's history were reviewed and updated as appropriate: allergies, current medications, past family history, past medical history, past social history, past surgical history and problem list     Review of Systems   Constitutional: Negative  Negative for appetite change, diaphoresis, fatigue, fever and unexpected weight change  HENT: Negative  Eyes: Negative  Respiratory: Negative  Negative for shortness of breath  Cardiovascular: Negative  Negative for chest pain  Gastrointestinal: Negative  Negative for abdominal pain, blood in stool, constipation, diarrhea, nausea and vomiting  Endocrine: Negative  Negative for cold intolerance and heat intolerance  Genitourinary: Negative  Negative for dysuria, frequency, hematuria, urgency, vaginal bleeding, vaginal discharge and vaginal pain  Musculoskeletal: Negative  Skin: Negative  Allergic/Immunologic: Negative  Neurological: Negative  Hematological: Negative  Negative for adenopathy  Psychiatric/Behavioral: Negative  Objective:      /80   Ht 4' 11" (1 499 m)   Wt 84 9 kg (187 lb 3 2 oz)   LMP 10/27/2017 (Exact Date)   BMI 37 81 kg/m²          Physical Exam  Constitutional:       General: She is not in acute distress  Appearance: Normal appearance  She is well-developed  She is not diaphoretic  HENT:      Head: Normocephalic and atraumatic  Eyes:      Pupils: Pupils are equal, round, and reactive to light  Cardiovascular:      Rate and Rhythm: Normal rate and regular rhythm  Heart sounds: Normal heart sounds  No murmur heard  No friction rub  No gallop     Pulmonary:      Effort: Pulmonary effort is normal       Breath sounds: Normal breath sounds  Chest:   Breasts:     Breasts are symmetrical       Right: No inverted nipple, mass, nipple discharge, skin change or tenderness  Left: No inverted nipple, mass, nipple discharge, skin change or tenderness  Abdominal:      General: Bowel sounds are normal       Palpations: Abdomen is soft  Genitourinary:     Exam position: Supine  Labia:         Right: No rash or lesion  Left: No rash or lesion  Urethra: No urethral swelling or urethral lesion  Vagina: Normal  No vaginal discharge, erythema, tenderness or bleeding  Cervix: No discharge or friability  Uterus: Not enlarged and not tender  Adnexa:         Right: No mass, tenderness or fullness  Left: No mass, tenderness or fullness  Rectum: Normal  Guaiac result negative  Comments: Good pelvic support confirmed  Pelvic exam revealed minimal atrophic vaginitis  Musculoskeletal:         General: Normal range of motion  Cervical back: Normal range of motion and neck supple  Lymphadenopathy:      Cervical: No cervical adenopathy  Upper Body:      Right upper body: No supraclavicular adenopathy  Left upper body: No supraclavicular adenopathy  Skin:     General: Skin is warm and dry  Findings: No rash  Neurological:      General: No focal deficit present  Mental Status: She is alert and oriented to person, place, and time  Psychiatric:         Mood and Affect: Mood normal          Speech: Speech normal          Behavior: Behavior normal          Thought Content:  Thought content normal          Judgment: Judgment normal

## 2023-02-27 DIAGNOSIS — Z12.31 ENCOUNTER FOR SCREENING MAMMOGRAM FOR MALIGNANT NEOPLASM OF BREAST: ICD-10-CM

## 2023-02-27 LAB
LAB AP GYN PRIMARY INTERPRETATION: NORMAL
Lab: NORMAL

## 2023-03-15 ENCOUNTER — OFFICE VISIT (OUTPATIENT)
Dept: INTERNAL MEDICINE CLINIC | Facility: CLINIC | Age: 63
End: 2023-03-15

## 2023-03-15 VITALS
OXYGEN SATURATION: 98 % | SYSTOLIC BLOOD PRESSURE: 122 MMHG | TEMPERATURE: 98.4 F | WEIGHT: 187 LBS | DIASTOLIC BLOOD PRESSURE: 82 MMHG | HEART RATE: 75 BPM | BODY MASS INDEX: 37.77 KG/M2

## 2023-03-15 DIAGNOSIS — L03.011 CELLULITIS OF FINGER OF RIGHT HAND: Primary | ICD-10-CM

## 2023-03-15 RX ORDER — SULFAMETHOXAZOLE AND TRIMETHOPRIM 800; 160 MG/1; MG/1
1 TABLET ORAL 2 TIMES DAILY
Qty: 14 TABLET | Refills: 0 | Status: SHIPPED | OUTPATIENT
Start: 2023-03-15 | End: 2023-03-22

## 2023-03-15 NOTE — PROGRESS NOTES
Name: Blake Crespo      : 1960      MRN: [id number]  Encounter Provider: David Foley MD  Encounter Date: 3/15/2023   [facility] [address]     1  Cellulitis of finger of right hand  Assessment & Plan:  Finger cellulitis with small abscess  Attempted to express pus but appears most of it is already drained spontaneously  - I will start the patient on Bactrim to cover for MRSA as possible underlying cause  - Instructed patient to perform warm water soaks 3-4 times a day for at least 15 minutes  -Patient to follow-up in 1 week for reevaluation    Orders:  -     sulfamethoxazole-trimethoprim (BACTRIM DS) 800-160 mg per tablet; Take 1 tablet by mouth 2 (two) times a day for 7 days           Subjective      HPI  Patient presents today as an ill visit  She reports last Tuesday she developed pain and swelling of her right middle finger  She was seen at an [facility] and they started her on Keflex for a finger cellulitis  She reports there has been mild improvement but it has not completely resolved  She states while doing the dishes today she noticed that purulent drainage from her finger which is since resolved  She denies any fevers or chills        ROS as per HPI    Current Outpatient Medications on File Prior to Visit   Medication Sig   • ascorbic acid (VITAMIN C) 500 mg tablet Take 1 tablet (500 mg total) by mouth 2 (two) times a day for 60 doses   • Cholecalciferol 125 MCG (5000 UT) capsule Take 5,000 Units by mouth daily   • hydroxychloroquine (PLAQUENIL) 200 mg tablet Take 2 tablets (400 mg total) by mouth daily with breakfast (Patient taking differently: Take 200 mg by mouth daily with breakfast One tablet daily )   • Multiple Vitamins-Minerals (MULTIVITAL-M) TABS Take by mouth   • OMEGA-3 FATTY ACIDS  mg   • Probiotic Product (PROBIOTIC-10) CAPS Take by mouth   • psyllium (METAMUCIL) 0 52 g capsule Take 0 52 g by mouth daily   • Turmeric 500 MG TABS Take 1 capsule by mouth   • COVID-19 At Home Test (COVID-19 OTC Antigen 1-Pack) KIT  (Patient not taking: Reported on 2/20/2023)   • enoxaparin (LOVENOX) 40 mg/0 4 mL Inject 0 4 mL (40 mg total) under the skin daily in the early morning for 28 days (Patient not taking: Reported on 12/23/2022)   • ferrous sulfate 324 (65 Fe) mg Take 1 tablet (324 mg total) by mouth 2 (two) times a day before meals Take 1 pill BID, PRE-OP with Vit C, may be constipatory (Patient not taking: Reported on 77/77/0074)   • folic acid (FOLVITE) 1 mg tablet Take 1 tablet (1 mg total) by mouth daily for 30 doses Take 1 pill po Qday PRE-OP, with Vit C, and Iron (Patient not taking: Reported on 12/23/2022)       Objective     /82   Pulse 75   Temp 98 4 °F (36 9 °C) (Tympanic)   Wt 84 8 kg (187 lb)   LMP 10/27/2017 (Exact Date)   SpO2 98%   BMI 37 77 kg/m²     BP Readings from Last 3 Encounters:   03/15/23 122/82   02/20/23 120/80   12/23/22 124/80        Physical Exam    General: NAD, Alert and oriented x3   HEENT: NCAT, EOMI, normal conjunctiva  Cardiovascular: RRR, normal S1 and S2, no m/r/g  Pulmonary: Normal respiratory effort, no wheezes, rales or rhonchi  GI: Soft, nontender, nondistended, normoactive bowel sounds  MSK: Right distal third phalanx erythema and swelling, no purulent drainage  Extremities: No lower extremity edema  Skin: Normal skin color, no rashes     Tayo Lara MD

## 2023-03-15 NOTE — ASSESSMENT & PLAN NOTE
Finger cellulitis with small abscess  Attempted to express pus but appears most of it is already drained spontaneously    - I will start the patient on Bactrim to cover for MRSA as possible underlying cause  - Instructed patient to perform warm water soaks 3-4 times a day for at least 15 minutes  -Patient to follow-up in 1 week for reevaluation

## 2023-03-15 NOTE — PATIENT INSTRUCTIONS
- You will be started on Bactrim twice a day for 7 days   - Perform warm water soaks of the hand 3-4 times a day for 15 minutes   - Follow-up in 1 week

## 2023-03-24 ENCOUNTER — OFFICE VISIT (OUTPATIENT)
Dept: INTERNAL MEDICINE CLINIC | Facility: CLINIC | Age: 63
End: 2023-03-24

## 2023-03-24 ENCOUNTER — HOSPITAL ENCOUNTER (EMERGENCY)
Facility: HOSPITAL | Age: 63
Discharge: HOME | End: 2023-03-24
Attending: EMERGENCY MEDICINE | Admitting: EMERGENCY MEDICINE

## 2023-03-24 VITALS
DIASTOLIC BLOOD PRESSURE: 72 MMHG | OXYGEN SATURATION: 97 % | SYSTOLIC BLOOD PRESSURE: 120 MMHG | BODY MASS INDEX: 37.25 KG/M2 | RESPIRATION RATE: 16 BRPM | WEIGHT: 184.8 LBS | HEIGHT: 59 IN | HEART RATE: 79 BPM | TEMPERATURE: 97.2 F

## 2023-03-24 VITALS
TEMPERATURE: 98.2 F | HEART RATE: 67 BPM | DIASTOLIC BLOOD PRESSURE: 66 MMHG | OXYGEN SATURATION: 100 % | SYSTOLIC BLOOD PRESSURE: 122 MMHG | RESPIRATION RATE: 14 BRPM

## 2023-03-24 DIAGNOSIS — B00.89 HERPETIC WHITLOW: Primary | ICD-10-CM

## 2023-03-24 DIAGNOSIS — L03.011 CELLULITIS OF FINGER, RIGHT: Primary | ICD-10-CM

## 2023-03-24 NOTE — PROGRESS NOTES
Name: Kavon Mata      : 1960      MRN: [id number]  Encounter Provider: Rocael Long MD  Encounter Date: 3/24/2023   [facility] [address]     1  Cellulitis of finger, right  Assessment & Plan:  The patient has undergone 2 separate rounds of antibiotics including Keflex and Bactrim with minimal improvement in her finger cellulitis  At this time I recommended that she go to the ED for further evaluation as she may require IV antibiotics as well as imaging of her finger  Patient expressed understanding and will head to the [facility] ED  Orders:  -     Transfer to other facility           Subjective      HPI   Patient presents today for a 1 week follow-up of a right finger cellulitis  Patient was initially seen at an urgent care and started on Keflex but did not have full resolution of her cellulitis  I started her on Bactrim to cover for MRSA instructed her to follow-up in 1 week for re-evaluation  Today the patient reports there has been no improvement in her finger  She continues in endorse mild pain, swelling erythema and drainage  She denies any fevers or chills          ROS as per [facility]    Current Outpatient Medications on File Prior to Visit   Medication Sig   • ascorbic acid (VITAMIN C) 500 mg tablet Take 1 tablet (500 mg total) by mouth 2 (two) times a day for 60 doses   • Cholecalciferol 125 MCG (5000 UT) capsule Take 5,000 Units by mouth daily   • hydroxychloroquine (PLAQUENIL) 200 mg tablet Take 2 tablets (400 mg total) by mouth daily with breakfast (Patient taking differently: Take 200 mg by mouth daily with breakfast One tablet daily )   • Multiple Vitamins-Minerals (MULTIVITAL-M) TABS Take by mouth   • OMEGA-3 FATTY ACIDS  mg   • Probiotic Product (PROBIOTIC-10) CAPS Take by mouth   • psyllium (METAMUCIL) 0 52 g capsule Take 0 52 g by mouth daily   • Turmeric 500 MG TABS Take 1 capsule by mouth   • COVID-19 At Home Test (COVID-19 OTC Antigen 1-Pack) KIT  (Patient not taking: Reported on 2/20/2023)   • enoxaparin (LOVENOX) 40 mg/0 4 mL Inject 0 4 mL (40 mg total) under the skin daily in the early morning for 28 days (Patient not taking: Reported on 12/23/2022)   • ferrous sulfate 324 (65 Fe) mg Take 1 tablet (324 mg total) by mouth 2 (two) times a day before meals Take 1 pill BID, PRE-OP with Vit C, may be constipatory (Patient not taking: Reported on 20/39/6801)   • folic acid (FOLVITE) 1 mg tablet Take 1 tablet (1 mg total) by mouth daily for 30 doses Take 1 pill po Qday PRE-OP, with Vit C, and Iron (Patient not taking: Reported on 12/23/2022)       Objective     /72 (BP Location: Left arm, Patient Position: Sitting, Cuff Size: Large)   Pulse 79   Temp (!) 97 2 °F (36 2 °C) (Tympanic)   Resp 16   Ht 4' 11" (1 499 m)   Wt 83 8 kg (184 lb 12 8 oz)   LMP 10/27/2017 (Exact Date)   SpO2 97%   BMI 37 33 kg/m²     BP Readings from Last 3 Encounters:   03/24/23 120/72   03/15/23 122/82   02/20/23 120/80        Physical Exam    General: NAD, Alert and oriented x3   HEENT: NCAT, EOMI, normal conjunctiva  Cardiovascular: RRR, normal S1 and S2, no m/r/g  Pulmonary: Normal respiratory effort, no wheezes, rales or rhonchi  MSK: Normal bulk and tone  Neuro: antalgic gait  Extremities: No lower extremity edema              Tayo Ivey MD

## 2023-03-24 NOTE — ASSESSMENT & PLAN NOTE
OFFICE VISIT      CHIEF COMPLAINT    Chief Complaint   Patient presents with   • Follow-up     after covid symptoms body pain         HISTORY OF PRESENT ILLNESS    Fátima Salgado is a 46 year old female here today for:    Multiple symptoms after recent infection with COVID19.  Symptoms of COVID infection began first week of January, positive test 1/10/22.  Cough and fever resolved.   Myalgias, fatigue have persisted.   Aching pain in upper and lower body, fatigues with light activity. Does not have trouble breathing.  Symptoms relieved somewhat with rest.     I have reviewed the past medical and social history sections including the medications and allergies listed in the medical record as well as the nursing notes.       PHYSICAL EXAM    Vital Signs:    Visit Vitals  /80   Pulse 74   Temp 97.9 °F (36.6 °C) (Oral)   Resp 16   Ht 5' 4.5\" (1.638 m)   Wt 80.9 kg (178 lb 5.6 oz)   LMP 01/09/2022 (Exact Date)   SpO2 100%   BMI 30.14 kg/m²     General: Well developed, well nourished, no acute distress.   Head: Normocephalic, atraumatic.   Cardiovascular: Symmetrical pulses. Regular rate and rhythm without murmur.  Respiratory: Normal respiratory effort. Clear to auscultation. No wheezes, rales or rhonchi.  Musculoskeletal: No deformity or edema. No tenderness to palpation.     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   30 minutes spent with the patient.  More than half of that time was spent in  face-to-face consultation and coordination of care regarding nature of post-viral syndrome, attention to nutrition, rest and activity as tolerated; anticipate improvement over 2-8 weeks.    Acquired hypothyroidism  Plan: THYROID  The patient has undergone 2 separate rounds of antibiotics including Keflex and Bactrim with minimal improvement in her finger cellulitis  At this time I recommended that she go to the ED for further evaluation as she may require IV antibiotics as well as imaging of her finger  Patient expressed understanding and will head to the [facility] ED  STIMULATING HORMONE REFLEX      The patient indicated understanding of the diagnosis and agreed with the plan of care.     Jayro Henderson MD

## 2023-03-24 NOTE — PATIENT INSTRUCTIONS
- Please go to the nearest ED for evaluation  - Your finger still appears to be infected despite 2 rounds of antibiotics  - You will likely need IV antibiotics and imaging of your finger

## 2023-03-24 NOTE — DISCHARGE INSTRUCTIONS
Diagnosis: possible resolving herpetic porsche of right miiddLEl finger    - usually resolves on own over 2-4 weeks    - please wash daily with soap and waTER     - CAN COVER  WITH A BAND AID DURING THE DAY ---     - PLEASE RETURN TO  THE ER FOR ANY REDNESS/WARMTH/SWELLING EXTENDING UP FINGER /HAND/ WRIST- ANY FEVERS- TEMP > 100 4/ OR ANY INCREASING SWELLING/PAIN OF FINGERTIP PAD

## 2023-03-24 NOTE — Clinical Note
Manuela Rowley was seen and treated in our emergency department on 3/24/2023  Diagnosis:     Flavia Wilkes    She may return on this date: If you have any questions or concerns, please don't hesitate to call        Ramiro Cueto MD    ______________________________           _______________          _______________  Hospital Representative                              Date                                Time

## 2023-03-24 NOTE — ED PROVIDER NOTES
History  Chief Complaint   Patient presents with   • Finger Pain     Pt presents to the ED with c/o infection 3rd digit right hand  2 weeks antibiotics with no improvement  Pt spent night in hospital with  3 weeks ago, feels it may be related  63 yr female  with  r 3rd digit infegtion tx with antibx-- sent to er for urgent care for wound eval-- pt reports healing over time- no current systemic comps- fever/chills - states started out of  Side of nail area with blisters- no cold sores- pt does not bite nail       History provided by:  Patient and spouse   used: No        Prior to Admission Medications   Prescriptions Last Dose Informant Patient Reported? Taking?    COVID-19 At Home Test (COVID-19 OTC Antigen 1-Pack) KIT   Yes No   Patient not taking: Reported on 2023   Cholecalciferol 125 MCG (5000 UT) capsule   Yes No   Sig: Take 5,000 Units by mouth daily   Multiple Vitamins-Minerals (MULTIVITAL-M) TABS   Yes No   Sig: Take by mouth   OMEGA-3 FATTY ACIDS PO   Yes No   Si mg   Probiotic Product (PROBIOTIC-10) CAPS   Yes No   Sig: Take by mouth   Turmeric 500 MG TABS   Yes No   Sig: Take 1 capsule by mouth   ascorbic acid (VITAMIN C) 500 mg tablet   No No   Sig: Take 1 tablet (500 mg total) by mouth 2 (two) times a day for 60 doses   enoxaparin (LOVENOX) 40 mg/0 4 mL   No No   Sig: Inject 0 4 mL (40 mg total) under the skin daily in the early morning for 28 days   Patient not taking: Reported on 2022   ferrous sulfate 324 (65 Fe) mg   No No   Sig: Take 1 tablet (324 mg total) by mouth 2 (two) times a day before meals Take 1 pill BID, PRE-OP with Vit C, may be constipatory   Patient not taking: Reported on    folic acid (FOLVITE) 1 mg tablet   No No   Sig: Take 1 tablet (1 mg total) by mouth daily for 30 doses Take 1 pill po Qday PRE-OP, with Vit C, and Iron   Patient not taking: Reported on 2022   hydroxychloroquine (PLAQUENIL) 200 mg tablet   No No Sig: Take 2 tablets (400 mg total) by mouth daily with breakfast   Patient taking differently: Take 200 mg by mouth daily with breakfast One tablet daily  psyllium (METAMUCIL) 0 52 g capsule   Yes No   Sig: Take 0 52 g by mouth daily      Facility-Administered Medications: None       Past Medical History:   Diagnosis Date   • Arthritis    • Chest pain 2014   • Disease of thyroid gland    • Elevated blood sugar 2014   • Heart murmur     Functional, child   • Hypercholesteremia    • Hyperlipidemia    • Obesity    • Osteoarthritis    • Urinary tract infection     Recurring       Past Surgical History:   Procedure Laterality Date   • BIOPSY CORE NEEDLE      Thyroid Using Percutaneous Core Needle   •  SECTION     • FOOT SURGERY      As a teenager - Bunionectomy - Dr Bridgette Beltran - Last Assessed: 2016   • THYROID LOBECTOMY Right 2015    Dr Alberto Mera: Right Lobe - Last Assessed: 2016 Dr Bridgette Beltran       Family History   Problem Relation Age of Onset   • Graves' disease Mother    • Hyperlipidemia Mother    • Hypertension Mother    • Osteoporosis Mother    • Dementia Mother    • Thyroid disease Mother    • Arthritis Mother    • Aortic stenosis Father    • Hyperlipidemia Father    • Hypertension Father    • Coronary artery disease Father         Coronary Artery Bypass Graft (CABG)   • Heart disease Father         Pacemaker Placement   • Valvular heart disease Father         S/P Transcatheter Aortic Valve Replacement (TAVR)   • Arthritis Father    • Hearing loss Father    • No Known Problems Son    • No Known Problems Son    • No Known Problems Daughter    • No Known Problems Daughter    • No Known Problems Daughter    • No Known Problems Daughter    • Heart attack Other         Myocardial Infarction   • Stroke Other      I have reviewed and agree with the history as documented      E-Cigarette/Vaping   • E-Cigarette Use Never User      E-Cigarette/Vaping Substances   • Nicotine No    • THC No    • CBD No    • Flavoring No    • Other No      Social History     Tobacco Use   • Smoking status: Never     Passive exposure: Never   • Smokeless tobacco: Never   Vaping Use   • Vaping Use: Never used   Substance Use Topics   • Alcohol use: Yes     Comment: social   • Drug use: No       Review of Systems   Constitutional: Negative  HENT: Negative  Eyes: Negative  Respiratory: Negative  Cardiovascular: Negative  Gastrointestinal: Negative  Endocrine: Negative  Genitourinary: Negative  Musculoskeletal: Negative  Skin: Positive for wound  Negative for color change, pallor and rash  Allergic/Immunologic: Negative  Neurological: Negative  Hematological: Negative  Psychiatric/Behavioral: Negative  Physical Exam  Physical Exam  Vitals and nursing note reviewed  Constitutional:       General: She is not in acute distress  Appearance: Normal appearance  She is not ill-appearing, toxic-appearing or diaphoretic  Comments: avss-- pulse ox 100 % on ra- interpretation is normal- no intervention    Musculoskeletal:         General: No swelling, tenderness, deformity or signs of injury  Right lower leg: No edema  Left lower leg: No edema  Comments: r 3rd fingertip--volar pad  healing wound-- no felon- no flexor tenosynovitis -- normal fdp/fds- mild surrounding erythema no warmth- no ascending erythema   Neurological:      Mental Status: She is alert           Vital Signs  ED Triage Vitals [03/24/23 1134]   Temperature Pulse Respirations Blood Pressure SpO2   98 2 °F (36 8 °C) 67 14 122/66 100 %      Temp Source Heart Rate Source Patient Position - Orthostatic VS BP Location FiO2 (%)   Oral Monitor -- Right arm --      Pain Score       --           Vitals:    03/24/23 1134   BP: 122/66   Pulse: 67         Visual Acuity      ED Medications  Medications - No data to display    Diagnostic Studies  Results Reviewed     None No orders to display              Procedures  Procedures         ED Course                                             Medical Decision Making  After h and p- pt will healing r 3rd finger tip infection -- pt will need wound care instructons and reasons when to return to  The er --     Herpetic porsche: self-limited or minor problem  Amount and/or Complexity of Data Reviewed  Independent Historian: spouse  Discussion of management or test interpretation with external provider(s): Mild amount of er md thought complexity     Risk  Decision regarding hospitalization  Disposition  Final diagnoses:   None     ED Disposition     None      Follow-up Information    None         Patient's Medications   Discharge Prescriptions    No medications on file       No discharge procedures on file      PDMP Review     None          ED Provider  Electronically Signed by           Suzi Price MD  04/01/23 1897

## 2023-03-24 NOTE — Clinical Note
Juan Nestor was seen and treated in our emergency department on 3/24/2023  Diagnosis:     Karina Cox    She may return on this date: If you have any questions or concerns, please don't hesitate to call        Emmie Boas, MD    ______________________________           _______________          _______________  Hospital Representative                              Date                                Time

## 2023-05-11 DIAGNOSIS — M35.9 UNDIFFERENTIATED CONNECTIVE TISSUE DISEASE (HCC): ICD-10-CM

## 2023-05-11 DIAGNOSIS — M17.10 ARTHRITIS OF KNEE: ICD-10-CM

## 2023-05-15 RX ORDER — HYDROXYCHLOROQUINE SULFATE 200 MG/1
400 TABLET, FILM COATED ORAL
Qty: 180 TABLET | Refills: 1 | Status: SHIPPED | OUTPATIENT
Start: 2023-05-15 | End: 2023-08-13

## 2023-06-06 ENCOUNTER — TELEPHONE (OUTPATIENT)
Dept: OBGYN CLINIC | Facility: HOSPITAL | Age: 63
End: 2023-06-06

## 2023-06-06 ENCOUNTER — TELEPHONE (OUTPATIENT)
Dept: RHEUMATOLOGY | Facility: CLINIC | Age: 63
End: 2023-06-06

## 2023-06-06 DIAGNOSIS — M35.9 UNDIFFERENTIATED CONNECTIVE TISSUE DISEASE (HCC): Primary | ICD-10-CM

## 2023-06-06 NOTE — TELEPHONE ENCOUNTER
Caller: Patient    Doctor: Dr Zak Amaya    Reason for call: Patient calling stating that she has an upcoming appt with Dr Zak Amaya in July and was wondering if she is required to get blood work done prior to appt  Patient asking for call back to verify if blood work is required and if order is placed      Call back#: [phone]

## 2023-06-13 DIAGNOSIS — M25.561 RIGHT KNEE PAIN, UNSPECIFIED CHRONICITY: Primary | ICD-10-CM

## 2023-06-16 ENCOUNTER — APPOINTMENT (OUTPATIENT)
Dept: LAB | Facility: AMBULARY SURGERY CENTER | Age: 63
End: 2023-06-16
Payer: COMMERCIAL

## 2023-06-16 DIAGNOSIS — M35.9 UNDIFFERENTIATED CONNECTIVE TISSUE DISEASE (HCC): ICD-10-CM

## 2023-06-16 LAB
ALBUMIN SERPL BCP-MCNC: 3.9 G/DL (ref 3.5–5)
ALP SERPL-CCNC: 72 U/L (ref 46–116)
ALT SERPL W P-5'-P-CCNC: 27 U/L (ref 12–78)
ANION GAP SERPL CALCULATED.3IONS-SCNC: 0 MMOL/L (ref 4–13)
AST SERPL W P-5'-P-CCNC: 15 U/L (ref 5–45)
BACTERIA UR QL AUTO: ABNORMAL /HPF
BASOPHILS # BLD AUTO: 0.02 THOUSANDS/ÂΜL (ref 0–0.1)
BASOPHILS NFR BLD AUTO: 0 % (ref 0–1)
BILIRUB SERPL-MCNC: 0.36 MG/DL (ref 0.2–1)
BILIRUB UR QL STRIP: NEGATIVE
BUN SERPL-MCNC: 14 MG/DL (ref 5–25)
CALCIUM SERPL-MCNC: 9.2 MG/DL (ref 8.3–10.1)
CHLORIDE SERPL-SCNC: 109 MMOL/L (ref 96–108)
CK SERPL-CCNC: 93 U/L (ref 26–192)
CLARITY UR: CLEAR
CO2 SERPL-SCNC: 29 MMOL/L (ref 21–32)
COLOR UR: ABNORMAL
CREAT SERPL-MCNC: 0.81 MG/DL (ref 0.6–1.3)
CRP SERPL QL: 9.6 MG/L
EOSINOPHIL # BLD AUTO: 0.09 THOUSAND/ÂΜL (ref 0–0.61)
EOSINOPHIL NFR BLD AUTO: 2 % (ref 0–6)
ERYTHROCYTE [DISTWIDTH] IN BLOOD BY AUTOMATED COUNT: 12.5 % (ref 11.6–15.1)
GFR SERPL CREATININE-BSD FRML MDRD: 79 ML/MIN/1.73SQ M
GLUCOSE SERPL-MCNC: 94 MG/DL (ref 65–140)
GLUCOSE UR STRIP-MCNC: NEGATIVE MG/DL
HCT VFR BLD AUTO: 45.6 % (ref 34.8–46.1)
HGB BLD-MCNC: 14.7 G/DL (ref 11.5–15.4)
HGB UR QL STRIP.AUTO: NEGATIVE
IMM GRANULOCYTES # BLD AUTO: 0.02 THOUSAND/UL (ref 0–0.2)
IMM GRANULOCYTES NFR BLD AUTO: 0 % (ref 0–2)
KETONES UR STRIP-MCNC: NEGATIVE MG/DL
LEUKOCYTE ESTERASE UR QL STRIP: ABNORMAL
LYMPHOCYTES # BLD AUTO: 1.15 THOUSANDS/ÂΜL (ref 0.6–4.47)
LYMPHOCYTES NFR BLD AUTO: 23 % (ref 14–44)
MCH RBC QN AUTO: 29.2 PG (ref 26.8–34.3)
MCHC RBC AUTO-ENTMCNC: 32.2 G/DL (ref 31.4–37.4)
MCV RBC AUTO: 91 FL (ref 82–98)
MONOCYTES # BLD AUTO: 0.43 THOUSAND/ÂΜL (ref 0.17–1.22)
MONOCYTES NFR BLD AUTO: 9 % (ref 4–12)
NEUTROPHILS # BLD AUTO: 3.36 THOUSANDS/ÂΜL (ref 1.85–7.62)
NEUTS SEG NFR BLD AUTO: 66 % (ref 43–75)
NITRITE UR QL STRIP: NEGATIVE
NON-SQ EPI CELLS URNS QL MICRO: ABNORMAL /HPF
NRBC BLD AUTO-RTO: 0 /100 WBCS
PH UR STRIP.AUTO: 6 [PH]
PLATELET # BLD AUTO: 172 THOUSANDS/UL (ref 149–390)
PMV BLD AUTO: 10.6 FL (ref 8.9–12.7)
POTASSIUM SERPL-SCNC: 4.4 MMOL/L (ref 3.5–5.3)
PROT SERPL-MCNC: 7.7 G/DL (ref 6.4–8.4)
PROT UR STRIP-MCNC: ABNORMAL MG/DL
RBC # BLD AUTO: 5.03 MILLION/UL (ref 3.81–5.12)
RBC #/AREA URNS AUTO: ABNORMAL /HPF
SODIUM SERPL-SCNC: 138 MMOL/L (ref 135–147)
SP GR UR STRIP.AUTO: 1.02 (ref 1–1.03)
UROBILINOGEN UR STRIP-ACNC: <2 MG/DL
WBC # BLD AUTO: 5.07 THOUSAND/UL (ref 4.31–10.16)
WBC #/AREA URNS AUTO: ABNORMAL /HPF

## 2023-06-16 PROCEDURE — 85025 COMPLETE CBC W/AUTO DIFF WBC: CPT

## 2023-06-16 PROCEDURE — 81001 URINALYSIS AUTO W/SCOPE: CPT | Performed by: INTERNAL MEDICINE

## 2023-06-16 PROCEDURE — 82550 ASSAY OF CK (CPK): CPT

## 2023-06-16 PROCEDURE — 86225 DNA ANTIBODY NATIVE: CPT

## 2023-06-16 PROCEDURE — 86160 COMPLEMENT ANTIGEN: CPT

## 2023-06-16 PROCEDURE — 36415 COLL VENOUS BLD VENIPUNCTURE: CPT

## 2023-06-16 PROCEDURE — 80053 COMPREHEN METABOLIC PANEL: CPT

## 2023-06-16 PROCEDURE — 86140 C-REACTIVE PROTEIN: CPT

## 2023-06-17 LAB
C3 SERPL-MCNC: 131 MG/DL (ref 90–180)
C4 SERPL-MCNC: 36 MG/DL (ref 10–40)

## 2023-06-19 LAB — DSDNA AB SER-ACNC: <1 IU/ML (ref 0–9)

## 2023-06-21 ENCOUNTER — OFFICE VISIT (OUTPATIENT)
Dept: OBGYN CLINIC | Facility: HOSPITAL | Age: 63
End: 2023-06-21
Payer: COMMERCIAL

## 2023-06-21 ENCOUNTER — HOSPITAL ENCOUNTER (OUTPATIENT)
Dept: RADIOLOGY | Facility: HOSPITAL | Age: 63
Discharge: HOME | End: 2023-06-21
Attending: ORTHOPAEDIC SURGERY
Payer: COMMERCIAL

## 2023-06-21 VITALS
HEART RATE: 77 BPM | HEIGHT: 59 IN | SYSTOLIC BLOOD PRESSURE: 122 MMHG | WEIGHT: 184 LBS | DIASTOLIC BLOOD PRESSURE: 84 MMHG | BODY MASS INDEX: 37.09 KG/M2

## 2023-06-21 DIAGNOSIS — M25.561 RIGHT KNEE PAIN, UNSPECIFIED CHRONICITY: ICD-10-CM

## 2023-06-21 DIAGNOSIS — Z01.812 PRE-OPERATIVE LABORATORY EXAMINATION: ICD-10-CM

## 2023-06-21 DIAGNOSIS — Z01.810 PRE-OPERATIVE CARDIOVASCULAR EXAMINATION: ICD-10-CM

## 2023-06-21 DIAGNOSIS — M17.12 PRIMARY OSTEOARTHRITIS OF LEFT KNEE: ICD-10-CM

## 2023-06-21 DIAGNOSIS — M25.561 CHRONIC PAIN OF RIGHT KNEE: ICD-10-CM

## 2023-06-21 DIAGNOSIS — Z00.00 HEALTHCARE MAINTENANCE: ICD-10-CM

## 2023-06-21 DIAGNOSIS — G89.29 CHRONIC PAIN OF RIGHT KNEE: ICD-10-CM

## 2023-06-21 DIAGNOSIS — M17.11 PRIMARY OSTEOARTHRITIS OF RIGHT KNEE: Primary | ICD-10-CM

## 2023-06-21 DIAGNOSIS — G89.29 CHRONIC PAIN OF LEFT KNEE: ICD-10-CM

## 2023-06-21 DIAGNOSIS — M25.562 CHRONIC PAIN OF LEFT KNEE: ICD-10-CM

## 2023-06-21 PROCEDURE — 99214 OFFICE O/P EST MOD 30 MIN: CPT | Performed by: ORTHOPAEDIC SURGERY

## 2023-06-21 PROCEDURE — 20610 DRAIN/INJ JOINT/BURSA W/O US: CPT | Performed by: ORTHOPAEDIC SURGERY

## 2023-06-21 PROCEDURE — 73562 X-RAY EXAM OF KNEE 3: CPT

## 2023-06-21 RX ORDER — BETAMETHASONE SODIUM PHOSPHATE AND BETAMETHASONE ACETATE 3; 3 MG/ML; MG/ML
12 INJECTION, SUSPENSION INTRA-ARTICULAR; INTRALESIONAL; INTRAMUSCULAR; SOFT TISSUE
Status: COMPLETED | OUTPATIENT
Start: 2023-06-21 | End: 2023-06-21

## 2023-06-21 RX ORDER — BUPIVACAINE HYDROCHLORIDE 2.5 MG/ML
2 INJECTION, SOLUTION INFILTRATION; PERINEURAL
Status: COMPLETED | OUTPATIENT
Start: 2023-06-21 | End: 2023-06-21

## 2023-06-21 RX ORDER — CEFAZOLIN SODIUM 2 G/50ML
2000 SOLUTION INTRAVENOUS ONCE
OUTPATIENT
Start: 2023-06-21 | End: 2023-06-21

## 2023-06-21 RX ORDER — CHLORHEXIDINE GLUCONATE 0.12 MG/ML
15 RINSE ORAL ONCE
OUTPATIENT
Start: 2023-06-21 | End: 2023-06-21

## 2023-06-21 RX ORDER — TRANEXAMIC ACID 10 MG/ML
1000 INJECTION, SOLUTION INTRAVENOUS ONCE
OUTPATIENT
Start: 2023-06-21 | End: 2023-06-21

## 2023-06-21 RX ADMIN — BETAMETHASONE SODIUM PHOSPHATE AND BETAMETHASONE ACETATE 12 MG: 3; 3 INJECTION, SUSPENSION INTRA-ARTICULAR; INTRALESIONAL; INTRAMUSCULAR; SOFT TISSUE at 15:45

## 2023-06-21 RX ADMIN — BUPIVACAINE HYDROCHLORIDE 2 ML: 2.5 INJECTION, SOLUTION INFILTRATION; PERINEURAL at 15:45

## 2023-06-21 NOTE — PATIENT INSTRUCTIONS
Diagnosis ICD-10-CM Associated Orders   1  Primary osteoarthritis of right knee  M17 11 Large joint arthrocentesis: bilateral knee      2  Chronic pain of right knee  M25 561 Large joint arthrocentesis: bilateral knee    G89 29       3  Primary osteoarthritis of left knee  M17 12 Large joint arthrocentesis: bilateral knee      4  Chronic pain of left knee  M25 562 Large joint arthrocentesis: bilateral knee    G89 29       5  Pre-operative cardiovascular examination  Z01 810       6  Pre-operative laboratory examination  Z01 812       7  Healthcare maintenance  Z00 00           Return for post-op with x-rays right knee  Knee Replacement   AMBULATORY CARE:   What you need to know about knee replacement:  Knee replacement is surgery to replace all or part of your knee joint  It is also called knee arthroplasty  How to prepare for knee replacement:   Weeks before your surgery: Your healthcare provider will check your overall health  He or she will ask about your current knee pain or stiffness  Tell your provider how pain or stiffness affects your daily activities or ability to play sports  He or she may also ask about fatigue, anxiety, or depression you may have  Some medicines will need to be stopped weeks before surgery  These medicines include blood thinning medicine, such as aspirin and ibuprofen  It also includes some antirheumatic medicines  Make sure your healthcare provider knows all medicines you are taking  Also ask how long before surgery to stop taking them  Your healthcare provider may have you do exercises to strengthen your leg muscles before surgery  You may need x-rays to help your healthcare provider plan your surgery  Ask about any other tests you may need  The night before and the day of surgery: Your healthcare provider may tell you not to eat or drink anything after midnight on the day of your surgery       You will be told what medicines you can or cannot take the morning of surgery  What will happen during knee replacement:   You may be given general anesthesia to keep you asleep and free from pain during surgery  You may instead be given regional anesthesia, such as spinal or epidural anesthesia, or a peripheral nerve block  Regional anesthesia keeps you numb from the waist down, but you will be awake during surgery  Your surgeon will make an incision over your knee joint  He or she will remove the damaged parts of your knee joint and replace them with a knee implant  The knee implant may be made of metal and plastic  Your surgeon may secure it with medical cement  Your surgeon will move the muscles and other tissues around your joint back into place  He or she will close your incision with stitches or staples  He or she may use strips of medical tape and a bandage to cover your wound  What to expect after knee replacement: It is normal to have increased stiffness and pain after surgery  Your pain and stiffness should get better with exercise  Do not get out of bed until your healthcare provider says it is okay  The physical therapist will help you walk after your surgery  When you walk the same day after surgery, it helps decrease pain and improves the function of your knee  You may use crutches or a walker  You may be in the hospital 1 to 4 days, or you may go home shortly after surgery  Your healthcare provider may talk to you about rehabilitation you can do at home  A physical therapist can teach you exercises to help strengthen your knee and prevent stiffness  You may also need occupational therapy to teach you the best ways to bathe and dress  You may  be given a joint replacement ID card  The card will tell which joint was replaced and when it was replaced  Tell all healthcare providers about your joint replacement surgery  Risks of knee replacement:  You may bleed more than expected or get an infection   Nerves or blood vessels may be damaged during surgery  After surgery, your knee may be stiff or numb  You may continue to have knee pain  You may get a blood clot in your leg  This may become life-threatening  Your implant may get loose or move out of place  The implant may get worn out over time and need to be replaced  Call your local emergency number (911 in the [address] [facility]) for any of the following: You feel lightheaded, short of breath, and have chest pain  You cough up blood  Seek care immediately if:   Your leg feels warm, tender, and painful  It may look swollen and red  You cannot walk or move your knee  Blood soaks through your bandage  Your incision wound comes apart  Your incision area is red, swollen, or draining pus  Call your doctor or surgeon if:   You have a fever or chills  You have trouble moving or bending your knee  You have new knee pain or pain that does not get better with medicine  You have questions or concerns about your condition or care  Medicines: You may  need any of the following:  Prescription pain medicine  may be given  Ask your healthcare provider how to take this medicine safely  Some prescription pain medicines contain acetaminophen  Do not take other medicines that contain acetaminophen without talking to your healthcare provider  Too much acetaminophen may cause liver damage  Prescription pain medicine may cause constipation  Ask your healthcare provider how to prevent or treat constipation  NSAIDs , such as ibuprofen, help decrease swelling, pain, and fever  This medicine is available with or without a doctor's order  NSAIDs can cause stomach bleeding or kidney problems in certain people  If you take blood thinner medicine, always ask your healthcare provider if NSAIDs are safe for you  Always read the medicine label and follow directions  Blood thinners  help prevent blood clots  Clots can cause strokes, heart attacks, and death   The following are general safety guidelines to follow while you are taking a blood thinner:    Watch for bleeding and bruising while you take blood thinners  Watch for bleeding from your gums or nose  Watch for blood in your urine and bowel movements  Use a soft washcloth on your skin, and a soft toothbrush to brush your teeth  This can keep your skin and gums from bleeding  If you shave, use an electric shaver  Do not play contact sports  Tell your dentist and other healthcare providers that you take a blood thinner  Wear a bracelet or necklace that says you take this medicine  Do not start or stop any other medicines unless your healthcare provider tells you to  Many medicines cannot be used with blood thinners  Take your blood thinner exactly as prescribed by your healthcare provider  Do not skip does or take less than prescribed  Tell your provider right away if you forget to take your blood thinner, or if you take too much  Warfarin  is a blood thinner that you may need to take  The following are things you should be aware of if you take warfarin:     Foods and medicines can affect the amount of warfarin in your blood  Do not make major changes to your diet while you take warfarin  Warfarin works best when you eat about the same amount of vitamin K every day  Vitamin K is found in green leafy vegetables and certain other foods  Ask for more information about what to eat when you are taking warfarin  You will need to see your healthcare provider for follow-up visits when you are on warfarin  You will need regular blood tests  These tests are used to decide how much medicine you need  Take your medicine as directed  Contact your healthcare provider if you think your medicine is not helping or if you have side effects  Tell your provider if you are allergic to any medicine  Keep a list of the medicines, vitamins, and herbs you take  Include the amounts, and when and why you take them   Bring the list or the pill bottles to follow-up visits  Carry your medicine list with you in case of an emergency  Self-care:   Apply ice  on your knee for 15 to 20 minutes every hour or as directed  Use an ice pack, or put crushed ice in a plastic bag  Cover it with a towel  Ice helps prevent tissue damage and decreases swelling and pain  Do not get the area wet  until your healthcare provider says it is okay  When your provider says it is okay, carefully wash the area with soap and water  Dry the area and put on new, clean bandages as directed  Care for your incision area  as directed  Do not put powders or lotions over the area  If you have strips of medical tape over the incision area, let them fall off on their own  If they do not fall off within 14 days, gently remove them  Check the area for signs of infection, such as swelling, redness, or pus  Go to physical or occupational therapy , if directed  A physical therapist will teach you exercises to build muscle strength, decrease pain and swelling, and prevent blood clots  An occupational therapist will show you how to do daily activities safely  He or she may recommend assistive devices to help you dress, bathe, and  objects  The assistive devices will help you prevent knee damage from twisting or bending  Prevent blood clots:   Wear pressure stockings as directed  Pressure stockings help keep blood from pooling in your leg veins  Your healthcare provider can prescribe stockings that are right for you  Do not buy over-the-counter pressure stockings unless your healthcare provider says it is okay  They may not fit correctly or may have elastic that cuts off your circulation  Ask your healthcare provider when to start wearing pressure stockings and how long to wear them each day  Do not cross your legs for 6 weeks or as directed  Your risk for blood clots is greater when you cross your legs  You might also move your implant out of place      Activity guidelines: Know your limits  Start activities slowly and give yourself rest periods  Pain and swelling can increase when you do too much  Do not do an activity until your healthcare provider says you are ready  Use assistive devices as directed  Your thigh muscles will be weak after surgery  Assistive devices will help you not fall  Go up the stairs  by placing your non-operated leg on the step first  Then bring your operated leg to the same step  Repeat  Go down stairs  by placing your operated leg down on the step first  Then bring your non-operated leg to the same step  Repeat  Do light housekeeping only  Ask your healthcare provider which housekeeping activities are okay for you  Do not vacuum, change sheets on a bed, or anything that makes you reach up, bend, or twist     Do not drive for at least 6 weeks  or until your healthcare provider says it is okay  Do not play contact sports, tennis, golf, or ski  until your healthcare provider says it is okay  These activities can loosen your knee implant  Prevent falls:       Remove all loose carpets and cords  These can cause you to trip and fall  Use a shower bench or chair  when you take a shower to limit the time you are standing  Use a toilet seat riser with arms  if your toilet seat is low  A toilet seat riser will help prevent bending or twisting your knee  Metal detectors and MRIs after joint replacement surgery: The metal in your joint may set off metal detectors, such as at an airport  Tell the officials about your joint replacement surgery  You may also want to show your joint replacement ID card, if you were given one  MRIs are considered safe for people with joint replacements  The type of metal used is not magnetic  Tell all healthcare providers about your joint replacement surgery  Follow up with your healthcare provider as directed: Your provider may contact you weeks after your surgery   He or she may ask if surgery helped relieve your pain or stiffness  Tell your provider how well you are able to do your daily activities after surgery  Also tell him or her if you are having any problems with mobility or range of motion  Write down your questions so you remember to ask them during your visits  © [employer] 2022 Information is for End User's use only and may not be sold, redistributed or otherwise used for commercial purposes  The above information is an  only  It is not intended as medical advice for individual conditions or treatments  Talk to your doctor, nurse or pharmacist before following any medical regimen to see if it is safe and effective for you

## 2023-06-21 NOTE — PROGRESS NOTES
Assessment:   Diagnosis ICD-10-CM Associated Orders   1  Primary osteoarthritis of right knee  M17 11 Large joint arthrocentesis: bilateral knee     Diet NPO; Sips with meds     Nursing Communication Warmimg Interventions Implemented     Nursing Communication CHG bath, have staff wash entire body (neck down) per pre-op bathing protocol  Routine, evening prior to, and day of surgery  Nursing Communication Swab both nares with Povidone-Iodine solution, EXCLUDE if patient has shellfish/Iodine allergy, and replace with nasal alcohol swabstick  Routine, day of surgery, on call to [address] on call to OR     Insert peripheral IV     Place sequential compression device     Case request operating room: ARTHROPLASTY RIGHT KNEE TOTAL W ROBOT     Height and weight upon arrival      2  Chronic pain of right knee  M25 561 Large joint arthrocentesis: bilateral knee    G89 29 Diet NPO; Sips with meds     Nursing Communication Warmimg Interventions Implemented     Nursing Communication CHG bath, have staff wash entire body (neck down) per pre-op bathing protocol  Routine, evening prior to, and day of surgery  Nursing Communication Swab both nares with Povidone-Iodine solution, EXCLUDE if patient has shellfish/Iodine allergy, and replace with nasal alcohol swabstick  Routine, day of surgery, on call to [address] on call to OR     Insert peripheral IV     Place sequential compression device     Case request operating room: ARTHROPLASTY RIGHT KNEE TOTAL W ROBOT     Height and weight upon arrival      3  Primary osteoarthritis of left knee  M17 12 Large joint arthrocentesis: bilateral knee      4  Chronic pain of left knee  M25 562 Large joint arthrocentesis: bilateral knee    G89 29       5  Pre-operative cardiovascular examination  Z01 810 EKG 12 lead      6   Pre-operative laboratory examination  Z01 812 Comprehensive metabolic panel     Hemoglobin A1C W/EAG Estimation     CBC and differential     Anemia Panel w/Reflex Protime-INR     APTT     Type and screen      7  Healthcare maintenance  Z00 00 Ambulatory referral to [facility]     Ambulatory referral to surgical optimization     Ambulatory referral to Physical Therapy          Plan:  • Diagnostics reviewed and physical exam performed  Diagnosis, treatment options and associated risks were discussed with the patient including no treatment, nonsurgical treatment and potential for surgical intervention  The patient was given the opportunity to ask questions regarding each  • Quality of life decision to pursue elective right total knee arthroplasty  Risks and benefits of right total knee arthroplasty were discussed  Consents obtained  • She already has her preoperative vitamins and postoperative Lovenox at home  • In the interim she was offered, excepted, informed injection of cortisone to both knees today for symptomatic relief  She tolerated both injections well  Ice and postinjection protocol advised  • Weightbearing activities as tolerated  • She was counseled on general wellness and weight loss measures  • Discussed possible viscosupplementation as a form of treatment, she may call ahead to get this arranged if indicated  To do next visit:  Return for post-op with x-rays right knee  The above stated was discussed in layman's terms and the patient expressed understanding  All questions were answered to the patient's satisfaction  Scribe Attestation    I,:  Ann Parisi am acting as a scribe while in the presence of the attending physician :       I,:  Emely Colby MD personally performed the services described in this documentation    as scribed in my presence :             Subjective:   Chantale Nation is a 63 y o  female who presents today with her  for repeat evaluation of her bilateral knees due to return of pain  She has known advanced osteoarthritis  She has right greater than left knee pain    She has increased knee pain with weightbearing activities  She is stiffness and discomfort getting up after prolonged sedentary positions  She has difficulty ambulating stairs in an alternating fashion  She was previously scheduled for an elective right total knee arthroplasty for January however it was canceled due to her 's prostate cancer surgery  She finds that her knee symptoms are severe and unrelenting  Her symptoms also limit her day-to-day activities as well as impedes on her quality of life  She wishes to pursue elective right total knee arthroplasty for October  She has some family functions coming up in preparation for her daughter's wedding in September  Pain scale at times 9/10      Review of systems negative unless otherwise specified in HPI  Review of Systems    Past Medical History:   Diagnosis Date   • Arthritis    • Chest pain 2014   • Disease of thyroid gland    • Elevated blood sugar 2014   • Heart murmur     Functional, child   • Hypercholesteremia    • Hyperlipidemia    • Obesity    • Osteoarthritis    • Urinary tract infection     Recurring       Past Surgical History:   Procedure Laterality Date   • BIOPSY CORE NEEDLE      Thyroid Using Percutaneous Core Needle   •  SECTION     • FOOT SURGERY      As a teenager - Bunionectomy - Dr Chapis Lyons - Last Assessed: 2016   • THYROID LOBECTOMY Right 2015    Dr Tommy Bagley: Right Lobe - Last Assessed: 2016 Dr Chapis Lyons       Family History   Problem Relation Age of Onset   • Graves' disease Mother    • Hyperlipidemia Mother    • Hypertension Mother    • Osteoporosis Mother    • Dementia Mother    • Thyroid disease Mother    • Arthritis Mother    • Aortic stenosis Father    • Hyperlipidemia Father    • Hypertension Father    • Coronary artery disease Father         Coronary Artery Bypass Graft (CABG)   • Heart disease Father         Pacemaker Placement   • Valvular heart disease Father S/P Transcatheter Aortic Valve Replacement (TAVR)   • Arthritis Father    • Hearing loss Father    • No Known Problems Son    • No Known Problems Son    • No Known Problems Daughter    • No Known Problems Daughter    • No Known Problems Daughter    • No Known Problems Daughter    • Heart attack Other         Myocardial Infarction   • Stroke Other        Social History     Occupational History   • 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   Tobacco Use   • Smoking status: Never     Passive exposure: Never   • Smokeless tobacco: Never   Vaping Use   • Vaping Use: Never used   Substance and Sexual Activity   • Alcohol use: Yes     Comment: social   • Drug use: No   • Sexual activity: Yes     Partners: Male     Birth control/protection: Post-menopausal         Current Outpatient Medications:   •  Cholecalciferol 125 MCG (5000 UT) capsule, Take 5,000 Units by mouth daily, Disp: , Rfl:   •  hydroxychloroquine (PLAQUENIL) 200 mg tablet, TAKE 2 TABLETS (400 MG TOTAL) BY MOUTH DAILY WITH BREAKFAST, Disp: 180 tablet, Rfl: 1  •  Multiple Vitamins-Minerals (MULTIVITAL-M) TABS, Take by mouth, Disp: , Rfl:   •  OMEGA-3 FATTY ACIDS PO, 500 mg, Disp: , Rfl:   •  Probiotic Product (PROBIOTIC-10) CAPS, Take by mouth, Disp: , Rfl:   •  Turmeric 500 MG TABS, Take 1 capsule by mouth, Disp: , Rfl:   •  ascorbic acid (VITAMIN C) 500 mg tablet, Take 1 tablet (500 mg total) by mouth 2 (two) times a day for 60 doses, Disp: 60 tablet, Rfl: 0  •  COVID-19 At Home Test (COVID-19 OTC Antigen 1-Pack) KIT, , Disp: , Rfl:   •  enoxaparin (LOVENOX) 40 mg/0 4 mL, Inject 0 4 mL (40 mg total) under the skin daily in the early morning for 28 days (Patient not taking: Reported on 12/23/2022), Disp: 11 2 mL, Rfl: 0  •  ferrous sulfate 324 (65 Fe) mg, Take 1 tablet (324 mg total) by mouth 2 (two) times a day before meals Take 1 pill BID, PRE-OP with Vit C, may be constipatory (Patient not taking: Reported on 12/23/2022), Disp: 60 tablet, Rfl: 0  •  folic acid (FOLVITE) 1 mg tablet, Take 1 tablet (1 mg total) by mouth daily for 30 doses Take 1 pill po Qday PRE-OP, with Vit C, and Iron (Patient not taking: Reported on 12/23/2022), Disp: 30 tablet, Rfl: 0  •  psyllium (METAMUCIL) 0 52 g capsule, Take 0 52 g by mouth daily, Disp: , Rfl:     No Known Allergies         Vitals:    06/21/23 1541   BP: 122/84   Pulse: 77       Objective:                    Right Knee Exam     Muscle Strength   The patient has normal right knee strength  Tenderness   The patient is experiencing tenderness in the medial joint line  Range of Motion   Extension: 0   Flexion: 100 (with crepitation and stiffness at full flexion)     Other   Erythema: absent  Sensation: normal  Swelling: mild  Effusion: no effusion present      Left Knee Exam     Muscle Strength   The patient has normal left knee strength  Tenderness   The patient is experiencing tenderness in the medial joint line  Range of Motion   Extension: 0   Flexion: 100 (with crepitation and stiffness at full flexion)     Other   Erythema: absent  Sensation: normal  Swelling: mild  Effusion: no effusion present    Comments: Both knees are in modest varus alignment with bony enlargement medially  Both extensor mechanisms are intact  Mildly antalgic gait             Diagnostics, reviewed and taken today if performed as documented: The attending physician has personally reviewed the pertinent films in PACS and interpretation is as follows:    Right and left knee x-rays taken and reviewed in the office today and show: Advanced degenerative changes with bone-on-bone opposition medial and patellofemoral compartments of both knees  Both knees are in varus deformity  Both knees have subchondral sclerosis and osteophyte formation present  Procedures, if performed today:    Large joint arthrocentesis: bilateral knee  Universal Protocol:  Consent: Verbal consent obtained    Risks and benefits: risks, benefits and "alternatives were discussed  Consent given by: patient  Time out: Immediately prior to procedure a \"time out\" was called to verify the correct patient, procedure, equipment, support staff and site/side marked as required  Timeout called at: 6/21/2023 4:27 PM   Patient understanding: patient states understanding of the procedure being performed  Site marked: the operative site was marked  Patient identity confirmed: verbally with patient    Supporting Documentation  Indications: pain and diagnostic evaluation   Procedure Details  Location: knee - bilateral knee  Preparation: Patient was prepped and draped in the usual sterile fashion  Needle size: 22 G  Ultrasound guidance: no  Approach: anterolateral    Medications (Right): 2 mL bupivacaine 0 25 %; 12 mg betamethasone acetate-betamethasone sodium phosphate 6 (3-3) mg/mLMedications (Left): 2 mL bupivacaine 0 25 %; 12 mg betamethasone acetate-betamethasone sodium phosphate 6 (3-3) mg/mL   Patient tolerance: patient tolerated the procedure well with no immediate complications  Dressing:  Sterile dressing applied              Portions of the record may have been created with voice recognition software  Occasional wrong word or \"sound a like\" substitutions may have occurred due to the inherent limitations of voice recognition software  Read the chart carefully and recognize, using context, where substitutions have occurred    "

## 2023-07-25 ENCOUNTER — OFFICE VISIT (OUTPATIENT)
Dept: RHEUMATOLOGY | Facility: CLINIC | Age: 63
End: 2023-07-25
Payer: COMMERCIAL

## 2023-07-25 VITALS
HEIGHT: 59 IN | DIASTOLIC BLOOD PRESSURE: 80 MMHG | WEIGHT: 186 LBS | SYSTOLIC BLOOD PRESSURE: 140 MMHG | BODY MASS INDEX: 37.5 KG/M2

## 2023-07-25 DIAGNOSIS — M35.9 UNDIFFERENTIATED CONNECTIVE TISSUE DISEASE (HCC): Primary | ICD-10-CM

## 2023-07-25 DIAGNOSIS — R79.82 ELEVATED C-REACTIVE PROTEIN (CRP): ICD-10-CM

## 2023-07-25 DIAGNOSIS — M25.561 CHRONIC PAIN OF BOTH KNEES: ICD-10-CM

## 2023-07-25 DIAGNOSIS — M25.562 CHRONIC PAIN OF BOTH KNEES: ICD-10-CM

## 2023-07-25 DIAGNOSIS — G89.29 CHRONIC PAIN OF BOTH KNEES: ICD-10-CM

## 2023-07-25 PROCEDURE — 99214 OFFICE O/P EST MOD 30 MIN: CPT | Performed by: INTERNAL MEDICINE

## 2023-07-25 NOTE — PROGRESS NOTES
Assessment and Plan:   UCTD--continue Plaquenil 400mg daily  Yearly opthal.evaluations including visual field exams  Evaluation by ENT given tinnitus  Proceed with right total knee arthroplasty,   Anti-inflammatory diet/exercise  Topical NSAIDs/analgesics PRN joint pain  Check labs every 3-4 mos. and f/u in 6 mos. or sooner if necessary         Rheumatic Disease Summary  As above    Here for f/u visit. Much improved on increasing Plaquenil dose. She takes 200mg alt.with 400mg daily. Is scheduled for right TKR in October, 2023. No other complaints. No medication side effects. Sees ophthalmology every 3 months. The following portions of the patient's history were reviewed and updated as appropriate: allergies, current medications, past family history, past medical history, past social history, past surgical history and problem list.    Review of Systems:   Review of Systems   Constitutional: Negative for fatigue. HENT: Negative for mouth sores. Eyes: Negative for pain. Respiratory: Negative for shortness of breath. Cardiovascular: Negative for leg swelling. Musculoskeletal: Positive for arthralgias. Negative for joint swelling. Skin: Negative for rash. Neurological: Negative for weakness. Hematological: Negative for adenopathy. Psychiatric/Behavioral: Negative for sleep disturbance.        Home Medications:    Current Outpatient Medications:   •  Cholecalciferol 125 MCG (5000 UT) capsule, Take 5,000 Units by mouth daily, Disp: , Rfl:   •  hydroxychloroquine (PLAQUENIL) 200 mg tablet, TAKE 2 TABLETS (400 MG TOTAL) BY MOUTH DAILY WITH BREAKFAST (Patient taking differently: Take 400 mg by mouth), Disp: 180 tablet, Rfl: 1  •  Multiple Vitamins-Minerals (MULTIVITAL-M) TABS, Take by mouth, Disp: , Rfl:   •  OMEGA-3 FATTY ACIDS PO, 500 mg, Disp: , Rfl:   •  Probiotic Product (PROBIOTIC-10) CAPS, Take by mouth, Disp: , Rfl:   •  Turmeric 500 MG TABS, Take 1 capsule by mouth, Disp: , Rfl:   •  ascorbic acid (VITAMIN C) 500 mg tablet, Take 1 tablet (500 mg total) by mouth 2 (two) times a day for 60 doses, Disp: 60 tablet, Rfl: 0  •  enoxaparin (LOVENOX) 40 mg/0.4 mL, Inject 0.4 mL (40 mg total) under the skin daily in the early morning for 28 days (Patient not taking: Reported on 12/23/2022), Disp: 11.2 mL, Rfl: 0  •  ferrous sulfate 324 (65 Fe) mg, Take 1 tablet (324 mg total) by mouth 2 (two) times a day before meals Take 1 pill BID, PRE-OP with Vit C,..may be constipatory (Patient not taking: Reported on 12/23/2022), Disp: 60 tablet, Rfl: 0  •  folic acid (FOLVITE) 1 mg tablet, Take 1 tablet (1 mg total) by mouth daily for 30 doses Take 1 pill po Qday PRE-OP, with Vit C, and Iron (Patient not taking: Reported on 12/23/2022), Disp: 30 tablet, Rfl: 0    Objective:    Vitals:    07/25/23 1130   BP: 140/80   Weight: 84.4 kg (186 lb)   Height: 4' 11" (1.499 m)       Physical Exam  Constitutional:       General: She is not in acute distress. HENT:      Head: Normocephalic and atraumatic. Eyes:      Conjunctiva/sclera: Conjunctivae normal.   Cardiovascular:      Rate and Rhythm: Normal rate and regular rhythm. Heart sounds: S1 normal and S2 normal.      No friction rub. Pulmonary:      Effort: Pulmonary effort is normal. No respiratory distress. Breath sounds: Normal breath sounds. No wheezing, rhonchi or rales. Musculoskeletal:      Cervical back: Neck supple. Right knee: Crepitus present. Left knee: Crepitus present. Skin:     Coloration: Skin is not pale. Neurological:      Mental Status: She is alert. Mental status is at baseline. Psychiatric:         Mood and Affect: Mood normal.         Behavior: Behavior normal.         Reviewed labs and imaging. Imaging:   No results found.     Labs:   Appointment on 06/16/2023   Component Date Value Ref Range Status   • WBC 06/16/2023 5.07  4.31 - 10.16 Thousand/uL Final   • RBC 06/16/2023 5.03  3.81 - 5.12 Million/uL Final • Hemoglobin 06/16/2023 14.7  11.5 - 15.4 g/dL Final   • Hematocrit 06/16/2023 45.6  34.8 - 46.1 % Final   • MCV 06/16/2023 91  82 - 98 fL Final   • MCH 06/16/2023 29.2  26.8 - 34.3 pg Final   • MCHC 06/16/2023 32.2  31.4 - 37.4 g/dL Final   • RDW 06/16/2023 12.5  11.6 - 15.1 % Final   • MPV 06/16/2023 10.6  8.9 - 12.7 fL Final   • Platelets 03/56/8662 172  149 - 390 Thousands/uL Final   • nRBC 06/16/2023 0  /100 WBCs Final   • Neutrophils Relative 06/16/2023 66  43 - 75 % Final   • Immat GRANS % 06/16/2023 0  0 - 2 % Final   • Lymphocytes Relative 06/16/2023 23  14 - 44 % Final   • Monocytes Relative 06/16/2023 9  4 - 12 % Final   • Eosinophils Relative 06/16/2023 2  0 - 6 % Final   • Basophils Relative 06/16/2023 0  0 - 1 % Final   • Neutrophils Absolute 06/16/2023 3.36  1.85 - 7.62 Thousands/µL Final   • Immature Grans Absolute 06/16/2023 0.02  0.00 - 0.20 Thousand/uL Final   • Lymphocytes Absolute 06/16/2023 1.15  0.60 - 4.47 Thousands/µL Final   • Monocytes Absolute 06/16/2023 0.43  0.17 - 1.22 Thousand/µL Final   • Eosinophils Absolute 06/16/2023 0.09  0.00 - 0.61 Thousand/µL Final   • Basophils Absolute 06/16/2023 0.02  0.00 - 0.10 Thousands/µL Final   • CRP 06/16/2023 9.6 (H)  <3.0 mg/L Final   • Sodium 06/16/2023 138  135 - 147 mmol/L Final   • Potassium 06/16/2023 4.4  3.5 - 5.3 mmol/L Final   • Chloride 06/16/2023 109 (H)  96 - 108 mmol/L Final   • CO2 06/16/2023 29  21 - 32 mmol/L Final   • ANION GAP 06/16/2023 0 (L)  4 - 13 mmol/L Final   • BUN 06/16/2023 14  5 - 25 mg/dL Final   • Creatinine 06/16/2023 0.81  0.60 - 1.30 mg/dL Final    Standardized to IDMS reference method   • Glucose 06/16/2023 94  65 - 140 mg/dL Final    If the patient is fasting, the ADA then defines impaired fasting glucose as > 100 mg/dL and diabetes as > or equal to 123 mg/dL. Specimen collection should occur prior to Sulfasalazine administration due to the potential for falsely depressed results.  Specimen collection should occur prior to Sulfapyridine administration due to the potential for falsely elevated results. • Calcium 06/16/2023 9.2  8.3 - 10.1 mg/dL Final   • AST 06/16/2023 15  5 - 45 U/L Final    Specimen collection should occur prior to Sulfasalazine administration due to the potential for falsely depressed results. • ALT 06/16/2023 27  12 - 78 U/L Final    Specimen collection should occur prior to Sulfasalazine and/or Sulfapyridine administration due to the potential for falsely depressed results. • Alkaline Phosphatase 06/16/2023 72  46 - 116 U/L Final   • Total Protein 06/16/2023 7.7  6.4 - 8.4 g/dL Final   • Albumin 06/16/2023 3.9  3.5 - 5.0 g/dL Final   • Total Bilirubin 06/16/2023 0.36  0.20 - 1.00 mg/dL Final    Use of this assay is not recommended for patients undergoing treatment with eltrombopag due to the potential for falsely elevated results.    • eGFR 06/16/2023 79  ml/min/1.73sq m Final   • C3 Complement 06/16/2023 131.0  90.0 - 180.0 mg/dL Final   • C4, COMPLEMENT 06/16/2023 36.0  10.0 - 40.0 mg/dL Final   • Total CK 06/16/2023 93  26 - 192 U/L Final   • ds DNA Ab 06/16/2023 <1  0 - 9 IU/mL Final                                       Negative      <5                                     Equivocal  5 - 9                                     Positive      >9   Orders Only on 06/06/2023   Component Date Value Ref Range Status   • Color, UA 06/16/2023 Light Yellow   Final   • Clarity, UA 06/16/2023 Clear   Final   • Specific Gravity, UA 06/16/2023 1.016  1.003 - 1.030 Final   • pH, UA 06/16/2023 6.0  4.5, 5.0, 5.5, 6.0, 6.5, 7.0, 7.5, 8.0 Final   • Leukocytes, UA 06/16/2023 Trace (A)  Negative Final   • Nitrite, UA 06/16/2023 Negative  Negative Final   • Protein, UA 06/16/2023 Trace (A)  Negative mg/dl Final   • Glucose, UA 06/16/2023 Negative  Negative mg/dl Final   • Ketones, UA 06/16/2023 Negative  Negative mg/dl Final   • Urobilinogen, UA 06/16/2023 <2.0  <2.0 mg/dl mg/dl Final   • Bilirubin, UA 06/16/2023 Negative  Negative Final   • Occult Blood, UA 06/16/2023 Negative  Negative Final   • RBC, UA 06/16/2023 1-2  None Seen, 1-2 /hpf Final   • WBC, UA 06/16/2023 2-4 (A)  None Seen, 1-2 /hpf Final   • Epithelial Cells 06/16/2023 Occasional  None Seen, Occasional /hpf Final   • Bacteria, UA 06/16/2023 None Seen  None Seen, Occasional /hpf Final   Annual Exam on 02/20/2023   Component Date Value Ref Range Status   • Case Report 02/20/2023    Final                    Value:Gynecologic Cytology Report                       Case: [id number]                                  Authorizing Provider:  Gloria Hines MD   Collected:           02/20/2023 1642              Ordering Location:     [address] [place] [address] OB Received:            02/20/2023 1642                                     GYN                                                                          First Screen:          Bevely Carrie, CT                                                       Specimen:    LIQUID-BASED PAP, SCREENING, Cervix                                                       • Primary Interpretation 02/20/2023 Negative for intraepithelial lesion or malignancy   Final   • Specimen Adequacy 02/20/2023 Satisfactory for evaluation. (See note)   Final   • Note 02/20/2023    Final                    Value: This result contains rich text formatting which cannot be displayed here. • Additional Information 02/20/2023    Final                    Value: This result contains rich text formatting which cannot be displayed here.    Appointment on 11/23/2022   Component Date Value Ref Range Status   • Sodium 11/23/2022 139  135 - 147 mmol/L Final   • Potassium 11/23/2022 4.0  3.5 - 5.3 mmol/L Final   • Chloride 11/23/2022 108  96 - 108 mmol/L Final   • CO2 11/23/2022 28  21 - 32 mmol/L Final   • ANION GAP 11/23/2022 3 (L)  4 - 13 mmol/L Final   • BUN 11/23/2022 12  5 - 25 mg/dL Final   • Creatinine 11/23/2022 0.74  0.60 - 1.30 mg/dL Final    Standardized to IDMS reference method   • Glucose 11/23/2022 106  65 - 140 mg/dL Final    If the patient is fasting, the ADA then defines impaired fasting glucose as > 100 mg/dL and diabetes as > or equal to 123 mg/dL. Specimen collection should occur prior to Sulfasalazine administration due to the potential for falsely depressed results. Specimen collection should occur prior to Sulfapyridine administration due to the potential for falsely elevated results. • Calcium 11/23/2022 9.5  8.3 - 10.1 mg/dL Final   • AST 11/23/2022 12  5 - 45 U/L Final    Specimen collection should occur prior to Sulfasalazine administration due to the potential for falsely depressed results. • ALT 11/23/2022 24  12 - 78 U/L Final    Specimen collection should occur prior to Sulfasalazine and/or Sulfapyridine administration due to the potential for falsely depressed results. • Alkaline Phosphatase 11/23/2022 61  46 - 116 U/L Final   • Total Protein 11/23/2022 7.3  6.4 - 8.4 g/dL Final   • Albumin 11/23/2022 3.6  3.5 - 5.0 g/dL Final   • Total Bilirubin 11/23/2022 0.55  0.20 - 1.00 mg/dL Final    Use of this assay is not recommended for patients undergoing treatment with eltrombopag due to the potential for falsely elevated results.    • eGFR 11/23/2022 88  ml/min/1.73sq m Final   • CRP 11/23/2022 15.5 (H)  <3.0 mg/L Final   • WBC 11/23/2022 3.18 (L)  4.31 - 10.16 Thousand/uL Final   • RBC 11/23/2022 4.87  3.81 - 5.12 Million/uL Final   • Hemoglobin 11/23/2022 14.2  11.5 - 15.4 g/dL Final   • Hematocrit 11/23/2022 43.7  34.8 - 46.1 % Final   • MCV 11/23/2022 90  82 - 98 fL Final   • MCH 11/23/2022 29.2  26.8 - 34.3 pg Final   • MCHC 11/23/2022 32.5  31.4 - 37.4 g/dL Final   • RDW 11/23/2022 13.0  11.6 - 15.1 % Final   • MPV 11/23/2022 10.2  8.9 - 12.7 fL Final   • Platelets 76/34/4865 155  149 - 390 Thousands/uL Final   • nRBC 11/23/2022 0  /100 WBCs Final   • Neutrophils Relative 11/23/2022 52  43 - 75 % Final   • Immat GRANS % 11/23/2022 0  0 - 2 % Final   • Lymphocytes Relative 11/23/2022 31  14 - 44 % Final   • Monocytes Relative 11/23/2022 12  4 - 12 % Final   • Eosinophils Relative 11/23/2022 4  0 - 6 % Final   • Basophils Relative 11/23/2022 1  0 - 1 % Final   • Neutrophils Absolute 11/23/2022 1.68 (L)  1.85 - 7.62 Thousands/µL Final   • Immature Grans Absolute 11/23/2022 0.00  0.00 - 0.20 Thousand/uL Final   • Lymphocytes Absolute 11/23/2022 0.98  0.60 - 4.47 Thousands/µL Final   • Monocytes Absolute 11/23/2022 0.39  0.17 - 1.22 Thousand/µL Final   • Eosinophils Absolute 11/23/2022 0.11  0.00 - 0.61 Thousand/µL Final   • Basophils Absolute 11/23/2022 0.02  0.00 - 0.10 Thousands/µL Final

## 2023-07-25 NOTE — PATIENT INSTRUCTIONS
Increase the Plaquenil to 2 tabs every day. Continue using the Voltaren gel up to 4 times per day. Please have your blood work done (including the lab tests that I ordered) in September.

## 2023-09-07 PROBLEM — M17.11 PRIMARY OSTEOARTHRITIS OF RIGHT KNEE: Status: ACTIVE | Noted: 2023-09-07

## 2023-09-07 NOTE — PROGRESS NOTES
Internal Medicine Pre-Operative Evaluation:     Reason for Visit: Pre-operative Evaluation for Risk Stratification and Optimization    Patient ID: Melania Deluca is a 63 y.o. female. Surgery: Arthroplasty of right knee  Referring Provider: Dr Siva Turner      Recommendations to Proceed withSurgery    Patient is considered to be Low risk for Medium risk procedure. After evaluation and discussion with patient with emphasis that all surgery has some degree of inherent risk it is determined this procedure is of acceptable risk  medically. Patient may proceed with planned procedure      Assessment      Primary osteoarthritis right knee  • Failed conservative measures  • Electing to undergo total joint arthroplasty    Pre-operative Medical Evaluation for planned surgery  • Patient meets preoperative quality goals as noted below  • Recommendations as listed in PLAN section below  • Contact surgical nurse  navigator with any questions regarding preoperative plan or schedule. Obesity  • Recommend ongoing attempts at weight loss  • Current BMI 37    Polyarthralgia  · Inflammatory  · Takes plaquenil per rheumatology        Patient Active Problem List   Diagnosis   • Hyperlipidemia   • Impaired fasting glucose   • Nontoxic multinodular goiter   • Morbid obesity ([address])   • Undifferentiated connective tissue disease ([address])   • Polyarthralgia   • Cellulitis of finger, right   • Arthritis of both knees   • Menorrhagia   • Primary osteoarthritis of right knee        Plan:     1. Further preoperative workup as follows:   - none no further testing required may proceed with surgery    2.  Medication Management/Recommendations:   - continue all current medicines through morning of surgery with sip of water except the following:  - hold aspirin 7 days prior to surgery  - avoid use of NSAID such as motrin, advil, aleve for 7 days prior to surgery  - hold rheumatologic medicines as instructed by your rheumatologist  - hold all OTC herbal or nutritional supplements 7 days before surgery    3. Patient requires further consultation with:   No Consults Required    4. Discharge Planning / Barriers to Discharge  none identified - patients has post discharge therapy plan in place, transportation arranged for discharge day, adequate family support at home to assist with discharge to home. Subjective:           History of Present Illness:     Briseyda Lloyd is a 63 y.o. female who presents to the office today for a preoperative consultation at the request of surgeon. The patient understands this is an elective procedure and not emergent. They are electing to undergo planned procedure with an understanding that all surgery has inherent risk. They have worked with their surgeon and failed conservative treatment measures. Today they present for preoperative risk assessment and recommendations for optimization in preparation for surgery. Pt seen in surgical optimization center for upcoming proposed surgery. They have failed previous conservative measures and have elected surgical intervention. Pt meets presurgical lab and BMI optimization goals. Upon interview questioning patient is able to perform greater than 4 METs workload in daily life without any reported cardio-pulmonary symptoms. ROS: No TIA's or unusual headaches, no dysphagia. No prolonged cough. No dyspnea or chest pain on exertion. No abdominal pain, change in bowel habits, black or bloody stools. No urinary tract or BPH symptoms. Positive reported pain in arthritic joint. Positive difficulty with gait. No skin rashes or issues.             Objective:      /66   Ht 4' 11" (1.499 m)   Wt 85.2 kg (187 lb 12.8 oz)   LMP 10/27/2017 (Exact Date)   BMI 37.93 kg/m²       General Appearance: no distress, conversive  HEENT: PERRLA, conjuctiva normal; oropharynx clear; mucous membranes moist;   Neck:  Supple, no lymphadenopathy or thyromegaly  Lungs: breath sounds normal, normal respiratory effort, no retractions, expiratory effort normal  CV: normal heart sounds S1/S2, PMI normal   ABD: soft non tender, no masses , no hepatic or splenomegaly  EXT: DP pulses intact, no lymphadenopathy, no edema  Skin: normal turgor, normal texture, no rash  Psych: affect normal, mood normal  Neuro: AAOx3        The following portions of the patient's history were reviewed and updated as appropriate: allergies, current medications, past family history, past medical history, past social history, past surgical history and problem list.     Past History:       Past Medical History:   Diagnosis Date   • Arthritis    • Chest pain 2014   • Disease of thyroid gland    • Elevated blood sugar 2014   • Heart murmur     Functional, child   • Hypercholesteremia    • Hyperlipidemia    • Obesity    • Osteoarthritis    • Urinary tract infection     Recurring    Past Surgical History:   Procedure Laterality Date   • BIOPSY CORE NEEDLE      Thyroid Using Percutaneous Core Needle   •  SECTION     • FOOT SURGERY      As a teenager - Bunionectomy - Dr. Giorgio Kitchen - Last Assessed: 2016   • THYROID LOBECTOMY Right 2015    Dr. Ac Summers: Right Lobe - Last Assessed: 2016 Dr. Giorgio Kitchen          Social History     Tobacco Use   • Smoking status: Never     Passive exposure: Never   • Smokeless tobacco: Never   Vaping Use   • Vaping Use: Never used   Substance Use Topics   • Alcohol use: Yes     Comment: social   • Drug use: No     Family History   Problem Relation Age of Onset   • Graves' disease Mother    • Hyperlipidemia Mother    • Hypertension Mother    • Osteoporosis Mother    • Dementia Mother    • Thyroid disease Mother    • Arthritis Mother    • Aortic stenosis Father    • Hyperlipidemia Father    • Hypertension Father    • Coronary artery disease Father         Coronary Artery Bypass Graft (CABG)   • Heart disease Father Pacemaker Placement   • Valvular heart disease Father         S/P Transcatheter Aortic Valve Replacement (TAVR)   • Arthritis Father    • Hearing loss Father    • No Known Problems Son    • No Known Problems Son    • No Known Problems Daughter    • No Known Problems Daughter    • No Known Problems Daughter    • No Known Problems Daughter    • Heart attack Other         Myocardial Infarction   • Stroke Other           Allergies:     No Known Allergies     Current Medications:     Current Outpatient Medications   Medication Instructions   • ascorbic acid (VITAMIN C) 500 mg, Oral, 2 times daily   • Cholecalciferol 5,000 Units, Oral, Daily   • enoxaparin (LOVENOX) 40 mg, Subcutaneous, Daily (early morning)   • ferrous sulfate 324 mg, Oral, 2 times daily before meals, Take 1 pill BID, PRE-OP with Vit C,..may be constipatory   • folic acid (FOLVITE) 1 mg, Oral, Daily, Take 1 pill po Qday PRE-OP, with Vit C, and Iron   • hydroxychloroquine (PLAQUENIL) 400 mg, Oral, Daily with breakfast   • Multiple Vitamins-Minerals (MULTIVITAL-M) TABS Oral   • OMEGA-3 FATTY ACIDS  mg   • Probiotic Product (PROBIOTIC-10) CAPS Oral   • Turmeric 500 MG TABS 1 capsule, Oral             PRE-OP WORKSHEET DATA    Assessment of Pre-Operative Risks     MLJ Quality Hard Stops:  BMI (<40) : Estimated body mass index is 37.93 kg/m² as calculated from the following:    Height as of this encounter: 4' 11" (1.499 m). Weight as of this encounter: 85.2 kg (187 lb 12.8 oz). Hgb ( >11): Lab Results   Component Value Date    HGB 15.2 09/08/2023     HbA1c (<7.0) :   Lab Results   Component Value Date    HGBA1C 5.5 09/08/2023     GFR (>60) (Less then 45 = Nephrology consult):  Estimated Creatinine Clearance: 73.9 mL/min (by C-G formula based on SCr of 0.81 mg/dL).       Active Decompensated Chronic Conditions which would delay surgery  No acutely decompensated medical issues such as recent CVA, MI, new onset arrhythmia, severe aortic stenosis, CHF, uncontrolled COPD       Exercise Capacity: (if less the 4 mets consider functional assessment via cardiac stress testing or consultation)    · Able to walk 2 blocks without symptoms?: Yes  · Able to walk 1 flights without symptoms?: Yes    Assessment of intra and post operative respiratory, hemodynamic and thrombotic risks     Prior Anesthesia Reactions: No     Personal history of venous thromboembolic disease? No    History of steroid use > 5 mg for >2 weeks within last year? No    Cardiac Risk Estimation: per the Revised Cardiac Risk Index (Circ. 100:1043, 1999),     The patient's risk factors for cardiac complications include :  none    Qiana Alexander has a in hospital cardiac risk of RCI RISK CLASS I (0 risk factors, risk of major cardiac compl. appr. 0.5%) based on RCRI calculator          Pre-Op Data Reviewed:       Laboratory Results: I have personally reviewed the pertinent laboratory results/reports     EKG:I have personally reviewed pertinent reports. . I personally reviewed and interpreted available tracings in the electronic medical record    Normal sinus rhythm  Low voltage QRS  Borderline ECG  No previous ECGs available  Confirmed by Liza Aparicio ([id number]) on 9/11/2023 12:02:38 PM    OLD RECORDS: reviewed old records in the chart review section if EHR on day of visit.     Previous cardiopulmonary studies within the past year:  · Echocardiogram: no  · Cardiac Catheterization: no  · Stress Test: no      Time of visit including pre-visit chart review, visit and post-visit coordination of plan and care , review of pre-surgical lab work, preparation and time spent documenting note in electronic medical record, time spent face-to-face in physical examination answering patient questions by care team 55 minutes             [address] [facility]

## 2023-09-07 NOTE — PATIENT INSTRUCTIONS
Contact surgical nurse  navigator with any questions regarding preoperative plan or schedule.   Stop all over the counter supplements, herbal, naturopathic  medications for 1 week prior to surgery UNLESS prescribed by your surgeon  Hold NSAIDS (i.e. advil, alleve, motrin, ibuprofen, celebrex) minimum 7 days prior to surgery  Hold Asprin minimum 7 days prior to surgery  Recommend using Tylenol ( acetaminophen ) 500mg every eight hours during the first week post discharge in conjunction with any additional pain medicine prescribed by your surgeon  Use bowel medicines prescribed by your surgeon ( colace) daily post op during the first 1-2 weeks to avoid post operative constipation issues  Call [phone] with any post discharge concerns or medical issues  The morning of surgery take only the following medication with small sip of water: none

## 2023-09-08 ENCOUNTER — OFFICE VISIT (OUTPATIENT)
Dept: LAB | Age: 63
End: 2023-09-08
Payer: COMMERCIAL

## 2023-09-08 ENCOUNTER — LAB REQUISITION (OUTPATIENT)
Dept: LAB | Facility: HOSPITAL | Age: 63
End: 2023-09-08
Payer: COMMERCIAL

## 2023-09-08 ENCOUNTER — APPOINTMENT (OUTPATIENT)
Dept: LAB | Facility: AMBULARY SURGERY CENTER | Age: 63
End: 2023-09-08
Payer: COMMERCIAL

## 2023-09-08 DIAGNOSIS — Z01.812 ENCOUNTER FOR PREPROCEDURAL LABORATORY EXAMINATION: ICD-10-CM

## 2023-09-08 DIAGNOSIS — Z01.812 PRE-OPERATIVE LABORATORY EXAMINATION: ICD-10-CM

## 2023-09-08 DIAGNOSIS — Z01.810 PRE-OPERATIVE CARDIOVASCULAR EXAMINATION: ICD-10-CM

## 2023-09-08 LAB
ABO GROUP BLD: NORMAL
ALBUMIN SERPL BCP-MCNC: 3.9 G/DL (ref 3.5–5)
ALP SERPL-CCNC: 54 U/L (ref 34–104)
ALT SERPL W P-5'-P-CCNC: 17 U/L (ref 7–52)
ANION GAP SERPL CALCULATED.3IONS-SCNC: 6 MMOL/L
APTT PPP: 28 SECONDS (ref 23–37)
AST SERPL W P-5'-P-CCNC: 15 U/L (ref 13–39)
BASOPHILS # BLD AUTO: 0.02 THOUSANDS/ÂΜL (ref 0–0.1)
BASOPHILS NFR BLD AUTO: 1 % (ref 0–1)
BILIRUB SERPL-MCNC: 0.56 MG/DL (ref 0.2–1)
BLD GP AB SCN SERPL QL: NEGATIVE
BUN SERPL-MCNC: 15 MG/DL (ref 5–25)
CALCIUM SERPL-MCNC: 9.2 MG/DL (ref 8.4–10.2)
CHLORIDE SERPL-SCNC: 106 MMOL/L (ref 96–108)
CO2 SERPL-SCNC: 30 MMOL/L (ref 21–32)
CREAT SERPL-MCNC: 0.81 MG/DL (ref 0.6–1.3)
EOSINOPHIL # BLD AUTO: 0.11 THOUSAND/ÂΜL (ref 0–0.61)
EOSINOPHIL NFR BLD AUTO: 3 % (ref 0–6)
ERYTHROCYTE [DISTWIDTH] IN BLOOD BY AUTOMATED COUNT: 12.3 % (ref 11.6–15.1)
EST. AVERAGE GLUCOSE BLD GHB EST-MCNC: 111 MG/DL
GFR SERPL CREATININE-BSD FRML MDRD: 79 ML/MIN/1.73SQ M
GLUCOSE P FAST SERPL-MCNC: 94 MG/DL (ref 65–99)
HBA1C MFR BLD: 5.5 %
HCT VFR BLD AUTO: 45.8 % (ref 34.8–46.1)
HGB BLD-MCNC: 15.2 G/DL (ref 11.5–15.4)
IMM GRANULOCYTES # BLD AUTO: 0.01 THOUSAND/UL (ref 0–0.2)
IMM GRANULOCYTES NFR BLD AUTO: 0 % (ref 0–2)
INR PPP: 1.03 (ref 0.84–1.19)
LYMPHOCYTES # BLD AUTO: 1.01 THOUSANDS/ÂΜL (ref 0.6–4.47)
LYMPHOCYTES NFR BLD AUTO: 31 % (ref 14–44)
MCH RBC QN AUTO: 30 PG (ref 26.8–34.3)
MCHC RBC AUTO-ENTMCNC: 33.2 G/DL (ref 31.4–37.4)
MCV RBC AUTO: 91 FL (ref 82–98)
MONOCYTES # BLD AUTO: 0.32 THOUSAND/ÂΜL (ref 0.17–1.22)
MONOCYTES NFR BLD AUTO: 10 % (ref 4–12)
NEUTROPHILS # BLD AUTO: 1.78 THOUSANDS/ÂΜL (ref 1.85–7.62)
NEUTS SEG NFR BLD AUTO: 55 % (ref 43–75)
NRBC BLD AUTO-RTO: 0 /100 WBCS
PLATELET # BLD AUTO: 152 THOUSANDS/UL (ref 149–390)
PMV BLD AUTO: 10.3 FL (ref 8.9–12.7)
POTASSIUM SERPL-SCNC: 4.4 MMOL/L (ref 3.5–5.3)
PROT SERPL-MCNC: 6.8 G/DL (ref 6.4–8.4)
PROTHROMBIN TIME: 13.7 SECONDS (ref 11.6–14.5)
RBC # BLD AUTO: 5.06 MILLION/UL (ref 3.81–5.12)
RH BLD: POSITIVE
SODIUM SERPL-SCNC: 142 MMOL/L (ref 135–147)
SPECIMEN EXPIRATION DATE: NORMAL
WBC # BLD AUTO: 3.25 THOUSAND/UL (ref 4.31–10.16)

## 2023-09-08 PROCEDURE — 86900 BLOOD TYPING SEROLOGIC ABO: CPT | Performed by: ORTHOPAEDIC SURGERY

## 2023-09-08 PROCEDURE — 93005 ELECTROCARDIOGRAM TRACING: CPT

## 2023-09-08 PROCEDURE — 86901 BLOOD TYPING SEROLOGIC RH(D): CPT | Performed by: ORTHOPAEDIC SURGERY

## 2023-09-08 PROCEDURE — 83036 HEMOGLOBIN GLYCOSYLATED A1C: CPT

## 2023-09-08 PROCEDURE — 85730 THROMBOPLASTIN TIME PARTIAL: CPT

## 2023-09-08 PROCEDURE — 86850 RBC ANTIBODY SCREEN: CPT | Performed by: ORTHOPAEDIC SURGERY

## 2023-09-08 PROCEDURE — 85610 PROTHROMBIN TIME: CPT

## 2023-09-08 PROCEDURE — 85025 COMPLETE CBC W/AUTO DIFF WBC: CPT

## 2023-09-08 PROCEDURE — 36415 COLL VENOUS BLD VENIPUNCTURE: CPT

## 2023-09-08 PROCEDURE — 80053 COMPREHEN METABOLIC PANEL: CPT

## 2023-09-11 ENCOUNTER — ANESTHESIA EVENT (OUTPATIENT)
Dept: PERIOP | Facility: HOSPITAL | Age: 63
DRG: 470 | End: 2023-09-11
Payer: COMMERCIAL

## 2023-09-11 ENCOUNTER — TELEPHONE (OUTPATIENT)
Dept: OBGYN CLINIC | Facility: HOSPITAL | Age: 63
End: 2023-09-11

## 2023-09-11 DIAGNOSIS — M17.11 PRIMARY OSTEOARTHRITIS OF RIGHT KNEE: Primary | ICD-10-CM

## 2023-09-11 LAB
ATRIAL RATE: 78 BPM
P AXIS: 67 DEGREES
PR INTERVAL: 174 MS
QRS AXIS: 56 DEGREES
QRSD INTERVAL: 92 MS
QT INTERVAL: 378 MS
QTC INTERVAL: 430 MS
T WAVE AXIS: 47 DEGREES
VENTRICULAR RATE: 78 BPM

## 2023-09-11 PROCEDURE — 93010 ELECTROCARDIOGRAM REPORT: CPT | Performed by: INTERNAL MEDICINE

## 2023-09-11 NOTE — TELEPHONE ENCOUNTER
Preoperative Elective Admission Assessment    Who does pt live with: spouse  What kind of home: multi-level  How do they enter the home: garage  How many levels in home: 2   # of steps to enter home: 1  # of steps to second floor: 13  Are there handrails: Yes  Are there landings: No  Sleeping arrangement: second floor  Where is Bathroom: 1/2 bath on entry level  Where is the tub or shower: second floor, step in baht tub w/o grab bar, has a shower chair   Dogs or ther pets: 1 dog     First Floor Setup:   Is there a bathroom: Yes  Where would pt sleep: plans to stay on second floor     DME: RW ordered by NN on 9/11  We discussed clearing pathways in the home and making sure there is accessibly to use the walker, for example, removing throw rugs. Patient's Current Level of Function: Ambulates: Independently and ADLs: Independent    Post-op Caregiver: spouse  Caregiver Name and phone number for Inpatient discharge needs: Trey Flood  Currently receive any HHC/aides/community supports: No     Post-op Transport: spouse  To/from hospital: spouse  To/from PT 2-3x/week: spouse  Uses community transport now: No     Outpatient Physical Therapy Site:  Site: [facility]  pre and post-op appts scheduled? Yes     Medication Management: self and out of bottle  Preferred Pharmacy for Post-op Medications[facility]  Blood Management Vitamin Regimen: Pt confirms taking as prescribed, Pt c/o stomach upset from iron, NN advised to take every other day  Post-op anticoagulant: prescribed preoperatively - patient has at home ready for after surgery use only     DC Plan: Pt plans to be discharged home      Barriers to DC identified preoperatively: none identified    BMI: 37.57    Patient Education:  Pt educated on post-op pain, early mobilization (POD0), LOS goals, OP PT goals, and preoperative bathing.  Patient educated that our goal is to appropriately discharge patient based off their post-op function while striving to maintain maximal independence. The goal is to discharge patient to home and for them to attend outpatient physical therapy.     Assigned to care team? Yes

## 2023-09-12 ENCOUNTER — OFFICE VISIT (OUTPATIENT)
Age: 63
End: 2023-09-12
Payer: COMMERCIAL

## 2023-09-12 VITALS
SYSTOLIC BLOOD PRESSURE: 112 MMHG | DIASTOLIC BLOOD PRESSURE: 66 MMHG | WEIGHT: 187.8 LBS | BODY MASS INDEX: 37.86 KG/M2 | HEIGHT: 59 IN

## 2023-09-12 DIAGNOSIS — M17.11 PRIMARY OSTEOARTHRITIS OF RIGHT KNEE: ICD-10-CM

## 2023-09-12 DIAGNOSIS — M25.50 POLYARTHRALGIA: ICD-10-CM

## 2023-09-12 DIAGNOSIS — R73.01 IMPAIRED FASTING GLUCOSE: ICD-10-CM

## 2023-09-12 DIAGNOSIS — E78.2 MIXED HYPERLIPIDEMIA: Primary | ICD-10-CM

## 2023-09-12 DIAGNOSIS — Z01.818 PREOP EXAM FOR INTERNAL MEDICINE: ICD-10-CM

## 2023-09-12 DIAGNOSIS — E66.01 MORBID OBESITY (HCC): ICD-10-CM

## 2023-09-12 LAB
DME PARACHUTE DELIVERY DATE ACTUAL: NORMAL
DME PARACHUTE DELIVERY DATE REQUESTED: NORMAL
DME PARACHUTE ITEM DESCRIPTION: NORMAL
DME PARACHUTE ORDER STATUS: NORMAL
DME PARACHUTE SUPPLIER NAME: NORMAL
DME PARACHUTE SUPPLIER PHONE: NORMAL

## 2023-09-12 PROCEDURE — 99214 OFFICE O/P EST MOD 30 MIN: CPT | Performed by: INTERNAL MEDICINE

## 2023-09-15 NOTE — PRE-PROCEDURE INSTRUCTIONS
Pre-Surgery Instructions:   Medication Instructions   • ascorbic acid (VITAMIN C) 500 mg tablet Hold day of surgery. • Cholecalciferol 125 MCG (5000 UT) capsule Stop taking 7 days prior to surgery. • ferrous sulfate 324 (65 Fe) mg Hold day of surgery. • folic acid (FOLVITE) 1 mg tablet Hold day of surgery. • hydroxychloroquine (PLAQUENIL) 200 mg tablet Take day of surgery. • Multiple Vitamins-Minerals (MULTIVITAL-M) TABS Stop taking 7 days prior to surgery. • OMEGA-3 FATTY ACIDS PO Stop taking 7 days prior to surgery. • Probiotic Product (PROBIOTIC-10) CAPS Stop taking 7 days prior to surgery. Ortho class complete. Medication instructions for day surgery reviewed. Please use only a sip of water to take your instructed medications. Avoid all over the counter vitamins, supplements and NSAIDS for one week prior to surgery per anesthesia guidelines. Tylenol is ok to take as needed. You will receive a call one business day prior to surgery with an arrival time and hospital directions. If your surgery is scheduled on a Monday, the hospital will be calling you on the Friday prior to your surgery. If you have not heard from anyone by 8pm, please call the hospital supervisor through the hospital  at [phone]. Maksim Pennington [phone]). Do not eat or drink anything after midnight the night before your surgery, including candy, mints, lifesavers, or chewing gum. Do not drink alcohol 24hrs before your surgery. Try not to smoke at least 24hrs before your surgery. Follow the pre surgery showering instructions as listed in the [facility] Surgical Experience Booklet” or otherwise provided by your surgeon's office. Do not shave the surgical area 24 hours before surgery. Do not apply any lotions, creams, including makeup, cologne, deodorant, or perfumes after showering on the day of your surgery. No contact lenses, eye make-up, or artificial eyelashes.  Remove nail polish, including gel polish, and any artificial, gel, or acrylic nails if possible. Remove all jewelry including rings and body piercing jewelry. Wear causal clothing that is easy to take on and off. Consider your type of surgery. Keep any valuables, jewelry, piercings at home. Please bring any specially ordered equipment (sling, braces) if indicated. Arrange for a responsible person to drive you to and from the hospital on the day of your surgery. Visitor Guidelines discussed. Call the surgeon's office with any new illnesses, exposures, or additional questions prior to surgery. Please reference your [facility] Surgical Experience Booklet” for additional information to prepare for your upcoming surgery.

## 2023-10-02 ENCOUNTER — EVALUATION (OUTPATIENT)
Dept: PHYSICAL THERAPY | Age: 63
End: 2023-10-02
Payer: COMMERCIAL

## 2023-10-02 DIAGNOSIS — G89.29 CHRONIC PAIN OF RIGHT KNEE: Primary | ICD-10-CM

## 2023-10-02 DIAGNOSIS — M25.561 CHRONIC PAIN OF RIGHT KNEE: Primary | ICD-10-CM

## 2023-10-02 DIAGNOSIS — Z00.00 HEALTHCARE MAINTENANCE: ICD-10-CM

## 2023-10-02 PROCEDURE — 97161 PT EVAL LOW COMPLEX 20 MIN: CPT

## 2023-10-02 PROCEDURE — 97110 THERAPEUTIC EXERCISES: CPT

## 2023-10-02 NOTE — PROGRESS NOTES
PT Evaluation     Today's date: 10/2/2023  Patient name: Sekou Bonds  : 1960  MRN: [id number]  Referring provider: Rocky Atwood  Dx:   Encounter Diagnosis     ICD-10-CM    1. Chronic pain of right knee  M25.561     G89.29       2. Healthcare maintenance  Z00.00 Ambulatory referral to Physical Therapy                     Assessment  Assessment details: Problem List:  1) hypomobility of b/l knees - addressing with PROM and AAROM of knee  2) decreased neuromuscular control LE - addressing with functional strengthening, quad sets and SLR    Sekou Bonds is a pleasant 63 y.o. female who presents for R TKA pre op appt scheduled with Juanjo for 10/9/2023. She has decreased strength and ROM of b/l knees resulting in difficulty performing daily activities due to pain and some apprehension over what to expect from surgical rehab. No further referral appears necessary at this time based upon examination results. I expect she will benefit from physical therapy following surgery to regain function and return to PLOF. Positive prognostic indicators include positive attitude toward recovery and good understanding of diagnosis and treatment plan options. Negative prognostic indicators include chronicity of symptoms, anxiety and obesity. Comparable signs:  1) knee ROM  2) gait     Impairments: abnormal or restricted ROM, impaired physical strength, lacks appropriate home exercise program and pain with function    Symptom irritability: moderateUnderstanding of Dx/Px/POC: good   Prognosis: good    Goals  Short Term Goals: to be achieved by 4 weeks  1) Patient to be independent with basic HEP. 2) Decrease pain to 4/10 at its worst.  3) Increase R knee flex ROM to 115 deg to increase function post-op. 4) Increase R knee ext ROM to -5 deg to improve gait post-op. 5) Increase LE strength by 1/2 MMT grade in all deficient planes post-op.     Long Term Goals: to be achieved by discharge  1) Patient to be independent with comprehensive HEP. 2) R knee ROM WNL all planes to improve a/iadls post-op. 3) Increase LE strength to 5/5 MMT grade in all planes to improve a/iadls post-op. 4) Patient to report no sleep interruption secondary to pain post-op. 5) Increase ambulatory tolerance to 60 min post-op without AD. Plan  Plan details: Address physical impairments found during IE via therapeutic exercises, neuromuscular re-education, and manual therapy to improve functional tolerance. Develop HEP for self-management of symptoms. Patient would benefit from: skilled physical therapy  Referral necessary: No  Planned therapy interventions: home exercise program, gait training, flexibility, balance, joint mobilization, manual therapy, massage, neuromuscular re-education, patient education, strengthening, stretching, therapeutic activities, therapeutic exercise and therapeutic training  Frequency: 2x week  Duration in visits: 20  Duration in weeks: 10  Plan of Care beginning date: 10/9/2023  Plan of Care expiration date: 12/18/2023  Treatment plan discussed with: patient        Subjective Evaluation    History of Present Illness  Mechanism of injury: Patient presents for R TKA pre-op appt. Surgery scheduled with Juanjo for 10/9/2023. Patient reports ~10 years ago started noticing pain in knees, was working part-time at [employer] and started with pain from walking on concrete. Over the years has gotten worse. Has had therapy and CSI in the past with minimal relief. Notes that she needs to get both knees done but starting with right, wanted to wait until she was 62 to get them done. Says she's able to function but isn't able to walk long distances, stairs are also difficult. Reports getting exhausted from things as simple as walking around the store while grocery shopping. Wants to be able to return to work part-time.            Recurrent probem    Quality of life: good    Patient Goals  Patient goals for therapy: return to work, increased strength and decreased pain    Pain  Current pain ratin  At best pain ratin  At worst pain ratin  Location: medial knee  Quality: dull ache and sharp  Relieving factors: rest and change in position  Aggravating factors: walking and stair climbing  Progression: worsening    Social Support  Steps to enter house: yes  1  Stairs in house: yes   13  Lives in: multiple-level home  Lives with: significant other    Employment status: not working    Diagnostic Tests  X-ray: normal (degenerative changes)  Treatments  Previous treatment: injection treatment and physical therapy        Objective     General Comments:      Knee Comments  Posture: rounded shoulders  Palpation: TTP medial joint line b/l knees  Myotomes (L/R): intact b/l             GAIT: compensated Trendelenburg b/l  Squat assess: performs with pain  Steps: educated step to patterns, "up with the good, down with the bad"            MMT        AROM          PROM   Hip       L     R         L       R          L         R  Flex. 4- 4-       Extn. 4 4       Abd. 3+ 3+       Add. 4 4            .         Knee        Exten 4 4 0 0 NT NT      Flex 4 4 123 125 130 130                                  Precautions: NA      Manuals 10/2            PROM R knee             Patellar mobs                                       Neuro Re-Ed             Bridges HEP            Clamshell HEP            SLR HEP            Quad set HEP                                                   Ther Ex             Heel slide HEP            Ankle pumps HEP            Gastroc stretch w strap HEP            RB/NS - knee ROM             Weight shift             Pt education CS                                      Ther Activity             FSU             LSU             Gait Training                                       Modalities

## 2023-10-04 DIAGNOSIS — Z96.651 AFTERCARE FOLLOWING RIGHT KNEE JOINT REPLACEMENT SURGERY: Primary | ICD-10-CM

## 2023-10-04 DIAGNOSIS — Z47.1 AFTERCARE FOLLOWING RIGHT KNEE JOINT REPLACEMENT SURGERY: Primary | ICD-10-CM

## 2023-10-09 ENCOUNTER — HOSPITAL ENCOUNTER (INPATIENT)
Facility: HOSPITAL | Age: 63
LOS: 1 days | Discharge: HOME | DRG: 470 | End: 2023-10-11
Attending: ORTHOPAEDIC SURGERY | Admitting: ORTHOPAEDIC SURGERY
Payer: COMMERCIAL

## 2023-10-09 ENCOUNTER — ANESTHESIA (OUTPATIENT)
Dept: PERIOP | Facility: HOSPITAL | Age: 63
DRG: 470 | End: 2023-10-09
Payer: COMMERCIAL

## 2023-10-09 DIAGNOSIS — M25.561 CHRONIC PAIN OF RIGHT KNEE: ICD-10-CM

## 2023-10-09 DIAGNOSIS — G89.29 CHRONIC PAIN OF RIGHT KNEE: ICD-10-CM

## 2023-10-09 DIAGNOSIS — E78.2 MIXED HYPERLIPIDEMIA: ICD-10-CM

## 2023-10-09 DIAGNOSIS — M17.11 PRIMARY OSTEOARTHRITIS OF RIGHT KNEE: Primary | ICD-10-CM

## 2023-10-09 DIAGNOSIS — E04.2 NONTOXIC MULTINODULAR GOITER: ICD-10-CM

## 2023-10-09 LAB
ABO GROUP BLD: NORMAL
BLD GP AB SCN SERPL QL: NEGATIVE
GLUCOSE SERPL-MCNC: 82 MG/DL (ref 65–140)
RH BLD: POSITIVE
SPECIMEN EXPIRATION DATE: NORMAL

## 2023-10-09 PROCEDURE — 99254 IP/OBS CNSLTJ NEW/EST MOD 60: CPT | Performed by: NURSE PRACTITIONER

## 2023-10-09 PROCEDURE — C1776 JOINT DEVICE (IMPLANTABLE): HCPCS | Performed by: ORTHOPAEDIC SURGERY

## 2023-10-09 PROCEDURE — 27447 TOTAL KNEE ARTHROPLASTY: CPT | Performed by: ORTHOPAEDIC SURGERY

## 2023-10-09 PROCEDURE — C9290 INJ, BUPIVACAINE LIPOSOME: HCPCS | Performed by: ANESTHESIOLOGY

## 2023-10-09 PROCEDURE — 86901 BLOOD TYPING SEROLOGIC RH(D): CPT | Performed by: ORTHOPAEDIC SURGERY

## 2023-10-09 PROCEDURE — 86850 RBC ANTIBODY SCREEN: CPT | Performed by: ORTHOPAEDIC SURGERY

## 2023-10-09 PROCEDURE — 99024 POSTOP FOLLOW-UP VISIT: CPT | Performed by: ORTHOPAEDIC SURGERY

## 2023-10-09 PROCEDURE — 86900 BLOOD TYPING SEROLOGIC ABO: CPT | Performed by: ORTHOPAEDIC SURGERY

## 2023-10-09 PROCEDURE — 97163 PT EVAL HIGH COMPLEX 45 MIN: CPT

## 2023-10-09 PROCEDURE — 8E0Y0CZ ROBOTIC ASSISTED PROCEDURE OF LOWER EXTREMITY, OPEN APPROACH: ICD-10-PCS | Performed by: ORTHOPAEDIC SURGERY

## 2023-10-09 PROCEDURE — S2900 ROBOTIC SURGICAL SYSTEM: HCPCS | Performed by: ORTHOPAEDIC SURGERY

## 2023-10-09 PROCEDURE — 82948 REAGENT STRIP/BLOOD GLUCOSE: CPT

## 2023-10-09 PROCEDURE — C1713 ANCHOR/SCREW BN/BN,TIS/BN: HCPCS | Performed by: ORTHOPAEDIC SURGERY

## 2023-10-09 PROCEDURE — 0SRC0J9 REPLACEMENT OF RIGHT KNEE JOINT WITH SYNTHETIC SUBSTITUTE, CEMENTED, OPEN APPROACH: ICD-10-PCS | Performed by: ORTHOPAEDIC SURGERY

## 2023-10-09 PROCEDURE — NC001 PR NO CHARGE: Performed by: ORTHOPAEDIC SURGERY

## 2023-10-09 DEVICE — ATTUNE KNEE SYSTEM TIBIAL BASE FIXED BEARING SIZE 2 CEMENTED
Type: IMPLANTABLE DEVICE | Site: KNEE | Status: FUNCTIONAL
Brand: ATTUNE

## 2023-10-09 DEVICE — SMARTSET HV HIGH VISCOSITY BONE CEMENT 40G
Type: IMPLANTABLE DEVICE | Site: KNEE | Status: FUNCTIONAL
Brand: SMARTSET

## 2023-10-09 DEVICE — ATTUNE KNEE SYSTEM FEMORAL POSTERIOR STABILIZED NARROW SIZE 3N RIGHT CEMENTED
Type: IMPLANTABLE DEVICE | Site: KNEE | Status: FUNCTIONAL
Brand: ATTUNE

## 2023-10-09 DEVICE — ATTUNE KNEE SYSTEM TIBIAL INSERT FIXED BEARING POSTERIOR STABILIZED 3 7MM AOX
Type: IMPLANTABLE DEVICE | Site: KNEE | Status: FUNCTIONAL
Brand: ATTUNE

## 2023-10-09 DEVICE — ATTUNE PATELLA MEDIALIZED DOME 32MM CEMENTED AOX
Type: IMPLANTABLE DEVICE | Site: KNEE | Status: FUNCTIONAL
Brand: ATTUNE

## 2023-10-09 RX ORDER — ACETAMINOPHEN 325 MG/1
975 TABLET ORAL EVERY 8 HOURS
Status: DISCONTINUED | OUTPATIENT
Start: 2023-10-09 | End: 2023-10-11 | Stop reason: HOSPADM

## 2023-10-09 RX ORDER — TRANEXAMIC ACID 10 MG/ML
1000 INJECTION, SOLUTION INTRAVENOUS ONCE
Status: COMPLETED | OUTPATIENT
Start: 2023-10-09 | End: 2023-10-09

## 2023-10-09 RX ORDER — HYDROMORPHONE HCL/PF 1 MG/ML
0.5 SYRINGE (ML) INJECTION EVERY 2 HOUR PRN
Status: DISCONTINUED | OUTPATIENT
Start: 2023-10-09 | End: 2023-10-10

## 2023-10-09 RX ORDER — ONDANSETRON 2 MG/ML
INJECTION INTRAMUSCULAR; INTRAVENOUS AS NEEDED
Status: DISCONTINUED | OUTPATIENT
Start: 2023-10-09 | End: 2023-10-09

## 2023-10-09 RX ORDER — OXYCODONE HYDROCHLORIDE 5 MG/1
5 TABLET ORAL EVERY 4 HOURS PRN
Status: DISCONTINUED | OUTPATIENT
Start: 2023-10-09 | End: 2023-10-11

## 2023-10-09 RX ORDER — KETAMINE HCL IN NACL, ISO-OSM 100MG/10ML
SYRINGE (ML) INJECTION AS NEEDED
Status: DISCONTINUED | OUTPATIENT
Start: 2023-10-09 | End: 2023-10-09

## 2023-10-09 RX ORDER — HYDROXYCHLOROQUINE SULFATE 200 MG/1
400 TABLET, FILM COATED ORAL
Status: DISCONTINUED | OUTPATIENT
Start: 2023-10-10 | End: 2023-10-11 | Stop reason: HOSPADM

## 2023-10-09 RX ORDER — CHLORHEXIDINE GLUCONATE ORAL RINSE 1.2 MG/ML
15 SOLUTION DENTAL ONCE
Status: COMPLETED | OUTPATIENT
Start: 2023-10-09 | End: 2023-10-09

## 2023-10-09 RX ORDER — LABETALOL HYDROCHLORIDE 5 MG/ML
5 INJECTION, SOLUTION INTRAVENOUS
Status: DISCONTINUED | OUTPATIENT
Start: 2023-10-09 | End: 2023-10-09 | Stop reason: HOSPADM

## 2023-10-09 RX ORDER — LIDOCAINE HYDROCHLORIDE 10 MG/ML
0.5 INJECTION, SOLUTION EPIDURAL; INFILTRATION; INTRACAUDAL; PERINEURAL ONCE AS NEEDED
Status: DISCONTINUED | OUTPATIENT
Start: 2023-10-09 | End: 2023-10-09 | Stop reason: HOSPADM

## 2023-10-09 RX ORDER — SODIUM CHLORIDE, SODIUM LACTATE, POTASSIUM CHLORIDE, CALCIUM CHLORIDE 600; 310; 30; 20 MG/100ML; MG/100ML; MG/100ML; MG/100ML
125 INJECTION, SOLUTION INTRAVENOUS CONTINUOUS
Status: DISCONTINUED | OUTPATIENT
Start: 2023-10-09 | End: 2023-10-11 | Stop reason: HOSPADM

## 2023-10-09 RX ORDER — MAGNESIUM HYDROXIDE 1200 MG/15ML
LIQUID ORAL AS NEEDED
Status: DISCONTINUED | OUTPATIENT
Start: 2023-10-09 | End: 2023-10-09 | Stop reason: HOSPADM

## 2023-10-09 RX ORDER — ENOXAPARIN SODIUM 100 MG/ML
40 INJECTION SUBCUTANEOUS DAILY
Status: DISCONTINUED | OUTPATIENT
Start: 2023-10-10 | End: 2023-10-11 | Stop reason: HOSPADM

## 2023-10-09 RX ORDER — DOCUSATE SODIUM 100 MG/1
100 CAPSULE, LIQUID FILLED ORAL 2 TIMES DAILY
Status: DISCONTINUED | OUTPATIENT
Start: 2023-10-09 | End: 2023-10-11 | Stop reason: HOSPADM

## 2023-10-09 RX ORDER — ONDANSETRON 2 MG/ML
4 INJECTION INTRAMUSCULAR; INTRAVENOUS ONCE AS NEEDED
Status: DISCONTINUED | OUTPATIENT
Start: 2023-10-09 | End: 2023-10-09 | Stop reason: HOSPADM

## 2023-10-09 RX ORDER — PROPOFOL 10 MG/ML
INJECTION, EMULSION INTRAVENOUS AS NEEDED
Status: DISCONTINUED | OUTPATIENT
Start: 2023-10-09 | End: 2023-10-09

## 2023-10-09 RX ORDER — BUPIVACAINE HYDROCHLORIDE 7.5 MG/ML
INJECTION, SOLUTION INTRASPINAL AS NEEDED
Status: DISCONTINUED | OUTPATIENT
Start: 2023-10-09 | End: 2023-10-09

## 2023-10-09 RX ORDER — CALCIUM CARBONATE 500 MG/1
1000 TABLET, CHEWABLE ORAL DAILY PRN
Status: DISCONTINUED | OUTPATIENT
Start: 2023-10-09 | End: 2023-10-11 | Stop reason: HOSPADM

## 2023-10-09 RX ORDER — PROPOFOL 10 MG/ML
INJECTION, EMULSION INTRAVENOUS CONTINUOUS PRN
Status: DISCONTINUED | OUTPATIENT
Start: 2023-10-09 | End: 2023-10-09

## 2023-10-09 RX ORDER — FENTANYL CITRATE/PF 50 MCG/ML
25 SYRINGE (ML) INJECTION
Status: COMPLETED | OUTPATIENT
Start: 2023-10-09 | End: 2023-10-09

## 2023-10-09 RX ORDER — CEFAZOLIN SODIUM 2 G/50ML
2000 SOLUTION INTRAVENOUS ONCE
Status: DISCONTINUED | OUTPATIENT
Start: 2023-10-09 | End: 2023-10-09 | Stop reason: HOSPADM

## 2023-10-09 RX ORDER — MEPERIDINE HYDROCHLORIDE 25 MG/ML
12.5 INJECTION INTRAMUSCULAR; INTRAVENOUS; SUBCUTANEOUS ONCE
Status: DISCONTINUED | OUTPATIENT
Start: 2023-10-09 | End: 2023-10-09 | Stop reason: HOSPADM

## 2023-10-09 RX ORDER — SODIUM CHLORIDE, SODIUM LACTATE, POTASSIUM CHLORIDE, CALCIUM CHLORIDE 600; 310; 30; 20 MG/100ML; MG/100ML; MG/100ML; MG/100ML
INJECTION, SOLUTION INTRAVENOUS CONTINUOUS PRN
Status: DISCONTINUED | OUTPATIENT
Start: 2023-10-09 | End: 2023-10-09

## 2023-10-09 RX ORDER — ONDANSETRON 2 MG/ML
4 INJECTION INTRAMUSCULAR; INTRAVENOUS EVERY 6 HOURS PRN
Status: DISCONTINUED | OUTPATIENT
Start: 2023-10-09 | End: 2023-10-11 | Stop reason: HOSPADM

## 2023-10-09 RX ORDER — HYDROMORPHONE HCL/PF 1 MG/ML
0.5 SYRINGE (ML) INJECTION
Status: DISCONTINUED | OUTPATIENT
Start: 2023-10-09 | End: 2023-10-09 | Stop reason: HOSPADM

## 2023-10-09 RX ORDER — SENNOSIDES 8.6 MG
1 TABLET ORAL DAILY
Status: DISCONTINUED | OUTPATIENT
Start: 2023-10-10 | End: 2023-10-11 | Stop reason: HOSPADM

## 2023-10-09 RX ORDER — DEXAMETHASONE SODIUM PHOSPHATE 10 MG/ML
INJECTION, SOLUTION INTRAMUSCULAR; INTRAVENOUS AS NEEDED
Status: DISCONTINUED | OUTPATIENT
Start: 2023-10-09 | End: 2023-10-09

## 2023-10-09 RX ORDER — FENTANYL CITRATE 50 UG/ML
INJECTION, SOLUTION INTRAMUSCULAR; INTRAVENOUS AS NEEDED
Status: DISCONTINUED | OUTPATIENT
Start: 2023-10-09 | End: 2023-10-09

## 2023-10-09 RX ORDER — BUPIVACAINE HYDROCHLORIDE 5 MG/ML
INJECTION, SOLUTION EPIDURAL; INTRACAUDAL
Status: COMPLETED | OUTPATIENT
Start: 2023-10-09 | End: 2023-10-09

## 2023-10-09 RX ORDER — OXYCODONE HYDROCHLORIDE 5 MG/1
TABLET ORAL
Qty: 30 TABLET | Refills: 0 | Status: SHIPPED | OUTPATIENT
Start: 2023-10-09

## 2023-10-09 RX ORDER — OXYCODONE HYDROCHLORIDE 10 MG/1
10 TABLET ORAL EVERY 4 HOURS PRN
Status: DISCONTINUED | OUTPATIENT
Start: 2023-10-09 | End: 2023-10-10

## 2023-10-09 RX ORDER — MIDAZOLAM HYDROCHLORIDE 2 MG/2ML
INJECTION, SOLUTION INTRAMUSCULAR; INTRAVENOUS AS NEEDED
Status: DISCONTINUED | OUTPATIENT
Start: 2023-10-09 | End: 2023-10-09

## 2023-10-09 RX ORDER — CEFAZOLIN SODIUM 1 G/3ML
INJECTION, POWDER, FOR SOLUTION INTRAMUSCULAR; INTRAVENOUS AS NEEDED
Status: DISCONTINUED | OUTPATIENT
Start: 2023-10-09 | End: 2023-10-09

## 2023-10-09 RX ORDER — CEFAZOLIN SODIUM 1 G/50ML
1000 SOLUTION INTRAVENOUS EVERY 8 HOURS
Status: COMPLETED | OUTPATIENT
Start: 2023-10-09 | End: 2023-10-10

## 2023-10-09 RX ORDER — PROMETHAZINE HYDROCHLORIDE 25 MG/ML
12.5 INJECTION, SOLUTION INTRAMUSCULAR; INTRAVENOUS ONCE AS NEEDED
Status: DISCONTINUED | OUTPATIENT
Start: 2023-10-09 | End: 2023-10-09 | Stop reason: HOSPADM

## 2023-10-09 RX ORDER — DOCUSATE SODIUM 100 MG/1
100 CAPSULE, LIQUID FILLED ORAL 2 TIMES DAILY
COMMUNITY

## 2023-10-09 RX ORDER — ALBUTEROL SULFATE 2.5 MG/3ML
2.5 SOLUTION RESPIRATORY (INHALATION) ONCE AS NEEDED
Status: DISCONTINUED | OUTPATIENT
Start: 2023-10-09 | End: 2023-10-09 | Stop reason: HOSPADM

## 2023-10-09 RX ADMIN — OXYCODONE HYDROCHLORIDE 10 MG: 10 TABLET ORAL at 17:08

## 2023-10-09 RX ADMIN — TRANEXAMIC ACID 1000 MG: 10 INJECTION, SOLUTION INTRAVENOUS at 12:28

## 2023-10-09 RX ADMIN — DOCUSATE SODIUM 100 MG: 100 CAPSULE, LIQUID FILLED ORAL at 17:08

## 2023-10-09 RX ADMIN — OXYCODONE HYDROCHLORIDE 10 MG: 10 TABLET ORAL at 21:26

## 2023-10-09 RX ADMIN — BUPIVACAINE HYDROCHLORIDE 20 ML: 5 INJECTION, SOLUTION EPIDURAL; INTRACAUDAL; PERINEURAL at 11:34

## 2023-10-09 RX ADMIN — MIDAZOLAM 2 MG: 1 INJECTION INTRAMUSCULAR; INTRAVENOUS at 11:37

## 2023-10-09 RX ADMIN — SODIUM CHLORIDE, SODIUM LACTATE, POTASSIUM CHLORIDE, AND CALCIUM CHLORIDE 125 ML/HR: .6; .31; .03; .02 INJECTION, SOLUTION INTRAVENOUS at 10:35

## 2023-10-09 RX ADMIN — FENTANYL CITRATE 25 MCG: 50 INJECTION INTRAMUSCULAR; INTRAVENOUS at 15:01

## 2023-10-09 RX ADMIN — FENTANYL CITRATE 25 MCG: 50 INJECTION INTRAMUSCULAR; INTRAVENOUS at 15:16

## 2023-10-09 RX ADMIN — SODIUM CHLORIDE, SODIUM LACTATE, POTASSIUM CHLORIDE, AND CALCIUM CHLORIDE: .6; .31; .03; .02 INJECTION, SOLUTION INTRAVENOUS at 11:25

## 2023-10-09 RX ADMIN — CEFAZOLIN SODIUM 1000 MG: 1 SOLUTION INTRAVENOUS at 19:12

## 2023-10-09 RX ADMIN — FENTANYL CITRATE 25 MCG: 50 INJECTION INTRAMUSCULAR; INTRAVENOUS at 14:25

## 2023-10-09 RX ADMIN — BUPIVACAINE HYDROCHLORIDE 10 ML: 5 INJECTION, SOLUTION EPIDURAL; INTRACAUDAL at 11:40

## 2023-10-09 RX ADMIN — SODIUM CHLORIDE, SODIUM LACTATE, POTASSIUM CHLORIDE, AND CALCIUM CHLORIDE 125 ML/HR: .6; .31; .03; .02 INJECTION, SOLUTION INTRAVENOUS at 23:30

## 2023-10-09 RX ADMIN — ACETAMINOPHEN 975 MG: 325 TABLET, FILM COATED ORAL at 23:28

## 2023-10-09 RX ADMIN — BUPIVACAINE HYDROCHLORIDE IN DEXTROSE 1.3 ML: 7.5 INJECTION, SOLUTION SUBARACHNOID at 12:19

## 2023-10-09 RX ADMIN — Medication 25 MG: at 12:28

## 2023-10-09 RX ADMIN — Medication 25 MG: at 12:48

## 2023-10-09 RX ADMIN — ACETAMINOPHEN 975 MG: 325 TABLET, FILM COATED ORAL at 17:00

## 2023-10-09 RX ADMIN — FENTANYL CITRATE 25 MCG: 50 INJECTION INTRAMUSCULAR; INTRAVENOUS at 14:29

## 2023-10-09 RX ADMIN — BUPIVACAINE 10 ML: 13.3 INJECTION, SUSPENSION, LIPOSOMAL INFILTRATION at 11:34

## 2023-10-09 RX ADMIN — DEXAMETHASONE SODIUM PHOSPHATE 10 MG: 10 INJECTION, SOLUTION INTRAMUSCULAR; INTRAVENOUS at 12:35

## 2023-10-09 RX ADMIN — CHLORHEXIDINE GLUCONATE 15 ML: 1.2 SOLUTION ORAL at 10:21

## 2023-10-09 RX ADMIN — FENTANYL CITRATE 50 MCG: 50 INJECTION, SOLUTION INTRAMUSCULAR; INTRAVENOUS at 12:24

## 2023-10-09 RX ADMIN — PROPOFOL 70 MCG/KG/MIN: 10 INJECTION, EMULSION INTRAVENOUS at 12:22

## 2023-10-09 RX ADMIN — ONDANSETRON 4 MG: 2 INJECTION INTRAMUSCULAR; INTRAVENOUS at 18:40

## 2023-10-09 RX ADMIN — HYDROMORPHONE HYDROCHLORIDE 0.5 MG: 1 INJECTION, SOLUTION INTRAMUSCULAR; INTRAVENOUS; SUBCUTANEOUS at 15:18

## 2023-10-09 RX ADMIN — ONDANSETRON 4 MG: 2 INJECTION INTRAMUSCULAR; INTRAVENOUS at 13:54

## 2023-10-09 RX ADMIN — CEFAZOLIN 2000 MG: 1 INJECTION, POWDER, FOR SOLUTION INTRAMUSCULAR; INTRAVENOUS at 12:30

## 2023-10-09 RX ADMIN — PROPOFOL 50 MG: 10 INJECTION, EMULSION INTRAVENOUS at 12:22

## 2023-10-09 RX ADMIN — FENTANYL CITRATE 50 MCG: 50 INJECTION, SOLUTION INTRAMUSCULAR; INTRAVENOUS at 11:37

## 2023-10-09 NOTE — PLAN OF CARE
Problem: PHYSICAL THERAPY ADULT  Goal: Performs mobility at highest level of function for planned discharge setting. See evaluation for individualized goals. Description: Treatment/Interventions: ADL retraining, Functional transfer training, LE strengthening/ROM, Elevations, Endurance training, Therapeutic exercise, Patient/family training, Equipment eval/education, Bed mobility, Gait training, Spoke to nursing, Spoke to case management, OT  Equipment Recommended: Marielena Ponce       See flowsheet documentation for full assessment, interventions and recommendations. Note: Prognosis: Good  Problem List: Decreased strength, Decreased endurance, Decreased range of motion, Impaired balance, Decreased mobility, Pain, Orthopedic restrictions  Assessment: Pt is a 63 y.o. female seen for PT evaluation s/p admit to [facility] on 10/9/2023. Pt was admitted with a primary dx of: OA of R knee s/p elective R TKA. Pt WBAT to RLE. PT now consulted for assessment of mobility and d/c needs. Pt with activity beginning POD 0 orders. Pts current comorbidities effecting treatment include: polyarthralgia, obesity. Pt  has a past medical history of Arthritis (2013), Chest pain (07/01/2014), Disease of thyroid gland, Elevated blood sugar (07/01/2014), Heart murmur, Hypercholesteremia, Hyperlipidemia, Obesity (2010), Osteoarthritis, and Urinary tract infection. Pts current clinical presentation is Unstable/ Unpredictable (high complexity) due to Ongoing medical management for primary dx, Increased reliance on more restrictive AD compared to baseline, Decreased activity tolerance compared to baseline, Fall risk, Increased assistance needed from caregiver at current time, s/p post op day 0, Current WBS, Trending lab values, LAURO drain in place at current time, s/p surgical intervention. Prior to admission, pt was residing with spouse and daughter in [address] [place] [address] with 1 ELIZABETH and was independent without AD use.  Upon evaluation, pt currently is requiring min A for bed mobility; mod Ax2 for transfers; mod Ax2 for ambulation 2 ft w/ b/l HHA. Pt presents at PT eval functioning below baseline and currently w/ overall mobility deficits 2* to: BLE weakness, decreased ROM, impaired balance, decreased endurance, gait deviations, pain, decreased activity tolerance compared to baseline, decreased functional mobility tolerance compared to baseline, fall risk, orthopedic restrictions. Pt currently at a fall risk 2* to impairments listed above. Pt will continue to benefit from skilled acute PT interventions to address stated impairments; to maximize functional mobility; for ongoing pt/ family training; and DME needs. At conclusion of PT session pt returned back in chair with phone and call bell within reach. Pt denies any further questions at this time. The patient's AM-PAC Basic Mobility Inpatient Short Form Raw Score is 10. A Raw score of less than or equal to 16 suggests the patient may benefit from discharge to post-acute rehabilitation services. Please also refer to the recommendation of the Physical Therapist for safe discharge planning. Recommend home with OPPT + increased social support pending progress upon hospital D/C. PT Discharge Recommendation: Home with outpatient rehabilitation    See flowsheet documentation for full assessment.

## 2023-10-09 NOTE — INTERVAL H&P NOTE
H&P reviewed. After examining the patient I find no changes in the patients condition since the H&P had been written. Vitals:    10/09/23 1015   BP: 131/95   Pulse: 79   Resp: 20   Temp: 99.7 °F (37.6 °C)   SpO2: 99%   Head: Present  CVS: RRR  Lungs: Clear bilateral  Abdomen: Present  Assessment: Adult female with osteoarthritis right knee that remains symptomatic despite appropriate nonsurgical treatments she continues to have pain and dysfunction  Plan: Right total knee arthroplasty.   Patient is familiar with risks and benefits

## 2023-10-09 NOTE — ANESTHESIA PROCEDURE NOTES
Peripheral Block    Patient location during procedure: holding area  Start time: 10/9/2023 11:30 AM  Reason for block: at surgeon's request and post-op pain management  Staffing  Performed by: Millie Villareal MD  Authorized by: Millie Villareal MD    Preanesthetic Checklist  Completed: patient identified, IV checked, site marked, risks and benefits discussed, surgical consent, monitors and equipment checked, pre-op evaluation and timeout performed  Peripheral Block  Patient position: left lateral  Prep: ChloraPrep  Patient monitoring: frequent blood pressure checks, continuous pulse oximetry and heart rate  Block type: IPACK  Laterality: right  Injection technique: single-shot  Procedures: ultrasound guided, Ultrasound guidance required for the procedure to increase accuracy and safety of medication placement and decrease risk of complications.   Ultrasound permanent image savedbupivacaine (PF) (MARCAINE) 0.5 % injection 20 mL - Perineural   20 mL - 10/9/2023 11:34:00 AM  bupivacaine liposomal (EXPAREL) 1.3 % injection 20 mL - Perineural   10 mL - 10/9/2023 11:34:00 AM  Needle  Needle type: Stimuplex   Needle length: 4 in  Needle localization: anatomical landmarks and ultrasound guidance  Needle insertion depth: 9 cm  Assessment  Injection assessment: incremental injection, frequent aspiration, injected with ease, negative aspiration, negative for heart rate change, no paresthesia on injection, no symptoms of intraneural/intravenous injection and needle tip visualized at all times  Paresthesia pain: none  Post-procedure:  site cleaned  patient tolerated the procedure well with no immediate complications

## 2023-10-09 NOTE — CONSULTS
Internal Medicine  Consultation Note    Patient: Kinjal Lipscomb  Age/sex: 63 y.o. female  Medical Record #: [id number]    Assessment/Plan    Status Post right Total KNEE ARTHROPLASTY  • Continue post op pain control measures as prescribed. • Follow bowel regimen to help decrease narcotic induced constipation. •  Follow post operative hemoglobin with serial CBC and treat accordingly. • Monitor WBC and fever curve post op while encouraging use of incentive spirometer. • DVT prophylaxis in place and reviewed. Polyarthralgia  · Resume plaquenil   · Stable    Obesity  • Recommend ongoing attempts at weight loss  • Current BMI 38.3        PRE-OP HGB LEVEL: 15.2    Subjective/ HPI: Kinjal Lipscomb was seen and examined. Hx of KNEE pain failed out patient conservative measures. Elected to undergo total KNEE arthroplasty We are asked to see patient for post op management of underlying medical co-morbidities as outlined above. Pt did well intra and post operatively with good hemodynamics. Pt currently comfortable and without any reported post op nausea. ROS:   A 10 point ROS was performed; negative except as noted above.      Social History:    Substance Use History:   Social History     Substance and Sexual Activity   Alcohol Use Yes    Comment: rarely     Social History     Tobacco Use   Smoking Status Never   • Passive exposure: Never   Smokeless Tobacco Never     Social History     Substance and Sexual Activity   Drug Use No       Family History:    Family History   Problem Relation Age of Onset   • Graves' disease Mother    • Hyperlipidemia Mother    • Hypertension Mother    • Osteoporosis Mother    • Dementia Mother    • Thyroid disease Mother    • Arthritis Mother    • Aortic stenosis Father    • Hyperlipidemia Father    • Hypertension Father    • Coronary artery disease Father         Coronary Artery Bypass Graft (CABG)   • Heart disease Father         Pacemaker Placement   • Valvular heart disease Father         S/P Transcatheter Aortic Valve Replacement (TAVR)   • Arthritis Father    • Hearing loss Father    • No Known Problems Son    • No Known Problems Son    • No Known Problems Daughter    • No Known Problems Daughter    • No Known Problems Daughter    • No Known Problems Daughter    • Heart attack Other         Myocardial Infarction   • Stroke Other          Review of Scheduled Meds:  Current Facility-Administered Medications   Medication Dose Route Frequency Provider Last Rate   • acetaminophen  975 mg Oral Q8H Missy Greene MD     • calcium carbonate  1,000 mg Oral Daily PRN Missy Greene MD     • cefazolin  1,000 mg Intravenous Raul Landeros MD     • docusate sodium  100 mg Oral BID Missy Greene MD     • [START ON 10/10/2023] enoxaparin  40 mg Subcutaneous Daily Missy Greene MD     • HYDROmorphone  0.5 mg Intravenous Q2H PRN Missy Greene MD     • [START ON 10/10/2023] hydroxychloroquine  400 mg Oral Daily With Breakfast ZELALEM Harvey     • lactated ringers  1,000 mL Intravenous Once PRN Missy Greene MD      And   • lactated ringers  1,000 mL Intravenous Once PRN Missy Greene MD     • lactated ringers  125 mL/hr Intravenous Continuous Missy Greene  mL/hr (10/09/23 1035)   • ondansetron  4 mg Intravenous Q6H PRN Missy Greene MD     • oxyCODONE  10 mg Oral Q4H PRN Missy Greene MD     • oxyCODONE  5 mg Oral Q4H PRN Missy Greene MD     • senna  1 tablet Oral Daily Missy Greene MD     • sodium chloride  1,000 mL Intravenous Once PRN Missy Greene MD      And   • sodium chloride  1,000 mL Intravenous Once PRN Missy Greene MD         Labs:                Invalid input(s): "LABGLOM", "CMP"               Results from last 7 days   Lab Units 10/09/23  1421   POC GLUCOSE mg/dl 82       Lab Results   Component Value Date    URINECX 10,000-19,000 cfu/ml 2019    URINECX 30,000-39,000 cfu/ml 2019       Input and Output Summary (last 24 hours): Intake/Output Summary (Last 24 hours) at 10/9/2023 1548  Last data filed at 10/9/2023 1529  Gross per 24 hour   Intake 2000 ml   Output 650 ml   Net 1350 ml       Imaging:     No orders to display       *Labs /Radiology studiesLabs reviewed  *Medications reviewed and reconciled as needed  *Please refer to order section for additional ordered labs studies  *Case discussed with primary attending during morning huddle case rounds    Vitals:   Temp (24hrs), Av.8 °F (36.6 °C), Min:97 °F (36.1 °C), Max:99.7 °F (37.6 °C)    Temp:  [97 °F (36.1 °C)-99.7 °F (37.6 °C)] 97.5 °F (36.4 °C)  HR:  [62-79] 73  Resp:  [12-20] 17  BP: ()/(60-95) 115/71  SpO2:  [96 %-99 %] 98 %  Body mass index is 38.38 kg/m². Physical Exam:   General Appearance: no distress, conversive  HEENT: PERRLA, conjuctiva normal; oropharynx clear; mucous membranes moist;   Neck:  Supple, no lymphadenopathy or thyromegaly  Lungs: CTA, normal respiratory effort, no retractions, expiratory effort normal  CV: regular rate and rhythm , PMI normal   ABD: soft non tender, no masses , no hepatic or splenomegaly  EXT: DP pulses intact, no lymphadenopathy, no edema ;  right KNEE dressing in place  Skin: normal turgor, normal texture, no rash  Psych: affect normal, mood normal  Neuro: AAOx3          Invasive Devices     Peripheral Intravenous Line  Duration           Peripheral IV 10/09/23 Dorsal (posterior); Right Wrist <1 day          Drain  Duration           Closed/Suction Drain Right;Lateral Knee Accordion 10 Fr. <1 day    Urethral Catheter Latex;Straight-tip 16 Fr. <1 day                   Code Status: No Order  Current Length of Stay: 0 day(s)    Total floor / unit time spent today 45 minutes  Coordination of patient's care was performed in conjunction with primary service.  Time invested included review of patient's labs, vitals, and management of their comorbidities with continued monitoring, examination of patient as well as answering patient questions, documenting her findings and creating progress note in electronic medical record,  ordering appropriate diagnostic testing. Medical decision making for the day was made by supervising physician unless otherwise noted in their attestation statement. ** Please Note: Fluency Direct voice to text software may have been used in the creation of this document.  Audio transcription errors may occur**

## 2023-10-09 NOTE — DISCHARGE INSTR - AVS FIRST PAGE
Discharge Instructions - Orthopedics  Amelia Wesley 63 y.o. female MRN: [id number]  Unit/Bed#:     Weight Bearing Status:                                           Weight Bearing as tolerated to the right lower extremity. DVT prophylaxis:  Complete course of Lovenox as directed    Pain:  Continue analgesics as directed    Showering Instructions:   Do not shower until followup     Dressing Instructions:   Keep dressing clean, dry and intact until follow up appointment. Driving Instructions:  No driving until cleared by Orthopaedic Surgery. PT/OT:  Continue PT/OT on outpatient basis as directed    Appt Instructions: If you do not have your appointment, please call the clinic at [phone]  Otherwise followup as scheduled    Contact the office sooner if you experience any increased numbness/tingling in the extremities.       Miscellaneous:  None

## 2023-10-09 NOTE — PROGRESS NOTES
Progress Note - Orthopedics   Karri Agee 63 y.o. female MRN: [id number]  Unit/Bed#: -01      Subjective:    63 y. o.female POD 1 R TKA. No acute events, no new complaints. Patient doing well. Pain well controlled. Patient was able to work with PT yesterday, recommending home with outpatient rehab. Hgb pending this AM. HV with 100 cc sanguinous output.     Labs:  0   Lab Value Date/Time    HCT 45.8 09/08/2023 1039    HCT 45.6 06/16/2023 1630    HCT 43.7 11/23/2022 1004    HGB 15.2 09/08/2023 1039    HGB 14.7 06/16/2023 1630    HGB 14.2 11/23/2022 1004    INR 1.03 09/08/2023 1039    WBC 3.25 (L) 09/08/2023 1039    WBC 5.07 06/16/2023 1630    WBC 3.18 (L) 11/23/2022 1004    ESR 22 08/03/2021 1203    CRP 9.6 (H) 06/16/2023 1630       Meds:    Current Facility-Administered Medications:   •  acetaminophen (TYLENOL) tablet 975 mg, 975 mg, Oral, Q8H, Chantelle Gorman MD  •  calcium carbonate (TUMS) chewable tablet 1,000 mg, 1,000 mg, Oral, Daily PRN, Chantelle Gorman MD  •  ceFAZolin (ANCEF) IVPB (premix in dextrose) 1,000 mg 50 mL, 1,000 mg, Intravenous, Q8H, Chantelle Gorman MD  •  docusate sodium (COLACE) capsule 100 mg, 100 mg, Oral, BID, Chantelle Gorman MD  •  [START ON 10/10/2023] enoxaparin (LOVENOX) subcutaneous injection 40 mg, 40 mg, Subcutaneous, Daily, Chantelle Gorman MD  •  HYDROmorphone (DILAUDID) injection 0.5 mg, 0.5 mg, Intravenous, Q2H PRN, Chantelle Gorman MD  •  [START ON 10/10/2023] hydroxychloroquine (PLAQUENIL) tablet 400 mg, 400 mg, Oral, Daily With Breakfast, ZELALEM Harvey  •  lactated ringers bolus 1,000 mL, 1,000 mL, Intravenous, Once PRN **AND** lactated ringers bolus 1,000 mL, 1,000 mL, Intravenous, Once PRN, Chantelle Gorman MD  •  lactated ringers infusion, 125 mL/hr, Intravenous, Continuous, Chantelle Gorman MD, Last Rate: 125 mL/hr at 10/09/23 1035, 125 mL/hr at 10/09/23 1035  •  ondansetron (ZOFRAN) injection 4 mg, 4 mg, Intravenous, Q6H PRN, Judie Black MD  •  oxyCODONE (ROXICODONE) immediate release tablet 10 mg, 10 mg, Oral, Q4H PRN, Judie Black MD  •  oxyCODONE (ROXICODONE) IR tablet 5 mg, 5 mg, Oral, Q4H PRN, Judie Black MD  •  [START ON 10/10/2023] senna (SENOKOT) tablet 8.6 mg, 1 tablet, Oral, Daily, Judie Black MD  •  sodium chloride 0.9 % bolus 1,000 mL, 1,000 mL, Intravenous, Once PRN **AND** sodium chloride 0.9 % bolus 1,000 mL, 1,000 mL, Intravenous, Once PRN, Judie Black MD    Blood Culture:   No results found for: "Michael Benavides"    Wound Culture:   No results found for: "WOUNDCULT"    Ins and Outs:  I/O last 24 hours: In: 2000 [I.V.:2000]  Out: 650 [Urine:650]          Physical:  Vitals:    10/09/23 1622   BP: 119/72   Pulse: 77   Resp:    Temp:    SpO2: 97%     Musculoskeletal: right Lower Extremity  · Dressing C/D/I without strike thru, HV x 1 in place set to suction w sanguinous output  · TTP julio incisionally  · Sensation intact to saphenous, sural, tibial, superficial peroneal nerve, and deep peroneal  · Motor intact to +FHL/EHL, +ankle dorsi/plantar flexion  · Palpable DP pulse  · Digits warm and well perfused  · Capillary refill < 2 seconds    Assessment:    62 y. o.female POD 1 R TKA. Patient doing well.      Plan:  · WBAT RLE  · Will monitor for ABLA and administer IVF/prbc as indicated for Greater than 2 gram drop or Hgb < 7  · PT/OT  - rec home  · Pain control  · DVT ppx lov  · Appreciate medical comanagement  · APS consult for post block management  · Dispo: pending PT, AM labs    Judie Black MD

## 2023-10-09 NOTE — ANESTHESIA PREPROCEDURE EVALUATION
Procedure:  ARTHROPLASTY RIGHT KNEE TOTAL W ROBOT (Right: Knee)    Relevant Problems   CARDIO   (+) Hyperlipidemia      MUSCULOSKELETAL   (+) Arthritis of both knees   (+) Primary osteoarthritis of right knee        Physical Exam    Airway    Mallampati score: II  TM Distance: >3 FB  Neck ROM: full     Dental   No notable dental hx     Cardiovascular      Pulmonary      Other Findings        Anesthesia Plan  ASA Score- 3     Anesthesia Type- spinal with ASA Monitors. Additional Monitors:   Airway Plan:     Comment: Patient seen and examined. History reviewed. Adductor canal and iPACK nerve blocks to be done pre-op for post-op pain. Spinal to be done in OR with sedation during the case. Plan discussed with the patient. Risks explained, consent obtained. .       Plan Factors-Exercise tolerance (METS): >4 METS. Chart reviewed. Existing labs reviewed. Patient summary reviewed. Induction- intravenous. Postoperative Plan- Plan for postoperative opioid use. Informed Consent- Anesthetic plan and risks discussed with patient. I personally reviewed this patient with the CRNA. Discussed and agreed on the Anesthesia Plan with the CRNA. Elena Daley

## 2023-10-09 NOTE — CONSULTS
Office Visit    6/21/2023  [address], [place]  Orthopedic Surgery  Primary osteoarthritis of right knee +6 more  Dx  Right Knee - Follow-up  Reason for Visit     Progress Notes  Gilma Bella MD (Physician) • • Orthopedic Surgery  Procedure Orders   1. Large joint arthrocentesis: bilateral knee [[id number]] ordered by Gilma Bella MD   Expand All Collapse All  Assessment:    Diagnosis ICD-10-CM Associated Orders   1. Primary osteoarthritis of right knee  M17.11 Large joint arthrocentesis: bilateral knee       Diet NPO; Sips with meds       Nursing Communication Warmimg Interventions Implemented       Nursing Communication CHG bath, have staff wash entire body (neck down) per pre-op bathing protocol. Routine, evening prior to, and day of surgery.       Nursing Communication Swab both nares with Povidone-Iodine solution, EXCLUDE if patient has shellfish/Iodine allergy, and replace with nasal alcohol swabstick. Routine, day of surgery, on call to OR       Void on call to OR       Insert peripheral IV       Place sequential compression device       Case request operating room: ARTHROPLASTY RIGHT KNEE TOTAL W ROBOT       Height and weight upon arrival       2. Chronic pain of right knee  M25.561 Large joint arthrocentesis: bilateral knee     G89.29 Diet NPO; Sips with meds       Nursing Communication Warmimg Interventions Implemented       Nursing Communication CHG bath, have staff wash entire body (neck down) per pre-op bathing protocol. Routine, evening prior to, and day of surgery.       Nursing Communication Swab both nares with Povidone-Iodine solution, EXCLUDE if patient has shellfish/Iodine allergy, and replace with nasal alcohol swabstick.  Routine, day of surgery, on call to OR       Void on call to OR       Insert peripheral IV       Place sequential compression device       Case request operating room: ARTHROPLASTY RIGHT KNEE TOTAL W ROBOT       Height and weight upon arrival       3. Primary osteoarthritis of left knee  M17.12 Large joint arthrocentesis: bilateral knee       4. Chronic pain of left knee  M25.562 Large joint arthrocentesis: bilateral knee     G89.29         5. Pre-operative cardiovascular examination  Z01.810 EKG 12 lead       6. Pre-operative laboratory examination  Z01.812 Comprehensive metabolic panel       Hemoglobin A1C W/EAG Estimation       CBC and differential       Anemia Panel w/Reflex       Protime-INR       APTT       Type and screen       7. Healthcare maintenance  Z00.00 Ambulatory referral to Family Practice       Ambulatory referral to surgical optimization       Ambulatory referral to Physical Therapy             Plan:  • Diagnostics reviewed and physical exam performed. Diagnosis, treatment options and associated risks were discussed with the patient including no treatment, nonsurgical treatment and potential for surgical intervention. The patient was given the opportunity to ask questions regarding each. • Quality of life decision to pursue elective right total knee arthroplasty. Risks and benefits of right total knee arthroplasty were discussed. Consents obtained. • She already has her preoperative vitamins and postoperative Lovenox at home. • In the interim she was offered, excepted, informed injection of cortisone to both knees today for symptomatic relief. She tolerated both injections well. Ice and postinjection protocol advised. • Weightbearing activities as tolerated. • She was counseled on general wellness and weight loss measures. • Discussed possible viscosupplementation as a form of treatment, she may call ahead to get this arranged if indicated.        To do next visit:  Return for post-op with x-rays right knee.     The above stated was discussed in layman's terms and the patient expressed understanding.   All questions were answered to the patient's satisfaction.              Scribe Attestation    I,:  Opal Villa am acting as a scribe while in the presence of the attending physician.:       I,:  Neri Sepulveda MD personally performed the services described in this documentation    as scribed in my presence. :                 Subjective:   Yared Menendez is a 63 y.o. female who presents today with her  for repeat evaluation of her bilateral knees due to return of pain. She has known advanced osteoarthritis. She has right greater than left knee pain. She has increased knee pain with weightbearing activities. She is stiffness and discomfort getting up after prolonged sedentary positions. She has difficulty ambulating stairs in an alternating fashion. She was previously scheduled for an elective right total knee arthroplasty for January however it was canceled due to her 's prostate cancer surgery. She finds that her knee symptoms are severe and unrelenting. Her symptoms also limit her day-to-day activities as well as impedes on her quality of life. She wishes to pursue elective right total knee arthroplasty for October. She has some family functions coming up in preparation for her daughter's wedding in September.   Pain scale at times 9/10.     Review of systems negative unless otherwise specified in HPI  Review of Systems     Medical History        Past Medical History:   Diagnosis Date   • Arthritis    • Chest pain 2014   • Disease of thyroid gland     • Elevated blood sugar 2014   • Heart murmur       Functional, child   • Hypercholesteremia     • Hyperlipidemia     • Obesity    • Osteoarthritis     • Urinary tract infection       Recurring            Surgical History         Past Surgical History:   Procedure Laterality Date   • BIOPSY CORE NEEDLE         Thyroid Using Percutaneous Core Needle   •  SECTION       • FOOT SURGERY         As a teenager - Bunionectomy - Dr. Chago Johnson - Last Assessed: 2016   • THYROID LOBECTOMY Right 09/01/2015     Dr. Suma Thrasher: Right Lobe - Last Assessed: July 5, 2016 Dr. Marylee Bells            Family History         Family History   Problem Relation Age of Onset   • Graves' disease Mother     • Hyperlipidemia Mother     • Hypertension Mother     • Osteoporosis Mother     • Dementia Mother     • Thyroid disease Mother     • Arthritis Mother     • Aortic stenosis Father     • Hyperlipidemia Father     • Hypertension Father     • Coronary artery disease Father           Coronary Artery Bypass Graft (CABG)   • Heart disease Father           Pacemaker Placement   • Valvular heart disease Father           S/P Transcatheter Aortic Valve Replacement (TAVR)   • Arthritis Father     • Hearing loss Father     • No Known Problems Son     • No Known Problems Son     • No Known Problems Daughter     • No Known Problems Daughter     • No Known Problems Daughter     • No Known Problems Daughter     • Heart attack Other           Myocardial Infarction   • Stroke Other              Social History            Occupational History   • 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   Tobacco Use   • Smoking status: Never       Passive exposure: Never   • Smokeless tobacco: Never   Vaping Use   • Vaping Use: Never used   Substance and Sexual Activity   • Alcohol use:  Yes       Comment: social   • Drug use: No   • Sexual activity: Yes       Partners: Male       Birth control/protection: Post-menopausal            Current Outpatient Medications:   •  Cholecalciferol 125 MCG (5000 UT) capsule, Take 5,000 Units by mouth daily, Disp: , Rfl:   •  hydroxychloroquine (PLAQUENIL) 200 mg tablet, TAKE 2 TABLETS (400 MG TOTAL) BY MOUTH DAILY WITH BREAKFAST, Disp: 180 tablet, Rfl: 1  •  Multiple Vitamins-Minerals (MULTIVITAL-M) TABS, Take by mouth, Disp: , Rfl:   •  OMEGA-3 FATTY ACIDS PO, 500 mg, Disp: , Rfl:   •  Probiotic Product (PROBIOTIC-10) CAPS, Take by mouth, Disp: , Rfl:   •  Turmeric 500 MG TABS, Take 1 capsule by mouth, Disp: , Rfl:   •  ascorbic acid (VITAMIN C) 500 mg tablet, Take 1 tablet (500 mg total) by mouth 2 (two) times a day for 60 doses, Disp: 60 tablet, Rfl: 0  •  COVID-19 At Home Test (COVID-19 OTC Antigen 1-Pack) KIT, , Disp: , Rfl:   •  enoxaparin (LOVENOX) 40 mg/0.4 mL, Inject 0.4 mL (40 mg total) under the skin daily in the early morning for 28 days (Patient not taking: Reported on 12/23/2022), Disp: 11.2 mL, Rfl: 0  •  ferrous sulfate 324 (65 Fe) mg, Take 1 tablet (324 mg total) by mouth 2 (two) times a day before meals Take 1 pill BID, PRE-OP with Vit C,..may be constipatory (Patient not taking: Reported on 12/23/2022), Disp: 60 tablet, Rfl: 0  •  folic acid (FOLVITE) 1 mg tablet, Take 1 tablet (1 mg total) by mouth daily for 30 doses Take 1 pill po Qday PRE-OP, with Vit C, and Iron (Patient not taking: Reported on 12/23/2022), Disp: 30 tablet, Rfl: 0  •  psyllium (METAMUCIL) 0.52 g capsule, Take 0.52 g by mouth daily, Disp: , Rfl:      No Known Allergies               Vitals:     06/21/23 1541   BP: 122/84   Pulse: 77         Objective:                     Right Knee Exam      Muscle Strength   The patient has normal right knee strength.     Tenderness   The patient is experiencing tenderness in the medial joint line.     Range of Motion   Extension: 0   Flexion: 100 (with crepitation and stiffness at full flexion)      Other   Erythema: absent  Sensation: normal  Swelling: mild  Effusion: no effusion present        Left Knee Exam      Muscle Strength   The patient has normal left knee strength.     Tenderness   The patient is experiencing tenderness in the medial joint line.     Range of Motion   Extension: 0   Flexion: 100 (with crepitation and stiffness at full flexion)      Other   Erythema: absent  Sensation: normal  Swelling: mild  Effusion: no effusion present     Comments:     Both knees are in modest varus alignment with bony enlargement medially  Both extensor mechanisms are intact  Mildly antalgic gait                  Diagnostics, reviewed and taken today if performed as documented:     The attending physician has personally reviewed the pertinent films in PACS and interpretation is as follows:     Right and left knee x-rays taken and reviewed in the office today and show: Advanced degenerative changes with bone-on-bone opposition medial and patellofemoral compartments of both knees. Both knees are in varus deformity. Both knees have subchondral sclerosis and osteophyte formation present.        Procedures, if performed today:     Large joint arthrocentesis: bilateral knee  Universal Protocol:  Consent: Verbal consent obtained. Risks and benefits: risks, benefits and alternatives were discussed  Consent given by: patient  Time out: Immediately prior to procedure a "time out" was called to verify the correct patient, procedure, equipment, support staff and site/side marked as required.   Timeout called at: 6/21/2023 4:27 PM.  Patient understanding: patient states understanding of the procedure being performed  Site marked: the operative site was marked  Patient identity confirmed: verbally with patient     Supporting Documentation  Indications: pain and diagnostic evaluation   Procedure Details  Location: knee - bilateral knee  Preparation: Patient was prepped and draped in the usual sterile fashion  Needle size: 22 G  Ultrasound guidance: no  Approach: anterolateral     Medications (Right): 2 mL bupivacaine 0.25 %; 12 mg betamethasone acetate-betamethasone sodium phosphate 6 (3-3) mg/mLMedications (Left): 2 mL bupivacaine 0.25 %; 12 mg betamethasone acetate-betamethasone sodium phosphate 6 (3-3) mg/mL   Patient tolerance: patient tolerated the procedure well with no immediate complications  Dressing:  Sterile dressing applied                   Portions of the record may have been created with voice recognition software.  Occasional wrong word or "sound a like" substitutions may have occurred due to the inherent limitations of voice recognition software.  Read the chart carefully and recognize, using context, where substitutions have occurred. Instructions       Return for post-op with x-rays right knee.    Diagnosis ICD-10-CM Associated Orders   1. Primary osteoarthritis of right knee  M17.11 Large joint arthrocentesis: bilateral knee       2. Chronic pain of right knee  M25.561 Large joint arthrocentesis: bilateral knee     G89.29         3. Primary osteoarthritis of left knee  M17.12 Large joint arthrocentesis: bilateral knee       4. Chronic pain of left knee  M25.562 Large joint arthrocentesis: bilateral knee     G89.29         5. Pre-operative cardiovascular examination  Z01.810         6. Pre-operative laboratory examination  Z01.812         7. Healthcare maintenance  Z00.00               Return for post-op with x-rays right knee.          Knee Replacement   AMBULATORY CARE:   What you need to know about knee replacement:  Knee replacement is surgery to replace all or part of your knee joint. It is also called knee arthroplasty.         How to prepare for knee replacement:   • Weeks before your surgery:       ? Your healthcare provider will check your overall health. He or she will ask about your current knee pain or stiffness. Tell your provider how pain or stiffness affects your daily activities or ability to play sports. He or she may also ask about fatigue, anxiety, or depression you may have.      ? Some medicines will need to be stopped weeks before surgery. These medicines include blood thinning medicine, such as aspirin and ibuprofen. It also includes some antirheumatic medicines. Make sure your healthcare provider knows all medicines you are taking.  Also ask how long before surgery to stop taking them.     ? Your healthcare provider may have you do exercises to strengthen your leg muscles before surgery.     ? You may need x-rays to help your healthcare provider plan your surgery. Ask about any other tests you may need.     • The night before and the day of surgery:       ? Your healthcare provider may tell you not to eat or drink anything after midnight on the day of your surgery.      ? You will be told what medicines you can or cannot take the morning of surgery.     What will happen during knee replacement:   • You may be given general anesthesia to keep you asleep and free from pain during surgery. You may instead be given regional anesthesia, such as spinal or epidural anesthesia, or a peripheral nerve block. Regional anesthesia keeps you numb from the waist down, but you will be awake during surgery.      • Your surgeon will make an incision over your knee joint. He or she will remove the damaged parts of your knee joint and replace them with a knee implant. The knee implant may be made of metal and plastic. Your surgeon may secure it with medical cement.      • Your surgeon will move the muscles and other tissues around your joint back into place. He or she will close your incision with stitches or staples. He or she may use strips of medical tape and a bandage to cover your wound.        What to expect after knee replacement:   • It is normal to have increased stiffness and pain after surgery. Your pain and stiffness should get better with exercise.     • Do not get out of bed until your healthcare provider says it is okay. The physical therapist will help you walk after your surgery. When you walk the same day after surgery, it helps decrease pain and improves the function of your knee. You may use crutches or a walker.     • You may be in the hospital 1 to 4 days, or you may go home shortly after surgery. Your healthcare provider may talk to you about rehabilitation you can do at home. A physical therapist can teach you exercises to help strengthen your knee and prevent stiffness. You may also need occupational therapy to teach you the best ways to bathe and dress.    • You may  be given a joint replacement ID card. The card will tell which joint was replaced and when it was replaced. Tell all healthcare providers about your joint replacement surgery.     Risks of knee replacement:  You may bleed more than expected or get an infection. Nerves or blood vessels may be damaged during surgery. After surgery, your knee may be stiff or numb. You may continue to have knee pain. You may get a blood clot in your leg. This may become life-threatening. Your implant may get loose or move out of place. The implant may get worn out over time and need to be replaced. Call your local emergency number (911 in the [address]) for any of the following:   • You feel lightheaded, short of breath, and have chest pain.      • You cough up blood.     Seek care immediately if:   • Your leg feels warm, tender, and painful. It may look swollen and red.     • You cannot walk or move your knee.     • Blood soaks through your bandage.     • Your incision wound comes apart.     • Your incision area is red, swollen, or draining pus.     Call your doctor or surgeon if:   • You have a fever or chills.     • You have trouble moving or bending your knee.     • You have new knee pain or pain that does not get better with medicine.     • You have questions or concerns about your condition or care.     Medicines: You may  need any of the following:  • Prescription pain medicine  may be given. Ask your healthcare provider how to take this medicine safely. Some prescription pain medicines contain acetaminophen. Do not take other medicines that contain acetaminophen without talking to your healthcare provider. Too much acetaminophen may cause liver damage. Prescription pain medicine may cause constipation. Ask your healthcare provider how to prevent or treat constipation.      • NSAIDs , such as ibuprofen, help decrease swelling, pain, and fever. This medicine is available with or without a doctor's order.  NSAIDs can cause stomach bleeding or kidney problems in certain people. If you take blood thinner medicine, always ask your healthcare provider if NSAIDs are safe for you. Always read the medicine label and follow directions.     • Blood thinners  help prevent blood clots. Clots can cause strokes, heart attacks, and death. The following are general safety guidelines to follow while you are taking a blood thinner:     ? Watch for bleeding and bruising while you take blood thinners. Watch for bleeding from your gums or nose. Watch for blood in your urine and bowel movements. Use a soft washcloth on your skin, and a soft toothbrush to brush your teeth. This can keep your skin and gums from bleeding. If you shave, use an electric shaver. Do not play contact sports.      ? Tell your dentist and other healthcare providers that you take a blood thinner. Wear a bracelet or necklace that says you take this medicine.      ? Do not start or stop any other medicines unless your healthcare provider tells you to. Many medicines cannot be used with blood thinners.     ? Take your blood thinner exactly as prescribed by your healthcare provider. Do not skip does or take less than prescribed. Tell your provider right away if you forget to take your blood thinner, or if you take too much.     ? Warfarin  is a blood thinner that you may need to take. The following are things you should be aware of if you take warfarin:      - Foods and medicines can affect the amount of warfarin in your blood. Do not make major changes to your diet while you take warfarin. Warfarin works best when you eat about the same amount of vitamin K every day. Vitamin K is found in green leafy vegetables and certain other foods. Ask for more information about what to eat when you are taking warfarin.     - You will need to see your healthcare provider for follow-up visits when you are on warfarin. You will need regular blood tests.  These tests are used to decide how much medicine you need.     • Take your medicine as directed. Contact your healthcare provider if you think your medicine is not helping or if you have side effects. Tell your provider if you are allergic to any medicine. Keep a list of the medicines, vitamins, and herbs you take. Include the amounts, and when and why you take them. Bring the list or the pill bottles to follow-up visits. Carry your medicine list with you in case of an emergency.     Self-care:   • Apply ice  on your knee for 15 to 20 minutes every hour or as directed. Use an ice pack, or put crushed ice in a plastic bag. Cover it with a towel. Ice helps prevent tissue damage and decreases swelling and pain.      • Do not get the area wet  until your healthcare provider says it is okay. When your provider says it is okay, carefully wash the area with soap and water. Dry the area and put on new, clean bandages as directed.     • Care for your incision area  as directed. Do not put powders or lotions over the area. If you have strips of medical tape over the incision area, let them fall off on their own. If they do not fall off within 14 days, gently remove them. Check the area for signs of infection, such as swelling, redness, or pus.     • Go to physical or occupational therapy , if directed. A physical therapist will teach you exercises to build muscle strength, decrease pain and swelling, and prevent blood clots. An occupational therapist will show you how to do daily activities safely. He or she may recommend assistive devices to help you dress, bathe, and  objects. The assistive devices will help you prevent knee damage from twisting or bending.     Prevent blood clots:   • Wear pressure stockings as directed. Pressure stockings help keep blood from pooling in your leg veins. Your healthcare provider can prescribe stockings that are right for you. Do not buy over-the-counter pressure stockings unless your healthcare provider says it is okay. They may not fit correctly or may have elastic that cuts off your circulation. Ask your healthcare provider when to start wearing pressure stockings and how long to wear them each day.          • Do not cross your legs for 6 weeks or as directed. Your risk for blood clots is greater when you cross your legs. You might also move your implant out of place.     Activity guidelines:   • Know your limits. Start activities slowly and give yourself rest periods. Pain and swelling can increase when you do too much. Do not do an activity until your healthcare provider says you are ready.     • Use assistive devices as directed. Your thigh muscles will be weak after surgery. Assistive devices will help you not fall.      • Go up the stairs  by placing your non-operated leg on the step first. Then bring your operated leg to the same step. Repeat.     • Go down stairs  by placing your operated leg down on the step first. Then bring your non-operated leg to the same step. Repeat.     • Do light housekeeping only. Ask your healthcare provider which housekeeping activities are okay for you. Do not vacuum, change sheets on a bed, or anything that makes you reach up, bend, or twist.     • Do not drive for at least 6 weeks  or until your healthcare provider says it is okay.     • Do not play contact sports, tennis, golf, or ski  until your healthcare provider says it is okay. These activities can loosen your knee implant.     Prevent falls:     •   Remove all loose carpets and cords. These can cause you to trip and fall.     • Use a shower bench or chair  when you take a shower to limit the time you are standing.     • Use a toilet seat riser with arms  if your toilet seat is low. A toilet seat riser will help prevent bending or twisting your knee. Metal detectors and MRIs after joint replacement surgery:   • The metal in your joint may set off metal detectors, such as at an airport.  Tell the officials about your joint replacement surgery. You may also want to show your joint replacement ID card, if you were given one.     • MRIs are considered safe for people with joint replacements. The type of metal used is not magnetic. Tell all healthcare providers about your joint replacement surgery.     Follow up with your healthcare provider as directed: Your provider may contact you weeks after your surgery. He or she may ask if surgery helped relieve your pain or stiffness. Tell your provider how well you are able to do your daily activities after surgery. Also tell him or her if you are having any problems with mobility or range of motion. Write down your questions so you remember to ask them during your visits. © Copyright Deana Jimenez 2022 Information is for End User's use only and may not be sold, redistributed or otherwise used for commercial purposes. The above information is an  only. It is not intended as medical advice for individual conditions or treatments. Talk to your doctor, nurse or pharmacist before following any medical regimen to see if it is safe and effective for you.                After Visit Summary (Automatic SnapShot taken 6/21/2023)  Additional Documentation    Vitals:   /84   Pulse 77   Ht 4' 11" (1.499 m)   Wt 83.5 kg (184 lb)   LMP 10/27/2017 (Exact Date)   BMI 37.16 kg/m²   BSA 1.78 m²      More Vitals   SmartForms:    [facility] PRE-CHARTING •    [facility] PCMH/PCSP WRAP UP REQUIREMENTS ADVANCED •    [facility] SCRIBE ATTESTATION   . ..(2 more)   Encounter Info:   Billing Info,   History,   Allergies,   Detailed Report        Communications    View All Conversations on this Encounter  Orders Placed       Labs     CBC and differential     Anemia Panel w/Reflex     APTT     Comprehensive metabolic panel     Protime-INR     Hemoglobin A1C W/EAG Estimation     Type and screen   . Fabi Li .(5 more)     Other Orders     Large joint arthrocentesis: bilateral knee     Ambulatory referral to [facility] Closed     Ambulatory referral to surgical optimization Closed     Ambulatory referral to Physical Therapy Pending Review     Case request operating room: ARTHROPLASTY RIGHT KNEE TOTAL W ROBOT Once     EKG 12 lead   . Damien Wilkinson .(4 more)  All Encounter Results  Medication Changes       None  Medication List  Medications Administered     Betamethasone Sod Phos & Acet 12 mg   Betamethasone Sod Phos & Acet 12 mg   BUPivacaine HCl 2 mL   BUPivacaine HCl 2 mL  Visit Diagnoses       Primary osteoarthritis of right knee     Chronic pain of right knee     Primary osteoarthritis of left knee     Chronic pain of left knee     Pre-operative cardiovascular examination     Pre-operative laboratory examination     Healthcare maintenance  Problem List  Encounters with Related Results     Appointment (9/8/2023)

## 2023-10-09 NOTE — ANESTHESIA PROCEDURE NOTES
Spinal Block    Patient location during procedure: OR  Start time: 10/9/2023 12:19 PM  Reason for block: procedure for pain and at surgeon's request  Staffing  Performed by: Nicolasa Castle CRNA  Authorized by: Leidy Avila MD    Preanesthetic Checklist  Completed: patient identified, IV checked, site marked, risks and benefits discussed, surgical consent, monitors and equipment checked, pre-op evaluation and timeout performed  Spinal Block  Patient position: sitting  Prep: ChloraPrep  Patient monitoring: cardiac monitor and frequent blood pressure checks  Approach: midline  Location: L3-4  Injection technique: single-shot  Needle  Needle type: pencil-tip   Needle gauge: 25 G  Needle length: 10 cm  Assessment  Sensory level: T4  Injection Assessment:  negative aspiration for heme, no paresthesia on injection and positive aspiration for clear CSF.   Post-procedure:  adhesive bandage applied, pressure dressing applied, secured with tape, site cleaned and sterile dressing applied

## 2023-10-09 NOTE — H&P (VIEW-ONLY)
Office Visit    6/21/2023  [address], [place]  Orthopedic Surgery  Primary osteoarthritis of right knee +6 more  Dx  Right Knee - Follow-up  Reason for Visit     Progress Notes  Silvia Rubio MD (Physician) • • Orthopedic Surgery  Procedure Orders   1. Large joint arthrocentesis: bilateral knee [[id number]] ordered by Silvia Rubio MD   Expand All Collapse All  Assessment:    Diagnosis ICD-10-CM Associated Orders   1. Primary osteoarthritis of right knee  M17.11 Large joint arthrocentesis: bilateral knee       Diet NPO; Sips with meds       Nursing Communication Warmimg Interventions Implemented       Nursing Communication CHG bath, have staff wash entire body (neck down) per pre-op bathing protocol. Routine, evening prior to, and day of surgery.       Nursing Communication Swab both nares with Povidone-Iodine solution, EXCLUDE if patient has shellfish/Iodine allergy, and replace with nasal alcohol swabstick. Routine, day of surgery, on call to OR       Void on call to OR       Insert peripheral IV       Place sequential compression device       Case request operating room: ARTHROPLASTY RIGHT KNEE TOTAL W ROBOT       Height and weight upon arrival       2. Chronic pain of right knee  M25.561 Large joint arthrocentesis: bilateral knee     G89.29 Diet NPO; Sips with meds       Nursing Communication Warmimg Interventions Implemented       Nursing Communication CHG bath, have staff wash entire body (neck down) per pre-op bathing protocol. Routine, evening prior to, and day of surgery.       Nursing Communication Swab both nares with Povidone-Iodine solution, EXCLUDE if patient has shellfish/Iodine allergy, and replace with nasal alcohol swabstick.  Routine, day of surgery, on call to OR       Void on call to OR       Insert peripheral IV       Place sequential compression device       Case request operating room: ARTHROPLASTY RIGHT KNEE TOTAL W ROBOT       Height and weight upon arrival       3. Primary osteoarthritis of left knee  M17.12 Large joint arthrocentesis: bilateral knee       4. Chronic pain of left knee  M25.562 Large joint arthrocentesis: bilateral knee     G89.29         5. Pre-operative cardiovascular examination  Z01.810 EKG 12 lead       6. Pre-operative laboratory examination  Z01.812 Comprehensive metabolic panel       Hemoglobin A1C W/EAG Estimation       CBC and differential       Anemia Panel w/Reflex       Protime-INR       APTT       Type and screen       7. Healthcare maintenance  Z00.00 Ambulatory referral to Family Practice       Ambulatory referral to surgical optimization       Ambulatory referral to Physical Therapy             Plan:  • Diagnostics reviewed and physical exam performed. Diagnosis, treatment options and associated risks were discussed with the patient including no treatment, nonsurgical treatment and potential for surgical intervention. The patient was given the opportunity to ask questions regarding each. • Quality of life decision to pursue elective right total knee arthroplasty. Risks and benefits of right total knee arthroplasty were discussed. Consents obtained. • She already has her preoperative vitamins and postoperative Lovenox at home. • In the interim she was offered, excepted, informed injection of cortisone to both knees today for symptomatic relief. She tolerated both injections well. Ice and postinjection protocol advised. • Weightbearing activities as tolerated. • She was counseled on general wellness and weight loss measures. • Discussed possible viscosupplementation as a form of treatment, she may call ahead to get this arranged if indicated.        To do next visit:  Return for post-op with x-rays right knee.     The above stated was discussed in layman's terms and the patient expressed understanding.   All questions were answered to the patient's satisfaction.              Scribe Attestation    I,:  Mariano Arrington am acting as a scribe while in the presence of the attending physician.:       I,:  Darron Yang MD personally performed the services described in this documentation    as scribed in my presence. :                 Subjective:   Bebo Hannah is a 63 y.o. female who presents today with her  for repeat evaluation of her bilateral knees due to return of pain. She has known advanced osteoarthritis. She has right greater than left knee pain. She has increased knee pain with weightbearing activities. She is stiffness and discomfort getting up after prolonged sedentary positions. She has difficulty ambulating stairs in an alternating fashion. She was previously scheduled for an elective right total knee arthroplasty for January however it was canceled due to her 's prostate cancer surgery. She finds that her knee symptoms are severe and unrelenting. Her symptoms also limit her day-to-day activities as well as impedes on her quality of life. She wishes to pursue elective right total knee arthroplasty for October. She has some family functions coming up in preparation for her daughter's wedding in September.   Pain scale at times 9/10.     Review of systems negative unless otherwise specified in HPI  Review of Systems     Medical History        Past Medical History:   Diagnosis Date   • Arthritis    • Chest pain 2014   • Disease of thyroid gland     • Elevated blood sugar 2014   • Heart murmur       Functional, child   • Hypercholesteremia     • Hyperlipidemia     • Obesity    • Osteoarthritis     • Urinary tract infection       Recurring            Surgical History         Past Surgical History:   Procedure Laterality Date   • BIOPSY CORE NEEDLE         Thyroid Using Percutaneous Core Needle   •  SECTION       • FOOT SURGERY         As a teenager - Bunionectomy - Dr. Kaela Guo - Last Assessed: 2016   • THYROID LOBECTOMY Right 09/01/2015     Dr. Ramos Sharpe: Right Lobe - Last Assessed: July 5, 2016 Dr. Karan Puentes            Family History         Family History   Problem Relation Age of Onset   • Graves' disease Mother     • Hyperlipidemia Mother     • Hypertension Mother     • Osteoporosis Mother     • Dementia Mother     • Thyroid disease Mother     • Arthritis Mother     • Aortic stenosis Father     • Hyperlipidemia Father     • Hypertension Father     • Coronary artery disease Father           Coronary Artery Bypass Graft (CABG)   • Heart disease Father           Pacemaker Placement   • Valvular heart disease Father           S/P Transcatheter Aortic Valve Replacement (TAVR)   • Arthritis Father     • Hearing loss Father     • No Known Problems Son     • No Known Problems Son     • No Known Problems Daughter     • No Known Problems Daughter     • No Known Problems Daughter     • No Known Problems Daughter     • Heart attack Other           Myocardial Infarction   • Stroke Other              Social History            Occupational History   • 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   Tobacco Use   • Smoking status: Never       Passive exposure: Never   • Smokeless tobacco: Never   Vaping Use   • Vaping Use: Never used   Substance and Sexual Activity   • Alcohol use:  Yes       Comment: social   • Drug use: No   • Sexual activity: Yes       Partners: Male       Birth control/protection: Post-menopausal            Current Outpatient Medications:   •  Cholecalciferol 125 MCG (5000 UT) capsule, Take 5,000 Units by mouth daily, Disp: , Rfl:   •  hydroxychloroquine (PLAQUENIL) 200 mg tablet, TAKE 2 TABLETS (400 MG TOTAL) BY MOUTH DAILY WITH BREAKFAST, Disp: 180 tablet, Rfl: 1  •  Multiple Vitamins-Minerals (MULTIVITAL-M) TABS, Take by mouth, Disp: , Rfl:   •  OMEGA-3 FATTY ACIDS PO, 500 mg, Disp: , Rfl:   •  Probiotic Product (PROBIOTIC-10) CAPS, Take by mouth, Disp: , Rfl:   •  Turmeric 500 MG TABS, Take 1 capsule by mouth, Disp: , Rfl:   •  ascorbic acid (VITAMIN C) 500 mg tablet, Take 1 tablet (500 mg total) by mouth 2 (two) times a day for 60 doses, Disp: 60 tablet, Rfl: 0  •  COVID-19 At Home Test (COVID-19 OTC Antigen 1-Pack) KIT, , Disp: , Rfl:   •  enoxaparin (LOVENOX) 40 mg/0.4 mL, Inject 0.4 mL (40 mg total) under the skin daily in the early morning for 28 days (Patient not taking: Reported on 12/23/2022), Disp: 11.2 mL, Rfl: 0  •  ferrous sulfate 324 (65 Fe) mg, Take 1 tablet (324 mg total) by mouth 2 (two) times a day before meals Take 1 pill BID, PRE-OP with Vit C,..may be constipatory (Patient not taking: Reported on 12/23/2022), Disp: 60 tablet, Rfl: 0  •  folic acid (FOLVITE) 1 mg tablet, Take 1 tablet (1 mg total) by mouth daily for 30 doses Take 1 pill po Qday PRE-OP, with Vit C, and Iron (Patient not taking: Reported on 12/23/2022), Disp: 30 tablet, Rfl: 0  •  psyllium (METAMUCIL) 0.52 g capsule, Take 0.52 g by mouth daily, Disp: , Rfl:      No Known Allergies               Vitals:     06/21/23 1541   BP: 122/84   Pulse: 77         Objective:                     Right Knee Exam      Muscle Strength   The patient has normal right knee strength.     Tenderness   The patient is experiencing tenderness in the medial joint line.     Range of Motion   Extension: 0   Flexion: 100 (with crepitation and stiffness at full flexion)      Other   Erythema: absent  Sensation: normal  Swelling: mild  Effusion: no effusion present        Left Knee Exam      Muscle Strength   The patient has normal left knee strength.     Tenderness   The patient is experiencing tenderness in the medial joint line.     Range of Motion   Extension: 0   Flexion: 100 (with crepitation and stiffness at full flexion)      Other   Erythema: absent  Sensation: normal  Swelling: mild  Effusion: no effusion present     Comments:     Both knees are in modest varus alignment with bony enlargement medially  Both extensor mechanisms are intact  Mildly antalgic gait                  Diagnostics, reviewed and taken today if performed as documented:     The attending physician has personally reviewed the pertinent films in PACS and interpretation is as follows:     Right and left knee x-rays taken and reviewed in the office today and show: Advanced degenerative changes with bone-on-bone opposition medial and patellofemoral compartments of both knees. Both knees are in varus deformity. Both knees have subchondral sclerosis and osteophyte formation present.        Procedures, if performed today:     Large joint arthrocentesis: bilateral knee  Universal Protocol:  Consent: Verbal consent obtained. Risks and benefits: risks, benefits and alternatives were discussed  Consent given by: patient  Time out: Immediately prior to procedure a "time out" was called to verify the correct patient, procedure, equipment, support staff and site/side marked as required.   Timeout called at: 6/21/2023 4:27 PM.  Patient understanding: patient states understanding of the procedure being performed  Site marked: the operative site was marked  Patient identity confirmed: verbally with patient     Supporting Documentation  Indications: pain and diagnostic evaluation   Procedure Details  Location: knee - bilateral knee  Preparation: Patient was prepped and draped in the usual sterile fashion  Needle size: 22 G  Ultrasound guidance: no  Approach: anterolateral     Medications (Right): 2 mL bupivacaine 0.25 %; 12 mg betamethasone acetate-betamethasone sodium phosphate 6 (3-3) mg/mLMedications (Left): 2 mL bupivacaine 0.25 %; 12 mg betamethasone acetate-betamethasone sodium phosphate 6 (3-3) mg/mL   Patient tolerance: patient tolerated the procedure well with no immediate complications  Dressing:  Sterile dressing applied                   Portions of the record may have been created with voice recognition software.  Occasional wrong word or "sound a like" substitutions may have occurred due to the inherent limitations of voice recognition software.  Read the chart carefully and recognize, using context, where substitutions have occurred. Instructions       Return for post-op with x-rays right knee.    Diagnosis ICD-10-CM Associated Orders   1. Primary osteoarthritis of right knee  M17.11 Large joint arthrocentesis: bilateral knee       2. Chronic pain of right knee  M25.561 Large joint arthrocentesis: bilateral knee     G89.29         3. Primary osteoarthritis of left knee  M17.12 Large joint arthrocentesis: bilateral knee       4. Chronic pain of left knee  M25.562 Large joint arthrocentesis: bilateral knee     G89.29         5. Pre-operative cardiovascular examination  Z01.810         6. Pre-operative laboratory examination  Z01.812         7. Healthcare maintenance  Z00.00               Return for post-op with x-rays right knee.          Knee Replacement   AMBULATORY CARE:   What you need to know about knee replacement:  Knee replacement is surgery to replace all or part of your knee joint. It is also called knee arthroplasty.         How to prepare for knee replacement:   • Weeks before your surgery:       ? Your healthcare provider will check your overall health. He or she will ask about your current knee pain or stiffness. Tell your provider how pain or stiffness affects your daily activities or ability to play sports. He or she may also ask about fatigue, anxiety, or depression you may have.      ? Some medicines will need to be stopped weeks before surgery. These medicines include blood thinning medicine, such as aspirin and ibuprofen. It also includes some antirheumatic medicines. Make sure your healthcare provider knows all medicines you are taking.  Also ask how long before surgery to stop taking them.     ? Your healthcare provider may have you do exercises to strengthen your leg muscles before surgery.     ? You may need x-rays to help your healthcare provider plan your surgery. Ask about any other tests you may need.     • The night before and the day of surgery:       ? Your healthcare provider may tell you not to eat or drink anything after midnight on the day of your surgery.      ? You will be told what medicines you can or cannot take the morning of surgery.     What will happen during knee replacement:   • You may be given general anesthesia to keep you asleep and free from pain during surgery. You may instead be given regional anesthesia, such as spinal or epidural anesthesia, or a peripheral nerve block. Regional anesthesia keeps you numb from the waist down, but you will be awake during surgery.      • Your surgeon will make an incision over your knee joint. He or she will remove the damaged parts of your knee joint and replace them with a knee implant. The knee implant may be made of metal and plastic. Your surgeon may secure it with medical cement.      • Your surgeon will move the muscles and other tissues around your joint back into place. He or she will close your incision with stitches or staples. He or she may use strips of medical tape and a bandage to cover your wound.        What to expect after knee replacement:   • It is normal to have increased stiffness and pain after surgery. Your pain and stiffness should get better with exercise.     • Do not get out of bed until your healthcare provider says it is okay. The physical therapist will help you walk after your surgery. When you walk the same day after surgery, it helps decrease pain and improves the function of your knee. You may use crutches or a walker.     • You may be in the hospital 1 to 4 days, or you may go home shortly after surgery. Your healthcare provider may talk to you about rehabilitation you can do at home. A physical therapist can teach you exercises to help strengthen your knee and prevent stiffness. You may also need occupational therapy to teach you the best ways to bathe and dress.    • You may  be given a joint replacement ID card. The card will tell which joint was replaced and when it was replaced. Tell all healthcare providers about your joint replacement surgery.     Risks of knee replacement:  You may bleed more than expected or get an infection. Nerves or blood vessels may be damaged during surgery. After surgery, your knee may be stiff or numb. You may continue to have knee pain. You may get a blood clot in your leg. This may become life-threatening. Your implant may get loose or move out of place. The implant may get worn out over time and need to be replaced. Call your local emergency number (911 in the [address]) for any of the following:   • You feel lightheaded, short of breath, and have chest pain.      • You cough up blood.     Seek care immediately if:   • Your leg feels warm, tender, and painful. It may look swollen and red.     • You cannot walk or move your knee.     • Blood soaks through your bandage.     • Your incision wound comes apart.     • Your incision area is red, swollen, or draining pus.     Call your doctor or surgeon if:   • You have a fever or chills.     • You have trouble moving or bending your knee.     • You have new knee pain or pain that does not get better with medicine.     • You have questions or concerns about your condition or care.     Medicines: You may  need any of the following:  • Prescription pain medicine  may be given. Ask your healthcare provider how to take this medicine safely. Some prescription pain medicines contain acetaminophen. Do not take other medicines that contain acetaminophen without talking to your healthcare provider. Too much acetaminophen may cause liver damage. Prescription pain medicine may cause constipation. Ask your healthcare provider how to prevent or treat constipation.      • NSAIDs , such as ibuprofen, help decrease swelling, pain, and fever. This medicine is available with or without a doctor's order.  NSAIDs can cause stomach bleeding or kidney problems in certain people. If you take blood thinner medicine, always ask your healthcare provider if NSAIDs are safe for you. Always read the medicine label and follow directions.     • Blood thinners  help prevent blood clots. Clots can cause strokes, heart attacks, and death. The following are general safety guidelines to follow while you are taking a blood thinner:     ? Watch for bleeding and bruising while you take blood thinners. Watch for bleeding from your gums or nose. Watch for blood in your urine and bowel movements. Use a soft washcloth on your skin, and a soft toothbrush to brush your teeth. This can keep your skin and gums from bleeding. If you shave, use an electric shaver. Do not play contact sports.      ? Tell your dentist and other healthcare providers that you take a blood thinner. Wear a bracelet or necklace that says you take this medicine.      ? Do not start or stop any other medicines unless your healthcare provider tells you to. Many medicines cannot be used with blood thinners.     ? Take your blood thinner exactly as prescribed by your healthcare provider. Do not skip does or take less than prescribed. Tell your provider right away if you forget to take your blood thinner, or if you take too much.     ? Warfarin  is a blood thinner that you may need to take. The following are things you should be aware of if you take warfarin:      - Foods and medicines can affect the amount of warfarin in your blood. Do not make major changes to your diet while you take warfarin. Warfarin works best when you eat about the same amount of vitamin K every day. Vitamin K is found in green leafy vegetables and certain other foods. Ask for more information about what to eat when you are taking warfarin.     - You will need to see your healthcare provider for follow-up visits when you are on warfarin. You will need regular blood tests.  These tests are used to decide how much medicine you need.     • Take your medicine as directed. Contact your healthcare provider if you think your medicine is not helping or if you have side effects. Tell your provider if you are allergic to any medicine. Keep a list of the medicines, vitamins, and herbs you take. Include the amounts, and when and why you take them. Bring the list or the pill bottles to follow-up visits. Carry your medicine list with you in case of an emergency.     Self-care:   • Apply ice  on your knee for 15 to 20 minutes every hour or as directed. Use an ice pack, or put crushed ice in a plastic bag. Cover it with a towel. Ice helps prevent tissue damage and decreases swelling and pain.      • Do not get the area wet  until your healthcare provider says it is okay. When your provider says it is okay, carefully wash the area with soap and water. Dry the area and put on new, clean bandages as directed.     • Care for your incision area  as directed. Do not put powders or lotions over the area. If you have strips of medical tape over the incision area, let them fall off on their own. If they do not fall off within 14 days, gently remove them. Check the area for signs of infection, such as swelling, redness, or pus.     • Go to physical or occupational therapy , if directed. A physical therapist will teach you exercises to build muscle strength, decrease pain and swelling, and prevent blood clots. An occupational therapist will show you how to do daily activities safely. He or she may recommend assistive devices to help you dress, bathe, and  objects. The assistive devices will help you prevent knee damage from twisting or bending.     Prevent blood clots:   • Wear pressure stockings as directed. Pressure stockings help keep blood from pooling in your leg veins. Your healthcare provider can prescribe stockings that are right for you. Do not buy over-the-counter pressure stockings unless your healthcare provider says it is okay. They may not fit correctly or may have elastic that cuts off your circulation. Ask your healthcare provider when to start wearing pressure stockings and how long to wear them each day.          • Do not cross your legs for 6 weeks or as directed. Your risk for blood clots is greater when you cross your legs. You might also move your implant out of place.     Activity guidelines:   • Know your limits. Start activities slowly and give yourself rest periods. Pain and swelling can increase when you do too much. Do not do an activity until your healthcare provider says you are ready.     • Use assistive devices as directed. Your thigh muscles will be weak after surgery. Assistive devices will help you not fall.      • Go up the stairs  by placing your non-operated leg on the step first. Then bring your operated leg to the same step. Repeat.     • Go down stairs  by placing your operated leg down on the step first. Then bring your non-operated leg to the same step. Repeat.     • Do light housekeeping only. Ask your healthcare provider which housekeeping activities are okay for you. Do not vacuum, change sheets on a bed, or anything that makes you reach up, bend, or twist.     • Do not drive for at least 6 weeks  or until your healthcare provider says it is okay.     • Do not play contact sports, tennis, golf, or ski  until your healthcare provider says it is okay. These activities can loosen your knee implant.     Prevent falls:     •   Remove all loose carpets and cords. These can cause you to trip and fall.     • Use a shower bench or chair  when you take a shower to limit the time you are standing.     • Use a toilet seat riser with arms  if your toilet seat is low. A toilet seat riser will help prevent bending or twisting your knee. Metal detectors and MRIs after joint replacement surgery:   • The metal in your joint may set off metal detectors, such as at an airport.  Tell the officials about your joint replacement surgery. You may also want to show your joint replacement ID card, if you were given one.     • MRIs are considered safe for people with joint replacements. The type of metal used is not magnetic. Tell all healthcare providers about your joint replacement surgery.     Follow up with your healthcare provider as directed: Your provider may contact you weeks after your surgery. He or she may ask if surgery helped relieve your pain or stiffness. Tell your provider how well you are able to do your daily activities after surgery. Also tell him or her if you are having any problems with mobility or range of motion. Write down your questions so you remember to ask them during your visits. © Copyright [employer] 2022 Information is for End User's use only and may not be sold, redistributed or otherwise used for commercial purposes. The above information is an  only. It is not intended as medical advice for individual conditions or treatments. Talk to your doctor, nurse or pharmacist before following any medical regimen to see if it is safe and effective for you.                After Visit Summary (Automatic SnapShot taken 6/21/2023)  Additional Documentation    Vitals:   /84   Pulse 77   Ht 4' 11" (1.499 m)   Wt 83.5 kg (184 lb)   LMP 10/27/2017 (Exact Date)   BMI 37.16 kg/m²   BSA 1.78 m²      More Vitals   SmartForms:    [facility] PRE-CHARTING •    [facility] PCMH/PCSP WRAP UP REQUIREMENTS ADVANCED •    [facility] SCRIBE ATTESTATION   . ..(2 more)   Encounter Info:   Billing Info,   History,   Allergies,   Detailed Report        Communications    View All Conversations on this Encounter  Orders Placed       Labs     CBC and differential     Anemia Panel w/Reflex     APTT     Comprehensive metabolic panel     Protime-INR     Hemoglobin A1C W/EAG Estimation     Type and screen   . Dahlia Ozuna .(5 more)     Other Orders     Large joint arthrocentesis: bilateral knee     Ambulatory referral to [facility] Closed     Ambulatory referral to surgical optimization Closed     Ambulatory referral to Physical Therapy Pending Review     Case request operating room: ARTHROPLASTY RIGHT KNEE TOTAL W ROBOT Once     EKG 12 lead   . Freida Swan .(4 more)  All Encounter Results  Medication Changes       None  Medication List  Medications Administered     Betamethasone Sod Phos & Acet 12 mg   Betamethasone Sod Phos & Acet 12 mg   BUPivacaine HCl 2 mL   BUPivacaine HCl 2 mL  Visit Diagnoses       Primary osteoarthritis of right knee     Chronic pain of right knee     Primary osteoarthritis of left knee     Chronic pain of left knee     Pre-operative cardiovascular examination     Pre-operative laboratory examination     Healthcare maintenance  Problem List  Encounters with Related Results     Appointment (9/8/2023)

## 2023-10-09 NOTE — ANESTHESIA POSTPROCEDURE EVALUATION
Post-Op Assessment Note    CV Status:  Stable  Pain Score: 0    Pain management: adequate     Mental Status:  Alert   Hydration Status:  Euvolemic   PONV Controlled:  None   Airway Patency:  Patent      Post Op Vitals Reviewed: Yes      Staff: Anesthesiologist, CRNA         There were no known notable events for this encounter.     BP   125/78   Temp   97   Pulse  65   Resp   14   SpO2   95

## 2023-10-09 NOTE — PLAN OF CARE
Problem: PAIN - ADULT  Goal: Verbalizes/displays adequate comfort level or baseline comfort level  Description: Interventions:  - Encourage patient to monitor pain and request assistance  - Assess pain using appropriate pain scale  - Administer analgesics based on type and severity of pain and evaluate response  - Implement non-pharmacological measures as appropriate and evaluate response  - Consider cultural and social influences on pain and pain management  - Notify physician/advanced practitioner if interventions unsuccessful or patient reports new pain  Outcome: Progressing     Problem: INFECTION - ADULT  Goal: Absence or prevention of progression during hospitalization  Description: INTERVENTIONS:  - Assess and monitor for signs and symptoms of infection  - Monitor lab/diagnostic results  - Monitor all insertion sites, i.e. indwelling lines, tubes, and drains  - Monitor endotracheal if appropriate and nasal secretions for changes in amount and color  - [place] appropriate cooling/warming therapies per order  - Administer medications as ordered  - Instruct and encourage patient and family to use good hand hygiene technique  - Identify and instruct in appropriate isolation precautions for identified infection/condition  Outcome: Progressing     Problem: SAFETY ADULT  Goal: Patient will remain free of falls  Description: INTERVENTIONS:  - Educate patient/family on patient safety including physical limitations  - Instruct patient to call for assistance with activity   - Consult OT/PT to assist with strengthening/mobility   - Keep Call bell within reach  - Keep bed low and locked with side rails adjusted as appropriate  - Keep care items and personal belongings within reach  - Initiate and maintain comfort rounds  - Make Fall Risk Sign visible to staff  - Offer Toileting every 2-4 Hours, in advance of need  - Initiate/Maintain bed/chair alarm  - Obtain necessary fall risk management equipment: nonskid footwear - Apply yellow socks and bracelet for high fall risk patients  - Consider moving patient to room near nurses station  Outcome: Progressing  Goal: Maintain or return to baseline ADL function  Description: INTERVENTIONS:  -  Assess patient's ability to carry out ADLs; assess patient's baseline for ADL function and identify physical deficits which impact ability to perform ADLs (bathing, care of mouth/teeth, toileting, grooming, dressing, etc.)  - Assess/evaluate cause of self-care deficits   - Assess range of motion  - Assess patient's mobility; develop plan if impaired  - Assess patient's need for assistive devices and provide as appropriate  - Encourage maximum independence but intervene and supervise when necessary  - Involve family in performance of ADLs  - Assess for home care needs following discharge   - Consider OT consult to assist with ADL evaluation and planning for discharge  - Provide patient education as appropriate  Outcome: Progressing  Goal: Maintains/Returns to pre admission functional level  Description: INTERVENTIONS:  - Perform BMAT or MOVE assessment daily.   - Set and communicate daily mobility goal to care team and patient/family/caregiver. - Collaborate with rehabilitation services on mobility goals if consulted  - Perform Range of Motion 3 times a day. - Reposition patient every 2 hours.   - Dangle patient 3 times a day  - Stand patient 3 times a day  - Ambulate patient 3 times a day  - Out of bed to chair 3 times a day   - Out of bed for meals 3 times a day  - Out of bed for toileting  - Record patient progress and toleration of activity level   Outcome: Progressing     Problem: DISCHARGE PLANNING  Goal: Discharge to home or other facility with appropriate resources  Description: INTERVENTIONS:  - Identify barriers to discharge w/patient and caregiver  - Arrange for needed discharge resources and transportation as appropriate  - Identify discharge learning needs (meds, wound care, etc.)  - Arrange for interpretive services to assist at discharge as needed  - Refer to Case Management Department for coordinating discharge planning if the patient needs post-hospital services based on physician/advanced practitioner order or complex needs related to functional status, cognitive ability, or social support system  Outcome: Progressing     Problem: Knowledge Deficit  Goal: Patient/family/caregiver demonstrates understanding of disease process, treatment plan, medications, and discharge instructions  Description: Complete learning assessment and assess knowledge base.   Interventions:  - Provide teaching at level of understanding  - Provide teaching via preferred learning methods  Outcome: Progressing

## 2023-10-09 NOTE — PHYSICAL THERAPY NOTE
Physical Therapy Evaluation     Patient's Name: Briseyda Lloyd    Admitting Diagnosis  Primary osteoarthritis of right knee [M17.11]  Chronic pain of right knee [M25.561, G89.29]    Problem List  Patient Active Problem List   Diagnosis    Hyperlipidemia    Impaired fasting glucose    Nontoxic multinodular goiter    Morbid obesity ([address])    Undifferentiated connective tissue disease ([address])    Polyarthralgia    Cellulitis of finger, right    Arthritis of both knees    Menorrhagia    Primary osteoarthritis of right knee       Past Medical History  Past Medical History:   Diagnosis Date    Arthritis 2013    Chest pain 2014    Disease of thyroid gland     Elevated blood sugar 2014    Heart murmur     Functional, child    Hypercholesteremia     Hyperlipidemia     Obesity 2010    Osteoarthritis     Urinary tract infection     Recurring       Past Surgical History  Past Surgical History:   Procedure Laterality Date    BIOPSY CORE NEEDLE      Thyroid Using Percutaneous Core Needle     SECTION      x 2    FOOT SURGERY Bilateral     As a teenager - Bunionectomy - Dr. Lucero Tovar - Last Assessed: 2016    THYROID LOBECTOMY Right 2015    Dr. Jen Krutzters: Right Lobe - Last Assessed: 2016 Dr. Lucero Tovar        10/09/23 1632   PT Last Visit   PT Visit Date 10/09/23   Note Type   Note type Evaluation   Pain Assessment   Pain Assessment Tool 0-10   Pain Score 4   Pain Location/Orientation Orientation: Right;Location: Knee   Hospital Pain Intervention(s) Cold applied;Repositioned; Ambulation/increased activity; Emotional support   Restrictions/Precautions   Weight Bearing Precautions Per Order Yes   RLE Weight Bearing Per Order WBAT   Other Precautions WBS; Multiple lines; Fall Risk;Pain   Home Living   Type of [address] [place] [address]  Two level;1/2 bath on main level  (1 ELIZABETH)   Bathroom Shower/Tub Tub/shower unit   Bathroom Toilet Standard   Bathroom Equipment Grab bars in shower; Shower chair  (suction cup grab bars)   Home Equipment Walker  (was not using PTA)   Prior Function   Level of [place] Independent with ADLs; Independent with functional mobility; Independent with IADLS   Lives With Spouse;Daughter   Receives Help From Family   IADLs Independent with driving; Independent with meal prep; Independent with medication management   Falls in the last 6 months 0   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))   General   Family/Caregiver Present Yes   Cognition   Overall Cognitive Status WFL   Arousal/Participation Cooperative   Orientation Level Oriented X4   Memory Within functional limits   Following Commands Follows all commands and directions without difficulty   Comments pt very pleasant and cooperative to participate in therapy session   RLE Assessment   RLE Assessment   (functionally 3/5)   LLE Assessment   LLE Assessment WFL   Bed Mobility   Supine to Sit 4  Minimal assistance   Additional items Assist x 1;Bedrails;HOB elevated; Increased time required;Verbal cues;LE management   Sit to Supine Unable to assess   Additional Comments pt left sitting OOB in recliner with all needs within reach   Transfers   Sit to Stand 3  Moderate assistance   Additional items Assist x 2; Increased time required;Verbal cues   Stand to Sit 3  Moderate assistance   Additional items Assist x 2; Increased time required;Verbal cues   Stand pivot 3  Moderate assistance   Additional items Assist x 2; Increased time required;Verbal cues   Additional Comments transfers with b/l HHA; b/l knee buckling   Ambulation/Elevation   Gait pattern Excessively slow; Short stride; Foward flexed;Decreased foot clearance;L Knee [place]; Improper Weight shift;R Knee [place]   Gait Assistance 3  Moderate assist   Additional items Assist x 2;Verbal cues; Tactile cues   Assistive Device Other (Comment)  (b/l HHA)   Distance 2'  (limited by nausea, decreased LLE sensation)   Stair Management Assistance Not tested Balance   Static Sitting Fair +   Dynamic Sitting Fair   Static Standing Poor   Dynamic Standing Poor -   Ambulatory Poor -   Activity Tolerance   Activity Tolerance Patient limited by fatigue   Medical Staff [address] [facility]   Nurse Made Aware RN cleared pt to participate in therapy session   Assessment   Prognosis Good   Problem List Decreased strength;Decreased endurance;Decreased range of motion; Impaired balance;Decreased mobility;Pain;Orthopedic restrictions   Assessment Pt is a 63 y.o. female seen for PT evaluation s/p admit to [address] [place] [address] on 10/9/2023. Pt was admitted with a primary dx of: OA of R knee s/p elective R TKA. Pt WBAT to RLE. PT now consulted for assessment of mobility and d/c needs. Pt with activity beginning POD 0 orders. Pts current comorbidities effecting treatment include: polyarthralgia, obesity. Pt  has a past medical history of Arthritis (2013), Chest pain (07/01/2014), Disease of thyroid gland, Elevated blood sugar (07/01/2014), Heart murmur, Hypercholesteremia, Hyperlipidemia, Obesity (2010), Osteoarthritis, and Urinary tract infection. Pts current clinical presentation is Unstable/ Unpredictable (high complexity) due to Ongoing medical management for primary dx, Increased reliance on more restrictive AD compared to baseline, Decreased activity tolerance compared to baseline, Fall risk, Increased assistance needed from caregiver at current time, s/p post op day 0, Current WBS, Trending lab values, LAURO drain in place at current time, s/p surgical intervention. Prior to admission, pt was residing with spouse and daughter in [address] [place] [address] with [facility] and was independent without AD use. Upon evaluation, pt currently is requiring min A for bed mobility; mod Ax2 for transfers; mod Ax2 for ambulation 2 ft w/ b/l HHA.  Pt presents at PT eval functioning below baseline and currently w/ overall mobility deficits 2* to: BLE weakness, decreased ROM, impaired balance, decreased endurance, gait deviations, pain, decreased activity tolerance compared to baseline, decreased functional mobility tolerance compared to baseline, fall risk, orthopedic restrictions. Pt currently at a fall risk 2* to impairments listed above. Pt will continue to benefit from skilled acute PT interventions to address stated impairments; to maximize functional mobility; for ongoing pt/ family training; and DME needs. At conclusion of PT session pt returned back in chair with phone and call bell within reach. Pt denies any further questions at this time. The patient's AM-PAC Basic Mobility Inpatient Short Form Raw Score is 10. A Raw score of less than or equal to 16 suggests the patient may benefit from discharge to post-acute rehabilitation services. Please also refer to the recommendation of the Physical Therapist for safe discharge planning. Recommend home with OPPT + increased social support pending progress upon hospital D/C. Goals   Patient Goals to feel better   STG Expiration Date 10/23/23   Short Term Goal #1 STG 1. Pt will be able to perform bed mobility tasks with mod I level in order to improve overall functional mobility and assist in safe d/c. STG 2. Pt with sit EOB for at least 25 minutes at mod I level in order to strengthen abdominal musculature and assist in future transfers/ ambulation. STG 3. Pt will be able to perform functional transfer with mod I level in order to improve overall functional mobility and assist in safe d/c. STG 4. Pt will be able to ambulate at least 250 feet with least restrictive device with mod I level A in order to improve overall functional mobility and assist in safe d/c. STG 5. Pt will improve sitting/standing static/dynamic balance 1/2 grade in order to improve functional mobility and assist in safe d/c. STG 6. Pt will improve LE strength by 1/2 grade in order to improve functional mobility and assist in safe d/c. STG 7.  Pt will be able to negotiate at least 12 stairs with least restrictive device with mod I level A in order to improve overall functional mobility and assist in safe d/c.   PT Treatment Day 0   Plan   Treatment/Interventions ADL retraining;Functional transfer training;LE strengthening/ROM; Elevations; Endurance training; Therapeutic exercise;Patient/family training;Equipment eval/education; Bed mobility;Gait training;Spoke to nursing;Spoke to case management;OT   PT Frequency Twice a day   Discharge Recommendation   PT Discharge Recommendation Home with outpatient rehabilitation ( pending continued progress)    Equipment Recommended [address] walker   Change/add to GeoSentric?  Yes, Change Size   Walker Size Héctor (Ht <5'1")   AM-PAC Basic Mobility Inpatient   Turning in Flat Bed Without Bedrails 3   Lying on Back to Sitting on Edge of Flat Bed Without Bedrails 3   Moving Bed to Chair 1   Standing Up From Chair Using Arms 1   Walk in Room 1   Climb 3-5 Stairs With Railing 1   Basic Mobility Inpatient Raw Score 10   Highest Level Of Mobility   JH-HLM Goal 4: Move to chair/commode   JH-HLM Achieved 4: Move to chair/commode   Modified Idris Scale   Modified Vidor Scale 4           [facility] Son, PT DPT

## 2023-10-09 NOTE — PROGRESS NOTES
Progress Note - Orthopedics   Carmelo Alberto 63 y.o. female MRN: [id number]  Unit/Bed#: PACU 08      Subjective:    63 y. o.female POD 0 R TKA. No acute events, no new complaints. Patient doing well. Pain well controlled. Resting comfortably in PACU requesting lunch. Labs:  0   Lab Value Date/Time    HCT 45.8 09/08/2023 1039    HCT 45.6 06/16/2023 1630    HCT 43.7 11/23/2022 1004    HGB 15.2 09/08/2023 1039    HGB 14.7 06/16/2023 1630    HGB 14.2 11/23/2022 1004    INR 1.03 09/08/2023 1039    WBC 3.25 (L) 09/08/2023 1039    WBC 5.07 06/16/2023 1630    WBC 3.18 (L) 11/23/2022 1004    ESR 22 08/03/2021 1203    CRP 9.6 (H) 06/16/2023 1630       Meds:    Current Facility-Administered Medications:   •  albuterol inhalation solution 2.5 mg, 2.5 mg, Nebulization, Once PRN, Agnes Orozco MD  •  bupivacaine liposomal (EXPAREL) 1.3 % injection 20 mL, 20 mL, Infiltration, Once, Agnes Orozco MD  •  ceFAZolin (ANCEF) IVPB (premix in dextrose) 2,000 mg 50 mL, 2,000 mg, Intravenous, Once, Francis Hallman MD  •  HYDROmorphone (DILAUDID) injection 0.5 mg, 0.5 mg, Intravenous, Q5 Min PRN, Agnes Orozco MD, 0.5 mg at 10/09/23 1518  •  labetalol (NORMODYNE) injection 5 mg, 5 mg, Intravenous, Q10 Min PRN, Agnes Orozco MD  •  lactated ringers infusion, 125 mL/hr, Intravenous, Continuous, Agnes Orozco MD, Last Rate: 125 mL/hr at 10/09/23 1035, 125 mL/hr at 10/09/23 1035  •  lidocaine (PF) (XYLOCAINE-MPF) 1 % injection 0.5 mL, 0.5 mL, Infiltration, Once PRN, Agnes Orozco MD  •  meperidine (DEMEROL) injection 12.5 mg, 12.5 mg, Intravenous, Once, Agnes Orozco MD  •  ondansetron [facility]) injection 4 mg, 4 mg, Intravenous, Once PRN, Agnes Orozco MD  •  promethazine (PHENERGAN) injection 12.5 mg, 12.5 mg, Intravenous, Once PRN, Agnes Orozco MD    Blood Culture:   No results found for: "BLOODCX"    Wound Culture:   No results found for: "WOUNDCULT"    Ins and Outs:  I/O last 24 hours:   In: 2000 [I.V.:2000]  Out: 650 [Urine:650]          Physical:  Vitals:    10/09/23 1516   BP: 105/63   Pulse: 62   Resp: 12   Temp:    SpO2: 98%     Musculoskeletal: right Lower Extremity  · Dressing C/D/I without strike thru  · TTP julio incisionally  · Sensation intact to saphenous, sural, tibial, superficial peroneal nerve, and deep peroneal  · Motor intact to +FHL/EHL, +ankle dorsi/plantar flexion  · Palpable DP pulse  · Digits warm and well perfused  · Capillary refill < 2 seconds    Assessment:    62 y. o.female POD 0 R TKA. Patient doing well.      Plan:  · WBAT RLE  · Monitor drain outputs qShift  · Will monitor for ABLA and administer IVF/prbc as indicated for Greater than 2 gram drop or Hgb < 7  · PT/OT to begin POD 0  · Pain control  · DVT ppx lov  · Appreciate medical comanagement  · Dispo: pending PT, AM labs    Tommy Guy MD

## 2023-10-09 NOTE — OP NOTE
OPERATIVE REPORT  PATIENT NAME: Briseyda Lloyd  : 1960  MRN: [id number]  Pt Location:  [facility] OR ROOM 18    Surgery Date: 10/9/2023    Surgeon(s) and Role:     * Aide Holcomb MD - Primary     * John Dill PA-C - Assisting     * Adán Gordon MD - Assisting     Preop Diagnosis:  Primary osteoarthritis of right knee [M17.11]  Chronic pain of right knee [M25.561, G89.29]    Post-Op Diagnosis Codes:     * Primary osteoarthritis of right knee [M17.11]     * Chronic pain of right knee [M25.561, G89.29]    Procedure(s):  Right - ARTHROPLASTY RIGHT KNEE TOTAL W ROBOT    Specimens:  * No specimens in log *    Estimated Blood Loss:   Minimal    Drains:  Closed/Suction Drain Right;Lateral Knee Accordion 10 Fr. (Active)   Dressing Status Clean;Dry; Intact 10/09/23 1341   Status Accordion suction 10/09/23 1341   Number of days: 0       Urethral Catheter Latex;Straight-tip 16 Fr. (Active)   Number of days: 0       Anesthesia Type:   Choice     Operative Indications:  Primary osteoarthritis of right knee [M17.11]  Chronic pain of right knee [M25.561, G89.29]    Operative Findings:  depuy attune   Femur-3N   Poly-7   Tibia-2   [id number]    Complications:   None    Knee Technique: Suture (direct) Repair  Knee Approach: Medial Parapatellar    Procedure and Technique: Following the induction of adequate level of spinal anesthesia, a Terrazas catheter was then sterilely introduced into the patient's bladder. Antibiotics administered. The right thigh was then fitted with a thigh-high tourniquet. The right lower extremity was then prepped. Right lower extremity was exsanguinated gravity, the tourniquet inflated to 300 mmHg. A midline knee incision was carried the knee in flexion. Full-thickness flaps were raised when I excised the extensor mechanism. A medial arthrotomy was created to open up the knee joint. 2 half pins were placed from proximal tibia, 2 half pins were placed in the distal femur.   Doing so, modules were created. The arrays and attention modules. Checkpoints in the proximal tibia distal femur. The knee was then registered. The surgery was planned on the computer, the plan was finalized. The robot was docked. First maneuver involved the distal femoral cut followed by anterior posterior cuts. The chamfer cuts completed the process. Proximal tibial cut was made next. Care was taken preserve the integrity medial condyle, lateral condyle, posterior structures during these maneuvers. The box cut was made for the posterior stabilized unit, remnant medial and lateral meniscus then performed. Trials were inserted, the knee was taken through range of motion, found to begin both full extension, good flexion, stable throughout. The patella was then resurfaced while utilizing manufacture description, is found to be a size 32 mm button. Trial components removed and the knee was prepared for insertion of cemented components. The cemented tibia, cemented femur, trial poly-, cemented patella in place. Excess cement was removed, the knee was brought into extension. The cement was cured. The trial poly was taken out, the knee was packed. The tourniquet was deflated, hemostasis was secured. The insert polyethylene was then snapped into position. The knee was taken through final range of motion, and found to be In full extension, good flexion, stable throughout, next patella tracking. Satisfied with the extent of surgery, the wounds were flushed with saline and closed. A Betadine soak initiated. A drain was placed deep part of his cephalad stab incision. The arthrotomy was closed with #1 Vicryl suture. The subcu tissue closed with 2-0 Vicryl suture. The skin was then closed with staples. Sterile dressings were applied.   He was then transferred to recovery room in stable condition plans include physical therapy weightbearing tolerance, she will require DVT prophylaxis with Lovenox   I was present for the entire procedure.     Patient Disposition:  PACU             SIGNATURE: Saurabh Ortega MD  DATE: October 9, 2023  TIME: 1:57 PM

## 2023-10-10 PROBLEM — G89.18 ACUTE POST-OPERATIVE PAIN: Status: ACTIVE | Noted: 2023-10-10

## 2023-10-10 LAB
ANION GAP SERPL CALCULATED.3IONS-SCNC: 5 MMOL/L
BUN SERPL-MCNC: 9 MG/DL (ref 5–25)
CALCIUM SERPL-MCNC: 8.4 MG/DL (ref 8.4–10.2)
CHLORIDE SERPL-SCNC: 106 MMOL/L (ref 96–108)
CO2 SERPL-SCNC: 26 MMOL/L (ref 21–32)
CREAT SERPL-MCNC: 0.57 MG/DL (ref 0.6–1.3)
ERYTHROCYTE [DISTWIDTH] IN BLOOD BY AUTOMATED COUNT: 12 % (ref 11.6–15.1)
GFR SERPL CREATININE-BSD FRML MDRD: 101 ML/MIN/1.73SQ M
GLUCOSE P FAST SERPL-MCNC: 142 MG/DL (ref 65–99)
GLUCOSE SERPL-MCNC: 142 MG/DL (ref 65–140)
HCT VFR BLD AUTO: 36.3 % (ref 34.8–46.1)
HGB BLD-MCNC: 11.9 G/DL (ref 11.5–15.4)
MCH RBC QN AUTO: 29.3 PG (ref 26.8–34.3)
MCHC RBC AUTO-ENTMCNC: 32.8 G/DL (ref 31.4–37.4)
MCV RBC AUTO: 89 FL (ref 82–98)
PLATELET # BLD AUTO: 101 THOUSANDS/UL (ref 149–390)
PMV BLD AUTO: 11.5 FL (ref 8.9–12.7)
POTASSIUM SERPL-SCNC: 4 MMOL/L (ref 3.5–5.3)
RBC # BLD AUTO: 4.06 MILLION/UL (ref 3.81–5.12)
SODIUM SERPL-SCNC: 137 MMOL/L (ref 135–147)
WBC # BLD AUTO: 9.61 THOUSAND/UL (ref 4.31–10.16)

## 2023-10-10 PROCEDURE — 97116 GAIT TRAINING THERAPY: CPT

## 2023-10-10 PROCEDURE — 97530 THERAPEUTIC ACTIVITIES: CPT

## 2023-10-10 PROCEDURE — 80048 BASIC METABOLIC PNL TOTAL CA: CPT

## 2023-10-10 PROCEDURE — NC001 PR NO CHARGE: Performed by: NURSE PRACTITIONER

## 2023-10-10 PROCEDURE — 99232 SBSQ HOSP IP/OBS MODERATE 35: CPT | Performed by: NURSE PRACTITIONER

## 2023-10-10 PROCEDURE — 97167 OT EVAL HIGH COMPLEX 60 MIN: CPT

## 2023-10-10 PROCEDURE — 99024 POSTOP FOLLOW-UP VISIT: CPT | Performed by: ORTHOPAEDIC SURGERY

## 2023-10-10 PROCEDURE — 99232 SBSQ HOSP IP/OBS MODERATE 35: CPT | Performed by: INTERNAL MEDICINE

## 2023-10-10 PROCEDURE — 85027 COMPLETE CBC AUTOMATED: CPT

## 2023-10-10 RX ORDER — KETOROLAC TROMETHAMINE 30 MG/ML
15 INJECTION, SOLUTION INTRAMUSCULAR; INTRAVENOUS EVERY 6 HOURS SCHEDULED
Status: COMPLETED | OUTPATIENT
Start: 2023-10-10 | End: 2023-10-11

## 2023-10-10 RX ORDER — METHOCARBAMOL 500 MG/1
500 TABLET, FILM COATED ORAL EVERY 6 HOURS SCHEDULED
Status: DISCONTINUED | OUTPATIENT
Start: 2023-10-10 | End: 2023-10-11 | Stop reason: HOSPADM

## 2023-10-10 RX ORDER — HYDROMORPHONE HCL IN WATER/PF 6 MG/30 ML
0.2 PATIENT CONTROLLED ANALGESIA SYRINGE INTRAVENOUS EVERY 2 HOUR PRN
Status: DISCONTINUED | OUTPATIENT
Start: 2023-10-10 | End: 2023-10-11

## 2023-10-10 RX ADMIN — KETOROLAC TROMETHAMINE 15 MG: 30 INJECTION, SOLUTION INTRAMUSCULAR; INTRAVENOUS at 12:48

## 2023-10-10 RX ADMIN — OXYCODONE HYDROCHLORIDE 10 MG: 10 TABLET ORAL at 08:05

## 2023-10-10 RX ADMIN — ENOXAPARIN SODIUM 40 MG: 40 INJECTION SUBCUTANEOUS at 08:05

## 2023-10-10 RX ADMIN — HYDROXYCHLOROQUINE SULFATE 400 MG: 200 TABLET ORAL at 08:06

## 2023-10-10 RX ADMIN — OXYCODONE HYDROCHLORIDE 10 MG: 10 TABLET ORAL at 03:09

## 2023-10-10 RX ADMIN — ACETAMINOPHEN 975 MG: 325 TABLET, FILM COATED ORAL at 18:14

## 2023-10-10 RX ADMIN — ACETAMINOPHEN 975 MG: 325 TABLET, FILM COATED ORAL at 08:05

## 2023-10-10 RX ADMIN — DOCUSATE SODIUM 100 MG: 100 CAPSULE, LIQUID FILLED ORAL at 18:14

## 2023-10-10 RX ADMIN — CEFAZOLIN SODIUM 1000 MG: 1 SOLUTION INTRAVENOUS at 03:09

## 2023-10-10 RX ADMIN — SODIUM CHLORIDE, SODIUM LACTATE, POTASSIUM CHLORIDE, AND CALCIUM CHLORIDE 1000 ML: .6; .31; .03; .02 INJECTION, SOLUTION INTRAVENOUS at 09:39

## 2023-10-10 RX ADMIN — METHOCARBAMOL 500 MG: 500 TABLET ORAL at 12:45

## 2023-10-10 RX ADMIN — SENNOSIDES 8.6 MG: 8.6 TABLET, FILM COATED ORAL at 08:05

## 2023-10-10 RX ADMIN — OXYCODONE HYDROCHLORIDE 5 MG: 5 TABLET ORAL at 12:45

## 2023-10-10 RX ADMIN — SODIUM CHLORIDE 1000 ML: 0.9 INJECTION, SOLUTION INTRAVENOUS at 14:25

## 2023-10-10 RX ADMIN — IRON SUCROSE 200 MG: 20 INJECTION, SOLUTION INTRAVENOUS at 10:43

## 2023-10-10 RX ADMIN — DOCUSATE SODIUM 100 MG: 100 CAPSULE, LIQUID FILLED ORAL at 08:05

## 2023-10-10 RX ADMIN — METHOCARBAMOL 500 MG: 500 TABLET ORAL at 18:14

## 2023-10-10 RX ADMIN — KETOROLAC TROMETHAMINE 15 MG: 30 INJECTION, SOLUTION INTRAMUSCULAR; INTRAVENOUS at 18:14

## 2023-10-10 NOTE — DISCHARGE SUMMARY
ORTHOPEDICS DISCHARGE SUMMARY  Aliyah Guerrero 63 y.o. female MRN: [id number]  Unit/Bed#: -01    Attending Physician: Maryhelen Dakin    Admitting diagnosis: Primary osteoarthritis of right knee [M17.11]  Chronic pain of right knee [M25.561, G89.29]    Discharge diagnosis: Primary osteoarthritis of right knee [M17.11]  Chronic pain of right knee [M25.561, G89.29]    Date of admission: 10/9/2023    Date of discharge: 10/11/23         Procedure: right total knee arthroplasty    HPI:  This is a 63y.o. year old female that presented to the office with signs and symptoms of right knee osteoarthritis. They tried and failed conservative treatment measures and wished to proceed with surgical intervention. The risks, benefits, and complications of the procedure were discussed with the patient and informed consent was obtained. Hospital Course: The patient was admitted to the hospital on 10/9/2023 and underwent an uncomplicated right total knee arthroplasty. They were transferred to the floor after a brief stay in the post-anesthesia care unit. Their pain was well managed with IV and oral pain medications. They began therapy on post operative day #1. Lovenox was also started for DVT prophylaxis 12 hours post operatively. Hemovac drain was removed on POD1. On discharge date pt was cleared by PT and the medicine team and determined to be safe for discharge. Daily discussion was had with the patient, nursing staff, orthopaedic team, and family members if present. All questions were answered to the patients satisifaction.       0   Lab Value Date/Time    HGB 10.1 (L) 10/11/2023 0442    HGB 11.9 10/10/2023 0443    HGB 15.2 09/08/2023 1039    HGB 14.7 06/16/2023 1630    HGB 14.2 11/23/2022 1004    HGB 14.8 05/17/2022 1035    HGB 13.8 08/03/2021 1203    HGB 14.9 05/25/2021 1422    HGB 14.7 10/17/2019 1023    HGB 14.5 10/12/2018 1050    HGB 13.9 10/26/2017 1011    HGB 14.1 08/03/2017 1016    HGB 14.1 06/01/2017 1131    HGB 14.3 04/06/2017 1037    HGB 14.0 01/27/2017 1026    HGB 13.3 07/12/2016 1052       Greater than 2 gram drop which qualifies for diagnosis of acute blood loss anemia. Vital signs remained stable and pt was resuscitated with IVF as needed   Body mass index is 38.38 kg/m². morbidly obese. Recommend behavior modifications, nutrition and physical activity. Discharge Instructions: The patient was discharged weight bearing as tolerated to the right lower extremity. Lovenox will be continued for 28 days. Continue PT/OT. Take pain medications as instructed. Discharge Medications: For the complete list of discharge medications, please refer to the patient's medication reconciliation.

## 2023-10-10 NOTE — PLAN OF CARE
Problem: PAIN - ADULT  Goal: Verbalizes/displays adequate comfort level or baseline comfort level  Description: Interventions:  - Encourage patient to monitor pain and request assistance  - Assess pain using appropriate pain scale  - Administer analgesics based on type and severity of pain and evaluate response  - Implement non-pharmacological measures as appropriate and evaluate response  - Consider cultural and social influences on pain and pain management  - Notify physician/advanced practitioner if interventions unsuccessful or patient reports new pain  Outcome: Progressing     Problem: INFECTION - ADULT  Goal: Absence or prevention of progression during hospitalization  Description: INTERVENTIONS:  - Assess and monitor for signs and symptoms of infection  - Monitor lab/diagnostic results  - Monitor all insertion sites, i.e. indwelling lines, tubes, and drains  - Monitor endotracheal if appropriate and nasal secretions for changes in amount and color  - Mohler appropriate cooling/warming therapies per order  - Administer medications as ordered  - Instruct and encourage patient and family to use good hand hygiene technique  - Identify and instruct in appropriate isolation precautions for identified infection/condition  Outcome: Progressing

## 2023-10-10 NOTE — CASE MANAGEMENT
Case Management Assessment & Discharge Planning Note    Patient name Elias Acevedo  Location [address] [facility] [id number] MRN [id number]  : 1960 Date 10/10/2023       Current Admission Date: 10/9/2023  Current Admission Diagnosis:Primary osteoarthritis of right knee   Patient Active Problem List    Diagnosis Date Noted   • Primary osteoarthritis of right knee 2023   • Cellulitis of finger, right 03/15/2023   • Undifferentiated connective tissue disease ([facility]) 2017   • Polyarthralgia 2017   • Morbid obesity ([facility]) 01/10/2017   • Arthritis of both knees 2016   • Impaired fasting glucose 2016   • Menorrhagia 2014   • Hyperlipidemia 2014   • Nontoxic multinodular goiter 10/18/2010      LOS (days): 0  Geometric Mean LOS (GMLOS) (days):   Days to GMLOS:     OBJECTIVE:              Current admission status: Outpatient Surgery       Preferred Pharmacy:   [facility]/pharmacy #[id number]- [id number] [facility], [address] [facility]. [address] [place] [id number]  Phone: [phone] Fax: 0606 [facility] [address] [place] [address]  Phone: [phone] Fax: [phone]    Primary Care Provider: Odalis Pacheco MD    Primary Insurance: BLUE CROSS  Secondary Insurance:     ASSESSMENT:  [facility] Proxies    There are no active Health Care Proxies on file. Readmission Root Cause  30 Day Readmission: No    Patient Information  Admitted from[de-identified] Home  Mental Status: Alert  During Assessment patient was accompanied by: Spouse  Assessment information provided by[de-identified] Patient  Primary Caregiver: Self  Support Systems: Self, Spouse/significant other  [facility]: [facility] [address] [place] [address] do you live in?: [address] entry access options.  Select all that apply.: Stairs  Number of steps to enter home.: 1 (through garage)  Type of Current Residence: 2 story home  Upon entering residence, is there a bedroom on the main floor (no further steps)?: No  A bedroom is located on the following floor levels of residence (select all that apply):: 2nd Floor  Upon entering residence, is there a bathroom on the main floor (no further steps)?: Yes (half bath)  Number of steps to 2nd floor from main floor: One Flight (landing midway up)  In the last 12 months, was there a time when you were not able to pay the mortgage or rent on time?: No  In the last 12 months, how many places have you lived?: 1  In the last 12 months, was there a time when you did not have a steady place to sleep or slept in a shelter (including now)?: No  Homeless/housing insecurity resource given?: N/A  Living Arrangements: Lives w/ Spouse/significant other, Lives w/ Daughter  Is patient a ?: No    Activities of Daily Living Prior to Admission  Functional Status: Independent  Ambulates independently?: Yes  Does patient use assisted devices?: No  Does patient currently own DME?: Yes  What DME does the patient currently own?: Andrés Matthews  Does patient have a history of Outpatient Therapy (PT/OT)?: Yes ([facility])  Does the patient have a history of Short-Term Rehab?: Yes  Does patient have a history of HHC?: Yes  Does patient currently have [address]?: No         Patient Information Continued  Income Source:  Other (Comment) ( supports)  Within the past 12 months, you worried that your food would run out before you got the money to buy more.: Never true  Within the past 12 months, the food you bought just didn't last and you didn't have money to get more.: Never true  Food insecurity resource given?: N/A  Does patient receive dialysis treatments?: No  Does patient have a history of substance abuse?: No  Does patient have a history of Mental Health Diagnosis?: No         Means of Transportation  Means of Transport to [facility][de-identified] Drives Self (family will transport as recovers)  In the past 12 months, has lack of transportation kept you from medical appointments or from getting medications?: No  In the past 12 months, has lack of transportation kept you from meetings, work, or from getting things needed for daily living?: No  Was application for public transport provided?: N/A        DISCHARGE DETAILS:    Discharge planning discussed with[de-identified] patietn and  at bedside        CM contacted family/caregiver?: Yes  Were Treatment Team discharge recommendations reviewed with patient/caregiver?: Yes  Did patient/caregiver verbalize understanding of patient care needs?: Yes  Were patient/caregiver advised of the risks associated with not following Treatment Team discharge recommendations?: Yes    Contacts  Patient Contacts: Russ Corbett (Spouse)   [phone]  Relationship to Patient[de-identified] Family  Contact Method: In Person  Reason/Outcome: Emergency Contact, Continuity of Care, Discharge [address] [facility] [address]         Is the patient interested in [address] at discharge?: No    DME Referral Provided  Referral made for DME?: No                 Discharge Destination Plan[de-identified] Home  Transport at Discharge : Family          Additional Comments: Patient's  Michael Wolf came to the bedside with a walker. Michael Wolf says the patient's insurance coordinated the walker prior to the surgical procedure. As per PT a Héctor walker is recommended and an adult walker was delivered to the home. Awaiting PT recs concerning the adult walker if the walker has to be exchanged. CM to be available      CM reviewed d/c planning process including the following: identifying help at home, patient preference for d/c planning needs, Discharge Lounge, [facility] Meds to Bed program, availability of treatment team to discuss questions or concerns patient and/or family may have regarding understanding medications and recognizing signs and symptoms once discharged. CM also encouraged patient to follow up with all recommended appointments after discharge.  Patient advised of importance for patient and family to participate in managing patient’s medical well being. 4:45pm  Cm was informed by MS 4 Charge RN Tiny patient can use adult size walker at bedside.   No further needs

## 2023-10-10 NOTE — PLAN OF CARE
Problem: OCCUPATIONAL THERAPY ADULT  Goal: Performs self-care activities at highest level of function for planned discharge setting. See evaluation for individualized goals. Description: Treatment Interventions: ADL retraining, Functional transfer training, Endurance training, Patient/family training, Equipment evaluation/education, Compensatory technique education, Continued evaluation, Energy conservation, Activityengagement          See flowsheet documentation for full assessment, interventions and recommendations. Note: Limitation: Decreased ADL status, Decreased self-care trans, Decreased high-level ADLs  Prognosis: Good  Assessment: Pt is a very pleasant and cooperative 62 yo female admitted to [facility] s/p "Right - ARTHROPLASTY RIGHT KNEE TOTAL W ROBOT" on 10/9 2* primary osteoarthritis of right knee and is now WBAT in [facility]. Pt  has a past medical history of Arthritis (2013), Chest pain (07/01/2014), Disease of thyroid gland, Elevated blood sugar (07/01/2014), Heart murmur, Hypercholesteremia, Hyperlipidemia, Obesity (2010), Osteoarthritis, and Urinary tract infection. Pt with active OT orders in which OT consulted to assess pt's functional status and occupational performance to determine safe d/c needs. Pt seen with PT to increase safety, decrease fall risk, and maximize functional/occupational performance 2* medical complexity which is a regression from pt's functional baseline. Pt lives with her supportive  and daughter in a 2 SH with 1 ELIZABETH with 13 steps to access 2nd floor. PTA, pt was independent in ADLs/IADLs and used no DME for functional mobility. (+) driving. Currently, pt performing functional transfers w/ Min A x1 w/ RW, UB ADLs w/ supervision, and LB ADLs w/ Min A. Pt demonstrates the following limitations/impairments which impact the pt's ability to engage in valued occupations: endurance/activity tolerance, standing tolerance, strength, and pain.  At this time, pt is limited by hypotension however from an OT standpoint, anticipate pending medical management, pt w/ progress to home with increased support/assistance, once medically stable. The patient's raw score on the AM-PAC Daily Activity Inpatient Short Form is 20. A raw score of greater than or equal to 19 suggests the patient may benefit from discharge to home. Please refer to the recommendation of the Occupational Therapist for safe discharge planning. Pt would benefit from skilled OT services 2-3x/wk to address immediate acute care needs and underlying performance skills to promote safety, decrease fall risk, and enhance occupational performance to return to OF. Goals to be met within the next 10-14 days.      OT Discharge Recommendation: (S) No rehabilitation needs (Anticipate pending medical management of hypotension, pt will progress to home with increased support/assistance; will continue to assess as initial OT eval limited by hypotension @ this time)

## 2023-10-10 NOTE — UTILIZATION REVIEW
Initial Clinical Review    Elective OP surgical procedure    Elective outpatient procedure, converted to inpatient admission due to ongoing pain control and hypotension. Age/Sex: 63 y.o. female  Surgery Date: 10/09/23  Procedure: Right - ARTHROPLASTY RIGHT KNEE TOTAL W ROBOT  Anesthesia: General  ASA Score: 3  Operative Findings:   depuy attune              Femur-3N              Poly-7              Che Caulk    POD#1 Progress Note: Reports pain moderate in R knee. Hemovac 100 mL sanguinous output. On exam, RLE: dressing w/ some breakthrough bleeding superolaterally, tenderness incisionally. Plan: WBAT RLE, monitor for acute blood loss anemia; PT/OT, Lovenox. IVF, Trend labs, replete electrolytes as needed; supportive care, continue meds. Acute Pain Service consulted. Acute Pain: Pt w/ persistent post-op pain despite peripheral nerve block. Pt w/ low blood pressure, medications adjusted. Felt significant dizziness while working w/ PT this morning, as well as nausea, that has improved since IVF. Plan: tylenol scheduled, start robaxin q6h scheduled, decrease PRN oxycodone 5 mg (moderate) and 7.5 mg (severe), decrease prn IV dilaudid for breakthrough pain, close monitoring of BP, add IV Toradol scheduled q6h.      Admission Orders: Date/Time/Statement:   Admission Orders (From admission, onward)     Ordered        10/10/23 1010  Inpatient Admission  Once                      Orders Placed This Encounter   Procedures   • Inpatient Admission     Standing Status:   Standing     Number of Occurrences:   1     Order Specific Question:   Level of Care     Answer:   Med Surg [16]     Order Specific Question:   Estimated length of stay     Answer:   Not Applicable     Vital Signs: /57   Pulse 94   Temp 99.2 °F (37.3 °C)   Resp 16   Ht 4' 11" (1.499 m)   Wt 86.2 kg (190 lb)   LMP 10/27/2017 (Exact Date)   SpO2 95%   BMI 38.38 kg/m²     Date/Time Temp Pulse Resp BP MAP (mmHg) SpO2 O2 Device   10/10/23 12:40:38 -- 94 -- 110/57 75 95 % --   10/10/23 0933 -- -- -- 72/50 Abnormal  -- -- --   10/10/23 09:30:03 -- 85 -- 59/38 Abnormal  45 Abnormal  95 % --   10/10/23 09:00:52 -- -- -- 95/64 74 -- --   10/10/23 08:57:43 -- 98 -- 94/70 78 98 % --   10/10/23 08:56:24 -- 97 -- 94/70 78 98 % --   10/10/23 07:25:38 99.2 °F (37.3 °C) 88 16 105/69 81 96 % --   10/10/23 0251 98.6 °F (37 °C) 78 20 105/68 80 97 % --   10/09/23 22:52:42 98.8 °F (37.1 °C) 85 14 103/66 78 92 % --   10/09/23 2030 -- -- -- -- -- 96 % None (Room air)   10/09/23 19:31:31 98.6 °F (37 °C) 78 18 107/61 76 95 % --   10/09/23 1827 -- -- -- 112/77 -- -- --   10/09/23 1821 98.9 °F (37.2 °C) 95 17 89/57 Abnormal  68 91 % --   10/09/23 17:09:28 -- 85 17 114/70 85 99 % --   10/09/23 16:22:53 -- 77 -- 119/72 88 97 % --   10/09/23 16:16:32 -- 84 -- 118/72 87 98 % --   10/09/23 15:48:27 97.5 °F (36.4 °C) 73 17 115/71 86 98 % --   10/09/23 1532 -- 72 14 106/71 80 97 % --   10/09/23 1516 -- 62 12 105/63 75 98 % --   10/09/23 1515 -- 64 13 105/63 75 97 % --   10/09/23 1500 97 °F (36.1 °C) Abnormal -- -- -- -- -- None (Room air)   10/09/23 1445 -- 64 13 96/60 65 98 % --   10/09/23 1430 -- 64 15 82/66 Abnormal  73 97 % --   10/09/23 1417 97 °F (36.1 °C) Abnormal 70 16 113/74 89 96 % None (Room air)   10/09/23 1015 99.7 °F (37.6 °C) 79 20 131/95 -- 99 % None (Room air)       Pertinent Labs/Diagnostic Test Results:   Results from last 7 days   Lab Units 10/10/23  0443   WBC Thousand/uL 9.61   HEMOGLOBIN g/dL 11.9   HEMATOCRIT % 36.3   PLATELETS Thousands/uL 101*         Results from last 7 days   Lab Units 10/10/23  0443   SODIUM mmol/L 137   POTASSIUM mmol/L 4.0   CHLORIDE mmol/L 106   CO2 mmol/L 26   ANION GAP mmol/L 5   BUN mg/dL 9   CREATININE mg/dL 0.57*   EGFR ml/min/1.73sq m 101   CALCIUM mg/dL 8.4         Results from last 7 days   Lab Units 10/09/23  1421   POC GLUCOSE mg/dl 82     Results from last 7 days   Lab Units 10/10/23  0443   GLUCOSE RANDOM mg/dL 142*       Diet: Regular Diet. Mobility: Dangle at bedside day of surgery; WBAT RLE, PT/OT evals  DVT Prophylaxis: SCDs, Lovenox    Medications/Pain Control:   Scheduled Medications:  acetaminophen, 975 mg, Oral, Q8H  docusate sodium, 100 mg, Oral, BID  enoxaparin, 40 mg, Subcutaneous, Daily  hydroxychloroquine, 400 mg, Oral, Daily With Breakfast  ketorolac, 15 mg, Intravenous, Q6H AYAD  methocarbamol, 500 mg, Oral, Q6H AYAD  senna, 1 tablet, Oral, Daily  sodium chloride, 1,000 mL, Intravenous, Once    Continuous IV Infusions:  lactated ringers, 125 mL/hr, Intravenous, Continuous    PRN Meds:  calcium carbonate, 1,000 mg, Oral, Daily PRN  HYDROmorphone, 0.2 mg, Intravenous, Q2H PRN  lactated ringers, 1,000 mL bolus, Intravenous; 10/10 x1  ondansetron, 4 mg, Intravenous, Q6H PRN; 10/9 x1  oxyCODONE, 5 mg, Oral, Q4H PRN; 10/10 x1  oxyCODONE, 7.5 mg, Oral, Q4H PRN  oxyCODONE, 10 mg, Oral, 10/9 x2, 10/10 x2 then discontinued    ceFAZolin (ANCEF) IVPB (premix in dextrose) 1,000 mg 50 mL  Dose: 1,000 mg Freq: Every 8 hours Route: IV  Last Dose: Stopped (10/10/23 0400)  Start: 10/09/23 2000 End: 10/10/23 0400  iron sucrose (VENOFER) 200 mg in sodium chloride 0.9 % 100 mL IVPB  Dose: 200 mg Freq: Once Route: IV  Start: 10/10/23 1100 End: 10/10/23 1143  Tranexamic Acid-NaCl (CYKLOKAPRON) 1000-0.7 MG/100ML-% injection 1,000 mg  Dose: 1,000 mg Freq: Once Route: IV  Start: 10/09/23 1015 End: 10/09/23 1228      Network Utilization Review Department  ATTENTION: Please call with any questions or concerns to [phone] and carefully listen to the prompts so that you are directed to the right person. All voicemails are confidential.   For Discharge needs, contact Care Management DC Support Team at [phone] opt. 2  Send all requests for admission clinical reviews, approved or denied determinations and any other requests to dedicated fax number below belonging to the campus where the patient is receiving treatment. List of dedicated fax numbers for the Facilities:  [place] DENCRISTIAN (Administrative/Medical Necessity) [phone]   DISCHARGE SUPPORT TEAM (NETWORK) [id number] Rene Schwarz (Maternity/NICU/Pediatrics) [phone]   [address] [place] [address] Drive [address] [facility] [address] 1000 [facility] [phone] 1505 [facility] [address] 5220 [facility] [address] [address] [facility] [address] [phone]

## 2023-10-10 NOTE — PROGRESS NOTES
Progress Note - Orthopedics   Yared Menendez 63 y.o. female MRN: [id number]  Unit/Bed#: -01      Subjective:    63 y. o.female POD 2 R TKA. No acute events, no new complaints. Patient doing well. Pain well controlled. Patient was able to work with PT yesterday, recommending home with outpatient rehab. Given hypotension SBP to 50-70s yesterday with symptomatic dizziness and lethargy, patient was not medically cleared for discharge. Blood pressures have since stabilized, currently SBP 100s. Hgb this AM 10.1.     Labs:  0   Lab Value Date/Time    HCT 36.3 10/10/2023 0443    HCT 45.8 09/08/2023 1039    HCT 45.6 06/16/2023 1630    HGB 11.9 10/10/2023 0443    HGB 15.2 09/08/2023 1039    HGB 14.7 06/16/2023 1630    INR 1.03 09/08/2023 1039    WBC 9.61 10/10/2023 0443    WBC 3.25 (L) 09/08/2023 1039    WBC 5.07 06/16/2023 1630    ESR 22 08/03/2021 1203    CRP 9.6 (H) 06/16/2023 1630       Meds:    Current Facility-Administered Medications:     acetaminophen (TYLENOL) tablet 975 mg, 975 mg, Oral, Q8H, oTmmy Guy MD, 975 mg at 10/10/23 0805    calcium carbonate (TUMS) chewable tablet 1,000 mg, 1,000 mg, Oral, Daily PRN, Tommy Guy MD    docusate sodium (COLACE) capsule 100 mg, 100 mg, Oral, BID, Tommy Guy MD, 100 mg at 10/10/23 0805    enoxaparin (LOVENOX) subcutaneous injection 40 mg, 40 mg, Subcutaneous, Daily, Tommy Guy MD, 40 mg at 10/10/23 0805    HYDROmorphone HCl (DILAUDID) injection 0.2 mg, 0.2 mg, Intravenous, Q2H PRN, ZELALEM Saavedra    hydroxychloroquine (PLAQUENIL) tablet 400 mg, 400 mg, Oral, Daily With Breakfast, ZELALEM Alcocer, 400 mg at 10/10/23 0806    ketorolac (TORADOL) injection 15 mg, 15 mg, Intravenous, Q6H [facility], ZELALEM Yo, 15 mg at 10/10/23 1248    lactated ringers infusion, 125 mL/hr, Intravenous, Continuous, Tommy Guy MD, Last Rate: 125 mL/hr at 10/09/23 2330, 125 mL/hr at 10/09/23 2330    methocarbamol (ROBAXIN) tablet 500 mg, 500 mg, Oral, Q6H [address], [place] ZELALEM Card, 500 mg at 10/10/23 1245    ondansetron (ZOFRAN) injection 4 mg, 4 mg, Intravenous, Q6H PRN, Darcie Wetzel MD, 4 mg at 10/09/23 1840    oxyCODONE (ROXICODONE) IR tablet 5 mg, 5 mg, Oral, Q4H PRN, Darcie Wetzel MD, 5 mg at 10/10/23 1245    oxyCODONE (ROXICODONE) split tablet 7.5 mg, 7.5 mg, Oral, Q4H PRN, ZELALEM Chong    senna (SENOKOT) tablet 8.6 mg, 1 tablet, Oral, Daily, Darcie Wetzel MD, 8.6 mg at 10/10/23 0805    Blood Culture:   No results found for: "Herlene Kail"    Wound Culture:   No results found for: "WOUNDCULT"    Ins and Outs:  I/O last 24 hours: In: 3140 [P.O.:240; I.V.:2900]  Out: 1750 [Urine:1650; Drains:100]          Physical:  Vitals:    10/10/23 1626   BP: 106/69   Pulse: 82   Resp:    Temp:    SpO2: 97%     Musculoskeletal: right Lower Extremity  Dressing C/D/I without strike thru  TTP julio incisionally  Sensation intact to saphenous, sural, tibial, superficial peroneal nerve, and deep peroneal  Motor intact to +FHL/EHL, +ankle dorsi/plantar flexion  Palpable DP pulse  Digits warm and well perfused  Capillary refill < 2 seconds    Assessment:    62 y. o.female POD 2 R TKA. Patient doing well.      Plan:  WBAT RLE  Will monitor for ABLA and administer IVF/prbc as indicated for Greater than 2 gram drop or Hgb < 7  PT/OT  - rec home  Pain control  DVT ppx lov  Appreciate medical comanagement  APS consult for post block management  Dispo: pending PT, AM labs, medical clearance    Darcie Wetzel MD

## 2023-10-10 NOTE — ASSESSMENT & PLAN NOTE
Status post right total knee arthroplasty on 10/9/2023:    Multimodal analgesic regimen:  Tylenol 975 mg every 8 hours scheduled  Robaxin 500 mg every 6 hours scheduled  Decrease oxycodone to 2.5 mg every 4 hours as needed for moderate pain  Decrease oxycodone to 5 mg every 4 hours as needed for severe pain  Discontinue IV Dilaudid   Orthopedic service okay for Toradol, Toradol 15 mg IV course completed  Start ibuprofen 600 mg every 8 hours scheduled  Add Pepcid 20 mg daily  The patient is advised to apply ice or cold packs intermittently as needed to relieve pain; apply ice for no more than 20 minutes at a time    Interventional pain:  Status post right adductor canal/IPACK peripheral nerve block with Exparel on 10/9/2023 which was done preoperatively for postop pain control, which have predominately worn off.     Bowel regimen:  Colace 100 mg twice a day  Senokot daily

## 2023-10-10 NOTE — PHYSICAL THERAPY NOTE
PHYSICAL THERAPY NOTE          Patient Name: Carmelo BARRAGANW Date: 10/10/2023       10/10/23 8056   PT Last Visit   PT Visit Date 10/10/23   Note Type   Note Type Treatment   Pain Assessment   Pain Assessment Tool 0-10   Pain Score 8   Pain Location/Orientation Orientation: Right;Location: Knee   Pain Onset/Description Onset: Ongoing;Frequency: Constant/Continuous; Descriptor: Aching;Descriptor: Sore;Descriptor: Discomfort   Effect of Pain on Daily Activities limits mobility and comfort   Patient's Stated Pain Goal No pain   Hospital Pain Intervention(s) Repositioned; Ambulation/increased activity; Emotional support   Restrictions/Precautions   Weight Bearing Precautions Per Order Yes   RLE Weight Bearing Per Order WBAT   Other Precautions Multiple lines; Fall Risk;Pain;WBS   General   Chart Reviewed Yes   Family/Caregiver Present Yes   Cognition   Overall Cognitive Status WFL   Arousal/Participation Alert; Cooperative   Attention Within functional limits   Orientation Level Oriented X4   Memory Within functional limits   Following Commands Follows all commands and directions without difficulty   Comments Patient very pleasant and cooperative. Subjective   Subjective Patient agreeable to PT treatment   Bed Mobility   Supine to Sit Unable to assess   Sit to Supine Unable to assess   Additional Comments OOB in chair on arrival   Transfers   Sit to Stand 4  Minimal assistance   Additional items Assist x 1; Armrests; Verbal cues   Stand to Sit 4  Minimal assistance   Additional items Assist x 1; Increased time required;Verbal cues;Armrests   Additional Comments RW - Seated at rest: 94/70 ; Upon standing 95/64 - with continued standing, patient reports increased nausea and dizziness. RN aware. Patient returned to sitting with legs elevated. Patient reports reduced symptom. Per RN, BP later noted to decrease to 59 systolic.  Further activity limited   Ambulation/Elevation   Ambulation/Elevation Additional Comments not tested in initial session d/t hypotension   Balance   Static Sitting Good   Dynamic Sitting Fair +   Static Standing Fair   Dynamic Standing Fair -   Ambulatory Poor +   Endurance Deficit   Endurance Deficit Yes   Activity Tolerance   Activity Tolerance Treatment limited secondary to medical complications (Comment)  (hypotension)   Medical Staff Made Aware OT   Nurse Made Aware RN cleared and updated   Assessment   Prognosis Good   Problem List Decreased strength;Decreased endurance;Decreased range of motion; Impaired balance;Decreased mobility;Pain;Orthopedic restrictions   Assessment Patient received in bedside chair. Patient agreeable to therapy. Patient seated BP noted at 94/70 with no symptoms. Patient performs functional transfer with Min A using RW. Patient standing BP noted at 94/64 with no noted symptoms. patient maintains stance about 2 minutes but then notes feelings of nausea and dizziness. Patient returned to sitting with legs elevated and ankle pumping performed. Patient notes reduction in symptoms. RN aware. Session limited by hypotension. Patient left in bedside chair with all needs met and call bell/personal items within reach. The patient's AM-PAC Basic Mobility Inpatient Short Form Raw Score is 17 showing further need for skilled PT services in order to improve functional mobility, decrease need for assistance, and return to PLOF. A Raw score of greater than or equal to 16 suggests the patient may benefit from discharge to home. PT continues to recommend home with OPPT on d/c. Will continue to follow as able. Goals   Patient Goals to feel better   PT Treatment Day 1   Plan   Treatment/Interventions ADL retraining;Functional transfer training;LE strengthening/ROM; Elevations; Therapeutic exercise; Endurance training;Equipment eval/education;Patient/family training;Gait training;Bed mobility;Spoke to nursing;Spoke to case management;OT   Progress Slow progress, medical status limitations  (hypotension)   PT Frequency Twice a day   Discharge Recommendation   PT Discharge Recommendation Home with outpatient rehabilitation   Equipment Recommended [address] walker   AM-PAC Basic Mobility Inpatient   Turning in Flat Bed Without Bedrails 3   Lying on Back to Sitting on Edge of Flat Bed Without Bedrails 3   Moving Bed to Chair 3   Standing Up From Chair Using Arms 3   Walk in Room 3   Climb 3-5 Stairs With Railing 2   Basic Mobility Inpatient Raw Score 17   Basic Mobility Standardized Score 39.67   Highest Level Of Mobility   -HLM Goal 5: Stand one or more mins   JH-HLM Achieved 5: Stand (1 or more minutes)   Education   Education Provided Mobility training   Patient Demonstrates acceptance/verbal understanding   End of Consult   Patient Position at End of Consult Bedside chair; All needs within reach     Lester Paz, PT, DPT

## 2023-10-10 NOTE — PLAN OF CARE
Problem: PHYSICAL THERAPY ADULT  Goal: Performs mobility at highest level of function for planned discharge setting. See evaluation for individualized goals. Description: Treatment/Interventions: ADL retraining, Functional transfer training, LE strengthening/ROM, Elevations, Endurance training, Therapeutic exercise, Patient/family training, Equipment eval/education, Bed mobility, Gait training, Spoke to nursing, Spoke to case management, OT  Equipment Recommended: Reena Moscoso       See flowsheet documentation for full assessment, interventions and recommendations. Outcome: Progressing  Note: Prognosis: Good  Problem List: Decreased strength, Decreased endurance, Decreased range of motion, Impaired balance, Decreased mobility, Pain, Orthopedic restrictions  Assessment: Patient received in bedside chair. Patient agreeable to therapy. Patient performs STS with Supervision at [facility]. No dizziness or nausea noted. Patient ambulates 13' to bathroom with CGA using RW. Patient performs toilet transfer with Supervision with increased time. Patient ambulates 61' using RW with CGA. Patient requires seated rest period. Patient performs x2 stairs with Min A using BHR. Nausea, fatigue, and dizziness noted following stair training. Patient returned to sitting with BP noted at 72/48. Patient transferred with Min A to bedside chair and provided ride back to room. Patient left in bedside chair with all needs met and call bell/personal items within reach. The patient's AM-PAC Basic Mobility Inpatient Short Form Raw Score is 17 showing further need for skilled PT services in order to improve functional mobility, decrease need for assistance, and return to PLOF. A Raw score of greater than or equal to 16 suggests the patient may benefit from discharge to home. PT continues to recommend home with OPPT on d/c. Will continue to follow as able.         PT Discharge Recommendation: Home with outpatient rehabilitation    See flowsheet documentation for full assessment.

## 2023-10-10 NOTE — PHYSICAL THERAPY NOTE
PHYSICAL THERAPY NOTE          Patient Name: Sandra MAY Date: 10/10/2023       10/10/23 1410   PT Last Visit   PT Visit Date 10/10/23   Note Type   Note Type Treatment   Pain Assessment   Pain Assessment Tool 0-10   Pain Score 6   Pain Location/Orientation Orientation: Right;Location: Knee   Pain Onset/Description Onset: Ongoing;Frequency: Constant/Continuous; Descriptor: Aching;Descriptor: Sore;Descriptor: Discomfort   Effect of Pain on Daily Activities limits mobility and comfort   Patient's Stated Pain Goal No pain   Hospital Pain Intervention(s) Repositioned; Ambulation/increased activity; Emotional support   Restrictions/Precautions   Weight Bearing Precautions Per Order Yes   RLE Weight Bearing Per Order WBAT   Other Precautions Multiple lines; Fall Risk;Pain;WBS   General   Chart Reviewed Yes   Family/Caregiver Present Yes   Cognition   Overall Cognitive Status WFL   Arousal/Participation Alert; Cooperative   Attention Within functional limits   Orientation Level Oriented X4   Memory Within functional limits   Following Commands Follows all commands and directions without difficulty   Comments Patient pleasant and cooperative. Subjective   Subjective Patient agreeable to second PT session   Bed Mobility   Supine to Sit Unable to assess   Sit to Supine Unable to assess   Additional Comments OOB in chair on arrival   Transfers   Sit to Stand 5  Supervision  (Progressing to Min A as session progressed)   Additional items Increased time required;Verbal cues   Stand to Sit 5  Supervision  (Progressing to Min A as session progressed)   Additional items Increased time required;Verbal cues   Stand pivot 4  Minimal assistance   Additional items Assist x 1; Increased time required;Verbal cues   Toilet transfer 5  Supervision   Additional items Increased time required;Verbal cues;Standard toilet   Additional Comments RW Ambulation/Elevation   Gait pattern Short stride; Excessively slow; Step to; Antalgic;Decreased foot clearance   Gait Assistance   (CGA)   Additional items Assist x 1;Verbal cues   Assistive Device Rolling walker   Distance 15' + 60'   Stair Management Assistance 4  Minimal assist   Additional items Assist x 1;Verbal cues; Increased time required   Stair Management Technique Two rails   Number of Stairs 2   Ambulation/Elevation Additional Comments post gait/stair trianing nausea and dizziness noted - BP noted at 72/48   Balance   Static Sitting Good   Dynamic Sitting Fair +   Static Standing Fair   Dynamic Standing Fair -   Ambulatory Poor +   Endurance Deficit   Endurance Deficit Yes   Activity Tolerance   Activity Tolerance Treatment limited secondary to medical complications (Comment)  (hypotension)   Medical Staff Made Aware restorative Omkar   Nurse Made Aware RN cleared and updated   Assessment   Prognosis Good   Problem List Decreased strength;Decreased endurance;Decreased range of motion; Impaired balance;Decreased mobility;Pain;Orthopedic restrictions   Assessment Patient received in bedside chair. Patient agreeable to therapy. Patient performs STS with Supervision at [facility]. No dizziness or nausea noted. Patient ambulates 13' to bathroom with CGA using RW. Patient performs toilet transfer with Supervision with increased time. Patient ambulates 61' using RW with CGA. Patient requires seated rest period. Patient performs x2 stairs with Min A using BHR. Nausea, fatigue, and dizziness noted following stair training. Patient returned to sitting with BP noted at 72/48. Patient transferred with Min A to bedside chair and provided ride back to room. Patient left in bedside chair with all needs met and call bell/personal items within reach.  The patient's AM-PAC Basic Mobility Inpatient Short Form Raw Score is 17 showing further need for skilled PT services in order to improve functional mobility, decrease need for assistance, and return to PLOF. A Raw score of greater than or equal to 16 suggests the patient may benefit from discharge to home. PT continues to recommend home with OPPT on d/c. Will continue to follow as able. Goals   Patient Goals to feel better   PT Treatment Day 2   Plan   Treatment/Interventions ADL retraining;Functional transfer training;LE strengthening/ROM; Elevations; Therapeutic exercise; Endurance training;Patient/family training;Equipment eval/education; Bed mobility;Gait training;Spoke to nursing;Spoke to case management;OT   Progress Progressing toward goals   PT Frequency Twice a day   Discharge Recommendation   PT Discharge Recommendation Home with outpatient rehabilitation   Equipment Recommended [address] [facility] Recommended Wheeled walker   AM-PAC Basic Mobility Inpatient   Turning in Flat Bed Without Bedrails 3   Lying on Back to Sitting on Edge of Flat Bed Without Bedrails 3   Moving Bed to Chair 3   Standing Up From Chair Using Arms 3   Walk in Room 3   Climb 3-5 Stairs With Railing 3   Basic Mobility Inpatient Raw Score 18   Basic Mobility Standardized Score 41.05   Highest Level Of Mobility   JH-HLM Goal 6: Walk 10 steps or more   JH-HLM Achieved 7: Walk 25 feet or more   Education   Education Provided Mobility training   Patient Demonstrates acceptance/verbal understanding   End of Consult   Patient Position at End of Consult Bedside chair; All needs within reach     Mona Covarrubias, PT, DPT

## 2023-10-10 NOTE — PROGRESS NOTES
Internal Medicine Progress Note  Patient: Yared Menendez  Age/sex: 63 y.o. female  Medical Record #: [id number]      ASSESSMENT/PLAN: (Interval History)  Yared Menendez is seen and examined and management for following issues:    Status Post right Total KNEE ARTHROPLASTY  • Continue encourage incentive spirometry; monitor fever curve  • DVT prophylaxis in place and reviewed  • Pain controlled. Pain management as per APS. Results from last 7 days   Lab Units 10/10/23  0443   WBC Thousand/uL 9.61   HEMOGLOBIN g/dL 11.9   HEMATOCRIT % 36.3   PLATELETS Thousands/uL 101*   •       Operative acute blood loss anemia  · Decrease in hgb levels from preop labs results; suspect due to post operation extravasation losses along with potential dilutional effects of fluids  · Initiate surgery optimization venofer protocol/transfusion  · Transfuse PRBC if hgb less then 8.0 (per ortho protocol)  or symptomatic  · Monitor follow up CBC post intervention to trend effect    Orthostatic hypotension  · Multiple IV fluid boluses given today, almost 3.5 L postoperatively. · Continue to monitor vitals. Polyarthralgia  · Continue hydroxychloroquine. The above assessment and plan was reviewed and updated as determined by my evaluation of the patient on 10/10/2023.     Labs:   Results from last 7 days   Lab Units 10/10/23  0443   WBC Thousand/uL 9.61   HEMOGLOBIN g/dL 11.9   HEMATOCRIT % 36.3   PLATELETS Thousands/uL 101*     Results from last 7 days   Lab Units 10/10/23  0443   SODIUM mmol/L 137   POTASSIUM mmol/L 4.0   CHLORIDE mmol/L 106   CO2 mmol/L 26   BUN mg/dL 9   CREATININE mg/dL 0.57*   CALCIUM mg/dL 8.4             Results from last 7 days   Lab Units 10/09/23  1421   POC GLUCOSE mg/dl 82       Review of Scheduled Meds:  Current Facility-Administered Medications   Medication Dose Route Frequency Provider Last Rate   • acetaminophen  975 mg Oral Q8H Tommy Guy MD     • calcium carbonate  1,000 mg Oral Daily PRN Chantelle Gorman MD     • docusate sodium  100 mg Oral BID Chantelle Gorman MD     • enoxaparin  40 mg Subcutaneous Daily Chantelle Gorman MD     • HYDROmorphone  0.2 mg Intravenous Q2H PRN ZELALEM Diana     • hydroxychloroquine  400 mg Oral Daily With Breakfast ZELALEM Arteaga     • ketorolac  15 mg Intravenous Q6H [address] LeZELALEM Tracy     • lactated ringers  125 mL/hr Intravenous Continuous Chantelle Gormna  mL/hr (10/09/23 2330)   • methocarbamol  500 mg Oral Q6H [address] ZELALEM Marsh     • ondansetron  4 mg Intravenous Q6H PRN Chantelle Gorman MD     • oxyCODONE  5 mg Oral Q4H PRN Chantelle Gorman MD     • oxyCODONE  7.5 mg Oral Q4H PRN ZELALEM Diana     • senna  1 tablet Oral Daily Chantelle Gorman MD         Subjective/ HPI: Patient seen and examined. Patients overnight issues or events were reviewed with nursing or staff during rounds or morning huddle session. New or overnight issues include the following:     She had multiple episodes of symptomatic orthostasis, lowest blood pressure reading was 59/38. She received almost 3.5 L of IV fluids. Blood pressure improved although she became orthostatic immediately upon ambulation. ROS:   A 10 point ROS was performed; negative except as noted above.        Imaging:     No orders to display       *Labs /Radiology studies Reviewed  *Medications  reviewed and reconciled as needed  *Please refer to order section for additional ordered labs studies  *Case discussed with primary attending during morning huddle case rounds    Physical Examination:  Vitals:   Vitals:    10/10/23 0930 10/10/23 0933 10/10/23 1240 10/10/23 1626   BP: (!) 59/38 (!) 72/50 110/57 106/69   Pulse: 85  94 82   Resp:       Temp:       TempSrc:       SpO2: 95%  95% 97%   Weight:       Height:           General Appearance: no distress, conversive  HEENT: PERRLA, conjuctiva normal; oropharynx clear; mucous membranes moist;   Neck:  Supple, no lymphadenopathy or thyromegaly  Lungs: CTA, normal respiratory effort, no retractions, expiratory effort normal  CV: regular rate and rhythm , PMI normal   ABD: soft non tender, no masses , no hepatic or splenomegaly  EXT: DP pulses intact, no lymphadenopathy, no edema; right knee surgical dressing in place  Skin: normal turgor, normal texture, no rash  Psych: affect normal, mood normal  Neuro: AAOx3    : rodriguez removed ; due to void    The above physical exam was reviewed and updated as determined by my evaluation of the patient on 10/10/2023. Invasive Devices     Peripheral Intravenous Line  Duration           Peripheral IV 10/09/23 Dorsal (posterior); Right Wrist 1 day          Drain  Duration           Closed/Suction Drain Right;Lateral Knee Accordion 10 Fr. 1 day    Urethral Catheter Latex;Straight-tip 16 Fr. 1 day                   VTE Pharmacologic Prophylaxis: Enoxaparin  Code Status: No Order  Current Length of Stay: 0 day(s)      Total floor / unit time spent today 30 minutes  Coordination of patient's care was performed in conjunction with primary service. Time invested included review of patient's labs, vitals, and management of their comorbidities with continued monitoring, examination of patient as well as answering patient questions, documenting her findings and creating progress note in electronic medical record,  ordering appropriate diagnostic testing. Medical decision making for the day was made by supervising physician unless otherwise noted in their attestation statement. ** Please Note:  voice to text software may have been used in the creation of this document.  Although proof errors in transcription or interpretation are a potential of such software**

## 2023-10-10 NOTE — OCCUPATIONAL THERAPY NOTE
Occupational Therapy Evaluation     Patient Name: Juanis Steinberg  UECYC'F Date: 10/10/2023  Problem List  Principal Problem:    Primary osteoarthritis of right knee  Active Problems:    Acute post-operative pain    Past Medical History  Past Medical History:   Diagnosis Date    Arthritis 2013    Chest pain 2014    Disease of thyroid gland     Elevated blood sugar 2014    Heart murmur     Functional, child    Hypercholesteremia     Hyperlipidemia     Obesity 2010    Osteoarthritis     Urinary tract infection     Recurring     Past Surgical History  Past Surgical History:   Procedure Laterality Date    BIOPSY CORE NEEDLE      Thyroid Using Percutaneous Core Needle     SECTION      x 2    FOOT SURGERY Bilateral     As a teenager - Bunionectomy - Dr. Lester Dyer - Last Assessed: 2016    DC ARTHRP KNE CONDYLE&PLATU MEDIAL&LAT COMPARTMENTS Right 10/9/2023    Procedure: ARTHROPLASTY RIGHT KNEE TOTAL W ROBOT;  Surgeon: Cassius Last MD;  Location: [facility];  Service: Orthopedics    THYROID LOBECTOMY Right 2015    Dr. Dajuan Vásquez: Right Lobe - Last Assessed: 2016 Dr. Lester Dyer           10/10/23 0845   OT Last Visit   OT Visit Date 10/10/23   Note Type   Note type Evaluation   Additional Comments Pt seen w/ PT to increase safety, decrease fall risk, and maximize functional/occupational performance 2* medical complexity which is a regression from pt's functional baseline. Pain Assessment   Pain Assessment Tool 0-10   Pain Score 8   Pain Location/Orientation Orientation: Right;Location: Knee   Effect of Pain on Daily Activities Impacts ability to engage in valued occupations   Hospital Pain Intervention(s) Repositioned; Ambulation/increased activity; Emotional support   Restrictions/Precautions   Weight Bearing Precautions Per Order Yes   RLE Weight Bearing Per Order WBAT   Other Precautions Multiple lines; Fall Risk;Pain;WBS   Home Living   Type of Home House  (2  with 1 ELIZABETH)   Home Layout Two level;1/2 bath on main level;Bed/bath upstairs   Bathroom Shower/Tub Tub/shower unit   Bathroom Toilet Standard   Bathroom Equipment Grab bars in shower; Shower chair  (suction cup grab bars)   [address]; Other (Comment)  (Was not using PTA)   Additional Comments Pt reports living with her supportive  and daughter in a 2  with 1 ELIZABETH; no DME PTA, but has access to RW prn   Prior Function   Level of [place] Independent with ADLs; Independent with functional mobility; Independent with IADLS   Lives With Spouse; Family;Daughter   Receives Help From Family   IADLs Independent with driving; Independent with meal prep; Independent with medication management   Falls in the last 6 months 0   Vocational Retired   Comments (+) driving; supportive family to assist with functional needs prn   Lifestyle   [employer] PTA, pt was independent in ADLs/IADLs and uses no DME for functional mobility; (+) driving   Reciprocal Relationships Lives with her supportive  and daughter; local supportive family to assist with functional needs prn   Service to Others Retired; 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; 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 16 month 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 however expresses interest in returning to work in some capacity   [address] [place] [address] Enjoys spending time with family   General   Additional Pertinent History OT eval limited by hypotension @ this time, RN made aware; RN reporting pt w/ systolic BP in 21E after therapy eval   Family/Caregiver Present Yes   Additional General Comments Pt's  present, supportive/interactive   Subjective   Subjective "I am dizzy."   ADL   Where Assessed Chair   Eating Assistance 5  Supervision/Setup   Grooming Assistance 5  Supervision/Setup   UB Bathing Assistance 5  Supervision/Setup   LB Bathing Assistance 4  Minimal Assistance   UB [address] Final 5  Supervision/Setup   LB Dressing Assistance 4  Minimal Assistance   Toileting Assistance  4  Minimal Assistance   Functional Assistance 4  Minimal Assistance   Bed Mobility   Supine to Sit Unable to assess   Sit to Supine Unable to assess   Additional Comments Upon arrival, pt found sitting upright in recliner; @ end of session, pt left sitting upright in recliner with all functional needs in reach   Transfers   Sit to Stand 4  Minimal assistance   Additional items Assist x 1; Armrests; Increased time required;Verbal cues   Stand to Sit 4  Minimal assistance   Additional items Assist x 1; Armrests; Increased time required;Verbal cues   Additional Comments w/ RW; in standing, pt reporting increased dizziness, requesting to return to seated position, BP reading 96/54; RN made aware   Functional Mobility   Functional Mobility 4  Minimal assistance   Additional items Rolling walker   Balance   Static Sitting Good   Dynamic Sitting Fair +   Static Standing Fair   Dynamic Standing Fair -   Ambulatory Poor +   Activity Tolerance   Activity Tolerance Treatment limited secondary to medical complications (Comment)  (hypotension)   Medical Staff Made [place] DPT   Nurse Made Aware RN cleared and made aware of hypotension   RUE Assessment   RUE Assessment WFL   LUE Assessment   LUE Assessment WFL   Hand Function   Gross Motor Coordination Functional   Fine Motor Coordination Functional   Cognition   Overall Cognitive Status WFL   Arousal/Participation Alert; Responsive;Arousable; Cooperative   Attention Within functional limits   Orientation Level Oriented X4   Memory Within functional limits   Following Commands Follows all commands and directions without difficulty   Comments Pt very pleasant and cooperative   Assessment   Limitation Decreased ADL status; Decreased self-care trans;Decreased high-level ADLs   Prognosis Good   Assessment Pt is a very pleasant and cooperative 64 yo female admitted to [facility] s/p "Right - ARTHROPLASTY RIGHT KNEE TOTAL W ROBOT" on 10/9 2* primary osteoarthritis of right knee and is now WBAT in RLE. Pt  has a past medical history of Arthritis (2013), Chest pain (07/01/2014), Disease of thyroid gland, Elevated blood sugar (07/01/2014), Heart murmur, Hypercholesteremia, Hyperlipidemia, Obesity (2010), Osteoarthritis, and Urinary tract infection. Pt with active OT orders in which OT consulted to assess pt's functional status and occupational performance to determine safe d/c needs. Pt seen with PT to increase safety, decrease fall risk, and maximize functional/occupational performance 2* medical complexity which is a regression from pt's functional baseline. Pt lives with her supportive  and daughter in a 2 SH with 1 ELIZABETH with 13 steps to access 2nd floor. PTA, pt was independent in ADLs/IADLs and used no DME for functional mobility. (+) driving. Currently, pt performing functional transfers w/ Min A x1 w/ RW, UB ADLs w/ supervision, and LB ADLs w/ Min A. Pt demonstrates the following limitations/impairments which impact the pt's ability to engage in valued occupations: endurance/activity tolerance, standing tolerance, strength, and pain. At this time, pt is limited by hypotension however from an OT standpoint, anticipate pending medical management, pt w/ progress to home with increased support/assistance, once medically stable. The patient's raw score on the -PAC Daily Activity Inpatient Short Form is 20. A raw score of greater than or equal to 19 suggests the patient may benefit from discharge to home. Please refer to the recommendation of the Occupational Therapist for safe discharge planning. Pt would benefit from skilled OT services 2-3x/wk to address immediate acute care needs and underlying performance skills to promote safety, decrease fall risk, and enhance occupational performance to return to PLOF. Goals to be met within the next 10-14 days.    Goals   Patient Goals To feel better and go home   LTG Time Frame 10-14   Long Term Goal #1 See OT goals listed below   Plan   Treatment Interventions ADL retraining;Functional transfer training; Endurance training;Patient/family training;Equipment evaluation/education; Compensatory technique education;Continued evaluation; Energy conservation; Activityengagement   Goal Expiration Date 10/24/23   OT Frequency 2-3x/wk   Discharge Recommendation   OT Discharge Recommendation (S)  No rehabilitation needs  (Anticipate pending medical management of hypotension, pt will progress to home with increased support/assistance; will continue to assess as initial OT eval limited by hypotension @ this time)   AM-PAC Daily Activity Inpatient   Lower Body Dressing 3   Bathing 3   Toileting 3   Upper Body Dressing 3   Grooming 4   Eating 4   Daily Activity Raw Score 20   Daily Activity Standardized Score (Calc for Raw Score >=11) 42.03   AM-PAC Applied Cognition Inpatient   Following a Speech/Presentation 4   Understanding Ordinary Conversation 4   Taking Medications 4   Remembering Where Things Are Placed or Put Away 4   Remembering List of 4-5 Errands 4   Taking Care of Complicated Tasks 4   Applied Cognition Raw Score 24   Applied Cognition Standardized Score 62.21   End of Consult   Education Provided Yes;Family or social support of family present for education by provider   Patient Position at End of Consult Bedside chair; All needs within reach   Nurse Communication Nurse aware of consult     OT GOALS:    Pt will improve functional mobility during ADL/IADL/leisure tasks with Mod I using AE/DME prn. Pt will improve activity tolerance/functional endurance during ADL/IADL/leisure tasks for at least 20 minutes to improve occupational performance and engagement in valued occupations using AE/DME prn. Pt will engage in ongoing functional/formal cognitive assessments to assist with safe d/c planning and increase safety during functional tasks.     Pt will participate in simulated IADL management task w/ Mod I to increase independence and engagement in valued occupations w/ good balance/safety. Pt will improve dynamic standing balance for at least 15 minutes with Mod I during functional tasks to decrease fall risk and improve independence and engagement in ADL/IADL/leisure activities. Pt will follow 100% of multi-step commands in ADL/IADL/leisure activities to improve functional cognition used in functional daily routines. Pt will complete functional transfers on and off all surfaces used in daily routines with Mod I for safety to maximize functional/occupational performance. Pt will complete all bed mobility tasks with Mod I to serve as a prerequisite for EOB/OOB ADL/IADL/leisure tasks, optimize positioning/comfort, and increase functional independence. Pt will independently demonstrate good carryover of safety precautions, WB status, and education/training during ADL/IADL/leisure tasks with energy conservation techniques s/p skilled instruction without verbal cues. Pt will complete UB ADL tasks with Mod I using AE/DME prn to increase functional independence in ADL/IADL/leisure tasks. Pt will complete LB ADL tasks with Mod I using AE/DME prn to increase functional independence in ADL/IADL/leisure tasks. Pt will complete toileting tasks with Mod I and good hygiene/thoroughness using AE/DME prn to increase functional independence. Pt will independently identify and utilize 2-3 positive coping strategies to enhance overall wellbeing and engagement in valued occupations.       Rashaad Pillai MS, OTR/L

## 2023-10-10 NOTE — PROGRESS NOTES
Progress Note - Acute Pain Service    Cece Rogers 63 y.o. female MRN: [id number]  Unit/Bed#: -01 Encounter: [id number]      Cece Rogers is a 63 y.o. female who is postop day 1 from a right total knee arthroplasty. Acute post-operative pain  Assessment & Plan  Status post right total knee arthroplasty on 10/9/2023:    Multimodal analgesic regimen:  · Tylenol 975 mg every 8 hours scheduled  · Add Robaxin 500 mg every 6 hours scheduled  · Oxycodone 5 mg every 4 hours as needed for moderate pain  · Decrease oxycodone to 7.5 mg every 4 hours as needed for severe pain  · Decrease IV Dilaudid to 0.2 mg every 2 hours as needed for breakthrough pain  · Plan to discontinue IV opioids tomorrow  · Continue lower doses of opioids given low systolic blood pressure. We will continue to monitor blood pressure and adjust analgesic regimen as appropriate. · Orthopedic service okay for Toradol, will add Toradol 15 mg IV every 6 hours scheduled for 24 hours  · Plan to transition patient to oral NSAID tomorrow    Interventional pain:  · Status post right adductor canal/IPACK peripheral nerve block with Exparel on 10/9/2023 which was done preoperatively for postop pain control, appropriate sensory deficits are present that are consistent with peripheral nerve block. Bowel regimen:  · Colace 100 mg twice a day  · Senokot daily    Postoperative pain is persistent despite peripheral nerve block, medication adjustments as outlined above given low blood pressure today. Will augment nonopioid medications and continue to monitor overall level of pain. Treatment plan was reviewed with patient, bedside nursing staff and orthopedic service. APS will continue to follow. Please contact Acute Pain Service - via [employer] from [phone] with additional questions or concerns. See [employer] or Jose for additional contacts and after hours information.      Pain History  Current pain location(s):  Pain Score: 8  Pain Location/Orientation: Orientation: Right, Location: Knee  Pain Scale: Pain Assessment Tool: 0-10  24 hour history: Patient lying back in chair. No acute distress. Reporting pain in her right anterior and posterior knee that does improve with oxycodone administration. Denied headaches, dizziness, nausea or vomiting in relation to oxycodone administration. She did get out of bed this morning and felt significantly dizzy with physical therapy and was nauseous which is now improved as she is receiving IV fluid bolus. Systolic blood pressures were 59 and 72 earlier this morning. Denied right lower extremity weakness or paresthesias.     Opioid requirement previous 24 hours: Oxycodone 40 mg    Meds/Allergies   all current active meds have been reviewed, current meds:   Current Facility-Administered Medications   Medication Dose Route Frequency   • acetaminophen (TYLENOL) tablet 975 mg  975 mg Oral Q8H   • calcium carbonate (TUMS) chewable tablet 1,000 mg  1,000 mg Oral Daily PRN   • docusate sodium (COLACE) capsule 100 mg  100 mg Oral BID   • enoxaparin (LOVENOX) subcutaneous injection 40 mg  40 mg Subcutaneous Daily   • HYDROmorphone HCl (DILAUDID) injection 0.2 mg  0.2 mg Intravenous Q2H PRN   • hydroxychloroquine (PLAQUENIL) tablet 400 mg  400 mg Oral Daily With Breakfast   • lactated ringers bolus 1,000 mL  1,000 mL Intravenous Once PRN    And   • lactated ringers bolus 1,000 mL  1,000 mL Intravenous Once PRN   • lactated ringers infusion  125 mL/hr Intravenous Continuous   • methocarbamol (ROBAXIN) tablet 500 mg  500 mg Oral Q6H [address]   • ondansetron (ZOFRAN) injection 4 mg  4 mg Intravenous Q6H PRN   • oxyCODONE (ROXICODONE) IR tablet 5 mg  5 mg Oral Q4H PRN   • oxyCODONE (ROXICODONE) split tablet 7.5 mg  7.5 mg Oral Q4H PRN   • senna (SENOKOT) tablet 8.6 mg  1 tablet Oral Daily   • sodium chloride 0.9 % bolus 1,000 mL  1,000 mL Intravenous Once PRN    And   • sodium chloride 0.9 % bolus 1,000 mL  1,000 mL Intravenous Once PRN    and PTA meds:   Prior to Admission Medications   Prescriptions Last Dose Informant Patient Reported? Taking? Cholecalciferol 125 MCG (5000 UT) capsule 2023  Yes Yes   Sig: Take 5,000 Units by mouth daily   Multiple Vitamins-Minerals (MULTIVITAL-M) TABS 10/8/2023 Self Yes Yes   Sig: Take by mouth in the morning   OMEGA-3 FATTY ACIDS PO 2023 Self Yes Yes   Si mg in the morning   Probiotic Product (PROBIOTIC-10) CAPS 2023 Self Yes Yes   Sig: Take by mouth in the morning   Turmeric 500 MG TABS 2023 Self Yes Yes   Sig: Take 1 capsule by mouth in the morning   ascorbic acid (VITAMIN C) 500 mg tablet 10/8/2023  No Yes   Sig: Take 1 tablet (500 mg total) by mouth 2 (two) times a day for 60 doses   docusate sodium (COLACE) 100 mg capsule 10/8/2023  Yes Yes   Sig: Take 100 mg by mouth 2 (two) times a day   enoxaparin (LOVENOX) 40 mg/0.4 mL   No No   Sig: Inject 0.4 mL (40 mg total) under the skin daily in the early morning for 28 days   Patient not taking: Reported on 2022   ferrous sulfate 324 (65 Fe) mg 10/8/2023  No Yes   Sig: Take 1 tablet (324 mg total) by mouth 2 (two) times a day before meals Take 1 pill BID, PRE-OP with Vit C,..may be constipatory   folic acid (FOLVITE) 1 mg tablet 10/7/2023  No Yes   Sig: Take 1 tablet (1 mg total) by mouth daily for 30 doses Take 1 pill po Qday PRE-OP, with Vit C, and Iron   hydroxychloroquine (PLAQUENIL) 200 mg tablet 10/8/2023  No Yes   Sig: TAKE 2 TABLETS (400 MG TOTAL) BY MOUTH DAILY WITH BREAKFAST   Patient taking differently: Take 400 mg by mouth      Facility-Administered Medications: None       No Known Allergies    Objective        Vitals:    10/10/23 0857 10/10/23 0900 10/10/23 0930 10/10/23 0933   BP: 94/70 95/64 (!) 59/38 (!) 72/50   Pulse: 98  85    Resp:       Temp:       TempSrc:       SpO2: 98%  95%    Weight:       Height:             Physical Exam  Vitals reviewed.    Constitutional:       General: She is awake. She is not in acute distress. Appearance: Normal appearance. She is not ill-appearing or toxic-appearing. HENT:      Head: Normocephalic and atraumatic. Nose: Nose normal. No congestion or rhinorrhea. Mouth/Throat:      Mouth: Mucous membranes are moist.   Eyes:      Extraocular Movements: Extraocular movements intact. Cardiovascular:      Rate and Rhythm: Normal rate. Pulmonary:      Effort: Pulmonary effort is normal. No tachypnea, bradypnea or respiratory distress. Musculoskeletal:         General: No tenderness. Skin:     General: Skin is warm and dry. Coloration: Skin is pale. Findings: No rash. Neurological:      Mental Status: She is alert and oriented to person, place, and time. Mental status is at baseline. Sensory: Sensory deficit (right knee) present. Psychiatric:         Attention and Perception: Attention normal.         Mood and Affect: Mood normal. Mood is not anxious. Speech: Speech normal.         Behavior: Behavior normal. Behavior is cooperative. Cognition and Memory: Cognition and memory normal.           Lab Results:   Estimated Creatinine Clearance: 105.7 mL/min (A) (by C-G formula based on SCr of 0.57 mg/dL (L)). Lab Results   Component Value Date    WBC 9.61 10/10/2023    HGB 11.9 10/10/2023    HCT 36.3 10/10/2023     (L) 10/10/2023         Component Value Date/Time    K 4.0 10/10/2023 0443     10/10/2023 0443    CO2 26 10/10/2023 0443    BUN 9 10/10/2023 0443    CREATININE 0.57 (L) 10/10/2023 0443         Component Value Date/Time    CALCIUM 8.4 10/10/2023 0443    ALKPHOS 54 09/08/2023 1039    AST 15 09/08/2023 1039    ALT 17 09/08/2023 1039    TP 6.8 09/08/2023 1039    ALB 3.9 09/08/2023 1039          Counseling / Coordination of Care  Total floor / unit time spent today 20 minutes minutes. Greater than 50% of total time was spent with the patient and / or family counseling and / or coordination of care.  A description of the counseling / coordination of care: Chart review included past medical history, vital signs, laboratory values, progress notes and medications. Discussed ongoing postoperative pain management which was inclusive of opioid and nonopioid medications, the use of nonpharmacological treatment such as ice and peripheral nerve block and duration of effectiveness of local anesthetics. Please note that the APS provides consultative services regarding pain management only. With the exception of ketamine and epidural infusions and except when indicated, final decisions regarding starting or changing doses of analgesic medications are at the discretion of the consulting service.     ZELALEM Nesbitt   Acute Pain Service

## 2023-10-10 NOTE — PLAN OF CARE
Problem: PHYSICAL THERAPY ADULT  Goal: Performs mobility at highest level of function for planned discharge setting. See evaluation for individualized goals. Description: Treatment/Interventions: ADL retraining, Functional transfer training, LE strengthening/ROM, Elevations, Endurance training, Therapeutic exercise, Patient/family training, Equipment eval/education, Bed mobility, Gait training, Spoke to nursing, Spoke to case management, OT  Equipment Recommended: Lakeisha Dwyer       See flowsheet documentation for full assessment, interventions and recommendations. 10/10/2023 1438 by Lester Paz PT  Outcome: Progressing  Note: Prognosis: Good  Problem List: Decreased strength, Decreased endurance, Decreased range of motion, Impaired balance, Decreased mobility, Pain, Orthopedic restrictions    Assessment: Patient received in bedside chair. Patient agreeable to therapy. Patient seated BP noted at 94/70 with no symptoms. Patient performs functional transfer with Min A using RW. Patient standing BP noted at 94/64 with no noted symptoms. patient maintains stance about 2 minutes but then notes feelings of nausea and dizziness. Patient returned to sitting with legs elevated and ankle pumping performed. Patient notes reduction in symptoms. RN aware. Session limited by hypotension. Patient left in bedside chair with all needs met and call bell/personal items within reach. The patient's AM-PAC Basic Mobility Inpatient Short Form Raw Score is 17 showing further need for skilled PT services in order to improve functional mobility, decrease need for assistance, and return to PLOF. A Raw score of greater than or equal to 16 suggests the patient may benefit from discharge to home. PT continues to recommend home with OPPT on d/c. Will continue to follow as able. PT Discharge Recommendation: Home with outpatient rehabilitation    See flowsheet documentation for full assessment.

## 2023-10-11 VITALS
DIASTOLIC BLOOD PRESSURE: 55 MMHG | BODY MASS INDEX: 38.3 KG/M2 | HEIGHT: 59 IN | RESPIRATION RATE: 17 BRPM | SYSTOLIC BLOOD PRESSURE: 101 MMHG | TEMPERATURE: 97.5 F | WEIGHT: 190 LBS | HEART RATE: 101 BPM | OXYGEN SATURATION: 99 %

## 2023-10-11 LAB
ANION GAP SERPL CALCULATED.3IONS-SCNC: 3 MMOL/L
BUN SERPL-MCNC: 7 MG/DL (ref 5–25)
CALCIUM SERPL-MCNC: 8 MG/DL (ref 8.4–10.2)
CHLORIDE SERPL-SCNC: 110 MMOL/L (ref 96–108)
CO2 SERPL-SCNC: 28 MMOL/L (ref 21–32)
CREAT SERPL-MCNC: 0.59 MG/DL (ref 0.6–1.3)
DME PARACHUTE DELIVERY DATE REQUESTED: NORMAL
DME PARACHUTE ITEM DESCRIPTION: NORMAL
DME PARACHUTE ORDER STATUS: NORMAL
DME PARACHUTE SUPPLIER NAME: NORMAL
DME PARACHUTE SUPPLIER PHONE: NORMAL
ERYTHROCYTE [DISTWIDTH] IN BLOOD BY AUTOMATED COUNT: 12.6 % (ref 11.6–15.1)
GFR SERPL CREATININE-BSD FRML MDRD: 99 ML/MIN/1.73SQ M
GLUCOSE SERPL-MCNC: 105 MG/DL (ref 65–140)
HCT VFR BLD AUTO: 30.9 % (ref 34.8–46.1)
HGB BLD-MCNC: 10.1 G/DL (ref 11.5–15.4)
MCH RBC QN AUTO: 30.1 PG (ref 26.8–34.3)
MCHC RBC AUTO-ENTMCNC: 32.7 G/DL (ref 31.4–37.4)
MCV RBC AUTO: 92 FL (ref 82–98)
PLATELET # BLD AUTO: 112 THOUSANDS/UL (ref 149–390)
PMV BLD AUTO: 10 FL (ref 8.9–12.7)
POTASSIUM SERPL-SCNC: 3.9 MMOL/L (ref 3.5–5.3)
RBC # BLD AUTO: 3.36 MILLION/UL (ref 3.81–5.12)
SODIUM SERPL-SCNC: 141 MMOL/L (ref 135–147)
WBC # BLD AUTO: 5.34 THOUSAND/UL (ref 4.31–10.16)

## 2023-10-11 PROCEDURE — 80048 BASIC METABOLIC PNL TOTAL CA: CPT

## 2023-10-11 PROCEDURE — 97116 GAIT TRAINING THERAPY: CPT

## 2023-10-11 PROCEDURE — 97530 THERAPEUTIC ACTIVITIES: CPT

## 2023-10-11 PROCEDURE — 99232 SBSQ HOSP IP/OBS MODERATE 35: CPT | Performed by: INTERNAL MEDICINE

## 2023-10-11 PROCEDURE — NC001 PR NO CHARGE: Performed by: ORTHOPAEDIC SURGERY

## 2023-10-11 PROCEDURE — 85027 COMPLETE CBC AUTOMATED: CPT

## 2023-10-11 PROCEDURE — 97535 SELF CARE MNGMENT TRAINING: CPT

## 2023-10-11 PROCEDURE — 99232 SBSQ HOSP IP/OBS MODERATE 35: CPT | Performed by: NURSE PRACTITIONER

## 2023-10-11 RX ORDER — FAMOTIDINE 20 MG/1
20 TABLET, FILM COATED ORAL DAILY
Status: DISCONTINUED | OUTPATIENT
Start: 2023-10-11 | End: 2023-10-11 | Stop reason: HOSPADM

## 2023-10-11 RX ORDER — OXYCODONE HYDROCHLORIDE 5 MG/1
5 TABLET ORAL EVERY 4 HOURS PRN
Status: DISCONTINUED | OUTPATIENT
Start: 2023-10-11 | End: 2023-10-11 | Stop reason: HOSPADM

## 2023-10-11 RX ORDER — METHOCARBAMOL 500 MG/1
500 TABLET, FILM COATED ORAL 3 TIMES DAILY
Qty: 18 TABLET | Refills: 0 | Status: SHIPPED | OUTPATIENT
Start: 2023-10-11 | End: 2023-10-17

## 2023-10-11 RX ADMIN — SODIUM CHLORIDE 1000 ML: 0.9 INJECTION, SOLUTION INTRAVENOUS at 11:39

## 2023-10-11 RX ADMIN — HYDROXYCHLOROQUINE SULFATE 400 MG: 200 TABLET ORAL at 08:25

## 2023-10-11 RX ADMIN — ACETAMINOPHEN 975 MG: 325 TABLET, FILM COATED ORAL at 11:38

## 2023-10-11 RX ADMIN — SENNOSIDES 8.6 MG: 8.6 TABLET, FILM COATED ORAL at 08:24

## 2023-10-11 RX ADMIN — ACETAMINOPHEN 975 MG: 325 TABLET, FILM COATED ORAL at 02:52

## 2023-10-11 RX ADMIN — DOCUSATE SODIUM 100 MG: 100 CAPSULE, LIQUID FILLED ORAL at 08:24

## 2023-10-11 RX ADMIN — METHOCARBAMOL 500 MG: 500 TABLET ORAL at 00:02

## 2023-10-11 RX ADMIN — METHOCARBAMOL 500 MG: 500 TABLET ORAL at 05:21

## 2023-10-11 RX ADMIN — FAMOTIDINE 20 MG: 20 TABLET ORAL at 11:39

## 2023-10-11 RX ADMIN — KETOROLAC TROMETHAMINE 15 MG: 30 INJECTION, SOLUTION INTRAMUSCULAR; INTRAVENOUS at 05:21

## 2023-10-11 RX ADMIN — KETOROLAC TROMETHAMINE 15 MG: 30 INJECTION, SOLUTION INTRAMUSCULAR; INTRAVENOUS at 00:02

## 2023-10-11 RX ADMIN — ENOXAPARIN SODIUM 40 MG: 40 INJECTION SUBCUTANEOUS at 08:25

## 2023-10-11 RX ADMIN — METHOCARBAMOL 500 MG: 500 TABLET ORAL at 11:38

## 2023-10-11 NOTE — PLAN OF CARE
Problem: PHYSICAL THERAPY ADULT  Goal: Performs mobility at highest level of function for planned discharge setting. See evaluation for individualized goals. Description: Treatment/Interventions: ADL retraining, Functional transfer training, LE strengthening/ROM, Elevations, Endurance training, Therapeutic exercise, Patient/family training, Equipment eval/education, Bed mobility, Gait training, Spoke to nursing, Spoke to case management, OT  Equipment Recommended: Agustin Marie       See flowsheet documentation for full assessment, interventions and recommendations. 10/11/2023 1619 by Bao Solorzano PTA  Outcome: Progressing  Note: Prognosis: Good  Problem List: Decreased strength, Decreased endurance, Decreased range of motion, Impaired balance, Decreased mobility, Pain, Orthopedic restrictions  Assessment: Pt demonstrated considerable improvemetns in pace, stride length, endurance & activity tolerance this PM, denying change in status with mobiilty compared to AM. She performed all tasks with good carryover of instructions & safety awareness after being prompted for hand placement to sit. Pt encouraged to ambulate freuqently at home until anticipated OPPT appointment tomorrow, at [facility] time she will follow their HEP. Pt &  report confidence in ability to d/c home at this time. Nurse informed of the same post activity. PT POC & d/c plan remain appropriate at this time. PT Discharge Recommendation: Home with outpatient rehabilitation    See flowsheet documentation for full assessment.      10/11/2023 1610 by Bao Solorzano PTA  Outcome: Progressing  Note: Prognosis: Good  Problem List: Decreased strength, Decreased endurance, Decreased range of motion, Impaired balance, Decreased mobility, Pain, Orthopedic restrictions  Assessment: Pt continues to require extensive time to perform all transfers & household ambulation due to RLE pain & weakness s/p surgery, compounded by ongoing hypotension in standing, requiring seated rests to recover. While pt is able to perform all tasks appropriately, anticipate she will perform much better with further medical optimization to reduce risks of orthostatic hypotension while performing these tasks to reduce risk for complications or falls at home. Pt & spouse verbalize confidence in her mobilty depsite this, an feel that she will be able to manage once medically cleared to d/c. From mobility perspective, pt has demosntrated sufficient progress to d/c home with first floor set up & family support prn initially, with OPPT follow up to progress to PLOF. Will continue to follow and treat to maximize endurance & ensure safe transition to community at d/c.        PT Discharge Recommendation: Home with outpatient rehabilitation    See flowsheet documentation for full assessment.

## 2023-10-11 NOTE — OCCUPATIONAL THERAPY NOTE
Occupational Therapy Progress Note     Patient Name: Spenser Wilson  Today's Date: 10/11/2023  Problem List  Principal Problem:    Primary osteoarthritis of right knee  Active Problems:    Acute post-operative pain              10/11/23 1343   OT Last Visit   OT Visit Date 10/11/23   Note Type   Note Type Treatment   Pain Assessment   Pain Assessment Tool 0-10   Pain Score 2   Pain Location/Orientation Orientation: Right;Location: Knee   Restrictions/Precautions   Weight Bearing Precautions Per Order Yes   RLE Weight Bearing Per Order WBAT   Other Precautions Fall Risk;Pain;WBS   Lifestyle   Autonomy PTA, pt was independent in ADLs/IADLs and uses no DME for functional mobility; (+) driving   Reciprocal Relationships Lives with her supportive  and daughter; local supportive family to assist with functional needs prn   Service to Others Retired; 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   Transfers   Sit to Stand 5  Supervision   Additional items Increased time required   Stand to Sit 5  Supervision   Additional items Increased time required   Functional Mobility   Functional Mobility 5  Supervision   Additional Comments Pt was able to demonstrate household mobility with S and RW. Additional items Rolling walker   Subjective   Subjective "I am feeling better:   Cognition   Overall Cognitive Status [facility]   Arousal/Participation Alert; Cooperative   Attention Within functional limits   Orientation Level Oriented X4   Memory Within functional limits   Following Commands Follows all commands and directions without difficulty   Comments Pt was pleasant and cooperative t/o session. Pt was educated on energy conservation and home equiptment. Activity Tolerance   Activity Tolerance Patient tolerated treatment well;Patient limited by fatigue   Medical Staff Made Aware RN made aware   Assessment   Assessment Pt was seen on 10/11/2023 for skilled OT session. OT session was focused on ADLs, functional mobility, functional transfers, static/dynamic balance, safety awareness, education, increased activity tolerance, and energy conservation. Pt was agreeable to OT session. Pt was able to demonstrate transfers and functional mobility with S. Pt was able to demonstrate standing at the sink to complete grooming tasks with S. Pt is limited 2* pain, endurance, activity tolerance, functional mobility, functional standing tolerance, and decreased I w/ ADLS/IADLS. The following Occupational Performance Areas to address include: eating, grooming, bathing/shower, toilet hygiene, dressing, health maintenance, and functional mobility. Pt will continue to benefit from skilled OT services 2-3x a wk to address functional goals. The patient's raw score on the AM-PAC Daily Activity Inpatient Short Form is 20. A raw score of greater than or equal to 19 suggests the patient may benefit from discharge to home. Please refer to the recommendation of the Occupational Therapist for safe discharge planning. OT recommendations for d/c include Home with increased social support. Plan   Treatment Interventions ADL retraining; Endurance training;UE strengthening/ROM; Compensatory technique education;Continued evaluation; Energy conservation; Activityengagement   Goal Expiration Date 10/24/23   OT Treatment Day 1   OT Frequency 2-3x/wk   Discharge Recommendation   OT Discharge Recommendation No rehabilitation needs  (home with increased social support)   Equipment Recommended (S)  Bedside commode   [facility] Daily Activity Inpatient Lower Body Dressing 3   Bathing 3   Toileting 3   Upper Body Dressing 3   Grooming 4   Eating 4   Daily Activity Raw Score 20   Daily Activity Standardized Score (Calc for Raw Score >=11) 42.03   AM-PAC Applied Cognition Inpatient   Following a Speech/Presentation 4   Understanding Ordinary Conversation 4   Taking Medications 4   Remembering Where Things Are Placed or Put Away 4   Remembering List of 4-5 Errands 4   Taking Care of Complicated Tasks 4   Applied Cognition Raw Score 24   Applied Cognition Standardized Score 62.21   End of Consult   Education Provided Yes;Family or social support of family present for education by provider   Patient Position at End of Consult Bedside chair; All needs within reach   Nurse Communication Nurse aware of consult     José Pac, OTR/L

## 2023-10-11 NOTE — PHYSICAL THERAPY NOTE
Physical Therapy Progress Note     10/11/23 0950   PT Last Visit   PT Visit Date 10/11/23   Note Type   Note Type Treatment   Pain Assessment   Pain Assessment Tool FLACC   Pain Rating: FLACC (Rest) - Face 1   Pain Rating: FLACC (Rest) - Legs 0   Pain Rating: FLACC (Rest) - Activity 0   Pain Rating: FLACC (Rest) - Cry 1   Pain Rating: FLACC (Rest) - Consolability 0   Score: FLACC (Rest) 2   Pain Rating: FLACC (Activity) - Face 2   Pain Rating: FLACC (Activity) - Legs 1   Pain Rating: FLACC (Activity) - Activity 1   Pain Rating: FLACC (Activity) - Cry 1   Pain Rating: FLACC (Activity) - Consolability 0   Score: FLACC (Activity) 5   Restrictions/Precautions   RLE Weight Bearing Per Order WBAT   Other Precautions WBS;Pain; Fall Risk   Subjective   Subjective Pt encountered seated in recliner, reporting feeling better overall, but has slightly increased pain since remaining off opiod meds. Reports weakness & lightheadedness at times during session, which improved with rest.   Transfers   Sit to Stand 5  Supervision   Additional items Assist x 1; Armrests; Increased time required   Stand to Sit 5  Supervision   Additional items Assist x 1; Armrests; Increased time required   Toilet transfer 5  Supervision   Additional items Assist x 1; Armrests; Increased time required;Standard toilet; Other  (wall rail)   Ambulation/Elevation   Gait pattern Step to;Short stride; Foward flexed; Inconsistent deni;Decreased R stance;Decreased foot clearance; Antalgic; Improper Weight shift   Gait Assistance 4  Minimal assist   Additional items Assist x 1  (+ chair follow)   Assistive Device Rolling walker   Distance 15', 40'   Curbs x1 curb step backwards with RW & min A   Balance   Static Sitting Good   Static Standing Fair   Ambulatory Poor +   Activity Tolerance   Activity Tolerance Patient tolerated treatment well;Patient limited by fatigue;Patient limited by pain   Nurse Gabby Villarreal RN   Assessment   Prognosis Good   Problem List Decreased strength;Decreased endurance;Decreased range of motion; Impaired balance;Decreased mobility;Pain;Orthopedic restrictions   Assessment Pt continues to require extensive time to perform all transfers & household ambulation due to RLE pain & weakness s/p surgery, compounded by ongoing hypotension in standing, requiring seated rests to recover. While pt is able to perform all tasks appropriately, anticipate she will perform much better with further medical optimization to reduce risks of orthostatic hypotension while performing these tasks to reduce risk for complications or falls at home. Pt & spouse verbalize confidence in her mobilty depsite this, an feel that she will be able to manage once medically cleared to d/c. From mobility perspective, pt has demosntrated sufficient progress to d/c home with first floor set up & family support prn initially, with OPPT follow up to progress to PLOF. Will continue to follow and treat to maximize endurance & ensure safe transition to community at d/c. Goals   Patient Goals to feel better before going home   STG Expiration Date 10/23/23   PT Treatment Day 3   Plan   Treatment/Interventions Functional transfer training;LE strengthening/ROM; Elevations; Therapeutic exercise; Endurance training;Patient/family training;Equipment eval/education; Bed mobility;Gait training   Progress Progressing toward goals   PT Frequency Twice a day   Discharge Recommendation   PT Discharge Recommendation Home with outpatient rehabilitation   Equipment Recommended Other (Comment)  (BSC, pt owns RW)   AM-PAC Basic Mobility Inpatient   Turning in Flat Bed Without Bedrails 3   Lying on Back to Sitting on Edge of Flat Bed Without Bedrails 3   Moving Bed to Chair 3   Standing Up From Chair Using Arms 3   Walk in Room 3   Climb 3-5 Stairs With Railing 3   Basic Mobility Inpatient Raw Score 18   Basic Mobility Standardized Score 41.05   Highest Level Of Mobility   -[facility] Goal 6: Walk 10 steps or more   JH-HLM Achieved 7: Walk 25 feet or more       Skippy Ghazi, PTA    An [facility] Basic Mobility Raw Score less than 17 suggests pt would benefit from post acute rehab. Please also refer to the recommendation of the Physical Therapist for safe discharge planning.

## 2023-10-11 NOTE — PROGRESS NOTES
Progress Note - Acute Pain Service    Karri Agee 63 y.o. female MRN: [id number]  Unit/Bed#: -01 Encounter: [id number]      Karri Agee is a 63 y.o. female who is postop day 2 from a right total knee arthroplasty. Acute post-operative pain  Assessment & Plan  Status post right total knee arthroplasty on 10/9/2023:    Multimodal analgesic regimen:  Tylenol 975 mg every 8 hours scheduled  Robaxin 500 mg every 6 hours scheduled  Decrease oxycodone to 2.5 mg every 4 hours as needed for moderate pain  Decrease oxycodone to 5 mg every 4 hours as needed for severe pain  Discontinue IV Dilaudid   Orthopedic service okay for Toradol, Toradol 15 mg IV course completed  Start ibuprofen 600 mg every 8 hours scheduled  Add Pepcid 20 mg daily  The patient is advised to apply ice or cold packs intermittently as needed to relieve pain; apply ice for no more than 20 minutes at a time    Interventional pain:  Status post right adductor canal/IPACK peripheral nerve block with Exparel on 10/9/2023 which was done preoperatively for postop pain control, which have predominately worn off. Bowel regimen:  Colace 100 mg twice a day  Senokot daily      Pain stable on current medication regimen. Patient is okay for discharge from a pain management standpoint. Would recommend to continue Tylenol, ibuprofen, Pepcid and short course of low-dose oxycodone at time of discharge. Treatment plan and medications were reviewed with patient, bedside nursing staff and primary care service. APS will sign off at this time. Thank you for the consult. All opioids and other analgesics to be written at discretion of primary team. Please contact Acute Pain Service - via [employer] from [phone] with additional questions or concerns. See [employer] or Jose for additional contacts and after hours information.     Pain History  Current pain location(s):  Pain Score: 3  Pain Location/Orientation: Orientation: Right, Location: Knee  Pain Scale: Pain Assessment Tool: 0-10  24 hour history: Patient sitting in chair, reporting pain in the low anterior portion of her knee which does increase with ambulation/working with physical therapy. She overall feels tired and weak. Was able to work with physical therapy this morning; blood pressure remains soft but has overall improved since yesterday. Has required minimal opioids in the last 24 hours since Toradol was started. She stated she slept well overnight since that medication was initiated. Opioid requirement previous 24 hours: Oxycodone 5 mg    Meds/Allergies   all current active meds have been reviewed and current meds:   Current Facility-Administered Medications   Medication Dose Route Frequency    acetaminophen (TYLENOL) tablet 975 mg  975 mg Oral Q8H    calcium carbonate (TUMS) chewable tablet 1,000 mg  1,000 mg Oral Daily PRN    docusate sodium (COLACE) capsule 100 mg  100 mg Oral BID    enoxaparin (LOVENOX) subcutaneous injection 40 mg  40 mg Subcutaneous Daily    famotidine (PEPCID) tablet 20 mg  20 mg Oral Daily    hydroxychloroquine (PLAQUENIL) tablet 400 mg  400 mg Oral Daily With Breakfast    lactated ringers infusion  125 mL/hr Intravenous Continuous    methocarbamol (ROBAXIN) tablet 500 mg  500 mg Oral Q6H AYAD    ondansetron (ZOFRAN) injection 4 mg  4 mg Intravenous Q6H PRN    oxyCODONE (ROXICODONE) IR tablet 5 mg  5 mg Oral Q4H PRN    oxyCODONE (ROXICODONE) split tablet 2.5 mg  2.5 mg Oral Q4H PRN    senna (SENOKOT) tablet 8.6 mg  1 tablet Oral Daily    sodium chloride 0.9 % bolus 1,000 mL  1,000 mL Intravenous Once       No Known Allergies    Objective        Vitals:    10/11/23 0009 10/11/23 0742 10/11/23 0851 10/11/23 0858   BP: 109/68 106/67 108/68 113/67   Pulse: 81 93 98    Resp:  17     Temp:  97.5 °F (36.4 °C)     TempSrc:       SpO2: 95% 97% 98%    Weight:       Height:             Physical Exam  Vitals reviewed. Constitutional:       General: She is awake.  She is not in acute distress. Appearance: Normal appearance. She is not ill-appearing, toxic-appearing or diaphoretic. HENT:      Head: Normocephalic and atraumatic. Nose: Nose normal. No congestion or rhinorrhea. Mouth/Throat:      Mouth: Mucous membranes are moist.   Eyes:      Extraocular Movements: Extraocular movements intact. Cardiovascular:      Rate and Rhythm: Normal rate. Pulmonary:      Effort: Pulmonary effort is normal. No tachypnea, bradypnea or respiratory distress. Musculoskeletal:      Comments: Right knee Ace wrap dressing intact   Skin:     Coloration: Skin is not pale. Neurological:      Mental Status: She is alert and oriented to person, place, and time. Mental status is at baseline. Sensory: Sensory deficit (Minimal residual sensory deficit right anterior shin) present. Psychiatric:         Attention and Perception: Attention normal.         Mood and Affect: Mood normal. Mood is not anxious. Speech: Speech normal.         Behavior: Behavior normal. Behavior is cooperative. Cognition and Memory: Cognition and memory normal.           Lab Results:   Estimated Creatinine Clearance: 102.1 mL/min (A) (by C-G formula based on SCr of 0.59 mg/dL (L)). Lab Results   Component Value Date    WBC 5.34 10/11/2023    HGB 10.1 (L) 10/11/2023    HCT 30.9 (L) 10/11/2023     (L) 10/11/2023         Component Value Date/Time    K 3.9 10/11/2023 0442     (H) 10/11/2023 0442    CO2 28 10/11/2023 0442    BUN 7 10/11/2023 0442    CREATININE 0.59 (L) 10/11/2023 0442         Component Value Date/Time    CALCIUM 8.0 (L) 10/11/2023 0442    ALKPHOS 54 09/08/2023 1039    AST 15 09/08/2023 1039    ALT 17 09/08/2023 1039    TP 6.8 09/08/2023 1039    ALB 3.9 09/08/2023 1039         Counseling / Coordination of Care  Total floor / unit time spent today 20 minutes minutes.  Greater than 50% of total time was spent with the patient and / or family counseling and / or coordination of care. A description of the counseling / coordination of care: Chart review included vital signs, progress notes, medications and laboratory values. Discussed ongoing pain management postoperatively which was inclusive of opioid and nonopioid medications and peripheral nerve block and duration of effectiveness of local anesthetics. Reviewed discharge recommendations which included medications and nonpharmacological treatment such as ice. Treatment plan was reviewed with patient, , bedside nursing staff and primary care service. Please note that the APS provides consultative services regarding pain management only. With the exception of ketamine and epidural infusions and except when indicated, final decisions regarding starting or changing doses of analgesic medications are at the discretion of the consulting service.     ZELALEM Desai   Acute Pain Service

## 2023-10-11 NOTE — PHYSICAL THERAPY NOTE
Physical Therapy Progress Note     10/11/23 1350   PT Last Visit   PT Visit Date 10/11/23   Note Type   Note Type BID visit/treatment   Pain Assessment   Pain Assessment Tool 0-10   Pain Score 2   Pain Location/Orientation Orientation: Right;Location: Knee   Hospital Pain Intervention(s) Repositioned; Ambulation/increased activity; Elevated; Rest   Restrictions/Precautions   RLE Weight Bearing Per Order WBAT   Other Precautions WBS;Pain; Fall Risk   Subjective   Subjective Pt encountered seated in recliner, feeling better than this AM.  Denied change in status with activity, and feels much more comfortable with pending d/c as a result. No new questions or concerns voiced compared to AM session. Transfers   Sit to Stand 5  Supervision   Additional items Assist x 1   Stand to Sit 5  Supervision   Additional items Assist x 1   Toilet transfer 5  Supervision   Additional items Assist x 1   Ambulation/Elevation   Gait pattern Step to;Short stride; Foward flexed; Inconsistent deni;Decreased R stance;Decreased foot clearance; Antalgic; Improper Weight shift   Gait Assistance 5  Supervision   Additional items Assist x 1   Assistive Device Rolling walker   Distance 15', 40', 61', 10'   Curbs x1 curb step backwards ascending with RW & min A   Balance   Static Sitting Good   Static Standing Fair   Ambulatory Fair -   Activity Tolerance   Activity Tolerance Patient tolerated treatment well   Nurse Made Aware EMMA Packer   Assessment   Prognosis Good   Problem List Decreased strength;Decreased endurance;Decreased range of motion; Impaired balance;Decreased mobility;Pain;Orthopedic restrictions   Assessment Pt demonstrated considerable improvemetns in pace, stride length, endurance & activity tolerance this PM, denying change in status with mobiilty compared to AM. She performed all tasks with good carryover of instructions & safety awareness after being prompted for hand placement to sit.   Pt encouraged to ambulate freuqently at home until anticipated OPPT appointment tomorrow, at [facility] time she will follow their HEP. Pt &  report confidence in ability to d/c home at this time. Nurse informed of the same post activity. PT POC & d/c plan remain appropriate at this time. Goals   Patient Goals to go home   STG Expiration Date 10/23/23   PT Treatment Day 4   Plan   Treatment/Interventions Functional transfer training;Elevations;LE strengthening/ROM; Therapeutic exercise; Endurance training;Patient/family training;Equipment eval/education; Bed mobility;Gait training   Progress Progressing toward goals   PT Frequency Twice a day   Discharge Recommendation   PT Discharge Recommendation Home with outpatient rehabilitation   Equipment Recommended Other (Comment)  (BSC, pt owns RW)   AM-PAC Basic Mobility Inpatient   Turning in Flat Bed Without Bedrails 3   Lying on Back to Sitting on Edge of Flat Bed Without Bedrails 3   Moving Bed to Chair 4   Standing Up From Chair Using Arms 4   Walk in Room 4   Climb 3-5 Stairs With Railing 3   Basic Mobility Inpatient Raw Score 21   Basic Mobility Standardized Score 45.55   Highest Level Of Mobility   JH-HLM Goal 6: Walk 10 steps or more   JH-HLM Achieved 7: Walk 25 feet or more       Gustavo Wu PTA    An [facility] Basic Mobility Raw Score less than 17 suggests pt would benefit from post acute rehab. Please also refer to the recommendation of the Physical Therapist for safe discharge planning.

## 2023-10-11 NOTE — PLAN OF CARE
Problem: OCCUPATIONAL THERAPY ADULT  Goal: Performs self-care activities at highest level of function for planned discharge setting. See evaluation for individualized goals. Description: Treatment Interventions: ADL retraining, Functional transfer training, Endurance training, Patient/family training, Equipment evaluation/education, Compensatory technique education, Continued evaluation, Energy conservation, Activityengagement          See flowsheet documentation for full assessment, interventions and recommendations. Outcome: Progressing  Note: Limitation: Decreased ADL status, Decreased self-care trans, Decreased high-level ADLs  Prognosis: Good  Assessment: Pt was seen on 10/11/2023 for skilled OT session. OT session was focused on ADLs, functional mobility, functional transfers, static/dynamic balance, safety awareness, education, increased activity tolerance, and energy conservation. Pt was agreeable to OT session. Pt was able to demonstrate transfers and functional mobility with S. Pt was able to demonstrate standing at the sink to complete grooming tasks with S. Pt is limited 2* pain, endurance, activity tolerance, functional mobility, functional standing tolerance, and decreased I w/ ADLS/IADLS. The following Occupational Performance Areas to address include: eating, grooming, bathing/shower, toilet hygiene, dressing, health maintenance, and functional mobility. Pt will continue to benefit from skilled OT services 2-3x a wk to address functional goals. The patient's raw score on the -PAC Daily Activity Inpatient Short Form is 20. A raw score of greater than or equal to 19 suggests the patient may benefit from discharge to home. Please refer to the recommendation of the Occupational Therapist for safe discharge planning. OT recommendations for d/c include Home with increased social support.      OT Discharge Recommendation: No rehabilitation needs (home with increased social support)

## 2023-10-11 NOTE — CASE MANAGEMENT
Case Management Progress Note    Patient name Joshua Shaw  Location [address] [facility] [id number] MRN [id number]  : 1960 Date 10/11/2023       LOS (days): 1  Geometric Mean LOS (GMLOS) (days):   Days to GMLOS:        OBJECTIVE:        Current admission status: Inpatient  Preferred Pharmacy:   [id number]- [place], [address] [facility]. [address]  [address] [facility] [address]  Phone: [phone] Fax: [phone] [facility] [address] [facility] [address] [facility] [address] [facility] [address] [facility] [address]  Phone: [phone] Fax: [phone]    Primary Care Provider: Leland Palomo MD    Primary Insurance: BLUE CROSS  Secondary Insurance:     PROGRESS NOTE: Received notification that patient is requesting a commode. Request sent to [address] [place] [address] via [address] [facility]. Awaiting review. Update at 1459: Commode approved with a copay of 100.47. Spoke with patient's spouse who is in the patient's room and updated on above. Both in agreement with copay. Confirmed liaison will be delivering commode to the room.

## 2023-10-11 NOTE — PROGRESS NOTES
Internal Medicine Progress Note  Patient: Gila Maguire  Age/sex: 63 y.o. female  Medical Record #: [id number]      ASSESSMENT/PLAN: (Interval History)  Gila Maguire is seen and examined and management for following issues:    Status Post right Total KNEE ARTHROPLASTY  Pain controlled  Continue encourage incentive spirometry; monitor fever curve  DVT prophylaxis in place and reviewed  Results from last 7 days   Lab Units 10/11/23  0442   WBC Thousand/uL 5.34   HEMOGLOBIN g/dL 10.1*   HEMATOCRIT % 30.9*   PLATELETS Thousands/uL 112*         Operative acute blood loss anemia  Decrease in hgb levels from preop labs results; suspect due to post operation extravasation losses along with potential dilutional effects of fluids  Initiate surgery optimization venofer protocol/transfusion  Transfuse PRBC if hgb less then 8.0 (per ortho protocol)  or symptomatic  Monitor follow up CBC post intervention to trend effect  Hemoglobin stable. Received Venofer yesterday. Orthostatic hypotension: We will give 1 L normal saline today. Follow-up postinfusion blood pressure. Polyarthralgia  Continue hydroxychloroquine. The above assessment and plan was reviewed and updated as determined by my evaluation of the patient on 10/11/2023.     Labs:   Results from last 7 days   Lab Units 10/11/23  0442 10/10/23  0443   WBC Thousand/uL 5.34 9.61   HEMOGLOBIN g/dL 10.1* 11.9   HEMATOCRIT % 30.9* 36.3   PLATELETS Thousands/uL 112* 101*     Results from last 7 days   Lab Units 10/11/23  0442 10/10/23  0443   SODIUM mmol/L 141 137   POTASSIUM mmol/L 3.9 4.0   CHLORIDE mmol/L 110* 106   CO2 mmol/L 28 26   BUN mg/dL 7 9   CREATININE mg/dL 0.59* 0.57*   CALCIUM mg/dL 8.0* 8.4             Results from last 7 days   Lab Units 10/09/23  1421   POC GLUCOSE mg/dl 82       Review of Scheduled Meds:  Current Facility-Administered Medications   Medication Dose Route Frequency Provider Last Rate    acetaminophen  975 mg Oral Q8H Marley Pinky Cruz MD      calcium carbonate  1,000 mg Oral Daily PRN Honey Murphy MD      docusate sodium  100 mg Oral BID Honey Murphy MD      enoxaparin  40 mg Subcutaneous Daily Honey Murphy MD      famotidine  20 mg Oral Daily ZELALEM Negron      hydroxychloroquine  400 mg Oral Daily With Breakfast ZELALEM Harvey      lactated ringers  125 mL/hr Intravenous Continuous Honey Murphy  mL/hr (10/09/23 2330)    methocarbamol  500 mg Oral Q6H [facility] ZELALEM Card      ondansetron  4 mg Intravenous Q6H PRN Honey Murphy MD      oxyCODONE  5 mg Oral Q4H PRN ZELALEM Negron      oxyCODONE  2.5 mg Oral Q4H PRN Mac Torres, ZELALEM      senna  1 tablet Oral Daily Honey Murphy MD      sodium chloride  1,000 mL Intravenous Once Judith Garnica MD 1,000 mL (10/11/23 1139)       Subjective/ HPI: Patient seen and examined. Patients overnight issues or events were reviewed with nursing or staff during rounds or morning huddle session. New or overnight issues include the following:     Pt without any overnight events or reported nursing issues. Blood pressures improved with aggressive IV fluid hydration although she did have mild orthostasis with blood pressures dropping from 110s to 90s. ROS:   A 10 point ROS was performed; negative except as noted above.        Imaging:     No orders to display       *Labs /Radiology studies Reviewed  *Medications  reviewed and reconciled as needed  *Please refer to order section for additional ordered labs studies  *Case discussed with primary attending during morning huddle case rounds    Physical Examination:  Vitals:   Vitals:    10/11/23 0009 10/11/23 0742 10/11/23 0851 10/11/23 0858   BP: 109/68 106/67 108/68 113/67   Pulse: 81 93 98    Resp:  17     Temp:  97.5 °F (36.4 °C)     TempSrc:       SpO2: 95% 97% 98%    Weight:       Height:           General Appearance: no distress, conversive  HEENT: PERRLA, conjuctiva normal; oropharynx clear; mucous membranes moist;   Neck:  Supple, no lymphadenopathy or thyromegaly  Lungs: CTA, normal respiratory effort, no retractions, expiratory effort normal  CV: regular rate and rhythm , PMI normal   ABD: soft non tender, no masses , no hepatic or splenomegaly  EXT: DP pulses intact, no lymphadenopathy, no edema; right knee surgical dressing in place  Skin: normal turgor, normal texture, no rash  Psych: affect normal, mood normal  Neuro: AAOx3    : rodriguez removed ; due to void    The above physical exam was reviewed and updated as determined by my evaluation of the patient on 10/11/2023. Invasive Devices       Peripheral Intravenous Line  Duration             Peripheral IV 10/09/23 Dorsal (posterior); Right Wrist 2 days                       VTE Pharmacologic Prophylaxis: Enoxaparin  Code Status: No Order  Current Length of Stay: 3 day(s)      Total floor / unit time spent today 30 minutes  Coordination of patient's care was performed in conjunction with primary service. Time invested included review of patient's labs, vitals, and management of their comorbidities with continued monitoring, examination of patient as well as answering patient questions, documenting her findings and creating progress note in electronic medical record,  ordering appropriate diagnostic testing. Medical decision making for the day was made by supervising physician unless otherwise noted in their attestation statement. ** Please Note:  voice to text software may have been used in the creation of this document.  Although proof errors in transcription or interpretation are a potential of such software**

## 2023-10-11 NOTE — PLAN OF CARE
Problem: PAIN - ADULT  Goal: Verbalizes/displays adequate comfort level or baseline comfort level  Description: Interventions:  - Encourage patient to monitor pain and request assistance  - Assess pain using appropriate pain scale  - Administer analgesics based on type and severity of pain and evaluate response  - Implement non-pharmacological measures as appropriate and evaluate response  - Consider cultural and social influences on pain and pain management  - Notify physician/advanced practitioner if interventions unsuccessful or patient reports new pain  Outcome: Progressing     Problem: INFECTION - ADULT  Goal: Absence or prevention of progression during hospitalization  Description: INTERVENTIONS:  - Assess and monitor for signs and symptoms of infection  - Monitor lab/diagnostic results  - Monitor all insertion sites, i.e. indwelling lines, tubes, and drains  - Monitor endotracheal if appropriate and nasal secretions for changes in amount and color  - [place] appropriate cooling/warming therapies per order  - Administer medications as ordered  - Instruct and encourage patient and family to use good hand hygiene technique  - Identify and instruct in appropriate isolation precautions for identified infection/condition  Outcome: Progressing     Problem: SAFETY ADULT  Goal: Patient will remain free of falls  Description: INTERVENTIONS:  - Educate patient/family on patient safety including physical limitations  - Instruct patient to call for assistance with activity   - Consult OT/PT to assist with strengthening/mobility   - Keep Call bell within reach  - Keep bed low and locked with side rails adjusted as appropriate  - Keep care items and personal belongings within reach  - Initiate and maintain comfort rounds  - Make Fall Risk Sign visible to staff  - Offer Toileting every 2 Hours, in advance of need  - Initiate/Maintain alarm  - Obtain necessary fall risk management equipment: non-skid footwear, pt instructed to call for assistance, call bell in reach and pt rings appropriately, pt in room near nurses' station, yellow bracelet  - Apply yellow socks and bracelet for high fall risk patients  - Consider moving patient to room near nurses station  Outcome: Progressing  Goal: Maintain or return to baseline ADL function  Description: INTERVENTIONS:  -  Assess patient's ability to carry out ADLs; assess patient's baseline for ADL function and identify physical deficits which impact ability to perform ADLs (bathing, care of mouth/teeth, toileting, grooming, dressing, etc.)  - Assess/evaluate cause of self-care deficits   - Assess range of motion  - Assess patient's mobility; develop plan if impaired  - Assess patient's need for assistive devices and provide as appropriate  - Encourage maximum independence but intervene and supervise when necessary  - Involve family in performance of ADLs  - Assess for home care needs following discharge   - Consider OT consult to assist with ADL evaluation and planning for discharge  - Provide patient education as appropriate  Outcome: Progressing  Goal: Maintains/Returns to pre admission functional level  Description: INTERVENTIONS:  - Perform BMAT or MOVE assessment daily.   - Set and communicate daily mobility goal to care team and patient/family/caregiver. - Collaborate with rehabilitation services on mobility goals if consulted  - Perform Range of Motion 3 times a day. - Reposition patient every 2 hours.   - Dangle patient 3 times a day  - Stand patient 3 times a day  - Ambulate patient 3 times a day  - Out of bed to chair 3 times a day   - Out of bed for meals 3 times a day  - Out of bed for toileting  - Record patient progress and toleration of activity level   Outcome: Progressing     Problem: DISCHARGE PLANNING  Goal: Discharge to home or other facility with appropriate resources  Description: INTERVENTIONS:  - Identify barriers to discharge w/patient and caregiver  - Arrange for needed discharge resources and transportation as appropriate  - Identify discharge learning needs (meds, wound care, etc.)  - Arrange for interpretive services to assist at discharge as needed  - Refer to Case Management Department for coordinating discharge planning if the patient needs post-hospital services based on physician/advanced practitioner order or complex needs related to functional status, cognitive ability, or social support system  Outcome: Progressing     Problem: Knowledge Deficit  Goal: Patient/family/caregiver demonstrates understanding of disease process, treatment plan, medications, and discharge instructions  Description: Complete learning assessment and assess knowledge base. Interventions:  - Provide teaching at level of understanding  - Provide teaching via preferred learning methods  Outcome: Progressing     Problem: MOBILITY - ADULT  Goal: Maintain or return to baseline ADL function  Description: INTERVENTIONS:  -  Assess patient's ability to carry out ADLs; assess patient's baseline for ADL function and identify physical deficits which impact ability to perform ADLs (bathing, care of mouth/teeth, toileting, grooming, dressing, etc.)  - Assess/evaluate cause of self-care deficits   - Assess range of motion  - Assess patient's mobility; develop plan if impaired  - Assess patient's need for assistive devices and provide as appropriate  - Encourage maximum independence but intervene and supervise when necessary  - Involve family in performance of ADLs  - Assess for home care needs following discharge   - Consider OT consult to assist with ADL evaluation and planning for discharge  - Provide patient education as appropriate  Outcome: Progressing  Goal: Maintains/Returns to pre admission functional level  Description: INTERVENTIONS:  - Perform BMAT or MOVE assessment daily.   - Set and communicate daily mobility goal to care team and patient/family/caregiver.    - Collaborate with rehabilitation services on mobility goals if consulted  - Perform Range of Motion 3 times a day. - Reposition patient every 2 hours.   - Dangle patient 3 times a day  - Stand patient 3 times a day  - Ambulate patient 3 times a day  - Out of bed to chair 3 times a day   - Out of bed for meals 3 times a day  - Out of bed for toileting  - Record patient progress and toleration of activity level   Outcome: Progressing

## 2023-10-11 NOTE — PLAN OF CARE
Problem: PAIN - ADULT  Goal: Verbalizes/displays adequate comfort level or baseline comfort level  Description: Interventions:  - Encourage patient to monitor pain and request assistance  - Assess pain using appropriate pain scale  - Administer analgesics based on type and severity of pain and evaluate response  - Implement non-pharmacological measures as appropriate and evaluate response  - Consider cultural and social influences on pain and pain management  - Notify physician/advanced practitioner if interventions unsuccessful or patient reports new pain  Outcome: Progressing     Problem: INFECTION - ADULT  Goal: Absence or prevention of progression during hospitalization  Description: INTERVENTIONS:  - Assess and monitor for signs and symptoms of infection  - Monitor lab/diagnostic results  - Monitor all insertion sites, i.e. indwelling lines, tubes, and drains  - Monitor endotracheal if appropriate and nasal secretions for changes in amount and color  - [place] appropriate cooling/warming therapies per order  - Administer medications as ordered  - Instruct and encourage patient and family to use good hand hygiene technique  - Identify and instruct in appropriate isolation precautions for identified infection/condition  Outcome: Progressing     Problem: SAFETY ADULT  Goal: Patient will remain free of falls  Description: INTERVENTIONS:  - Educate patient/family on patient safety including physical limitations  - Instruct patient to call for assistance with activity   - Consult OT/PT to assist with strengthening/mobility   - Keep Call bell within reach  - Keep bed low and locked with side rails adjusted as appropriate  - Keep care items and personal belongings within reach  - Initiate and maintain comfort rounds  - Make Fall Risk Sign visible to staff  - Offer Toileting every 2-4 Hours, in advance of need  - Initiate/Maintain call bell alarm  - Obtain necessary fall risk management equipment: nonskid socks   - Apply yellow socks and bracelet for high fall risk patients  - Consider moving patient to room near nurses station  Outcome: Progressing  Goal: Maintain or return to baseline ADL function  Description: INTERVENTIONS:  -  Assess patient's ability to carry out ADLs; assess patient's baseline for ADL function and identify physical deficits which impact ability to perform ADLs (bathing, care of mouth/teeth, toileting, grooming, dressing, etc.)  - Assess/evaluate cause of self-care deficits   - Assess range of motion  - Assess patient's mobility; develop plan if impaired  - Assess patient's need for assistive devices and provide as appropriate  - Encourage maximum independence but intervene and supervise when necessary  - Involve family in performance of ADLs  - Assess for home care needs following discharge   - Consider OT consult to assist with ADL evaluation and planning for discharge  - Provide patient education as appropriate  Outcome: Progressing  Goal: Maintains/Returns to pre admission functional level  Description: INTERVENTIONS:  - Perform BMAT or MOVE assessment daily.   - Set and communicate daily mobility goal to care team and patient/family/caregiver. - Collaborate with rehabilitation services on mobility goals if consulted  - Perform Range of Motion 3 times a day. - Reposition patient every 3 hours.   - Dangle patient 3 times a day  - Stand patient 3 times a day  - Ambulate patient 3 times a day  - Out of bed to chair 3 times a day   - Out of bed for meals 3 times a day  - Out of bed for toileting  - Record patient progress and toleration of activity level   Outcome: Progressing     Problem: DISCHARGE PLANNING  Goal: Discharge to home or other facility with appropriate resources  Description: INTERVENTIONS:  - Identify barriers to discharge w/patient and caregiver  - Arrange for needed discharge resources and transportation as appropriate  - Identify discharge learning needs (meds, wound care, etc.)  - Arrange for interpretive services to assist at discharge as needed  - Refer to Case Management Department for coordinating discharge planning if the patient needs post-hospital services based on physician/advanced practitioner order or complex needs related to functional status, cognitive ability, or social support system  Outcome: Progressing     Problem: Knowledge Deficit  Goal: Patient/family/caregiver demonstrates understanding of disease process, treatment plan, medications, and discharge instructions  Description: Complete learning assessment and assess knowledge base. Interventions:  - Provide teaching at level of understanding  - Provide teaching via preferred learning methods  Outcome: Progressing     Problem: MOBILITY - ADULT  Goal: Maintain or return to baseline ADL function  Description: INTERVENTIONS:  -  Assess patient's ability to carry out ADLs; assess patient's baseline for ADL function and identify physical deficits which impact ability to perform ADLs (bathing, care of mouth/teeth, toileting, grooming, dressing, etc.)  - Assess/evaluate cause of self-care deficits   - Assess range of motion  - Assess patient's mobility; develop plan if impaired  - Assess patient's need for assistive devices and provide as appropriate  - Encourage maximum independence but intervene and supervise when necessary  - Involve family in performance of ADLs  - Assess for home care needs following discharge   - Consider OT consult to assist with ADL evaluation and planning for discharge  - Provide patient education as appropriate  Outcome: Progressing  Goal: Maintains/Returns to pre admission functional level  Description: INTERVENTIONS:  - Perform BMAT or MOVE assessment daily.   - Set and communicate daily mobility goal to care team and patient/family/caregiver. - Collaborate with rehabilitation services on mobility goals if consulted  - Perform Range of Motion 3 times a day. - Reposition patient every 3 hours.   - Dangle patient 3 times a day  - Stand patient 3 times a day  - Ambulate patient 3 times a day  - Out of bed to chair 3 times a day   - Out of bed for meals 3 times a day  - Out of bed for toileting  - Record patient progress and toleration of activity level   Outcome: Progressing

## 2023-10-12 ENCOUNTER — TELEPHONE (OUTPATIENT)
Dept: OBGYN CLINIC | Facility: HOSPITAL | Age: 63
End: 2023-10-12

## 2023-10-12 ENCOUNTER — OFFICE VISIT (OUTPATIENT)
Dept: PHYSICAL THERAPY | Age: 63
End: 2023-10-12
Payer: COMMERCIAL

## 2023-10-12 DIAGNOSIS — M25.561 CHRONIC PAIN OF RIGHT KNEE: Primary | ICD-10-CM

## 2023-10-12 DIAGNOSIS — G89.18 ACUTE POST-OPERATIVE PAIN: ICD-10-CM

## 2023-10-12 DIAGNOSIS — G89.29 CHRONIC PAIN OF RIGHT KNEE: Primary | ICD-10-CM

## 2023-10-12 PROCEDURE — 97140 MANUAL THERAPY 1/> REGIONS: CPT

## 2023-10-12 PROCEDURE — 97110 THERAPEUTIC EXERCISES: CPT

## 2023-10-12 PROCEDURE — 97112 NEUROMUSCULAR REEDUCATION: CPT

## 2023-10-12 NOTE — PROGRESS NOTES
Daily Note     Today's date: 10/12/2023  Patient name: Anuja Terrazas  : 1960  MRN: [id number]  Referring provider: Rajat Mora:   Encounter Diagnosis     ICD-10-CM    1. Chronic pain of right knee  M25.561     G89.29       2. Acute post-operative pain  G89.18                      Assessment  Assessment details: Problem List:  1) hypomobility of b/l knees - addressing with PROM and AAROM of knee  2) decreased neuromuscular control LE - addressing with functional strengthening, quad sets and SLR    Anuja Terrazas is a pleasant 63 y.o. female who presents for R TKA pre op appt scheduled with Juanjo for 10/9/2023. She has decreased strength and ROM of b/l knees resulting in difficulty performing daily activities due to pain and some apprehension over what to expect from surgical rehab. No further referral appears necessary at this time based upon examination results. I expect she will benefit from physical therapy following surgery to regain function and return to PLOF. Positive prognostic indicators include positive attitude toward recovery and good understanding of diagnosis and treatment plan options. Negative prognostic indicators include chronicity of symptoms, anxiety and obesity. Comparable signs:  1) knee ROM  2) gait     PT Re-Evaluation 10/12/2023: Patient presents following R TKA completed by Dr. Jon Shukla, surgery completed 10/9/2023. Currently presenting with signs/symptoms consistent following surgery including swelling, decreased strength, and decreased ROM. Patient currently limited in functional abilities and would benefit from physical therapy for gradual progression to address deficits and restore full functional tolerance.       Impairments: abnormal or restricted ROM, impaired physical strength, lacks appropriate home exercise program and pain with function    Symptom irritability: moderateUnderstanding of Dx/Px/POC: good   Prognosis: good    Goals  Short Term Goals: to be achieved by 4 weeks  1) Patient to be independent with basic HEP. 2) Decrease pain to 4/10 at its worst.  3) Increase R knee flex ROM to 115 deg to increase function post-op. 4) Increase R knee ext ROM to -5 deg to improve gait post-op. 5) Increase LE strength by 1/2 MMT grade in all deficient planes post-op. Long Term Goals: to be achieved by discharge  1) Patient to be independent with comprehensive HEP. 2) R knee ROM WNL all planes to improve a/iadls post-op. 3) Increase LE strength to 5/5 MMT grade in all planes to improve a/iadls post-op. 4) Patient to report no sleep interruption secondary to pain post-op. 5) Increase ambulatory tolerance to 60 min post-op without AD. 6) FOTO scores >53. Plan  Plan details: Address physical impairments found during IE via therapeutic exercises, neuromuscular re-education, and manual therapy to improve functional tolerance. Develop HEP for self-management of symptoms. Patient would benefit from: skilled physical therapy  Referral necessary: No  Planned therapy interventions: home exercise program, gait training, flexibility, balance, joint mobilization, manual therapy, massage, neuromuscular re-education, patient education, strengthening, stretching, therapeutic activities, therapeutic exercise and therapeutic training  Frequency: 2x week  Duration in visits: 20  Duration in weeks: 10  Plan of Care beginning date: 10/9/2023  Plan of Care expiration date: 12/18/2023  Treatment plan discussed with: patient        Subjective Evaluation    History of Present Illness  Mechanism of injury: Patient presents for R TKA pre-op appt. Surgery scheduled with Juanjo for 10/9/2023. Patient reports ~10 years ago started noticing pain in knees, was working part-time at [employer] and started with pain from walking on concrete. Over the years has gotten worse. Has had therapy and CSI in the past with minimal relief.  Notes that she needs to get both knees done but starting with right, wanted to wait until she was 62 to get them done. Says she's able to function but isn't able to walk long distances, stairs are also difficult. Reports getting exhausted from things as simple as walking around the store while grocery shopping. Wants to be able to return to work part-time. PT Re-Evaluation 10/12/2023: Patient presents to OP PT post op for R TKA completed by Dr. Malissa Segovia, surgery completed 10/9/2023. Patient reports getting dizzy with low blood pressure in hospital due to pain medications so stayed an extra day in the hospital. Hasn't been taking the oxy but has been managing pain with Tylenol. Had been effective until this morning. Hasn't been able to make it up the steps and has been sleeping in living room on recliner, has  at home to help. Hasn't been doing exercises with exception of ankle pumps and quad set. Current pain = 7/10. Best pain = 1/10. Worst pain = 7/10.            Recurrent probem    Quality of life: good    Patient Goals  Patient goals for therapy: return to work, increased strength and decreased pain    Pain  Current pain ratin  At best pain ratin  At worst pain ratin  Location: medial knee  Quality: dull ache and sharp  Relieving factors: rest and change in position  Aggravating factors: walking and stair climbing  Progression: worsening    Social Support  Steps to enter house: yes  1  Stairs in house: yes   13  Lives in: multiple-level home  Lives with: significant other    Employment status: not working    Diagnostic Tests  X-ray: normal (degenerative changes)  Treatments  Previous treatment: injection treatment and physical therapy        Objective     BP = 132/78 mmHg  HR = 98  SpO2 = 99%    General Comments:      Knee Comments  Posture: rounded shoulders  Palpation: TTP medial joint line b/l knees  Myotomes (L/R): intact b/l             GAIT: compensated Trendelenburg b/l  Squat assess: performs with pain  Steps: educated step to patterns, "up with the good, down with the bad"            MMT        AROM          PROM   Hip       L     R         L       R          L         R  Flex. 4- 4-       Extn. 4 4       Abd. 3+ 3+       Add. 4 4            . Knee        Exten 4 4 0  0 -15! NT NT      Flex 4 4 123 125 70! 130 130 80!      Precautions: NA      Manuals 10/2 10/12           PROM R knee  CS           Patellar mobs             Reassess  CS                        Neuro Re-Ed             Bridges HEP            Clamshell HEP            SLR HEP            Quad set HEP 20x5"                                                  Ther Ex             Heel slide HEP 20x10"           Ankle pumps HEP            Gastroc stretch w strap HEP 20x5"           RB/NS - knee ROM             Weight shift  10x5"           Pt education CS CS                                     Ther Activity             FSU             LSU             Gait Training                                       Modalities

## 2023-10-12 NOTE — TELEPHONE ENCOUNTER
Patient contacted for a postoperative follow up assessment. Patient reports doing  "well"  Patient states current pain level of a  2/10  when sitting and 3/10 when walking with RW. Patient states they have minimal pain and it is relieved with medication regimen. Patient denies increase in swelling and dressing is clean, dry and intact. Patient is icing the site regularly. We reviewed patients AVS medication list. Patient reports taking motrin and tylenol. Advised patient to take only take tylenol 1000mg every 8 hours, patient was not taking robaxin, advised patient to take as prescribed to help alleviate pain,  Methocarbamol 500mg TID, and Oxycodone 5mg PRN, Lovenox 40mg inj daily. Patient has not yet had a BM but is passing gas. Patient denies nausea, vomiting, abdominal pain, chest pain, shortness of breath, fever, dizziness and calf pain. Patient confirmed post-op appointment with PT today, 10/12 and with surgeon on 10/17. Patient does not have any other questions or concerns at this time. Pt was encouraged to call with any questions, concerns or issues.

## 2023-10-13 ENCOUNTER — TRANSITIONAL CARE MANAGEMENT (OUTPATIENT)
Dept: INTERNAL MEDICINE CLINIC | Facility: CLINIC | Age: 63
End: 2023-10-13

## 2023-10-13 PROCEDURE — TCMPR

## 2023-10-16 ENCOUNTER — OFFICE VISIT (OUTPATIENT)
Dept: PHYSICAL THERAPY | Age: 63
End: 2023-10-16
Payer: COMMERCIAL

## 2023-10-16 DIAGNOSIS — G89.18 ACUTE POST-OPERATIVE PAIN: ICD-10-CM

## 2023-10-16 DIAGNOSIS — G89.29 CHRONIC PAIN OF RIGHT KNEE: Primary | ICD-10-CM

## 2023-10-16 DIAGNOSIS — M25.561 CHRONIC PAIN OF RIGHT KNEE: Primary | ICD-10-CM

## 2023-10-16 DIAGNOSIS — Z00.00 HEALTHCARE MAINTENANCE: ICD-10-CM

## 2023-10-16 PROCEDURE — 97112 NEUROMUSCULAR REEDUCATION: CPT

## 2023-10-16 PROCEDURE — 97140 MANUAL THERAPY 1/> REGIONS: CPT

## 2023-10-16 PROCEDURE — 97110 THERAPEUTIC EXERCISES: CPT

## 2023-10-16 NOTE — PROGRESS NOTES
Daily Note     Today's date: 10/16/2023  Patient name: Elias Acevedo  : 1960  MRN: [id number]  Referring provider: Indiana Bell  Dx:   Encounter Diagnosis     ICD-10-CM    1. Chronic pain of right knee  M25.561     G89.29       2. Acute post-operative pain  G89.18       3. Healthcare maintenance  Z00.00                      Subjective: pt reports her  bought a txfer chair to avoid amb at times due to pain/lightheadedness, pt reports she's only taking tylenol at this time for pain control, pt reports B feet feel cold at times       Objective: See treatment diary below  R knee PROM flexion 10-83 degrees  /72    Assessment: pt's BP was fairly stable, encouraged pt to increase her mobility and gait as susu, R knee PROM gradually improving       Plan: Continue per plan of care. Progress treament per protocol.       Precautions: NA      Manuals 10/2 10/12 10/16          PROM R knee  CS VK          Patellar mobs             Reassess  CS                        Neuro Re-Ed   10/16          Bridges HEP            Clamshell HEP            SLR HEP            Quad set HEP 20x5" 3x10x5"          Glut set   20x5"                                    Ther Ex   10/16          Heel slide HEP 20x10" [id number]"          Ankle pumps HEP            Gastroc stretch w strap HEP 20x5" 5x15"          RB/NS - knee ROM             Weight shift  10x5" 10x5"          Pt education CS CS                                     Ther Activity             FSU             LSU             Gait Training                                       Modalities

## 2023-10-16 NOTE — UTILIZATION REVIEW
NOTIFICATION OF ADMISSION DISCHARGE   This is a Notification of Discharge from [facility]. Please be advised that this patient has been discharge from our facility. Below you will find the admission and discharge date and time including the patient’s disposition. UTILIZATION REVIEW CONTACT:  Jorje Rios  Utilization   Network Utilization Review Department  Phone: [phone] x carefully listen to the prompts. All voicemails are confidential.  Email: [email or web address]     ADMISSION INFORMATION  PRESENTATION DATE: 10/9/2023  9:53 AM  OBERVATION ADMISSION DATE:   INPATIENT ADMISSION DATE: 10/10/23 10:10 AM   DISCHARGE DATE: 10/11/2023  5:07 PM   DISPOSITION:Home/Self Care    Network Utilization Review Department  ATTENTION: Please call with any questions or concerns to [phone] and carefully listen to the prompts so that you are directed to the right person. All voicemails are confidential.   For Discharge needs, contact Care Management DC Support Team at [phone] opt. 2  Send all requests for admission clinical reviews, approved or denied determinations and any other requests to dedicated fax number below belonging to the campus where the patient is receiving treatment.  List of dedicated fax numbers for the Facilities:  [place] DENIALS (Administrative/Medical Necessity) [phone]   DISCHARGE SUPPORT TEAM (Network) [phone] [address] [facility] (Maternity/NICU/Pediatrics) [phone]   [address] [facility] [address] [phone] [address] [facility] [phone]   [address] [place] [address]  [address]  [phone]

## 2023-10-17 ENCOUNTER — OFFICE VISIT (OUTPATIENT)
Dept: OBGYN CLINIC | Facility: HOSPITAL | Age: 63
End: 2023-10-17

## 2023-10-17 ENCOUNTER — HOSPITAL ENCOUNTER (OUTPATIENT)
Dept: RADIOLOGY | Facility: HOSPITAL | Age: 63
Discharge: HOME | End: 2023-10-17
Attending: ORTHOPAEDIC SURGERY
Payer: COMMERCIAL

## 2023-10-17 ENCOUNTER — TELEPHONE (OUTPATIENT)
Age: 63
End: 2023-10-17

## 2023-10-17 VITALS
HEART RATE: 85 BPM | SYSTOLIC BLOOD PRESSURE: 110 MMHG | HEIGHT: 59 IN | DIASTOLIC BLOOD PRESSURE: 73 MMHG | BODY MASS INDEX: 38.38 KG/M2

## 2023-10-17 DIAGNOSIS — Z47.1 AFTERCARE FOLLOWING RIGHT KNEE JOINT REPLACEMENT SURGERY: Primary | ICD-10-CM

## 2023-10-17 DIAGNOSIS — Z96.651 AFTERCARE FOLLOWING RIGHT KNEE JOINT REPLACEMENT SURGERY: ICD-10-CM

## 2023-10-17 DIAGNOSIS — Z96.651 AFTERCARE FOLLOWING RIGHT KNEE JOINT REPLACEMENT SURGERY: Primary | ICD-10-CM

## 2023-10-17 DIAGNOSIS — Z47.1 AFTERCARE FOLLOWING RIGHT KNEE JOINT REPLACEMENT SURGERY: ICD-10-CM

## 2023-10-17 PROCEDURE — 99024 POSTOP FOLLOW-UP VISIT: CPT | Performed by: ORTHOPAEDIC SURGERY

## 2023-10-17 PROCEDURE — 73560 X-RAY EXAM OF KNEE 1 OR 2: CPT

## 2023-10-17 RX ORDER — HYDROCODONE BITARTRATE AND ACETAMINOPHEN 5; 325 MG/1; MG/1
1 TABLET ORAL EVERY 6 HOURS PRN
Qty: 30 TABLET | Refills: 0 | Status: SHIPPED | OUTPATIENT
Start: 2023-10-17 | End: 2023-10-18 | Stop reason: SDUPTHER

## 2023-10-17 NOTE — TELEPHONE ENCOUNTER
PA HAS BEEN SUBMITTED W/OP REPORT TO PLAN W/SURE SCRIPTS    QUESTIONS ANSWERED    WILL AWAIT PLAN RESPONSE

## 2023-10-17 NOTE — PROGRESS NOTES
Assessment:  1. Aftercare following right knee joint replacement surgery              Surgery: Arthroplasty Right Knee Total W Robot - Right  Date of Surgery: 10/9/2023        Discussion and Plan:   Belkis Taylor sent to pharmacy on file as Oxycodone was causing a decrease in her blood pressure. Encouraged patient to work on ROM, it is important to get the knee moving. WBAT  Patient may shower, do not submerge in water      Follow Up:  Return for as scheduled 1 week. CHIEF COMPLAINT:  Chief Complaint   Patient presents with    Right Knee - Post-op         SUBJECTIVE:  Juanis Steinberg is a 63y.o. year old female who presents for follow up after Arthroplasty Right Knee Total W Robot - Right. Today patient has Pain  moderate to severe Intermittant  Sharp, Dull, and Aching. Patient reports she was unable to take the Oxycodone as it lower her blood pressure as was causing her to pass out. PHYSICAL EXAMINATION:  General: well developed and well nourished, alert, oriented times 3, and appears comfortable  Psychiatric: Normal    MUSCULOSKELETAL EXAMINATION:  Incision: Clean, dry, intact and staple intact  Surgery Site: normal, no evidence of infection  and swelling present  Range of Motion: limited by pain, patient unable to perform a straight leg raise. Neurovascular status: Neuro intact, good cap refill  Activity Restrictions:  WBAT         I have personally reviewed pertinent films in PACS and my interpretation is as follows. Right knee x-rays demonstrates prothesis in acceptable alignment and position.        Scribe Attestation      I,:  Julien Izaguirre am acting as a scribe while in the presence of the attending physician.:       I,:  Cassius Last MD personally performed the services described in this documentation    as scribed in my presence.:

## 2023-10-18 ENCOUNTER — TELEPHONE (OUTPATIENT)
Age: 63
End: 2023-10-18

## 2023-10-18 DIAGNOSIS — Z47.1 AFTERCARE FOLLOWING RIGHT KNEE JOINT REPLACEMENT SURGERY: ICD-10-CM

## 2023-10-18 DIAGNOSIS — Z96.651 AFTERCARE FOLLOWING RIGHT KNEE JOINT REPLACEMENT SURGERY: ICD-10-CM

## 2023-10-18 RX ORDER — HYDROCODONE BITARTRATE AND ACETAMINOPHEN 5; 325 MG/1; MG/1
1 TABLET ORAL EVERY 6 HOURS PRN
Qty: 30 TABLET | Refills: 0 | Status: SHIPPED | OUTPATIENT
Start: 2023-10-18 | End: 2023-10-19 | Stop reason: SDUPTHER

## 2023-10-18 NOTE — TELEPHONE ENCOUNTER
PA REQUEST WAS SUBMITTED WITH OFFICE NOTES TO PLAN FOR COVERAGE DETERMINATION    SUBMITTED W/SURE SCRIPTS  QUESTIONS ANSWERED     WILL AWAIT PLAN RESPONSE      SAME MEDICATION WAS DENIED YESTERDAY BY PLAN

## 2023-10-18 NOTE — TELEPHONE ENCOUNTER
Spoke to patients ,this drop in BP occurred this morning when she just woke up. We discussed orthostatic hypotension and drop of BP when getting up from sitting/lying position. We discussed eating a well balanced diet, with increased fluid intake. He denies that she was dizzy/hypotensive yesterday with two doses of pain medication.     If persistent he will notify our team.

## 2023-10-18 NOTE — TELEPHONE ENCOUNTER
Caller: Maura Tyler from [employer]     Doctor: Dr. Idalia Pedraza    Reason for call: Maura Tyler calling noting that she has 2 authorizations on file for the TKA and she would like to speak to surgery coordinator to see which prior authorization she should withdraw. She can be reached at number below.     Call back#: [phone]

## 2023-10-18 NOTE — TELEPHONE ENCOUNTER
Caller: PT  Jazmín Haremarlenis     Doctor: Estella Orellana    Reason for call: Pt  is calling regarding pain medication . Jazmín Garfield stated the new medication is lowering down his wife blood pressure .    Please advise     Call back#: [phone]

## 2023-10-19 ENCOUNTER — TELEPHONE (OUTPATIENT)
Age: 63
End: 2023-10-19

## 2023-10-19 ENCOUNTER — OFFICE VISIT (OUTPATIENT)
Dept: PHYSICAL THERAPY | Age: 63
End: 2023-10-19
Payer: COMMERCIAL

## 2023-10-19 ENCOUNTER — TELEPHONE (OUTPATIENT)
Dept: OBGYN CLINIC | Facility: HOSPITAL | Age: 63
End: 2023-10-19

## 2023-10-19 DIAGNOSIS — G89.18 ACUTE POST-OPERATIVE PAIN: ICD-10-CM

## 2023-10-19 DIAGNOSIS — M25.561 CHRONIC PAIN OF RIGHT KNEE: Primary | ICD-10-CM

## 2023-10-19 DIAGNOSIS — Z00.00 HEALTHCARE MAINTENANCE: ICD-10-CM

## 2023-10-19 DIAGNOSIS — G89.29 CHRONIC PAIN OF RIGHT KNEE: Primary | ICD-10-CM

## 2023-10-19 PROCEDURE — 97110 THERAPEUTIC EXERCISES: CPT

## 2023-10-19 PROCEDURE — 97140 MANUAL THERAPY 1/> REGIONS: CPT

## 2023-10-19 PROCEDURE — 97112 NEUROMUSCULAR REEDUCATION: CPT

## 2023-10-19 NOTE — PROGRESS NOTES
Daily Note     Today's date: 10/19/2023  Patient name: Elias Acevedo  : 1960  MRN: [id number]  Referring provider: Indiana Bell  Dx:   Encounter Diagnosis     ICD-10-CM    1. Chronic pain of right knee  M25.561     G89.29       2. Acute post-operative pain  G89.18       3. Healthcare maintenance  Z00.00                      Subjective: Patient reports not being able to perform steps at home, wants to go upstairs to shower but hasn't been able to. Changed medication again because blood pressure was dropping, currently taking Vicodin. Presents to clinic in wheelchair. Objective: See treatment diary below      Assessment: Tolerated treatment fair. Patient presents to clinic today in wheelchair for fear of fatiguing too much and being unable to walk into clinic. Patient unable to support weight on right leg to step up onto treatment table and was therefore treated on low treatment mat. Standing weight shifts to address WBing deficits. Patient demonstrates trace quad contraction despite significant verbal and tactile cueing, encouraged to perform at home bilaterally for irradiation side to side. Patient continues to be present with significant discomfort during PROM, PROM flex to 92 degrees and PROM ext to -10 degrees. Patient would benefit from continued PT to address remaining deficits for improved functional tolerance. Plan: Continue per plan of care.       Precautions: NA      Manuals 10/2 10/12 10/16 10/19         PROM R knee  CS VK CS         Patellar mobs             Reassess  CS                        Neuro Re-Ed   10/16          Bridges HEP            Clamshell HEP            SLR HEP            Quad set HEP 20x5" 3x10x5" 10x5"         Glut set   20x5"                                    Ther Ex   10/16          Heel slide HEP 20x10" [id number]"          Ankle pumps HEP            Gastroc stretch w strap HEP 20x5" 5x15"          RB/NS - knee ROM    8' NS         Weight shift  10x5" 10x5" 10x5"         Pt education CS CS  CS         Heel prop stretch    2'                      Ther Activity             FSU             LSU             Gait Training                                       Modalities

## 2023-10-19 NOTE — TELEPHONE ENCOUNTER
Caller: Evgeny    Doctor: Jorge L Junior    Reason for call: Auth# [id number]  Patient had surgery on 10/9/23. They seem to have 2 authorizations for this surgery. Requesting one be closed out. Can you please call back so that they know which on to close. Thank you.     Call back#: [phone]  Fax# [phone]

## 2023-10-19 NOTE — TELEPHONE ENCOUNTER
Caller: [facility]    Doctor: Josefina Mancuso    Reason for call: Request to cancel Román Anthony was received 10/18.  Patient had already picked up the prescription on the 17th    Call back#: [phone]

## 2023-10-23 ENCOUNTER — OFFICE VISIT (OUTPATIENT)
Dept: PHYSICAL THERAPY | Age: 63
End: 2023-10-23
Payer: COMMERCIAL

## 2023-10-23 DIAGNOSIS — G89.18 ACUTE POST-OPERATIVE PAIN: ICD-10-CM

## 2023-10-23 DIAGNOSIS — Z00.00 HEALTHCARE MAINTENANCE: ICD-10-CM

## 2023-10-23 DIAGNOSIS — M25.561 CHRONIC PAIN OF RIGHT KNEE: Primary | ICD-10-CM

## 2023-10-23 DIAGNOSIS — G89.29 CHRONIC PAIN OF RIGHT KNEE: Primary | ICD-10-CM

## 2023-10-23 PROCEDURE — 97110 THERAPEUTIC EXERCISES: CPT

## 2023-10-23 PROCEDURE — 97140 MANUAL THERAPY 1/> REGIONS: CPT

## 2023-10-23 PROCEDURE — 97112 NEUROMUSCULAR REEDUCATION: CPT

## 2023-10-23 NOTE — PROGRESS NOTES
Daily Note     Today's date: 10/23/2023  Patient name: Qiana Alexander  : 1960  MRN: [id number]  Referring provider: Markie Zheng  Dx:   Encounter Diagnosis     ICD-10-CM    1. Chronic pain of right knee  M25.561     G89.29       2. Acute post-operative pain  G89.18       3. Healthcare maintenance  Z00.00                      Subjective: pt reports she still has difficulty going up and down steps and has been going on her buttocks to go upstairs, pt reports she's not able to susu sleeping without pillow under R LE due to heel pain/pressure, pt reports she gets staples removed tomorrow       Objective: See treatment diary below  Pt ed for proper positioning of R LE with pillow under ankle NOT knee to help alleviate heel pressure, no skin breakdown noted R heel, no significant TTP noted    Assessment: pt amb into clinic with roller walker today (no wheelchair), pt progressed to amb on stairs, less assistance needed for txfers and bed mobility       Plan: Continue per plan of care. Progress treament per protocol. Precautions: NA      Manuals 10/2 10/12 10/16 10/19 10/23        PROM R knee  CS VK CS VK        Patellar mobs             Reassess  CS                        Neuro Re-Ed   10/16  10/23        Bridges HEP            Clamshell HEP            SLR HEP    Nv?         Quad set HEP 20x5" 3x10x5" 10x5" 3x10x5"        Glut set   20x5"                                    Ther Ex   10/16  10/23        Heel slide HEP 20x10" [id number]"  20x10"        Ankle pumps HEP            Gastroc stretch w strap HEP 20x5" 5x15"  5x15"        RB/NS - knee ROM    8' NS 10'        Weight shift  10x5" 10x5" 10x5" 10x5"        Pt education CS CS  CS VK        Heel prop stretch    2' 3'                     Ther Activity             FSU             LSU             Gait Training     10/23        steps     4 steps with RW/rail step to gait CG/Noel of 1                     Modalities

## 2023-10-24 ENCOUNTER — OFFICE VISIT (OUTPATIENT)
Dept: OBGYN CLINIC | Facility: HOSPITAL | Age: 63
End: 2023-10-24

## 2023-10-24 VITALS
HEART RATE: 89 BPM | BODY MASS INDEX: 38.38 KG/M2 | HEIGHT: 59 IN | SYSTOLIC BLOOD PRESSURE: 107 MMHG | DIASTOLIC BLOOD PRESSURE: 73 MMHG

## 2023-10-24 DIAGNOSIS — Z47.1 AFTERCARE FOLLOWING RIGHT KNEE JOINT REPLACEMENT SURGERY: Primary | ICD-10-CM

## 2023-10-24 DIAGNOSIS — M17.11 PRIMARY OSTEOARTHRITIS OF RIGHT KNEE: ICD-10-CM

## 2023-10-24 DIAGNOSIS — Z96.651 AFTERCARE FOLLOWING RIGHT KNEE JOINT REPLACEMENT SURGERY: Primary | ICD-10-CM

## 2023-10-24 PROCEDURE — 99024 POSTOP FOLLOW-UP VISIT: CPT | Performed by: ORTHOPAEDIC SURGERY

## 2023-10-24 RX ORDER — OXYCODONE HYDROCHLORIDE 5 MG/1
TABLET ORAL
Qty: 30 TABLET | Refills: 0 | Status: CANCELLED | OUTPATIENT
Start: 2023-10-24

## 2023-10-24 RX ORDER — HYDROCODONE BITARTRATE AND ACETAMINOPHEN 5; 325 MG/1; MG/1
1 TABLET ORAL EVERY 6 HOURS PRN
Qty: 20 TABLET | Refills: 0 | Status: SHIPPED | OUTPATIENT
Start: 2023-10-24 | End: 2023-11-13

## 2023-10-24 NOTE — PROGRESS NOTES
Assessment:   Diagnosis ICD-10-CM Associated Orders   1. Aftercare following right knee joint replacement surgery  Z47.1     Z96.651           Plan:  Physical exam performed. Incision examined  The patient can shower letting soapy water over incision, yet should not to submerge the incision, and then pat dry. Continue Physical therapy  Discussed taking NSAID's and Tylenol concurrently to each other for pain relief    To do next visit:  Return in about 4 weeks (around 11/21/2023) for Recheck. The above stated was discussed in layman's terms and the patient expressed understanding. All questions were answered to the patient's satisfaction. Scribe Attestation      I,:  Sergio Vieira am acting as a scribe while in the presence of the attending physician.:       I,:  Saurabh Ortega MD personally performed the services described in this documentation    as scribed in my presence.:               Subjective:   Gila Maguire is a 63 y.o. female who presents today for for visit #2 S/P Right TKA DOS 10/9/2023  Patient states she has been having some hypersensitivity and pain of 4/10. She is still taking pain medications and is currently in physical therapy. She is having trouble sleeping  Ambulates with rolling walker      Review of systems negative unless otherwise specified in HPI  Review of Systems   Constitutional:  Negative for chills and fever. HENT:  Negative for ear pain and sore throat. Eyes:  Negative for pain and visual disturbance. Respiratory:  Negative for cough and shortness of breath. Cardiovascular:  Negative for chest pain and palpitations. Gastrointestinal:  Negative for abdominal pain and vomiting. Genitourinary:  Negative for dysuria and hematuria. Musculoskeletal:  Negative for arthralgias and back pain. Skin:  Negative for color change and rash. Neurological:  Negative for seizures and syncope. All other systems reviewed and are negative.       Past Medical History:   Diagnosis Date    Arthritis 2013    Chest pain 2014    Disease of thyroid gland     Elevated blood sugar 2014    Heart murmur     Functional, child    Hypercholesteremia     Hyperlipidemia     Obesity 2010    Osteoarthritis     Urinary tract infection     Recurring       Past Surgical History:   Procedure Laterality Date    BIOPSY CORE NEEDLE      Thyroid Using Percutaneous Core Needle     SECTION      x 2    FOOT SURGERY Bilateral     As a teenager - Bunionectomy - Dr. Jaison Ochoa - Last Assessed: 2016    [place] ARTHRP KNE CONDYLE&PLATU MEDIAL&LAT COMPARTMENTS Right 10/9/2023    Procedure: ARTHROPLASTY RIGHT KNEE TOTAL W ROBOT;  Surgeon: Evelin Agarwal MD;  Location: [facility];  Service: Orthopedics    THYROID LOBECTOMY Right 2015    Dr. Ac Summers: Right Lobe - Last Assessed: 2016 Dr. Jaison Ochoa       Family History   Problem Relation Age of Onset    Graves' disease Mother     Hyperlipidemia Mother     Hypertension Mother     Osteoporosis Mother     Dementia Mother     Thyroid disease Mother     Arthritis Mother     Aortic stenosis Father     Hyperlipidemia Father     Hypertension Father     Coronary artery disease Father         Coronary Artery Bypass Graft (CABG)    Heart disease Father         Pacemaker Placement    Valvular heart disease Father         S/P Transcatheter Aortic Valve Replacement (TAVR)    Arthritis Father     Hearing loss Father     No Known Problems Son     No Known Problems Son     No Known Problems Daughter     No Known Problems Daughter     No Known Problems Daughter     No Known Problems Daughter     Heart attack Other         Myocardial Infarction    Stroke Other        Social History     Occupational History    Occupation: Employed - Cardiology Technician - 2016   Tobacco Use    Smoking status: Never     Passive exposure: Never    Smokeless tobacco: Never   Vaping Use    Vaping Use: Never used   Substance and Sexual Activity    Alcohol use: Yes     Comment: rarely    Drug use: No    Sexual activity: Yes     Partners: Male     Birth control/protection: Post-menopausal         Current Outpatient Medications:     ascorbic acid (VITAMIN C) 500 mg tablet, Take 1 tablet (500 mg total) by mouth 2 (two) times a day for 60 doses, Disp: 60 tablet, Rfl: 0    Cholecalciferol 125 MCG (5000 UT) capsule, Take 5,000 Units by mouth daily, Disp: , Rfl:     docusate sodium (COLACE) 100 mg capsule, Take 100 mg by mouth 2 (two) times a day, Disp: , Rfl:     enoxaparin (LOVENOX) 40 mg/0.4 mL, Inject 0.4 mL (40 mg total) under the skin daily in the early morning for 28 days (Patient not taking: Reported on 12/23/2022), Disp: 11.2 mL, Rfl: 0    ferrous sulfate 324 (65 Fe) mg, Take 1 tablet (324 mg total) by mouth 2 (two) times a day before meals Take 1 pill BID, PRE-OP with Vit C,..may be constipatory, Disp: 60 tablet, Rfl: 0    folic acid (FOLVITE) 1 mg tablet, Take 1 tablet (1 mg total) by mouth daily for 30 doses Take 1 pill po Qday PRE-OP, with Vit C, and Iron, Disp: 30 tablet, Rfl: 0    hydroxychloroquine (PLAQUENIL) 200 mg tablet, TAKE 2 TABLETS (400 MG TOTAL) BY MOUTH DAILY WITH BREAKFAST (Patient taking differently: Take 400 mg by mouth), Disp: 180 tablet, Rfl: 1    methocarbamol (ROBAXIN) 500 mg tablet, Take 1 tablet (500 mg total) by mouth 3 (three) times a day for 6 days, Disp: 18 tablet, Rfl: 0    Multiple Vitamins-Minerals (MULTIVITAL-M) TABS, Take by mouth in the morning, Disp: , Rfl:     OMEGA-3 FATTY ACIDS PO, 500 mg in the morning, Disp: , Rfl:     oxyCODONE (ROXICODONE) 5 immediate release tablet, 1 pill po Q4 Hrs prn, Disp: 30 tablet, Rfl: 0    Probiotic Product (PROBIOTIC-10) CAPS, Take by mouth in the morning, Disp: , Rfl:     Turmeric 500 MG TABS, Take 1 capsule by mouth in the morning, Disp: , Rfl:     Allergies   Allergen Reactions    Methocarbamol Dizziness and Confusion     Hypotension      Oxycodone Other (See Comments), Dizziness and Confusion     hypotension          Vitals:    10/24/23 1003   BP: 107/73   Pulse: 89       Objective:                    Right Knee Exam     Range of Motion   Extension:  5   Flexion:  90     Other   Sensation: normal  Pulse: present  Swelling: moderate  Effusion: no effusion present    Comments:  C/D/I  No signs of infection        Staples removed    Diagnostics, reviewed and taken today if performed as documented:    None performed       Procedures, if performed today:    Procedures    None performed     Portions of the record may have been created with voice recognition software. Occasional wrong word or "sound a like" substitutions may have occurred due to the inherent limitations of voice recognition software. Read the chart carefully and recognize, using context, where substitutions have occurred.

## 2023-10-26 ENCOUNTER — OFFICE VISIT (OUTPATIENT)
Dept: PHYSICAL THERAPY | Age: 63
End: 2023-10-26
Payer: COMMERCIAL

## 2023-10-26 DIAGNOSIS — Z00.00 HEALTHCARE MAINTENANCE: ICD-10-CM

## 2023-10-26 DIAGNOSIS — G89.29 CHRONIC PAIN OF RIGHT KNEE: Primary | ICD-10-CM

## 2023-10-26 DIAGNOSIS — G89.18 ACUTE POST-OPERATIVE PAIN: ICD-10-CM

## 2023-10-26 DIAGNOSIS — M25.561 CHRONIC PAIN OF RIGHT KNEE: Primary | ICD-10-CM

## 2023-10-26 PROCEDURE — 97140 MANUAL THERAPY 1/> REGIONS: CPT

## 2023-10-26 PROCEDURE — 97110 THERAPEUTIC EXERCISES: CPT

## 2023-10-26 PROCEDURE — 97112 NEUROMUSCULAR REEDUCATION: CPT

## 2023-10-26 NOTE — PROGRESS NOTES
Daily Note     Today's date: 10/26/2023  Patient name: Gila Maguire  : 1960  MRN: [id number]  Referring provider: Saurabh Ortega  Dx:   Encounter Diagnosis     ICD-10-CM    1. Chronic pain of right knee  M25.561     G89.29       2. Acute post-operative pain  G89.18       3. Healthcare maintenance  Z00.00                      Subjective: Patient presents to PT with her , utilizing RW and demonstrating step to gait pattern. Objective: See treatment diary below      Assessment: ROM progressing slowly but steadily. Did not tolerate heel prop with weight for full time. Discomfort maintaining knee ext in supine. Added SLR, required mod A but her activation improved as she performed. Cues to maintain push on RW and step through gait pattern, unable due to heavy reliance on UE. Cont to work on this. Patient demonstrated fatigue post treatment and would benefit from continued PT      Plan: Progress treatment as tolerated.        Precautions: NA      Manuals 10/2 10/12 10/16 10/19 10/23 10/26       PROM R knee  CS VK CS VK ORIANA       Patellar mobs             Reassess                          Neuro Re-Ed   10/16  10/23 10/26       Bridges HEP            Clamshell HEP            SLR HEP    Nv? 2x5       Quad set HEP 20x5" 3x10x5" 10x5" 3x10x5" 3x10x5"       Glut set   20x5"                                    Ther Ex   10/16  10/23 10/26       Heel slide HEP 20x10" [id number]"  20x10" 20x10"       Ankle pumps HEP            Gastroc stretch w strap HEP 20x5" 5x15"  5x15" 5x 15"  Towel under heel           RB/NS - knee ROM    8' NS 10' 10'       Weight shift  10x5" 10x5" 10x5" 10x5"        Pt education  CS  CS VK ORIANA       Heel prop stretch    2' 3' 1 min 2#  1 min 0#                    Ther Activity             STS      NV c/ foam?       FSU             LSU             Gait Training     10/23 10/26       steps     4 steps with RW/rail step to gait CG/Noel of 1 From gym to waiting room Ambulation W/ RW  Cues for step through                     Modalities

## 2023-10-30 ENCOUNTER — OFFICE VISIT (OUTPATIENT)
Dept: PHYSICAL THERAPY | Age: 63
End: 2023-10-30
Payer: COMMERCIAL

## 2023-10-30 DIAGNOSIS — G89.29 CHRONIC PAIN OF RIGHT KNEE: Primary | ICD-10-CM

## 2023-10-30 DIAGNOSIS — G89.18 ACUTE POST-OPERATIVE PAIN: ICD-10-CM

## 2023-10-30 DIAGNOSIS — M25.561 CHRONIC PAIN OF RIGHT KNEE: Primary | ICD-10-CM

## 2023-10-30 DIAGNOSIS — Z00.00 HEALTHCARE MAINTENANCE: ICD-10-CM

## 2023-10-30 PROCEDURE — 97140 MANUAL THERAPY 1/> REGIONS: CPT

## 2023-10-30 PROCEDURE — 97110 THERAPEUTIC EXERCISES: CPT

## 2023-10-30 PROCEDURE — 97112 NEUROMUSCULAR REEDUCATION: CPT

## 2023-10-30 NOTE — PROGRESS NOTES
Daily Note     Today's date: 10/30/2023  Patient name: Joshua Shaw  : 1960  MRN: [id number]  Referring provider: Lana Bryson  Dx:   Encounter Diagnosis     ICD-10-CM    1. Chronic pain of right knee  M25.561     G89.29       2. Acute post-operative pain  G89.18       3. Healthcare maintenance  Z00.00                      Subjective: Patient reports difficulty sleeping at night, getting pins and needles with sheet touching leg. Has been able get up/down steps at home and shower. Objective: See treatment diary below      Assessment: Tolerated treatment well. Patient required cues to stay on task during today's sessions. Patient able to complete SLR without assistance for increased reps, though fatigue and slight pain noted. Patient would benefit from continued PT to address remaining deficits. Plan: Continue per plan of care.       Precautions: NA      Manuals 10/2 10/12 10/16 10/19 10/23 10/26 10/30      PROM R knee  CS VK CS VK ORIANA CS      Patellar mobs             Reassess                          Neuro Re-Ed   10/16  10/23 10/26 10/30      Bridges HEP            Clamshell HEP            SLR HEP    Nv? 2x5 3x5      Quad set HEP 20x5" 3x10x5" 10x5" 3x10x5" 3x10x5" 20x5"      Glut set   20x5"          LAQ        nv                   Ther Ex   10/16  10/23 10/26 10/30      Heel slide HEP 20x10" [id number]"  20x10" 20x10" 20x10"      Ankle pumps HEP            Gastroc stretch w strap HEP 20x5" 5x15"  5x15" 5x 15"  Towel under heel     5x15" heel propped      RB/NS - knee ROM    8' NS 10' 10' 10' NS      Weight shift  10x5" 10x5" 10x5" 10x5"        Pt education CS CS  CS VK ORIANA       Heel prop stretch    2' 3' 1 min 2#  1 min 0# 2' therapist OP                   Ther Activity             STS      NV c/ foam?       FSU             LSU             Gait Training     10/23 10/26       steps     4 steps with RW/rail step to gait CG/Noel of 1 From gym to waiting room Ambulation W/ RW  Cues for step through                     Modalities

## 2023-11-02 ENCOUNTER — OFFICE VISIT (OUTPATIENT)
Dept: PHYSICAL THERAPY | Age: 63
End: 2023-11-02
Payer: COMMERCIAL

## 2023-11-02 DIAGNOSIS — Z00.00 HEALTHCARE MAINTENANCE: ICD-10-CM

## 2023-11-02 DIAGNOSIS — G89.29 CHRONIC PAIN OF RIGHT KNEE: Primary | ICD-10-CM

## 2023-11-02 DIAGNOSIS — M25.561 CHRONIC PAIN OF RIGHT KNEE: Primary | ICD-10-CM

## 2023-11-02 DIAGNOSIS — G89.18 ACUTE POST-OPERATIVE PAIN: ICD-10-CM

## 2023-11-02 PROCEDURE — 97110 THERAPEUTIC EXERCISES: CPT

## 2023-11-02 PROCEDURE — 97530 THERAPEUTIC ACTIVITIES: CPT

## 2023-11-02 PROCEDURE — 97140 MANUAL THERAPY 1/> REGIONS: CPT

## 2023-11-02 NOTE — PROGRESS NOTES
Daily Note     Today's date: 2023  Patient name: Allen Lucero  : 1960  MRN: [id number]  Referring provider: Holly Burden  Dx:   Encounter Diagnosis     ICD-10-CM    1. Chronic pain of right knee  M25.561     G89.29       2. Acute post-operative pain  G89.18       3. Healthcare maintenance  Z00.00                      Subjective: Patient reports wishing she was further along, was doing well on Wednesday but feeling more tired and sore today. Thought she'd be having less pain by now but still having pain on medial knee and now left knee isn't happy either. Objective: See treatment diary below      Assessment: Tolerated treatment well. Attempted walking without assistive with excessive compensated Trendelenburg noted on right. Progression of treatment to include glute/knee strengthening with forward and lateral step ups. Patient used both hands for balance and stability but able to complete all reps during today's session. Patient improving with mobility of knee but continues to lack full ROM and would benefit from continued PT to address remaining deficits. Plan: Progress treatment as tolerated.        Precautions: NA      Manuals 10/2 10/12 10/16 10/19 10/23 10/26 10/30 11/2     PROM R knee  CS VK CS VK ORIANA CS CS     Patellar mobs             Reassess                          Neuro Re-Ed   10/16  10/23 10/26 10/30      Bridges HEP            Clamshell HEP            SLR HEP    Nv? 2x5 3x5 3x5     Quad set HEP 20x5" 3x10x5" 10x5" 3x10x5" 3x10x5" 20x5"      Glut set   20x5"          LAQ        nv 10x5"                  Ther Ex   10/16  10/23 10/26 10/30      Heel slide HEP 20x10" [id number]"  20x10" 20x10" 20x10"      Ankle pumps HEP            Gastroc stretch w strap HEP 20x5" 5x15"  5x15" 5x 15"  Towel under heel     5x15" heel propped      RB/NS - knee ROM    8' NS 10' 10' 10' NS 10' NS     Weight shift  10x5" 10x5" 10x5" 10x5"        Pt education CS CS  CS VK ORIANA       Heel prop stretch    2' 3' 1 min 2#  1 min 0# 2' therapist OP 3' therapist OP     Knee flex on stretch        10x5"     Ther Activity             STS      NV c/ foam?       FSU        X10 6"     LSU        X10 6"     Gait Training     10/23 10/26       steps     4 steps with RW/rail step to gait CG/Noel of 1 From gym to waiting room Ambulation W/ RW  Cues for step through                     Modalities

## 2023-11-06 ENCOUNTER — OFFICE VISIT (OUTPATIENT)
Dept: PHYSICAL THERAPY | Age: 63
End: 2023-11-06
Payer: COMMERCIAL

## 2023-11-06 DIAGNOSIS — M25.561 CHRONIC PAIN OF RIGHT KNEE: Primary | ICD-10-CM

## 2023-11-06 DIAGNOSIS — Z00.00 HEALTHCARE MAINTENANCE: ICD-10-CM

## 2023-11-06 DIAGNOSIS — G89.18 ACUTE POST-OPERATIVE PAIN: ICD-10-CM

## 2023-11-06 DIAGNOSIS — G89.29 CHRONIC PAIN OF RIGHT KNEE: Primary | ICD-10-CM

## 2023-11-06 PROCEDURE — 97110 THERAPEUTIC EXERCISES: CPT

## 2023-11-06 PROCEDURE — 97112 NEUROMUSCULAR REEDUCATION: CPT

## 2023-11-06 PROCEDURE — 97140 MANUAL THERAPY 1/> REGIONS: CPT

## 2023-11-06 NOTE — PROGRESS NOTES
Daily Note     Today's date: 2023  Patient name: Estevan Peterson  : 1960  MRN: [id number]  Referring provider: Hanna Acevedo  Dx:   Encounter Diagnosis     ICD-10-CM    1. Chronic pain of right knee  M25.561     G89.29       2. Acute post-operative pain  G89.18       3. Healthcare maintenance  Z00.00                      Subjective: Patient reports feeling like left leg is weak from not doing strengthening exercises, sometimes feels like it's going to give out on her. Objective: See treatment diary below        Assessment: Tolerated treatment well. Improvements noted in motion of right knee though remains unable to perform full revolution on recumbent bike. Patient demonstrates improvement in function evidenced by improved FOTO scores. Encouraged patient to continue with HEP for progression of strength. Plan to work towards transitioning to cane for improved mobility as strength improves. Patient would benefit from continued PT to address remaining deficits. Plan: Progress treatment as tolerated.        Precautions: NA      Manuals 10/2 10/12 10/16 10/19 10/23 10/26 10/30 11/2 11/6    PROM R knee  CS VK CS VK ORIANA CS CS CS    Patellar mobs             Reassess                          Neuro Re-Ed   10/16  10/23 10/26 10/30      Bridges HEP            Clamshell HEP            SLR HEP    Nv? 2x5 3x5 3x5 2x10    Quad set HEP 20x5" 3x10x5" 10x5" 3x10x5" 3x10x5" 20x5"      Glut set   20x5"          LAQ        nv 10x5" 15x5" 2#    Hip 3-way         nv    Banded walking         nv    Ther Ex   10/16  10/23 10/26 10/30      Heel slide HEP 20x10" [id number]"  20x10" 20x10" 20x10"      Ankle pumps HEP            Gastroc stretch w strap HEP 20x5" 5x15"  5x15" 5x 15"  Towel under heel     5x15" heel propped      RB/NS - knee ROM    8' NS 10' 10' 10' NS 10' NS 10' RB    Weight shift  10x5" 10x5" 10x5" 10x5"        Pt education CS CS  CS VK ORIANA       Heel prop stretch    2' 3' 1 min 2#  1 min 0# 2' therapist OP 3' therapist OP 3' therapist OP    Knee flex on stretch        10x5" 15x5"    Ther Activity             STS      NV c/ foam?       FSU        X10 6" 2x10 6" R    LSU        X10 6" 2x10 6" R    Gait Training     10/23 10/26       steps     4 steps with RW/rail step to gait CG/Noel of 1 From gym to waiting room Ambulation W/ RW  Cues for step through                     Modalities                                   FOTO = 28

## 2023-11-09 ENCOUNTER — OFFICE VISIT (OUTPATIENT)
Dept: PHYSICAL THERAPY | Age: 63
End: 2023-11-09
Payer: COMMERCIAL

## 2023-11-09 DIAGNOSIS — M25.561 CHRONIC PAIN OF RIGHT KNEE: Primary | ICD-10-CM

## 2023-11-09 DIAGNOSIS — G89.18 ACUTE POST-OPERATIVE PAIN: ICD-10-CM

## 2023-11-09 DIAGNOSIS — G89.29 CHRONIC PAIN OF RIGHT KNEE: Primary | ICD-10-CM

## 2023-11-09 DIAGNOSIS — Z00.00 HEALTHCARE MAINTENANCE: ICD-10-CM

## 2023-11-09 PROCEDURE — 97140 MANUAL THERAPY 1/> REGIONS: CPT

## 2023-11-09 PROCEDURE — 97112 NEUROMUSCULAR REEDUCATION: CPT

## 2023-11-09 PROCEDURE — 97110 THERAPEUTIC EXERCISES: CPT

## 2023-11-09 NOTE — PROGRESS NOTES
Daily Note     Today's date: 2023  Patient name: Rhina Saenz  : 1960  MRN: [id number]  Referring provider: Hetal Centeno  Dx:   Encounter Diagnosis     ICD-10-CM    1. Chronic pain of right knee  M25.561     G89.29       2. Acute post-operative pain  G89.18       3. Healthcare maintenance  Z00.00                      Subjective: pt reports hypersensitivity R knee incision site       Objective: See treatment diary below      Assessment: pt amb with SPC with fairly steady balance, R knee hypersensitivity noted, added desensitization which pt susu fair, R knee ROM gradually improving    Plan: Continue per plan of care. Progress treatment as tolerated.        Precautions: NA      Manuals 10/2 10/12 10/16 10/19 10/23 10/26 10/30 11/2 11/6 11/9   PROM R knee  CS VK CS VK ORIANA CS CS CS VK   Patellar mobs          Pat ROM VK   Reassess  CS           Desensitization R knee          VK   Neuro Re-Ed   10/16  10/23 10/26 10/30   11/9   Bridges HEP            Clamshell HEP            SLR HEP    Nv? 2x5 3x5 3x5 2x10 3x10 w/QS   Quad set HEP 20x5" 3x10x5" 10x5" 3x10x5" 3x10x5" 20x5"   2x15x5"   Glut set   20x5"          LAQ        nv 10x5" 15x5" 2# 2x15x5"   Hip 3-way         nv    Banded walking         nv    Ther Ex   10/16  10/23 10/26 10/30   11/9   Heel slide HEP 20x10" [id number]"  20x10" 20x10" 20x10"   20x10"   Ankle pumps HEP            Gastroc stretch w strap HEP 20x5" 5x15"  5x15" 5x 15"  Towel under heel     5x15" heel propped      RB/NS - knee ROM    8' NS 10' 10' 10' NS 10' NS 10' RB 10" NS   Weight shift  10x5" 10x5" 10x5" 10x5"        Pt education CS CS  CS VK ORIANA       Heel prop stretch    2' 3' 1 min 2#  1 min 0# 2' therapist OP 3' therapist OP 3' therapist OP 4' w/OP   Knee flex on stretch        10x5" 15x5" 15x5"   Ther Activity             STS      NV c/ foam?       FSU        X10 6" 2x10 6" R    LSU        X10 6" 2x10 6" R    Gait Training     10/23 10/26    11/9   steps     4 steps with RW/rail step to gait CG/Noel of 1 From gym to waiting room Ambulation W/ RW  Cues for step through        [facility]          8'   Modalities                                   FOTO = 28

## 2023-11-13 ENCOUNTER — OFFICE VISIT (OUTPATIENT)
Dept: PHYSICAL THERAPY | Age: 63
End: 2023-11-13
Payer: COMMERCIAL

## 2023-11-13 DIAGNOSIS — M25.561 CHRONIC PAIN OF RIGHT KNEE: Primary | ICD-10-CM

## 2023-11-13 DIAGNOSIS — G89.29 CHRONIC PAIN OF RIGHT KNEE: Primary | ICD-10-CM

## 2023-11-13 DIAGNOSIS — G89.18 ACUTE POST-OPERATIVE PAIN: ICD-10-CM

## 2023-11-13 DIAGNOSIS — Z00.00 HEALTHCARE MAINTENANCE: ICD-10-CM

## 2023-11-13 PROCEDURE — 97140 MANUAL THERAPY 1/> REGIONS: CPT

## 2023-11-13 PROCEDURE — 97110 THERAPEUTIC EXERCISES: CPT

## 2023-11-13 PROCEDURE — 97112 NEUROMUSCULAR REEDUCATION: CPT

## 2023-11-13 NOTE — PROGRESS NOTES
PT Re-Evaluation     Today's date: 2023  Patient name: Reina Wright  : 1960  MRN: [id number]  Referring provider: Melissa Yeboah  Dx:   Encounter Diagnosis     ICD-10-CM    1. Chronic pain of right knee  M25.561     G89.29       2. Acute post-operative pain  G89.18       3. Healthcare maintenance  Z00.00                      Subjective: Patient reports 50% improvement since beginning therapy. Has noticed improvements in ability to walk better without AD, taking shower standing up, sleeping through the night better but still waking up occasionally, no longer taking Vicodin, mostly Tylenol and during the night for sleep. Also notes "my mental health is better". Continues to be limited and have difficulty performing activities such as walking at night due to feeling unsteady and tired, also having some difficulty with performing stair negotiation due to fatigue and "shakiness". Current pain =1. Best pain=0. Worst pain =6.         Objective: See treatment diary below    MMT        AROM          PROM   Hip       L     R         L       R          L         R  Flex. 4- 4-       Extn. 4 4       Abd. 3+ 3+       Add. 4 4            . Knee        Exten 4  4 4+ 0  0 -6! NT NT -5! Flex 4 4 4+ 123 125 95  130 130 104! Assessment: Patient demonstrates functional improvements evidenced by progress towards short and long-term goals. Improvements include strength, ROM, and pain ratings. Improved FOTO scores also suggest patient perceives functional improvement since beginning therapy. Patient continues to have limitations and difficulty performing functional activities due to lack of ROM and endurance. Patient would benefit from continued therapy to address remaining deficits and improve overall functional tolerance. Goals  Short Term Goals: to be achieved by 4 weeks  1) Patient to be independent with basic HEP.  Met  2) Decrease pain to 4/10 at its worst. Progress  3) Increase R knee flex ROM to 115 deg to increase function post-op. Progress, 104  4) Increase R knee ext ROM to -5 deg to improve gait post-op. Met  5) Increase LE strength by 1/2 MMT grade in all deficient planes post-op. Progress    Long Term Goals: to be achieved by discharge  1) Patient to be independent with comprehensive HEP. Met  2) R knee ROM WNL all planes to improve a/iadls post-op. Progress  3) Increase LE strength to 5/5 MMT grade in all planes to improve a/iadls post-op. Progress  4) Patient to report no sleep interruption secondary to pain post-op. Progress, 2x/night ~1.5 hours  5) Increase ambulatory tolerance to 60 min post-op without AD. Progress  6) FOTO scores >53. Progress, 28      Plan: Continue per plan of care. Progress treatment as tolerated.       Frequency: 2x week  Duration in visits: 20  Duration in weeks: 10  Plan of Care beginning date: 10/9/2023  Plan of Care expiration date: 12/18/2023  Treatment plan discussed with: patient     Precautions: NA      Manuals 10/2 10/12 10/16 10/19 10/23 10/26 10/30 11/2 11/6 11/9 11/13   PROM R knee  CS VK CS VK ORIANA CS CS CS VK CS   Patellar mobs          Pat ROM VK    Reassess  CS            Desensitization R knee          VK    Neuro Re-Ed   10/16  10/23 10/26 10/30   11/9    Bridges HEP             Clamshell HEP             SLR HEP    Nv? 2x5 3x5 3x5 2x10 3x10 w/QS 3x15 w/QS   Quad set HEP 20x5" 3x10x5" 10x5" 3x10x5" 3x10x5" 20x5"   2x15x5"    Glut set   20x5"           LAQ        nv 10x5" 15x5" 2# 2x15x5" 2x15x5" 3#   Hip 3-way         nv  nv   Banded walking         nv  nv   Ther Ex   10/16  10/23 10/26 10/30   11/9    Heel slide HEP 20x10" [id number]"  20x10" 20x10" 20x10"   20x10"    Ankle pumps HEP             Gastroc stretch w strap HEP 20x5" 5x15"  5x15" 5x 15"  Towel under heel     5x15" heel propped       RB/NS - knee ROM    8' NS 10' 10' 10' NS 10' NS 10' RB 10" NS 10' NS   Weight shift  10x5" 10x5" 10x5" 10x5"         Pt education CS CS  CS VK ORIANA Heel prop stretch    2' 3' 1 min 2#  1 min 0# 2' therapist OP 3' therapist OP 3' therapist OP 4' w/OP 3' 3#   Knee flex on stretch        10x5" 15x5" 15x5" 15x5"   Ther Activity              STS      NV c/ foam?        FSU        X10 6" 2x10 6" R     LSU        X10 6" 2x10 6" R     Gait Training     10/23 10/26    11/9    steps     4 steps with RW/rail step to gait CG/Noel of 1 From gym to waiting room Ambulation W/ RW  Cues for step through         [facility]          8'    Modalities                                     FOTO = 28

## 2023-11-16 ENCOUNTER — OFFICE VISIT (OUTPATIENT)
Dept: PHYSICAL THERAPY | Age: 63
End: 2023-11-16
Payer: COMMERCIAL

## 2023-11-16 DIAGNOSIS — Z00.00 HEALTHCARE MAINTENANCE: ICD-10-CM

## 2023-11-16 DIAGNOSIS — G89.18 ACUTE POST-OPERATIVE PAIN: ICD-10-CM

## 2023-11-16 DIAGNOSIS — M25.561 CHRONIC PAIN OF RIGHT KNEE: Primary | ICD-10-CM

## 2023-11-16 DIAGNOSIS — G89.29 CHRONIC PAIN OF RIGHT KNEE: Primary | ICD-10-CM

## 2023-11-16 PROCEDURE — 97112 NEUROMUSCULAR REEDUCATION: CPT

## 2023-11-16 PROCEDURE — 97110 THERAPEUTIC EXERCISES: CPT

## 2023-11-16 NOTE — PROGRESS NOTES
Daily Note     Today's date: 2023  Patient name: Cyndi Wilcox  : 1960  MRN: [id number]  Referring provider: Andrew Mcfarland  Dx:   Encounter Diagnosis     ICD-10-CM    1. Chronic pain of right knee  M25.561     G89.29       2. Acute post-operative pain  G89.18       3. Healthcare maintenance  Z00.00                      Subjective: Patient reports no new complaints at this time. Objective: See treatment diary below      Assessment: Pt is doing well with all TE and stretching. Continue to progress as pt is able to tolerate. Plan: Continue per plan of care.       Precautions: NA      Manuals 10/19 10/23 10/26 10/30 11/2 11/6 11/9 11/13 11/16   PROM R knee CS VK ORIANA CS CS CS VK CS HA   Patellar mobs       Pat ROM VK     Reassess            Desensitization R knee       VK     Neuro Re-Ed  10/23 10/26 10/30   11/9     Bridges            Clamshell            SLR  Nv? 2x5 3x5 3x5 2x10 3x10 w/QS 3x15 w/QS 3x15 w/ QS   Quad set 10x5" 3x10x5" 3x10x5" 20x5"   2x15x5"     Mini squats         x20   Glut set            LAQ     nv 10x5" 15x5" 2# 2x15x5" 2x15x5" 3# 3# 2x15   Hip 3-way      nv  nv X20 ea   Banded walking      nv  nv    Ther Ex  10/23 10/26 10/30   11/9  11/16   Heel slide  20x10" 20x10" 20x10"   20x10"     Ankle pumps            Gastroc stretch w strap  5x15" 5x 15"  Towel under heel     5x15" heel propped        RB/NS - knee ROM 8' NS 10' 10' 10' NS 10' NS 10' RB 10" NS 10' NS 10' NS   Weight shift 10x5" 10x5"          Pt education CS VK ORIANA         Heel prop stretch 2' 3' 1 min 2#  1 min 0# 2' therapist OP 3' therapist OP 3' therapist OP 4' w/OP 3' 3# 3'x3#   Knee flex on stretch     10x5" 15x5" 15x5" 15x5" 15x5"   Ther Activity            STS   NV c/ foam?         FSU     X10 6" 2x10 6" R      LSU     X10 6" 2x10 6" R      Gait Training  10/23 10/26    11/9     steps  4 steps with RW/rail step to gait CG/Noel of 1 From gym to waiting room Ambulation W/ RW  Cues for step through          [facility]       8'     Modalities                              FOTO = 28

## 2023-11-17 DIAGNOSIS — M17.10 ARTHRITIS OF KNEE: ICD-10-CM

## 2023-11-17 DIAGNOSIS — M35.9 UNDIFFERENTIATED CONNECTIVE TISSUE DISEASE (HCC): ICD-10-CM

## 2023-11-20 ENCOUNTER — OFFICE VISIT (OUTPATIENT)
Dept: PHYSICAL THERAPY | Age: 63
End: 2023-11-20
Payer: COMMERCIAL

## 2023-11-20 DIAGNOSIS — G89.29 CHRONIC PAIN OF RIGHT KNEE: Primary | ICD-10-CM

## 2023-11-20 DIAGNOSIS — M25.561 CHRONIC PAIN OF RIGHT KNEE: Primary | ICD-10-CM

## 2023-11-20 DIAGNOSIS — G89.18 ACUTE POST-OPERATIVE PAIN: ICD-10-CM

## 2023-11-20 PROCEDURE — 97110 THERAPEUTIC EXERCISES: CPT | Performed by: PHYSICAL THERAPIST

## 2023-11-20 PROCEDURE — 97112 NEUROMUSCULAR REEDUCATION: CPT | Performed by: PHYSICAL THERAPIST

## 2023-11-20 PROCEDURE — 97140 MANUAL THERAPY 1/> REGIONS: CPT | Performed by: PHYSICAL THERAPIST

## 2023-11-20 NOTE — PROGRESS NOTES
Daily Note     Today's date: 2023  Patient name: Estevan Peterson  : 1960  MRN: [id number]  Referring provider: Hanna Acevedo  Dx:   Encounter Diagnosis     ICD-10-CM    1. Chronic pain of right knee  M25.561     G89.29       2. Acute post-operative pain  G89.18                      Subjective: Reports feeling much better today, but feels like she's still lacking some end range straightening. Objective: See treatment diary below      Assessment: Tolerated treatment well. Patient would benefit from continued PT, can continue to progress moving forward in accordance with pain presentation. Plan: Continue per plan of care.       Precautions: NA    POC expires Unit limit Auth Expiration date PT/OT + Visit Limit?   2023                               Visit/Unit Tracking  AUTH Status:  Date                BCBS- no auth needed  Used 13                Remaining                          Manuals     PROM R knee CS CS VK CS HA CW    Patellar mobs   Pat ROM VK      Reassess         Desensitization R knee   VK      Neuro Re-Ed         Bridges         Clamshell         SLR 3x5 2x10 3x10 w/QS 3x15 w/QS 3x15 w/ QS 3x15 w QS   Quad set   2x15x5"      Mini squats     x20    Glut set         LAQ  10x5" 15x5" 2# 2x15x5" 2x15x5" 3# 3# 2x15 2x15x3" 3#   Hip 3-way  nv  nv X20 ea    Banded walking  nv  nv     Ther Ex       Heel slide   20x10"      Ankle pumps         Gastroc stretch w strap         RB/NS - knee ROM 10' NS 10' RB 10" NS 10' NS 10' NS 10' RB    Weight shift         Pt education         Heel prop stretch 3' therapist OP 3' therapist OP 4' w/OP 3' 3# 3'x3# 3' 3#    Knee flex on stretch 10x5" 15x5" 15x5" 15x5" 15x5" 20x5"    Ther Activity         STS         FSU X10 6" 2x10 6" R       LSU X10 6" 2x10 6" R       Gait Training   11      steps         SPC   8'      Modalities                    FOTO = 28

## 2023-11-21 ENCOUNTER — OFFICE VISIT (OUTPATIENT)
Dept: OBGYN CLINIC | Facility: HOSPITAL | Age: 63
End: 2023-11-21

## 2023-11-21 VITALS
WEIGHT: 188 LBS | HEART RATE: 89 BPM | DIASTOLIC BLOOD PRESSURE: 79 MMHG | BODY MASS INDEX: 37.9 KG/M2 | SYSTOLIC BLOOD PRESSURE: 117 MMHG | HEIGHT: 59 IN

## 2023-11-21 DIAGNOSIS — Z96.651 HISTORY OF TOTAL KNEE ARTHROPLASTY, RIGHT: Primary | ICD-10-CM

## 2023-11-21 PROCEDURE — 99024 POSTOP FOLLOW-UP VISIT: CPT | Performed by: ORTHOPAEDIC SURGERY

## 2023-11-21 RX ORDER — HYDROXYCHLOROQUINE SULFATE 200 MG/1
400 TABLET, FILM COATED ORAL DAILY
Qty: 180 TABLET | Refills: 3 | Status: SHIPPED | OUTPATIENT
Start: 2023-11-21 | End: 2023-11-21 | Stop reason: SDUPTHER

## 2023-11-21 RX ORDER — HYDROXYCHLOROQUINE SULFATE 200 MG/1
400 TABLET, FILM COATED ORAL DAILY
Qty: 180 TABLET | Refills: 3 | Status: SHIPPED | OUTPATIENT
Start: 2023-11-21 | End: 2024-11-15

## 2023-11-21 NOTE — PROGRESS NOTES
Assessment:  1. History of total knee arthroplasty, right            Plan:  62 y/o female 6 weeks s/p right total knee arthroplasty. She is progressing well. Recommend continuing physical therapy. . She can follow up in 6 weeks for repeat evaluation. To do next visit:  Return in about 6 weeks (around 2024). The above stated was discussed in layman's terms and the patient expressed understanding. All questions were answered to the patient's satisfaction. Scribe Attestation      I,:  Dom Weeks MD am acting as a scribe while in the presence of the attending physician.:       I,:  Cassius Last MD personally performed the services described in this documentation    as scribed in my presence.:               Subjective:   Juanis Steinberg is a 63 y.o. female who presents 6 weeks s/p right total knee arthroplasty. She reports her pain is significantly improved, however she is still having swelling. She is having difficulty sleeping and as a result is fatigued. She has been doing physical therapy twice a week. She has completed her Lovenox. She is only taking tylenol and motrin for pain.        Review of systems negative unless otherwise specified in HPI    Past Medical History:   Diagnosis Date    Arthritis 2013    Chest pain 2014    Disease of thyroid gland     Elevated blood sugar 2014    Heart murmur     Functional, child    Hypercholesteremia     Hyperlipidemia     Obesity 2010    Osteoarthritis     Urinary tract infection     Recurring       Past Surgical History:   Procedure Laterality Date    BIOPSY CORE NEEDLE      Thyroid Using Percutaneous Core Needle     SECTION      x 2    FOOT SURGERY Bilateral     As a teenager - Bunionectomy - Dr. Lester Delia - Last Assessed: 2016    [place] ARTHRP KNE CONDYLE&PLATU MEDIAL&LAT COMPARTMENTS Right 10/9/2023    Procedure: ARTHROPLASTY RIGHT KNEE TOTAL W ROBOT;  Surgeon: Cassius Last MD;  Location: [facility] OR;  Service: Orthopedics    THYROID LOBECTOMY Right 09/01/2015    Dr. Shazia Mckinley: Right Lobe - Last Assessed: July 5, 2016 Dr. Kalli Restrepo       Family History   Problem Relation Age of Onset    Graves' disease Mother     Hyperlipidemia Mother     Hypertension Mother     Osteoporosis Mother     Dementia Mother     Thyroid disease Mother     Arthritis Mother     Aortic stenosis Father     Hyperlipidemia Father     Hypertension Father     Coronary artery disease Father         Coronary Artery Bypass Graft (CABG)    Heart disease Father         Pacemaker Placement    Valvular heart disease Father         S/P Transcatheter Aortic Valve Replacement (TAVR)    Arthritis Father     Hearing loss Father     No Known Problems Son     No Known Problems Son     No Known Problems Daughter     No Known Problems Daughter     No Known Problems Daughter     No Known Problems Daughter     Heart attack Other         Myocardial Infarction    Stroke Other        Social History     Occupational History    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   Tobacco Use    Smoking status: Never     Passive exposure: Never    Smokeless tobacco: Never   Vaping Use    Vaping Use: Never used   Substance and Sexual Activity    Alcohol use: Yes     Comment: rarely    Drug use: No    Sexual activity: Yes     Partners: Male     Birth control/protection: Post-menopausal         Current Outpatient Medications:     ascorbic acid (VITAMIN C) 500 mg tablet, Take 1 tablet (500 mg total) by mouth 2 (two) times a day for 60 doses, Disp: 60 tablet, Rfl: 0    Cholecalciferol 125 MCG (5000 UT) capsule, Take 5,000 Units by mouth daily, Disp: , Rfl:     docusate sodium (COLACE) 100 mg capsule, Take 100 mg by mouth 2 (two) times a day, Disp: , Rfl:     hydroxychloroquine (PLAQUENIL) 200 mg tablet, TAKE 2 TABLETS (400 MG TOTAL) BY MOUTH DAILY WITH BREAKFAST (Patient taking differently: Take 400 mg by mouth), Disp: 180 tablet, Rfl: 1    methocarbamol (ROBAXIN) 500 mg tablet, Take 1 tablet (500 mg total) by mouth 3 (three) times a day for 6 days, Disp: 18 tablet, Rfl: 0    Multiple Vitamins-Minerals (MULTIVITAL-M) TABS, Take by mouth in the morning, Disp: , Rfl:     OMEGA-3 FATTY ACIDS PO, 500 mg in the morning, Disp: , Rfl:     oxyCODONE (ROXICODONE) 5 immediate release tablet, 1 pill po Q4 Hrs prn, Disp: 30 tablet, Rfl: 0    Probiotic Product (PROBIOTIC-10) CAPS, Take by mouth in the morning, Disp: , Rfl:     Turmeric 500 MG TABS, Take 1 capsule by mouth in the morning, Disp: , Rfl:     Allergies   Allergen Reactions    Methocarbamol Dizziness and Confusion     Hypotension      Oxycodone Other (See Comments), Dizziness and Confusion     hypotension            Vitals:    11/21/23 1035   BP: 117/79   Pulse: 89       Objective:  Physical exam  General: Awake, Alert, Oriented  Eyes: Pupils equal, round and reactive to light  Heart: regular rate and rhythm  Lungs: No audible wheezing  Abdomen: soft                    Ortho Exam  Right knee - incision healed without erythema, non-tender to palpation throughout knee, stable to varus/valgus stress, neurovascularly intact    Diagnostics, reviewed and taken today if performed as documented:    None performed     Procedures, if performed today:    None performed      Portions of the record may have been created with voice recognition software. Occasional wrong word or "sound a like" substitutions may have occurred due to the inherent limitations of voice recognition software. Read the chart carefully and recognize, using context, where substitutions have occurred.

## 2023-11-22 ENCOUNTER — OFFICE VISIT (OUTPATIENT)
Dept: PHYSICAL THERAPY | Age: 63
End: 2023-11-22
Payer: COMMERCIAL

## 2023-11-22 DIAGNOSIS — G89.29 CHRONIC PAIN OF RIGHT KNEE: Primary | ICD-10-CM

## 2023-11-22 DIAGNOSIS — G89.18 ACUTE POST-OPERATIVE PAIN: ICD-10-CM

## 2023-11-22 DIAGNOSIS — M25.561 CHRONIC PAIN OF RIGHT KNEE: Primary | ICD-10-CM

## 2023-11-22 DIAGNOSIS — Z00.00 HEALTHCARE MAINTENANCE: ICD-10-CM

## 2023-11-22 PROCEDURE — 97112 NEUROMUSCULAR REEDUCATION: CPT | Performed by: PHYSICAL MEDICINE & REHABILITATION

## 2023-11-22 PROCEDURE — 97140 MANUAL THERAPY 1/> REGIONS: CPT | Performed by: PHYSICAL MEDICINE & REHABILITATION

## 2023-11-22 PROCEDURE — 97110 THERAPEUTIC EXERCISES: CPT | Performed by: PHYSICAL MEDICINE & REHABILITATION

## 2023-11-22 NOTE — PROGRESS NOTES
Daily Note     Today's date: 2023  Patient name: Estevan Peterson  : 1960  MRN: [id number]  Referring provider: Hanna Acevedo  Dx:   Encounter Diagnosis     ICD-10-CM    1. Chronic pain of right knee  M25.561     G89.29       2. Acute post-operative pain  G89.18       3. Healthcare maintenance  Z00.00                    Subjective: Patient reports some increased pain today as she tried to avoid pain medication overnight. Objective: See treatment diary below    Assessment: Tolerated treatment well. Able to include STS without issue, requires no UE assist. Patient would benefit from continued PT to address remaining functional deficits. Plan: Continue per plan of care.       Precautions: NA    POC expires Unit limit Auth Expiration date PT/OT + Visit Limit?   2023                               Visit/Unit Tracking  AUTH Status:  Date              BCBS- no auth needed  Used 13  14              Remaining                    Manuals     PROM R knee LH  VK CS HA CW    Patellar mobs LH  Pat ROM VK      Reassess         Desensitization R knee   VK      Neuro Re-Ed       Bridges         Clamshell         SLR 3x15  3x10 w/QS 3x15 w/QS 3x15 w/ QS 3x15 w QS   Quad set   2x15x5"      Mini squats     x20    Glut set         LAQ  2x15, 3#  2x15x5" 2x15x5" 3# 3# 2x15 2x15x3" 3#   Hip 3-way    nv X20 ea    Banded walking    nv     Ther Ex     Heel slide   20x10"      Ankle pumps         Gastroc stretch w strap         RB/NS - knee ROM Bike 10'  10" NS 10' NS 10' NS 10' RB    Weight shift         Pt education         Heel prop stretch 3'  4' w/OP 3' 3# 3'x3# 3' 3#    Knee flex on stretch 20x5"  15x5" 15x5" 15x5" 20x5"    Ther Activity         STS 2x5 no UE        FSU         LSU         Gait Training         steps         SPC   8'      Modalities

## 2023-11-24 ENCOUNTER — APPOINTMENT (OUTPATIENT)
Dept: PHYSICAL THERAPY | Age: 63
End: 2023-11-24
Payer: COMMERCIAL

## 2023-11-27 ENCOUNTER — APPOINTMENT (OUTPATIENT)
Dept: PHYSICAL THERAPY | Age: 63
End: 2023-11-27
Payer: COMMERCIAL

## 2023-11-28 ENCOUNTER — OFFICE VISIT (OUTPATIENT)
Dept: PHYSICAL THERAPY | Age: 63
End: 2023-11-28
Payer: COMMERCIAL

## 2023-11-28 DIAGNOSIS — G89.29 CHRONIC PAIN OF RIGHT KNEE: Primary | ICD-10-CM

## 2023-11-28 DIAGNOSIS — M25.561 CHRONIC PAIN OF RIGHT KNEE: Primary | ICD-10-CM

## 2023-11-28 DIAGNOSIS — G89.18 ACUTE POST-OPERATIVE PAIN: ICD-10-CM

## 2023-11-28 PROCEDURE — 97140 MANUAL THERAPY 1/> REGIONS: CPT | Performed by: PHYSICAL THERAPIST

## 2023-11-28 PROCEDURE — 97110 THERAPEUTIC EXERCISES: CPT | Performed by: PHYSICAL THERAPIST

## 2023-11-28 PROCEDURE — 97112 NEUROMUSCULAR REEDUCATION: CPT | Performed by: PHYSICAL THERAPIST

## 2023-11-28 NOTE — PROGRESS NOTES
Daily Note     Today's date: 2023  Patient name: Elias Acevedo  : 1960  MRN: [id number]  Referring provider: Indiana Bell  Dx:   Encounter Diagnosis     ICD-10-CM    1. Chronic pain of right knee  M25.561     G89.29       2. Acute post-operative pain  G89.18           Start Time: 1745  Stop Time: 1830  Total time in clinic (min): 45 minutes    Subjective: Pt reports no new symptoms       Objective: See treatment diary below      Assessment: Tolerated treatment well. HEP progressed to include more interventions to increase TKE (heel prop 3x daily). Patient demonstrated fatigue post treatment and would benefit from continued PT      Plan: Continue per plan of care.       Precautions: NA    POC expires Unit limit Auth Expiration date PT/OT + Visit Limit?   2023                               Visit/Unit Tracking  AUTH Status:  Date             BCBS- no auth needed  Used 13  14 15             Remaining                    Manuals        PROM R knee LH JF       Patellar mobs LH JF       Reassess         Desensitization R knee         Neuro Re-Ed        Bridges  3x10       Clamshell         SLR 3x15 3x15                Mini squats  3x10       Glut set         LAQ  2x15, 3#        Hip 3-way         Banded walking         Ther Ex         Heel slide         Ankle pumps         Gastroc stretch w strap         RB/NS - knee ROM Bike 10' NS 5'        Weight shift         Pt education         Heel prop stretch 3' 3' 3#       Knee flex on stretch 20x5" 20x5"       Ther Activity         STS 2x5 no UE 2x10 hi/lo       FSU  2x10 6" two handrails       LSU         Gait Training         steps         SPC         Modalities

## 2023-11-30 ENCOUNTER — OFFICE VISIT (OUTPATIENT)
Dept: PHYSICAL THERAPY | Age: 63
End: 2023-11-30
Payer: COMMERCIAL

## 2023-11-30 DIAGNOSIS — G89.29 CHRONIC PAIN OF RIGHT KNEE: Primary | ICD-10-CM

## 2023-11-30 DIAGNOSIS — G89.18 ACUTE POST-OPERATIVE PAIN: ICD-10-CM

## 2023-11-30 DIAGNOSIS — M25.561 CHRONIC PAIN OF RIGHT KNEE: Primary | ICD-10-CM

## 2023-11-30 PROCEDURE — 97110 THERAPEUTIC EXERCISES: CPT | Performed by: PHYSICAL THERAPIST

## 2023-11-30 PROCEDURE — 97140 MANUAL THERAPY 1/> REGIONS: CPT | Performed by: PHYSICAL THERAPIST

## 2023-11-30 PROCEDURE — 97112 NEUROMUSCULAR REEDUCATION: CPT | Performed by: PHYSICAL THERAPIST

## 2023-11-30 NOTE — PROGRESS NOTES
Daily Note     Today's date: 2023  Patient name: Sonny Duque  : 1960  MRN: [id number]  Referring provider: Alessandro Kuo  Dx:   Encounter Diagnosis     ICD-10-CM    1. Chronic pain of right knee  M25.561     G89.29       2. Acute post-operative pain  G89.18                      Subjective: Reports feeling continued improvement coming in today, is beginning to ambulate at home without her [facility]. Objective: See treatment diary below      Assessment: Tolerated treatment well. Patient would benefit from continued PT, did well with tx with no complaints during today's session. Patient will continue to progress going forward. Plan: Continue per plan of care.       Precautions: NA    POC expires Unit limit Auth Expiration date PT/OT + Visit Limit?   2023                               Visit/Unit Tracking  AUTH Status:  Date             BCBS- no auth needed  Used 13  14 15 16            Remaining                    Manuals        PROM R knee [facility] CW      Patellar mobs [facility] CW       Reassess         Desensitization R knee         Neuro Re-Ed        Bridges  3x10 3x10x5"       Clamshell         SLR 3x15 3x15 3x10                Mini squats  3x10 3x10       Wall ball TKE    20x5"       LAQ  2x15, 3#        Hip 3-way         Banded walking         Ther Ex         Heel slide         Ankle pumps         Gastroc stretch w strap         RB/NS - knee ROM Bike 10' NS 5'  NS 10'       Weight shift         Pt education         Heel prop stretch 3' 3' 3#       Knee flex on stretch 20x5" 20x5" 5x20"       Ther Activity         STS 2x5 no UE 2x10 hi/lo       FSU  2x10 6" two handrails 25x ea 6"      LSU   25x ea 6"       Gait Training         steps         SPC         Modalities

## 2023-12-04 ENCOUNTER — OFFICE VISIT (OUTPATIENT)
Dept: PHYSICAL THERAPY | Age: 63
End: 2023-12-04
Payer: COMMERCIAL

## 2023-12-04 DIAGNOSIS — M25.561 CHRONIC PAIN OF RIGHT KNEE: Primary | ICD-10-CM

## 2023-12-04 DIAGNOSIS — G89.29 CHRONIC PAIN OF RIGHT KNEE: Primary | ICD-10-CM

## 2023-12-04 DIAGNOSIS — G89.18 ACUTE POST-OPERATIVE PAIN: ICD-10-CM

## 2023-12-04 PROCEDURE — 97110 THERAPEUTIC EXERCISES: CPT

## 2023-12-04 PROCEDURE — 97140 MANUAL THERAPY 1/> REGIONS: CPT

## 2023-12-04 PROCEDURE — 97112 NEUROMUSCULAR REEDUCATION: CPT

## 2023-12-04 NOTE — PROGRESS NOTES
Daily Note     Today's date: 2023  Patient name: Joy Hansen  : 1960  MRN: [id number]  Referring provider: Dhara Felipe  Dx:   Encounter Diagnosis     ICD-10-CM    1. Chronic pain of right knee  M25.561     G89.29       2. Acute post-operative pain  G89.18           Start Time: 1615  Stop Time: 1700  Total time in clinic (min): 45 minutes    Subjective: Oliverio Oseguera has no new complaints today. Reports mild R knee stiffness this evening. Objective: See treatment diary below  R knee PROM 5->110    Assessment: Amber tolerated treatment well. She continues to make steady gains towards goals with improved R LE strength and stability, as well as knee ROM. Required cuing for decreased UE support with standing ex's. Oliverio Oseguera would benefit from continued PT and compliance with HEP. Plan: Continue per plan of care.       Precautions: NA    POC expires Unit limit Auth Expiration date PT/OT + Visit Limit?   2023                               Visit/Unit Tracking  AUTH Status:  Date           BCBS- no auth needed  Used 13  14 15 16 17           Remaining                    Manuals      PROM R knee LH JF CW AAW     Patellar mobs LH  CW  AAW     Reassess         Desensitization R knee         Neuro Re-Ed      Bridges  3x10 3x10x5"  5" 3x10     Clamshell         SLR 3x15 3x15 3x10  3x10              Mini squats  3x10 3x10  3x10     Wall ball TKE    20x5"  5"x20     LAQ  2x15, 3#        Hip 3-way         Banded walking         Ther Ex      Heel slide         Ankle pumps         Gastroc stretch w strap         RB/NS - knee ROM Bike 10' NS 5'  NS 10'  10'     Weight shift         Pt education         Heel prop stretch 3' 3' 3#       Knee flex on stretch 20x5" 20x5" 5x20"  20"X5     Ther Activity         STS 2x5 no UE 2x10 hi/lo       FSU  2x10 6" two handrails 25x ea 6" 6" step x25      LSU   25x ea 6"  6'" step x25     Gait Training         steps         SPC         Modalities

## 2023-12-06 ENCOUNTER — APPOINTMENT (OUTPATIENT)
Dept: LAB | Facility: AMBULARY SURGERY CENTER | Age: 63
End: 2023-12-06
Payer: COMMERCIAL

## 2023-12-06 DIAGNOSIS — E78.2 MIXED HYPERLIPIDEMIA: ICD-10-CM

## 2023-12-06 LAB
CHOLEST SERPL-MCNC: 241 MG/DL
HDLC SERPL-MCNC: 58 MG/DL
LDLC SERPL CALC-MCNC: 166 MG/DL (ref 0–100)
TRIGL SERPL-MCNC: 87 MG/DL

## 2023-12-06 PROCEDURE — 36415 COLL VENOUS BLD VENIPUNCTURE: CPT

## 2023-12-06 PROCEDURE — 80061 LIPID PANEL: CPT

## 2023-12-07 ENCOUNTER — OFFICE VISIT (OUTPATIENT)
Dept: PHYSICAL THERAPY | Age: 63
End: 2023-12-07
Payer: COMMERCIAL

## 2023-12-07 DIAGNOSIS — M25.561 CHRONIC PAIN OF RIGHT KNEE: Primary | ICD-10-CM

## 2023-12-07 DIAGNOSIS — G89.29 CHRONIC PAIN OF RIGHT KNEE: Primary | ICD-10-CM

## 2023-12-07 DIAGNOSIS — G89.18 ACUTE POST-OPERATIVE PAIN: ICD-10-CM

## 2023-12-07 PROCEDURE — 97110 THERAPEUTIC EXERCISES: CPT | Performed by: PHYSICAL THERAPIST

## 2023-12-07 PROCEDURE — 97140 MANUAL THERAPY 1/> REGIONS: CPT | Performed by: PHYSICAL THERAPIST

## 2023-12-07 PROCEDURE — 97112 NEUROMUSCULAR REEDUCATION: CPT | Performed by: PHYSICAL THERAPIST

## 2023-12-07 NOTE — PROGRESS NOTES
Daily Note     Today's date: 2023  Patient name: Carmelo Alberto  : 1960  MRN: [id number]  Referring provider: Francis Hallman  Dx:   Encounter Diagnosis     ICD-10-CM    1. Chronic pain of right knee  M25.561     G89.29       2. Acute post-operative pain  G89.18                      Subjective: Reports feeling good today, is able to walk around at home with no AD at times. Objective: See treatment diary below      Assessment: Tolerated treatment well. Patient would benefit from continued PT, did well with today's session with no complaints of pain throughout. Plan: Continue per plan of care.       Precautions: NA    POC expires Unit limit Auth Expiration date PT/OT + Visit Limit?   2023                               Visit/Unit Tracking  AUTH Status:  Date          BCBS- no auth needed  Used 13  14 15 16 17 18          Remaining                    Manuals      PROM R knee LH JF CW AAW CW    Patellar mobs LH  CW  AAW CW     Reassess         Desensitization R knee         Neuro Re-Ed      Bridges  3x10 3x10x5"  5" 3x10 3x10x5"     Clamshell         SLR 3x15 3x15 3x10  3x10 3x10 1#              Mini squats  3x10 3x10  3x10 3x10    Wall ball TKE    20x5"  5"x20 25x5"     LAQ  2x15, 3#        Hip 3-way         Banded walking         Ther Ex      Heel slide         Ankle pumps         Gastroc stretch w strap         RB/NS - knee ROM Bike 10' NS 5'  NS 10'  10' 10'     Weight shift         Vigor      2'     Heel prop stretch 3' 3' 3#       Knee flex on stretch 20x5" 20x5" 5x20"  20"X5 5x20"    Ther Activity         STS 2x5 no UE 2x10 hi/lo       FSU  2x10 6" two handrails 25x ea 6" 6" step x25  30x 6"    LSU   25x ea 6"  6'" step x25 30x 6"     Gait Training         steps         SPC         Modalities

## 2023-12-11 ENCOUNTER — OFFICE VISIT (OUTPATIENT)
Dept: PHYSICAL THERAPY | Age: 63
End: 2023-12-11
Payer: COMMERCIAL

## 2023-12-11 DIAGNOSIS — G89.18 ACUTE POST-OPERATIVE PAIN: ICD-10-CM

## 2023-12-11 DIAGNOSIS — M25.561 CHRONIC PAIN OF RIGHT KNEE: Primary | ICD-10-CM

## 2023-12-11 DIAGNOSIS — G89.29 CHRONIC PAIN OF RIGHT KNEE: Primary | ICD-10-CM

## 2023-12-11 PROCEDURE — 97112 NEUROMUSCULAR REEDUCATION: CPT | Performed by: PHYSICAL THERAPIST

## 2023-12-11 PROCEDURE — 97110 THERAPEUTIC EXERCISES: CPT | Performed by: PHYSICAL THERAPIST

## 2023-12-11 PROCEDURE — 97140 MANUAL THERAPY 1/> REGIONS: CPT | Performed by: PHYSICAL THERAPIST

## 2023-12-11 NOTE — PROGRESS NOTES
Daily Note     Today's date: 2023  Patient name: Blas Hamlin  : 1960  MRN: [id number]  Referring provider: Jesse Centeno  Dx:   Encounter Diagnosis     ICD-10-CM    1. Chronic pain of right knee  M25.561     G89.29       2. Acute post-operative pain  G89.18           Start Time: 1617  Stop Time: 1655  Total time in clinic (min): 38 minutes    Subjective: Patient reports that knee is very painful today. She attributes this to the rainy weather and a long time spent in the car visiting her mom in [address] this past weekend. Objective: See treatment diary below      Assessment: Due to increased irritability, PT session modified to avoid excessive exacerbation of pain. Rather, soft tissue work and stretching performed around the knee joint and patient noted good relief of sx. Resume exercises, as able. Plan: Continue per plan of care.       Precautions: NA    POC expires Unit limit Auth Expiration date PT/OT + Visit Limit?   2023                               Visit/Unit Tracking  AUTH Status:  Date         BCBS- no auth needed  Used 13  14 15 16 17 18 19         Remaining                    Manuals    PROM R knee LH  CW AAW CW JAB   Patellar mobs LH  CW  AAW CW  JAB   Reassess         Desensitization R knee         Neuro Re-Ed      Bridges  3x10 3x10x5"  5" 3x10 3x10x5"  nv   Clamshell         SLR 3x15 3x15 3x10  3x10 3x10 1#  nv            Mini squats  3x10 3x10  3x10 3x10 nv   Wall ball TKE    20x5"  5"x20 25x5"  nv   LAQ  2x15, 3#        Hip 3-way         Banded walking         Ther Ex      Heel slide         Ankle pumps         Gastroc stretch w strap         RB/NS - knee ROM Bike 10' NS 5'  NS 10'  10' 10'  10'   Weight shift         Vigor      2'  2'   Heel prop stretch 3' 3' 3#       Knee flex on stretch 20x5" 20x5" 5x20"  20"X5 5x20" 5x20"    Ther Activity  STS 2x5 no UE 2x10 hi/lo       FSU  2x10 6" two handrails 25x ea 6" 6" step x25  30x 6" 20x 6"   LSU   25x ea 6"  6'" step x25 30x 6"  20x 6"    Gait Training         steps         SPC         Modalities

## 2023-12-14 ENCOUNTER — OFFICE VISIT (OUTPATIENT)
Dept: PHYSICAL THERAPY | Age: 63
End: 2023-12-14
Payer: COMMERCIAL

## 2023-12-14 DIAGNOSIS — G89.18 ACUTE POST-OPERATIVE PAIN: ICD-10-CM

## 2023-12-14 DIAGNOSIS — M25.561 CHRONIC PAIN OF RIGHT KNEE: Primary | ICD-10-CM

## 2023-12-14 DIAGNOSIS — G89.29 CHRONIC PAIN OF RIGHT KNEE: Primary | ICD-10-CM

## 2023-12-14 DIAGNOSIS — Z00.00 HEALTHCARE MAINTENANCE: ICD-10-CM

## 2023-12-14 PROCEDURE — 97112 NEUROMUSCULAR REEDUCATION: CPT

## 2023-12-14 PROCEDURE — 97140 MANUAL THERAPY 1/> REGIONS: CPT

## 2023-12-14 PROCEDURE — 97110 THERAPEUTIC EXERCISES: CPT

## 2023-12-14 NOTE — PROGRESS NOTES
Daily Note     Today's date: 2023  Patient name: Sekou Bonds  : 1960  MRN: [id number]  Referring provider: Rocky Atwood  Dx:   Encounter Diagnosis     ICD-10-CM    1. Chronic pain of right knee  M25.561     G89.29       2. Acute post-operative pain  G89.18       3. Healthcare maintenance  Z00.00                      Subjective: pt reports R knee is improving but still difficulty sleeping at times, PAS 3/10 max      Objective: See treatment diary below      Assessment: pt amb without AD today, gait improving, R knee A/PROM improving gradually      Plan: Continue per plan of care. Progress treatment as tolerated.        Precautions: NA    POC expires Unit limit Auth Expiration date PT/OT + Visit Limit?   2023                               Visit/Unit Tracking  AUTH Status:  Date        BCBS- no auth needed  Used 13  14 15 16 17 18 19 20        Remaining                    Manuals    PROM R knee LH JF CW AAW CW JAB VK   Patellar mobs LH  CW  AAW CW  JAB Pat ROM VK   Reassess          Desensitization R knee          Neuro Re-Ed    Bridges  3x10 3x10x5"  5" 3x10 3x10x5"  nv 2x15x5"   Clamshell          SLR 3x15 3x15 3x10  3x10 3x10 1#  nv 3x10             Mini squats  3x10 3x10  3x10 3x10 nv 2x15x5"   Wall ball TKE    20x5"  5"x20 25x5"  nv 2x15x5"   LAQ  2x15, 3#      2x15x5"   Hip 3-way          Banded walking          Ther Ex    Heel slide          Ankle pumps          Gastroc stretch w strap          RB/NS - knee ROM Bike 10' NS 5'  NS 10'  10' 10'  10' 10'   Weight shift          Vigor      2'  2'    Heel prop stretch 3' 3' 3#        Knee flex on stretch 20x5" 20x5" 5x20"  20"X5 5x20" 5x20"  10x10"   Ther Activity    STS 2x5 no UE 2x10 hi/lo     3x10   FSU  2x10 6" two handrails 25x ea 6" 6" step x25  30x 6" 20x 6" 6" 3x10   LSU 25x ea 6"  6'" step x25 30x 6"  20x 6"  6" 3x10             Gait Training       12/14   steps       No AD   SPC          Modalities

## 2023-12-18 ENCOUNTER — OFFICE VISIT (OUTPATIENT)
Dept: PHYSICAL THERAPY | Age: 63
End: 2023-12-18
Payer: COMMERCIAL

## 2023-12-18 DIAGNOSIS — G89.29 CHRONIC PAIN OF RIGHT KNEE: Primary | ICD-10-CM

## 2023-12-18 DIAGNOSIS — M25.561 CHRONIC PAIN OF RIGHT KNEE: Primary | ICD-10-CM

## 2023-12-18 DIAGNOSIS — Z96.651 AFTERCARE FOLLOWING RIGHT KNEE JOINT REPLACEMENT SURGERY: Primary | ICD-10-CM

## 2023-12-18 DIAGNOSIS — Z00.00 HEALTHCARE MAINTENANCE: ICD-10-CM

## 2023-12-18 DIAGNOSIS — Z47.1 AFTERCARE FOLLOWING RIGHT KNEE JOINT REPLACEMENT SURGERY: Primary | ICD-10-CM

## 2023-12-18 DIAGNOSIS — G89.18 ACUTE POST-OPERATIVE PAIN: ICD-10-CM

## 2023-12-18 PROCEDURE — 97112 NEUROMUSCULAR REEDUCATION: CPT

## 2023-12-18 PROCEDURE — 97110 THERAPEUTIC EXERCISES: CPT

## 2023-12-18 PROCEDURE — 97530 THERAPEUTIC ACTIVITIES: CPT

## 2023-12-18 PROCEDURE — 97140 MANUAL THERAPY 1/> REGIONS: CPT

## 2023-12-18 NOTE — PROGRESS NOTES
PT Re-Evaluation    Today's date: 2023  Patient name: Amber Jackson  : 1960  MRN: [id number]  Referring provider: Theodore Geiger,*  Dx:   Encounter Diagnosis     ICD-10-CM    1. Chronic pain of right knee  M25.561     G89.29       2. Acute post-operative pain  G89.18       3. Healthcare maintenance  Z00.00                      Assessment  Assessment details: Problem List:  1) hypomobility of b/l knees - addressing with PROM and AAROM of knee  2) decreased neuromuscular control LE - addressing with functional strengthening, quad sets and SLR    Amber Jackson is a pleasant 62 y.o. female who presents for R TKA pre op appt scheduled with Juanjo for 10/9/2023.  She has decreased strength and ROM of b/l knees resulting in difficulty performing daily activities due to pain and some apprehension over what to expect from surgical rehab.  No further referral appears necessary at this time based upon examination results.  I expect she will benefit from physical therapy following surgery to regain function and return to PLOF.  Positive prognostic indicators include positive attitude toward recovery and good understanding of diagnosis and treatment plan options.  Negative prognostic indicators include chronicity of symptoms, anxiety and obesity.      Comparable signs:  1) knee ROM  2) gait     PT Re-evaluation 23-  Patient presents following a Right TKA on 10/9/23.  Currently presents with signs and symptoms of right knee pain rated 3/10, decreased ROM and strength of right knee and decreased gait and balance status.  Patient would benefit from continued to skilled PT services to address her impairments and improve safe functional mobility in order to return to her PLOF.        PT Re-Evaluation 10/12/2023: Patient presents following R TKA completed by Dr. Geiger, surgery completed 10/9/2023. Currently presenting with signs/symptoms consistent following surgery including swelling, decreased  · Outpatient blood pressure medications include amlodipine 5 mg and hydrochlorothiazide 25 mg which he only takes p r n  When blood pressure is elevated <140/90  · Follow with Dr Vesna Parham  strength, and decreased ROM. Patient currently limited in functional abilities and would benefit from physical therapy for gradual progression to address deficits and restore full functional tolerance.      Impairments: abnormal or restricted ROM, impaired physical strength, lacks appropriate home exercise program and pain with function    Symptom irritability: moderateUnderstanding of Dx/Px/POC: good   Prognosis: good    Goals  Short Term Goals: to be achieved by 4 weeks  1) Patient to be independent with basic HEP. B Goal met  2) Decrease pain to 4/10 at its worst.  Pain level 3/10 Goal met  3) Increase R knee flex ROM to 115 deg to increase function post-op. AROM to 110 degrees Progressing  4) Increase R knee ext ROM to -5 deg to improve gait post-op. Knee ext to -4   Progressing  5) Increase LE strength by 1/2 MMT grade in all deficient planes post-op.  Progressing    Long Term Goals: to be achieved by discharge  1) Patient to be independent with comprehensive HEP. Progressing  2) R knee ROM WNL all planes to improve a/iadls post-op. Progressing  3) Increase LE strength to 5/5 MMT grade in all planes to improve a/iadls post-op. Progressing  4) Patient to report no sleep interruption secondary to pain post-op. Progressing  5) Increase ambulatory tolerance to 60 min post-op without AD.  Goal met  6) FOTO scores >53.    Plan  Plan details: Address physical impairments found during IE via therapeutic exercises, neuromuscular re-education, and manual therapy to improve functional tolerance. Develop HEP for self-management of symptoms.   Patient would benefit from: skilled physical therapy  Referral necessary: No  Planned therapy interventions: home exercise program, gait training, flexibility, balance, joint mobilization, manual therapy, massage, neuromuscular re-education, patient education, strengthening, stretching, therapeutic activities, therapeutic exercise and therapeutic training  Frequency: 2x week  Duration  in visits: 16  Duration in weeks: 8  Plan of Care beginning date: 12/18/23  Plan of Care expiration date: 2/9/23  Treatment plan discussed with: patient        Subjective Evaluation    History of Present Illness  Mechanism of injury: Patient presents for R TKA pre-op appt. Surgery scheduled with Juanjo for 10/9/2023. Patient reports ~10 years ago started noticing pain in knees, was working part-time at [employer] and started with pain from walking on concrete. Over the years has gotten worse. Has had therapy and CSI in the past with minimal relief. Notes that she needs to get both knees done but starting with right, wanted to wait until she was 62 to get them done. Says she's able to function but isn't able to walk long distances, stairs are also difficult. Reports getting exhausted from things as simple as walking around the store while grocery shopping. Wants to be able to return to work part-time.     PT Re-Evaluation 12/18/23  Patient has complete 20 visits of outpatient PT services.  Patient reports an overall improvement in strength and mobility but continues to report a decrease in strength and endurance.  Patient able to progress ambulation without the use of an assistive device.  Patient reports she continues to wake up at least 1x/night with uncomfortable feeling getting back to sleep.  Patient reports independence with transfers, bed mobility and negotiating stairs with non reciprocal pattern.  Pain level in right knee rated 3/10.      PT Re-Evaluation 10/12/2023: Patient presents to OP PT post op for R TKA completed by Dr. Geiger, surgery completed 10/9/2023. Patient reports getting dizzy with low blood pressure in hospital due to pain medications so stayed an extra day in the hospital. Hasn't been taking the oxy but has been managing pain with Tylenol. Had been effective until this morning. Hasn't been able to make it up the steps and has been sleeping in living room on recliner, has  at home to  "help. Hasn't been doing exercises with exception of ankle pumps and quad set. Current pain = 7/10. Best pain = 1/10. Worst pain = 7/10.           Recurrent probem    Quality of life: good    Patient Goals  Patient goals for therapy: return to work, increased strength and decreased pain    Pain  Current pain ratin  At best pain ratin  At worst pain rating: 3  Location: medial knee  Quality: dull ache and sharp  Relieving factors: rest and change in position  Aggravating factors: walking and stair climbing  Progression: worsening    Social Support  Steps to enter house: yes  1  Stairs in house: yes   13  Lives in: multiple-level home  Lives with: significant other    Employment status: not working    Diagnostic Tests  X-ray: normal (degenerative changes)  Treatments  Previous treatment: injection treatment and physical therapy          General Comments:      Knee Comments  Posture: rounded shoulders  Palpation: TTP medial joint line b/l knees  Myotomes (L/R): intact b/l             GAIT: compensated Trendelenburg b/l  Squat assess: performs with pain  Steps: educated step to patterns, \"up with the good, down with the bad\"            MMT        AROM          PROM   Hip       L     R         L       R          L         R  Flex. 4- 4       Extn. 4 4       Abd. 3+ 4-       Add. 4 4            .        Knee        Exten 4 4 0  0 - 4 NT NT      Flex 4 4 123 125  110 130      113     Precautions: NA    POC expires Unit limit Auth Expiration date PT/OT + Visit Limit?   2023                                       Visit/Unit Tracking  AUTH Status:  Date 11/20  11/22 11/28 11/30  12/4 12/7 12/11 12/12  12/18         BCBS- no auth needed  Used 13  14 15 16 17 18 19 20  21 Foto           Remaining                                  Manuals    PROM R knee ksg JF CW AAW CW JAB VK   Patellar mobs ksg JF CW  AAW CW  JAB Pat ROM VK   Reassess               " "  Desensitization R knee                 Neuro Re-Ed 12/18 11/28 12/4 12/14   Bridges  2x15x5\" 3x10 3x10x5\"  5\" 3x10 3x10x5\"  nv 2x15x5\"   Clamshell                 SLR 3x10 2# 3x15 3x10  3x10 3x10 1#  nv 3x10                     Mini squats  2x15x5\" 3x10 3x10  3x10 3x10 nv 2x15x5\"   Wall ball TKE   2x15x5\"   20x5\"  5\"x20 25x5\"  nv 2x15x5\"   LAQ  2x15x5\" 2#           2x15x5\"   Hip 3-way                 SLS 30\" x3                                       Banded walking                 Ther Ex 12/18 12/4 12/14   Heel slide                 Ankle pumps                 Gastroc stretch w strap                 RB/NS - knee ROM Bike 10' NS 5'  NS 10'  10' 10'  10' 10'   Weight shift                 Vigor          2'  2'     Heel prop stretch  3' 3#             Knee flex on stretch 10x10\" 20x5\" 5x20\"  20\"X5 5x20\" 5x20\"  10x10\"   Ther Activity 12/18 12/14   STS 3x10 2x10 hi/lo         3x10   FSU  6\" 3x10 2x10 6\" two handrails 25x ea 6\" 6\" step x25  30x 6\" 20x 6\" 6\" 3x10   LSU  6\" 3x10   25x ea 6\"  6'\" step x25 30x 6\"  20x 6\"  6\" 3x10                     Gait Training  12/18 12/14   steps             No AD   SPC                 Modalities                                                              "

## 2023-12-21 ENCOUNTER — OFFICE VISIT (OUTPATIENT)
Dept: PHYSICAL THERAPY | Age: 63
End: 2023-12-21
Payer: COMMERCIAL

## 2023-12-21 DIAGNOSIS — G89.29 CHRONIC PAIN OF RIGHT KNEE: Primary | ICD-10-CM

## 2023-12-21 DIAGNOSIS — M25.561 CHRONIC PAIN OF RIGHT KNEE: Primary | ICD-10-CM

## 2023-12-21 DIAGNOSIS — Z00.00 HEALTHCARE MAINTENANCE: ICD-10-CM

## 2023-12-21 DIAGNOSIS — G89.18 ACUTE POST-OPERATIVE PAIN: ICD-10-CM

## 2023-12-21 PROCEDURE — 97110 THERAPEUTIC EXERCISES: CPT

## 2023-12-21 PROCEDURE — 97140 MANUAL THERAPY 1/> REGIONS: CPT

## 2023-12-21 PROCEDURE — 97112 NEUROMUSCULAR REEDUCATION: CPT

## 2023-12-21 NOTE — PROGRESS NOTES
"Daily Note     Today's date: 2023  Patient name: Amber Jackson  : 1960  MRN: [id number]  Referring provider: Theodore Geiger,*  Dx:   Encounter Diagnosis     ICD-10-CM    1. Chronic pain of right knee  M25.561     G89.29       2. Acute post-operative pain  G89.18       3. Healthcare maintenance  Z00.00           Start Time: 1618  Stop Time: 1705  Total time in clinic (min): 47 minutes    Subjective: Pt reports she is doing okay today. Arrived without SPC. Pain is about 2/10. Is sleeping back in her bed but is still woken up in middle of the night.       Objective: See treatment diary below      Assessment: Tolerated treatment well. Pt noted some L knee discomfort with mini squats - pt noted she is likely going to have L TKR sometime in the new year. Patient demonstrated fatigue post treatment, exhibited good technique with therapeutic exercises, and would benefit from continued PT.      Plan: Continue per plan of care.      Precautions: NA    POC expires Unit limit Auth Expiration date PT/OT + Visit Limit?   2023                                       Visit/Unit Tracking  AUTH Status:  Date        BCBS- no auth needed  Used 13  14 15 16 17 18 19 20  21 Foto  22         Remaining                                  Manuals    PROM R knee ksg NA  AAW CW JAB VK   Patellar mobs ksg NA  AAW CW  JAB Pat ROM VK   Reassess               Desensitization R knee               Neuro Re-Ed    Bridges  2x15x5\" 2x15x5\"   5\" 3x10 3x10x5\"  nv 2x15x5\"   Clamshell               SLR 3x10 2# 3x10 2#  3x10 3x10 1#  nv 3x10                   Mini squats  2x15x5\" 2x15x5\"   3x10 3x10 nv 2x15x5\"   Wall ball TKE   2x15x5\" 2x15x5\"   5\"x20 25x5\"  nv 2x15x5\"   LAQ  2x15x5\" 2# 2x15x5\" 2#         2x15x5\"   Hip 3-way               SLS 30\" x3                                       Banded " "walking               Ther Ex 12/18 12/4 12/14   Heel slide               Ankle pumps               Gastroc stretch w strap               RB/NS - knee ROM Bike 10' 10' NS  10' 10'  10' 10'   Weight shift               Vigor        2'  2'     Heel prop stretch              Knee flex on stretch 10x10\" 10x10\"   20\"X5 5x20\" 5x20\"  10x10\"   Ther Activity 12/18 12/14   STS 3x10 3x10        3x10   FSU  6\" 3x10 6\" 3x10  6\" step x25  30x 6\" 20x 6\" 6\" 3x10   LSU  6\" 3x10 6\" 3x10  6'\" step x25 30x 6\"  20x 6\"  6\" 3x10                   Gait Training  12/18 12/14   steps           No AD   SPC               Modalities                                                             "

## 2023-12-26 ENCOUNTER — OFFICE VISIT (OUTPATIENT)
Dept: PHYSICAL THERAPY | Age: 63
End: 2023-12-26
Payer: COMMERCIAL

## 2023-12-26 DIAGNOSIS — M25.561 CHRONIC PAIN OF RIGHT KNEE: Primary | ICD-10-CM

## 2023-12-26 DIAGNOSIS — Z00.00 HEALTHCARE MAINTENANCE: ICD-10-CM

## 2023-12-26 DIAGNOSIS — G89.18 ACUTE POST-OPERATIVE PAIN: ICD-10-CM

## 2023-12-26 DIAGNOSIS — G89.29 CHRONIC PAIN OF RIGHT KNEE: Primary | ICD-10-CM

## 2023-12-26 PROCEDURE — 97110 THERAPEUTIC EXERCISES: CPT

## 2023-12-26 PROCEDURE — 97140 MANUAL THERAPY 1/> REGIONS: CPT

## 2023-12-26 PROCEDURE — 97112 NEUROMUSCULAR REEDUCATION: CPT

## 2023-12-26 NOTE — PROGRESS NOTES
"Daily Note     Today's date: 2023  Patient name: Amber Jackson  : 1960  MRN: [id number]  Referring provider: Theodore Geiger,*  Dx:   Encounter Diagnosis     ICD-10-CM    1. Chronic pain of right knee  M25.561     G89.29       2. Acute post-operative pain  G89.18       3. Healthcare maintenance  Z00.00                      Subjective: pt reports she's been working on her walking, trying not to limp, pt reports she started going up/down steps with recip gait at times      Objective: See treatment diary below      Assessment: Tolerated treatment well. Patient demonstrated fatigue post treatment and would benefit from continued PT, gait without AD slowly improving,with cueing to avoid lateral trunk sway, R knee ROM improving       Plan: Continue per plan of care.  Progress treatment as tolerated.       Precautions: NA    POC expires Unit limit Auth Expiration date PT/OT + Visit Limit?   2023                                       Visit/Unit Tracking  AUTH Status:  Date      BCBS- no auth needed  Used 13  14 15 16 17 18 19 20  21 Foto  22  23       Remaining                                  Manuals        PROM R knee ksg NA VK       Patellar mobs ksg NA Pat ROM VK       Reassess           Desensitization R knee           Neuro Re-Ed        Bridges  2x15x5\" 2x15x5\"  2x15x5\"       Clamshell           SLR 3x10 2# 3x10 2# 2# 3x10                   Mini squats  2x15x5\" 2x15x5\"  2x15x5\"       Wall ball TKE   2x15x5\" 2x15x5\"  2x15x5\"       LAQ  2x15x5\" 2# 2x15x5\" 2#         Hip 3-way           SLS 30\" x3  3x30\"                                     Banded walking           Ther Ex        Heel slide           Ankle pumps           Gastroc stretch w strap           RB/NS - knee ROM Bike 10' 10' NS 10'       Weight shift           Vigor            Side stepping   4x20' no TB     " "  Knee flex on stretch 10x10\" 10x10\"  10x10\"       Ther Activity 12/18 12/26       STS 3x10 3x10 2x15       FSU  6\" 3x10 6\" 3x10 6\" 2x15       LSU  6\" 3x10 6\" 3x10 6\" 2x15                   Gait Training  12/18         steps           SPC           Modalities                                                   "

## 2023-12-27 ENCOUNTER — OFFICE VISIT (OUTPATIENT)
Dept: INTERNAL MEDICINE CLINIC | Facility: CLINIC | Age: 63
End: 2023-12-27
Payer: COMMERCIAL

## 2023-12-27 VITALS
HEIGHT: 59 IN | DIASTOLIC BLOOD PRESSURE: 74 MMHG | BODY MASS INDEX: 38.34 KG/M2 | WEIGHT: 190.2 LBS | SYSTOLIC BLOOD PRESSURE: 122 MMHG

## 2023-12-27 DIAGNOSIS — K59.00 CONSTIPATION, UNSPECIFIED CONSTIPATION TYPE: ICD-10-CM

## 2023-12-27 DIAGNOSIS — E78.2 MIXED HYPERLIPIDEMIA: ICD-10-CM

## 2023-12-27 DIAGNOSIS — Z00.00 ANNUAL PHYSICAL EXAM: Primary | ICD-10-CM

## 2023-12-27 DIAGNOSIS — K63.5 POLYP OF COLON, UNSPECIFIED PART OF COLON, UNSPECIFIED TYPE: ICD-10-CM

## 2023-12-27 DIAGNOSIS — Z12.31 SCREENING MAMMOGRAM FOR BREAST CANCER: ICD-10-CM

## 2023-12-27 DIAGNOSIS — Z23 ENCOUNTER FOR IMMUNIZATION: ICD-10-CM

## 2023-12-27 PROCEDURE — 99396 PREV VISIT EST AGE 40-64: CPT | Performed by: INTERNAL MEDICINE

## 2023-12-27 PROCEDURE — 90471 IMMUNIZATION ADMIN: CPT

## 2023-12-27 PROCEDURE — 90678 RSV VACC PREF BIVALENT IM: CPT

## 2023-12-27 RX ORDER — DOCUSATE SODIUM 100 MG/1
100 CAPSULE, LIQUID FILLED ORAL 2 TIMES DAILY PRN
Start: 2023-12-27

## 2023-12-27 NOTE — PROGRESS NOTES
ADULT ANNUAL PHYSICAL  [facility] - [facility]    NAME: Amber Jackson  AGE: 63 y.o. SEX: female  : 1960     DATE: 2023     Assessment and Plan:     Problem List Items Addressed This Visit        Other    Hyperlipidemia    Relevant Orders    CBC and differential    Comprehensive metabolic panel    Lipid panel   Other Visit Diagnoses     Annual physical exam    -  Primary    Screening mammogram for breast cancer        Relevant Orders    Mammo screening bilateral w 3d & cad    Polyp of colon, unspecified part of colon, unspecified type        Relevant Orders    Ambulatory Referral to Colorectal Surgery    Encounter for immunization        Relevant Orders    Respiratory Syncytial Virus (RSV) vaccine (recombinant) (Abrysvo)    Constipation, unspecified constipation type        Relevant Medications    docusate sodium (COLACE) 100 mg capsule            Immunizations and preventive care screenings were discussed with patient today. Appropriate education was printed on patient's after visit summary.    Counseling:  Exercise: the importance of regular exercise/physical activity was discussed. Recommend exercise 3-5 times per week for at least 30 minutes.   Consider GLP 1 agonist    BMI Counseling: Body mass index is 38.42 kg/m². The BMI is above normal. Nutrition recommendations include moderation in carbohydrate intake and reducing intake of saturated and trans fat. Exercise recommendations include exercising 3-5 times per week. Rationale for BMI follow-up plan is due to patient being overweight or obese.     Depression Screening and Follow-up Plan: Patient was screened for depression during today's encounter. They screened negative with a PHQ-2 score of 0.        Return in about 1 year (around 2024), or give AVS, for Annual physical.     Chief Complaint:     Chief Complaint   Patient presents with   • Annual Exam      History of Present Illness:      Adult Annual Physical   Patient here for a comprehensive physical exam. The patient reports problems - ongoing PT for the right knee .    Diet and Physical Activity  Diet/Nutrition: poor diet.   Exercise: no formal exercise.      Depression Screening  PHQ-2/9 Depression Screening    Little interest or pleasure in doing things: 0 - not at all  Feeling down, depressed, or hopeless: 0 - not at all  PHQ-2 Score: 0  PHQ-2 Interpretation: Negative depression screen       General Health  Sleep: sleeps poorly.   Hearing: normal - bilateral.  Vision: goes for regular eye exams and wears glasses.   Dental: regular dental visits.       /GYN Health  Follows with gynecology? yes   Patient is: postmenopausal     Review of Systems:     Review of Systems   Constitutional:  Positive for fatigue.   Respiratory:  Negative for shortness of breath.    Cardiovascular:  Negative for chest pain and palpitations.   Gastrointestinal:  Positive for constipation. Negative for abdominal pain and blood in stool.   Genitourinary:  Negative for difficulty urinating.   Musculoskeletal:  Positive for arthralgias.   Psychiatric/Behavioral:  Positive for sleep disturbance.       Past Medical History:     Past Medical History:   Diagnosis Date   • Arthritis    • Chest pain 2014   • Disease of thyroid gland    • Elevated blood sugar 2014   • Heart murmur     Functional, child   • Hypercholesteremia    • Hyperlipidemia    • Obesity 2010   • Osteoarthritis    • Urinary tract infection     Recurring      Past Surgical History:     Past Surgical History:   Procedure Laterality Date   • BIOPSY CORE NEEDLE      Thyroid Using Percutaneous Core Needle   •  SECTION      x 2   • FOOT SURGERY Bilateral     As a teenager - Bunionectomy - Dr. Vibha Nation - Last Assessed: 2016   • MO ARTHRP KNE CONDYLE&PLATU MEDIAL&LAT COMPARTMENTS Right 10/9/2023    Procedure: ARTHROPLASTY RIGHT KNEE TOTAL W ROBOT;  Surgeon: Theodore Michel  MD Juanjo;  Location: [facility];  Service: Orthopedics   • THYROID LOBECTOMY Right 09/01/2015    Dr. Mae Salcido: Right Lobe - Last Assessed: July 5, 2016 Dr. 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   Tobacco Use   • Smoking status: Never     Passive exposure: Never   • Smokeless tobacco: Never   Vaping Use   • Vaping status: Never Used   Substance and Sexual Activity   • Alcohol use: Yes     Comment: rarely   • Drug use: No   • Sexual activity: Yes     Partners: Male     Birth control/protection: Post-menopausal   Other Topics Concern   • None   Social History Narrative    Feels safe at home     Social Determinants of Health     Financial Resource Strain: Not on file   Food Insecurity: No Food Insecurity (10/10/2023)    Hunger Vital Sign    • Worried About Running Out of Food in the Last Year: Never true    • Ran Out of Food in the Last Year: Never true   Transportation Needs: No Transportation Needs (10/10/2023)    PRAPARE - Transportation    • Lack of Transportation (Medical): No    • Lack of Transportation (Non-Medical): No   Physical Activity: Not on file   Stress: Not on file   Social Connections: Not on file   Intimate Partner Violence: Not on file   Housing Stability: Low Risk  (10/10/2023)    Housing Stability Vital Sign    • Unable to Pay for Housing in the Last Year: No    • Number of Places Lived in the Last Year: 1    • Unstable Housing in the Last Year: No      Family History:     Family History   Problem Relation Age of Onset   • Graves' disease Mother    • Hyperlipidemia Mother    • Hypertension Mother    • Osteoporosis Mother    • Dementia Mother    • Thyroid disease Mother    • Arthritis Mother    • Autoimmune disease Mother   "       Graves disease   • Aortic stenosis Father    • Hyperlipidemia Father    • Hypertension Father    • Coronary artery disease Father         Coronary Artery Bypass Graft (CABG)   • Heart disease Father         Pacemaker Placement   • Valvular heart disease Father         S/P Transcatheter Aortic Valve Replacement (TAVR)   • Arthritis Father    • Hearing loss Father    • No Known Problems Son    • No Known Problems Son    • No Known Problems Daughter    • No Known Problems Daughter    • No Known Problems Daughter    • No Known Problems Daughter    • Heart attack Other         Myocardial Infarction   • Stroke Other    • Cancer Maternal Aunt    • Cancer Paternal Aunt       Current Medications:     Current Outpatient Medications   Medication Sig Dispense Refill   • Cholecalciferol 125 MCG (5000 UT) capsule Take 5,000 Units by mouth daily     • docusate sodium (COLACE) 100 mg capsule Take 1 capsule (100 mg total) by mouth 2 (two) times a day as needed for constipation     • hydroxychloroquine (PLAQUENIL) 200 mg tablet Take 2 tablets (400 mg total) by mouth in the morning 180 tablet 3   • Multiple Vitamins-Minerals (MULTIVITAL-M) TABS Take by mouth in the morning     • OMEGA-3 FATTY ACIDS  mg in the morning     • Probiotic Product (PROBIOTIC-10) CAPS Take by mouth in the morning     • Turmeric 500 MG TABS Take 1 capsule by mouth in the morning     • ascorbic acid (VITAMIN C) 500 mg tablet Take 1 tablet (500 mg total) by mouth 2 (two) times a day for 60 doses 60 tablet 0     No current facility-administered medications for this visit.      Allergies:     Allergies   Allergen Reactions   • Methocarbamol Dizziness and Confusion     Hypotension     • Oxycodone Other (See Comments), Dizziness and Confusion     hypotension      Physical Exam:     /74 (BP Location: Left arm, Patient Position: Sitting, Cuff Size: Large)   Ht 4' 11\" (1.499 m)   Wt 86.3 kg (190 lb 3.2 oz)   LMP 10/27/2017 (Exact Date)   BMI " 38.42 kg/m²     Physical Exam  Constitutional:       General: She is not in acute distress.     Appearance: She is well-developed. She is obese. She is not ill-appearing, toxic-appearing or diaphoretic.   HENT:      Right Ear: External ear normal. There is no impacted cerumen.      Left Ear: External ear normal. There is no impacted cerumen.   Eyes:      Conjunctiva/sclera: Conjunctivae normal.   Cardiovascular:      Rate and Rhythm: Normal rate and regular rhythm.      Heart sounds: Normal heart sounds. No murmur heard.  Pulmonary:      Effort: Pulmonary effort is normal. No respiratory distress.      Breath sounds: Normal breath sounds. No stridor. No wheezing or rales.   Abdominal:      General: There is no distension.      Palpations: Abdomen is soft. There is no mass.      Tenderness: There is no abdominal tenderness. There is no guarding or rebound.   Musculoskeletal:      Cervical back: Neck supple.      Right lower leg: No edema.      Left lower leg: No edema.      Comments: Healed incision right knee   Neurological:      Mental Status: She is alert and oriented to person, place, and time.      Gait: Gait abnormal.   Psychiatric:         Mood and Affect: Mood normal.         Behavior: Behavior normal.         Thought Content: Thought content normal.         Judgment: Judgment normal.          Lea Reyes, MD  [facility]

## 2023-12-27 NOTE — PATIENT INSTRUCTIONS
Check with your insurance if a GLP 1 agonists for weight loss like Zepbound, Wegovy or Saxenda    Wellness Visit for Adults   AMBULATORY CARE:   A wellness visit  is when you see your healthcare provider to get screened for health problems. Your healthcare provider will also give you advice on how to stay healthy. Write down your questions so you remember to ask them. Ask your healthcare provider how often you should have a wellness visit.  What happens at a wellness visit:  Your healthcare provider will ask about your health, and your family history of health problems. This includes high blood pressure, heart disease, and cancer. He or she will ask if you have symptoms that concern you, if you smoke, and about your mood. You may also be asked about your intake of medicines, supplements, food, and alcohol. Any of the following may be done:  Your weight  will be checked. Your height may also be checked so your body mass index (BMI) can be calculated. Your BMI shows if you are at a healthy weight.    Your blood pressure  and heart rate will be checked. Your temperature may also be checked.    Blood and urine tests  may be done. Blood tests may be done to check your cholesterol levels. Abnormal cholesterol levels increase your risk for heart disease and stroke. You may also need a blood or urine test to check for diabetes if you are at increased risk. Urine tests may be done to look for signs of an infection or kidney disease.    A physical exam  includes checking your heartbeat and lungs with a stethoscope. Your healthcare provider may also check your skin to look for sun damage.    Screening tests  may be recommended. A screening test is done to check for diseases that may not cause symptoms. The screening tests you may need depend on your age, gender, family history, and lifestyle habits. For example, colorectal screening may be recommended if you are 50 years old or older.    Screening tests you need if you are a  woman:   A Pap smear  is used to screen for cervical cancer. Pap smears are usually done every 3 to 5 years depending on your age. You may need them more often if you have had abnormal Pap smear test results in the past. Ask your healthcare provider how often you should have a Pap smear.    A mammogram  is an x-ray of your breasts to screen for breast cancer. Experts recommend mammograms every 2 years starting at age 50 years. You may need a mammogram at age 49 years or younger if you have an increased risk for breast cancer. Talk to your healthcare provider about when you should start having mammograms and how often you need them.    Vaccines you may need:   Get an influenza vaccine  every year. The influenza vaccine protects you from the flu. Several types of viruses cause the flu. The viruses change over time, so new vaccines are made each year.    Get a tetanus-diphtheria (Td) booster vaccine  every 10 years. This vaccine protects you against tetanus and diphtheria. Tetanus is a severe infection that may cause painful muscle spasms and lockjaw. Diphtheria is a severe bacterial infection that causes a thick covering in the back of your mouth and throat.    Get a human papillomavirus (HPV) vaccine  if you are female and aged 19 to 26 or male 19 to 21 and never received it. This vaccine protects you from HPV infection. HPV is the most common infection spread by sexual contact. HPV may also cause vaginal, penile, and anal cancers.    Get a pneumococcal vaccine  if you are aged 65 years or older. The pneumococcal vaccine is an injection given to protect you from pneumococcal disease. Pneumococcal disease is an infection caused by pneumococcal bacteria. The infection may cause pneumonia, meningitis, or an ear infection.    Get a shingles vaccine  if you are 60 or older, even if you have had shingles before. The shingles vaccine is an injection to protect you from the varicella-zoster virus. This is the same virus that  causes chickenpox. Shingles is a painful rash that develops in people who had chickenpox or have been exposed to the virus.    How to eat healthy:  My Plate is a model for planning healthy meals. It shows the types and amounts of foods that should go on your plate. Fruits and vegetables make up about half of your plate, and grains and protein make up the other half. A serving of dairy is included on the side of your plate. The amount of calories and serving sizes you need depends on your age, gender, weight, and height. Examples of healthy foods are listed below:  Eat a variety of vegetables  such as dark green, red, and orange vegetables. You can also include canned vegetables low in sodium (salt) and frozen vegetables without added butter or sauces.    Eat a variety of fresh fruits , canned fruit in 100% juice, frozen fruit, and dried fruit.    Include whole grains.  At least half of the grains you eat should be whole grains. Examples include whole-wheat bread, wheat pasta, brown rice, and whole-grain cereals such as oatmeal.    Eat a variety of protein foods such as seafood (fish and shellfish), lean meat, and poultry without skin (turkey and chicken). Examples of lean meats include pork leg, shoulder, or tenderloin, and beef round, sirloin, tenderloin, and extra lean ground beef. Other protein foods include eggs and egg substitutes, beans, peas, soy products, nuts, and seeds.    Choose low-fat dairy products such as skim or 1% milk or low-fat yogurt, cheese, and cottage cheese.    Limit unhealthy fats  such as butter, hard margarine, and shortening.       Exercise:  Exercise at least 30 minutes per day on most days of the week. Some examples of exercise include walking, biking, dancing, and swimming. You can also fit in more physical activity by taking the stairs instead of the elevator or parking farther away from stores. Include muscle strengthening activities 2 days each week. Regular exercise provides many  health benefits. It helps you manage your weight, and decreases your risk for type 2 diabetes, heart disease, stroke, and high blood pressure. Exercise can also help improve your mood. Ask your healthcare provider about the best exercise plan for you.       General health and safety guidelines:   Do not smoke.  Nicotine and other chemicals in cigarettes and cigars can cause lung damage. Ask your healthcare provider for information if you currently smoke and need help to quit. E-cigarettes or smokeless tobacco still contain nicotine. Talk to your healthcare provider before you use these products.    Limit alcohol.  A drink of alcohol is 12 ounces of beer, 5 ounces of wine, or 1½ ounces of liquor.    Lose weight, if needed.  Being overweight increases your risk of certain health conditions. These include heart disease, high blood pressure, type 2 diabetes, and certain types of cancer.    Protect your skin.  Do not sunbathe or use tanning beds. Use sunscreen with a SPF 15 or higher. Apply sunscreen at least 15 minutes before you go outside. Reapply sunscreen every 2 hours. Wear protective clothing, hats, and sunglasses when you are outside.    Drive safely.  Always wear your seatbelt. Make sure everyone in your car wears a seatbelt. A seatbelt can save your life if you are in an accident. Do not use your cell phone when you are driving. This could distract you and cause an accident. Pull over if you need to make a call or send a text message.    Practice safe sex.  Use latex condoms if are sexually active and have more than one partner. Your healthcare provider may recommend screening tests for sexually transmitted infections (STIs).    Wear helmets, lifejackets, and protective gear.  Always wear a helmet when you ride a bike or motorcycle, go skiing, or play sports that could cause a head injury. Wear protective equipment when you play sports. Wear a lifejacket when you are on a boat or doing water sports.    ©  Copyright [employer] 2023 Information is for End User's use only and may not be sold, redistributed or otherwise used for commercial purposes.  The above information is an  only. It is not intended as medical advice for individual conditions or treatments. Talk to your doctor, nurse or pharmacist before following any medical regimen to see if it is safe and effective for you.

## 2023-12-28 ENCOUNTER — OFFICE VISIT (OUTPATIENT)
Dept: PHYSICAL THERAPY | Age: 63
End: 2023-12-28
Payer: COMMERCIAL

## 2023-12-28 ENCOUNTER — APPOINTMENT (OUTPATIENT)
Dept: PHYSICAL THERAPY | Age: 63
End: 2023-12-28
Payer: COMMERCIAL

## 2023-12-28 DIAGNOSIS — M25.561 CHRONIC PAIN OF RIGHT KNEE: Primary | ICD-10-CM

## 2023-12-28 DIAGNOSIS — G89.29 CHRONIC PAIN OF RIGHT KNEE: Primary | ICD-10-CM

## 2023-12-28 DIAGNOSIS — G89.18 ACUTE POST-OPERATIVE PAIN: ICD-10-CM

## 2023-12-28 DIAGNOSIS — Z00.00 HEALTHCARE MAINTENANCE: ICD-10-CM

## 2023-12-28 PROCEDURE — 97112 NEUROMUSCULAR REEDUCATION: CPT | Performed by: PHYSICAL THERAPIST

## 2023-12-28 PROCEDURE — 97110 THERAPEUTIC EXERCISES: CPT | Performed by: PHYSICAL THERAPIST

## 2023-12-28 PROCEDURE — 97140 MANUAL THERAPY 1/> REGIONS: CPT | Performed by: PHYSICAL THERAPIST

## 2023-12-28 NOTE — PROGRESS NOTES
"Daily Note     Today's date: 2023  Patient name: Amber Jackson  : 1960  MRN: [id number]  Referring provider: Theodore Geiger,*  Dx:   Encounter Diagnosis     ICD-10-CM    1. Chronic pain of right knee  M25.561     G89.29       2. Acute post-operative pain  G89.18       3. Healthcare maintenance  Z00.00           Start Time: 1445  Stop Time: 1530  Total time in clinic (min): 45 minutes    Subjective: Pt reports improvements in symptoms and knee ROM.       Objective: See treatment diary below      Assessment: Tolerated treatment well. POC progressed to include more closed chain strengthening interventions.  Patient demonstrated fatigue post treatment and would benefit from continued PT      Plan: Continue per plan of care.      Precautions: NA    POC expires Unit limit Auth Expiration date PT/OT + Visit Limit?   2023                                       Visit/Unit Tracking  AUTH Status:  Date    BCBS- no auth needed  Used 13  14 15 16 17 18 19 20  21 Foto       Remaining                                  Manuals        PROM R knee ksg NA JF       Patellar mobs ksg NA JF       Reassess           Desensitization R knee           Neuro Re-Ed        Bridges  2x15x5\" 2x15x5\"  2x15x5\"       Clamshell           SLR 3x10 2# 3x10 2# 2# 3x10                   Mini squats  2x15x5\" 2x15x5\"  2x15x5\" deep squat       Wall ball TKE   2x15x5\" 2x15x5\"         LAQ  2x15x5\" 2# 2x15x5\" 2#         Hip 3-way           SLS 30\" x3         Leg press   95# lv 3 3x10                           Banded walking           Ther Ex        Heel slide           Ankle pumps           Gastroc stretch w strap           RB/NS - knee ROM Bike 10' 10' NS 10'       Weight shift           Vigor            Side stepping          Knee flex on stretch 10x10\" 10x10\"  10x10\"       Ther Activity " "12/18 12/26       STS 3x10 3x10 3x10       FSU  6\" 3x10 6\" 3x10 6\" 2x15       LSU  6\" 3x10 6\" 3x10 6\" 2x15                   Gait Training  12/18         steps           SPC           Modalities                                                     "

## 2023-12-29 ENCOUNTER — APPOINTMENT (OUTPATIENT)
Dept: PHYSICAL THERAPY | Age: 63
End: 2023-12-29
Payer: COMMERCIAL

## 2024-01-02 ENCOUNTER — HOSPITAL ENCOUNTER (OUTPATIENT)
Dept: RADIOLOGY | Facility: HOSPITAL | Age: 64
Discharge: HOME | End: 2024-01-02
Attending: ORTHOPAEDIC SURGERY
Payer: COMMERCIAL

## 2024-01-02 ENCOUNTER — OFFICE VISIT (OUTPATIENT)
Dept: OBGYN CLINIC | Facility: HOSPITAL | Age: 64
End: 2024-01-02

## 2024-01-02 ENCOUNTER — OFFICE VISIT (OUTPATIENT)
Dept: PHYSICAL THERAPY | Age: 64
End: 2024-01-02
Payer: COMMERCIAL

## 2024-01-02 VITALS
HEART RATE: 78 BPM | BODY MASS INDEX: 38.42 KG/M2 | DIASTOLIC BLOOD PRESSURE: 81 MMHG | SYSTOLIC BLOOD PRESSURE: 118 MMHG | HEIGHT: 59 IN

## 2024-01-02 DIAGNOSIS — Z47.1 AFTERCARE FOLLOWING RIGHT KNEE JOINT REPLACEMENT SURGERY: Primary | ICD-10-CM

## 2024-01-02 DIAGNOSIS — Z96.651 AFTERCARE FOLLOWING RIGHT KNEE JOINT REPLACEMENT SURGERY: Primary | ICD-10-CM

## 2024-01-02 DIAGNOSIS — Z96.651 AFTERCARE FOLLOWING RIGHT KNEE JOINT REPLACEMENT SURGERY: ICD-10-CM

## 2024-01-02 DIAGNOSIS — Z47.1 AFTERCARE FOLLOWING RIGHT KNEE JOINT REPLACEMENT SURGERY: ICD-10-CM

## 2024-01-02 DIAGNOSIS — M25.561 CHRONIC PAIN OF RIGHT KNEE: Primary | ICD-10-CM

## 2024-01-02 DIAGNOSIS — G89.29 CHRONIC PAIN OF RIGHT KNEE: Primary | ICD-10-CM

## 2024-01-02 PROCEDURE — 99024 POSTOP FOLLOW-UP VISIT: CPT | Performed by: ORTHOPAEDIC SURGERY

## 2024-01-02 PROCEDURE — 97112 NEUROMUSCULAR REEDUCATION: CPT | Performed by: PHYSICAL THERAPIST

## 2024-01-02 PROCEDURE — 97140 MANUAL THERAPY 1/> REGIONS: CPT | Performed by: PHYSICAL THERAPIST

## 2024-01-02 PROCEDURE — 97110 THERAPEUTIC EXERCISES: CPT | Performed by: PHYSICAL THERAPIST

## 2024-01-02 PROCEDURE — 73560 X-RAY EXAM OF KNEE 1 OR 2: CPT

## 2024-01-02 NOTE — PROGRESS NOTES
"Daily Note     Today's date: 2024  Patient name: Amber Jackson  : 1960  MRN: [id number]  Referring provider: Theodore Geiger,*  Dx:   Encounter Diagnosis     ICD-10-CM    1. Chronic pain of right knee  M25.561     G89.29                      Subjective: Patient stated moderate stiffness in her knee prior to treatment session.       Objective: See treatment diary below      Assessment: Patient demonstrated moderate pain with manual knee extension PROM, minimal pain with knee flexion PROM.       Plan: Continue per plan of care.      Precautions: NA    POC expires Unit limit Auth Expiration date PT/OT + Visit Limit?   2023                                       Visit/Unit Tracking  AUTH Status:  Date    BCBS- no auth needed  Used 25 14 15 16 17 18 19 20  21 Foto  22  23  24     Remaining                                  Manuals       PROM R knee ksg NA JF KK      Patellar mobs ksg NA JF KK      Reassess           Desensitization R knee           Neuro Re-Ed  1/2      Bridges  2x15x5\" 2x15x5\"  2x15x5\"       Clamshell           SLR 3x10 2# 3x10 2# 2# 3x10 2# 3x10                  Mini squats  2x15x5\" 2x15x5\"  2x15x5\" deep squat       Wall ball TKE   2x15x5\" 2x15x5\"         LAQ  2x15x5\" 2# 2x15x5\" 2#         Hip 3-way           SLS 30\" x3         Leg press   95# lv 3 3x10 95# lv 3 3x10                          Banded walking           Ther Ex  1/2      Heel slide           Quad sets - towel roll @ ankle     20x5\"      Gastroc stretch w strap           RB/NS - knee ROM Bike 10' 10' NS 10' Bike 10'        Weight shift           Vigor            Side stepping          Knee flex stretch 10x10\" 10x10\"  10x10\" 10x10\" strap      Ther Activity  1/2      STS 3x10 3x10 3x10 3x10      FSU  6\" 3x10 6\" 3x10 6\" 2x15 6\" 2x15      LSU  6\" 3x10 6\" 3x10 6\" 2x15 6\" 2x15 "                  Gait Training  12/18         steps           SPC           Modalities

## 2024-01-02 NOTE — PROGRESS NOTES
Assessment:  1. Aftercare following right knee joint replacement surgery            Plan:  3 months s/p right TKA with robot, 10/9/2023.  The patient is progressing well  Continue physical therapy with focus on strength  Follow up in 3 months    To do next visit:  Return in about 3 months (around 2024) for re-check with x-rays.    The above stated was discussed in layman's terms and the patient expressed understanding.  All questions were answered to the patient's satisfaction.       Scribe Attestation      I,:  Julian Galarza am acting as a scribe while in the presence of the attending physician.:       I,:  Theodore Geiger MD personally performed the services described in this documentation    as scribed in my presence.:               Subjective:   Amber Jackson is a 63 y.o. female who presents 3 months s/p right TKA with robot, 10/9/2023.  Today she complains of right knee weakness with anterior thigh and posterior calf tenderness.  She continues with physical therapy with benefit.  She does use IBU and Tylenol as needed for pain.        Review of systems negative unless otherwise specified in HPI    Past Medical History:   Diagnosis Date    Arthritis 2013    Chest pain 2014    Disease of thyroid gland     Elevated blood sugar 2014    Heart murmur     Functional, child    Hypercholesteremia     Hyperlipidemia     Obesity 2010    Osteoarthritis     Urinary tract infection     Recurring       Past Surgical History:   Procedure Laterality Date    BIOPSY CORE NEEDLE      Thyroid Using Percutaneous Core Needle     SECTION      x 2    FOOT SURGERY Bilateral     As a teenager - Bunionectomy - Dr. Vibha Nation - Last Assessed: 2016    NH ARTHRP KNE CONDYLE&PLATU MEDIAL&LAT COMPARTMENTS Right 10/9/2023    Procedure: ARTHROPLASTY RIGHT KNEE TOTAL W ROBOT;  Surgeon: Theodore Geiger MD;  Location: [facility];  Service: Orthopedics    THYROID LOBECTOMY Right 2015     Dr. Mae Salcido: Right Lobe - Last Assessed: July 5, 2016 Dr. Abdullahi Nation       Family History   Problem Relation Age of Onset    Graves' disease Mother     Hyperlipidemia Mother     Hypertension Mother     Osteoporosis Mother     Dementia Mother     Thyroid disease Mother     Arthritis Mother     Autoimmune disease Mother         Graves disease    Aortic stenosis Father     Hyperlipidemia Father     Hypertension Father     Coronary artery disease Father         Coronary Artery Bypass Graft (CABG)    Heart disease Father         Pacemaker Placement    Valvular heart disease Father         S/P Transcatheter Aortic Valve Replacement (TAVR)    Arthritis Father     Hearing loss Father     No Known Problems Son     No Known Problems Son     No Known Problems Daughter     No Known Problems Daughter     No Known Problems Daughter     No Known Problems Daughter     Heart attack Other         Myocardial Infarction    Stroke Other     Cancer Maternal Aunt     Cancer Paternal Aunt        Social History     Occupational History    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   Tobacco Use    Smoking status: Never     Passive exposure: Never    Smokeless tobacco: Never   Vaping Use    Vaping status: Never Used   Substance and Sexual Activity    Alcohol use: Yes     Comment: rarely    Drug use: No    Sexual activity: Yes     Partners: Male     Birth control/protection: Post-menopausal         Current Outpatient Medications:     ascorbic acid (VITAMIN C) 500 mg tablet, Take 1 tablet (500 mg total) by mouth 2 (two) times a day for 60 doses, Disp: 60 tablet, Rfl: 0    Cholecalciferol 125 MCG (5000 UT) capsule, Take 5,000 Units by mouth daily, Disp: , Rfl:     docusate sodium (COLACE) 100 mg capsule, Take 1 capsule (100 mg total) by mouth 2 (two) times a day as needed for constipation, Disp: , Rfl:     hydroxychloroquine (PLAQUENIL) 200 mg tablet, Take 2 tablets (400 mg total) by mouth in the morning, Disp: 180 tablet,  "Rfl: 3    Multiple Vitamins-Minerals (MULTIVITAL-M) TABS, Take by mouth in the morning, Disp: , Rfl:     OMEGA-3 FATTY ACIDS PO, 500 mg in the morning, Disp: , Rfl:     Probiotic Product (PROBIOTIC-10) CAPS, Take by mouth in the morning, Disp: , Rfl:     Turmeric 500 MG TABS, Take 1 capsule by mouth in the morning, Disp: , Rfl:     Allergies   Allergen Reactions    Methocarbamol Dizziness and Confusion     Hypotension      Oxycodone Other (See Comments), Dizziness and Confusion     hypotension            Vitals:    01/02/24 1345   BP: 118/81   Pulse: 78       Objective:  Physical exam  General: Awake, Alert, Oriented  Eyes: Pupils equal, round and reactive to light  Heart: regular rate and rhythm  Lungs: No audible wheezing  Abdomen: soft                    Ortho Exam  Right knee:  Well healed anterior incision  No erythema and no ecchymosis  Stable to varus and valgus stress  Extensor mechanism intact  Extension 0  Flexion 120  Calf compartments soft and supple  Sensation intact  Toes are warm sensate and mobile      Diagnostics, reviewed and taken today if performed as documented:    The attending physician has personally reviewed the pertinent films in PACS and interpretation is as follows:  Right knee x-ray:  Well aligned prosthesis with no acute changes.      Procedures, if performed today:    Procedures    None performed      Portions of the record may have been created with voice recognition software.  Occasional wrong word or \"sound a like\" substitutions may have occurred due to the inherent limitations of voice recognition software.  Read the chart carefully and recognize, using context, where substitutions have occurred.    "

## 2024-01-05 ENCOUNTER — OFFICE VISIT (OUTPATIENT)
Dept: PHYSICAL THERAPY | Age: 64
End: 2024-01-05
Payer: COMMERCIAL

## 2024-01-05 DIAGNOSIS — G89.18 ACUTE POST-OPERATIVE PAIN: ICD-10-CM

## 2024-01-05 DIAGNOSIS — G89.29 CHRONIC PAIN OF RIGHT KNEE: Primary | ICD-10-CM

## 2024-01-05 DIAGNOSIS — M25.561 CHRONIC PAIN OF RIGHT KNEE: Primary | ICD-10-CM

## 2024-01-05 PROCEDURE — 97112 NEUROMUSCULAR REEDUCATION: CPT | Performed by: PHYSICAL THERAPIST

## 2024-01-05 PROCEDURE — 97110 THERAPEUTIC EXERCISES: CPT | Performed by: PHYSICAL THERAPIST

## 2024-01-05 PROCEDURE — 97140 MANUAL THERAPY 1/> REGIONS: CPT | Performed by: PHYSICAL THERAPIST

## 2024-01-05 NOTE — PROGRESS NOTES
"Daily Note     Today's date: 2024  Patient name: Amber Jackson  : 1960  MRN: [id number]  Referring provider: Theodore Geiger,*  Dx:   Encounter Diagnosis     ICD-10-CM    1. Chronic pain of right knee  M25.561     G89.29       2. Acute post-operative pain  G89.18                      Subjective: Patient stated minimal pain in her knee prior to treatment session.       Objective: See treatment diary below      Assessment: Patient continues to demonstrate more significant pain and restriction with knee extension than knee flexion PROM; no c/o at completion of treatment session.       Plan: Continue per plan of care.      Precautions: NA    POC expires Unit limit Auth Expiration date PT/OT + Visit Limit?   24                                                Visit/Unit Tracking  AUTH Status:  Date    BCBS- no auth needed  Used 25 26 15 16 17 18 19 20  21 Foto  22  23  24     Remaining                                  Manuals      PROM R knee ksg NA JF KK KK     Patellar mobs ksg NA JF KK KK     Reassess           Desensitization R knee           Neuro Re-Ed      Bridges  2x15x5\" 2x15x5\"  2x15x5\"       Clamshell           SLR 3x10 2# 3x10 2# 2# 3x10 2# 3x10 2# 3x10                 Mini squats  2x15x5\" 2x15x5\"  2x15x5\" deep squat       Wall ball TKE   2x15x5\" 2x15x5\"         LAQ  2x15x5\" 2# 2x15x5\" 2#         Hip 3-way           SLS 30\" x3         Leg press   95# lv 3 3x10 95# lv 3 3x10 95# lv 3 3x10                         Banded walking           Ther Ex      Heel slide           Quad sets - towel roll @ ankle     20x5\" 20x5\"     Gastroc stretch w strap           RB/NS - knee ROM Bike 10' 10' NS 10' Bike 10'   Bike 10'     Weight shift           Vigor            Side stepping          Knee flex stretch 10x10\" 10x10\"  10x10\" 10x10\" strap On step 10x10\"     Ther " "Activity 12/18 12/26 1/2 1/5     STS 3x10 3x10 3x10 3x10 3x10     FSU  6\" 3x10 6\" 3x10 6\" 2x15 6\" 2x15 6\" 2x15     LSU  6\" 3x10 6\" 3x10 6\" 2x15 6\" 2x15 6\" 2x15                 Gait Training  12/18         steps           SPC           Modalities                                                     "

## 2024-01-09 ENCOUNTER — OFFICE VISIT (OUTPATIENT)
Dept: PHYSICAL THERAPY | Age: 64
End: 2024-01-09
Payer: COMMERCIAL

## 2024-01-09 DIAGNOSIS — Z00.00 HEALTHCARE MAINTENANCE: ICD-10-CM

## 2024-01-09 DIAGNOSIS — M25.561 CHRONIC PAIN OF RIGHT KNEE: Primary | ICD-10-CM

## 2024-01-09 DIAGNOSIS — G89.18 ACUTE POST-OPERATIVE PAIN: ICD-10-CM

## 2024-01-09 DIAGNOSIS — G89.29 CHRONIC PAIN OF RIGHT KNEE: Primary | ICD-10-CM

## 2024-01-09 PROCEDURE — 97110 THERAPEUTIC EXERCISES: CPT | Performed by: PHYSICAL THERAPIST

## 2024-01-09 PROCEDURE — 97140 MANUAL THERAPY 1/> REGIONS: CPT | Performed by: PHYSICAL THERAPIST

## 2024-01-09 PROCEDURE — 97112 NEUROMUSCULAR REEDUCATION: CPT | Performed by: PHYSICAL THERAPIST

## 2024-01-09 NOTE — PROGRESS NOTES
Discharge Summary    Today's date: 2024  Patient name: Amber Jackson  : 1960  MRN: [id number]  Referring provider: Theodore Geiger,*  Dx:   Encounter Diagnosis     ICD-10-CM    1. Chronic pain of right knee  M25.561     G89.29       2. Acute post-operative pain  G89.18       3. Healthcare maintenance  Z00.00           Start Time: 0930  Stop Time: 1015  Total time in clinic (min): 45 minutes    Subjective: Pt reports feeling better than pre-operative state and feels comfortable with HEP at this time.      Objective: See treatment diary below    Goals - ALL MET  Short Term Goals: to be achieved by 4 weeks  1) Patient to be independent with basic HEP.  2) Decrease pain to 4/10 at its worst.  3) Increase R knee flex ROM to 115 deg to increase function post-op.   4) Increase R knee ext ROM to -5 deg to improve gait post-op.  5) Increase LE strength by 1/2 MMT grade in all deficient planes post-op.    Long Term Goals: to be achieved by discharge  1) Patient to be independent with comprehensive HEP.  2) R knee ROM WNL all planes to improve a/iadls post-op.  3) Increase LE strength to 5/5 MMT grade in all planes to improve a/iadls post-op.  4) Patient to report no sleep interruption secondary to pain post-op.  5) Increase ambulatory tolerance to 60 min post-op without AD.        Assessment: Tolerated treatment well. Patient has met all goals and will be discharged with HEP. Patient encouraged to schedule re-assessment with any future impairments. Patient demonstrated fatigue post treatment and would benefit from continued PT      Plan: D/C PT with HEP     Precautions: NA    POC expires Unit limit Auth Expiration date PT/OT + Visit Limit?   24                                                Visit/Unit Tracking  AUTH Status:  Date    BCBS- no auth needed  Used 25 26 15 16 17 18 19 20  21 Foto  22  23  25     Remaining                  "                 Manuals 12/18 12/21 12/28 1/2 1/5     PROM R knee ksg NA JF KK KK     Patellar mobs ksg NA JF KK KK     Reassess           Desensitization R knee           Neuro Re-Ed 12/18 12/28 1/2 1/5     Bridges  2x15x5\" 2x15x5\"  2x15x5\"       Clamshell           SLR 3x10 2# 3x10 2# 2# 3x10 2# 3x10 2# 3x10                 Mini squats  2x15x5\" 2x15x5\"  2x15x5\" deep squat       Wall ball TKE   2x15x5\" 2x15x5\"         LAQ  2x15x5\" 2# 2x15x5\" 2#         Hip 3-way           SLS 30\" x3         Leg press   95# lv 3 3x10 95# lv 3 3x10 95# lv 3 3x10                         Banded walking           Ther Ex 12/18 12/26 1/2 1/5     Heel slide           Quad sets - towel roll @ ankle     20x5\" 20x5\"     Gastroc stretch w strap           RB/NS - knee ROM Bike 10' 10' NS 10' Bike 10'   Bike 10'     Weight shift           Vigor            Side stepping          Knee flex stretch 10x10\" 10x10\"  10x10\" 10x10\" strap On step 10x10\"     Ther Activity 12/18 12/26 1/2 1/5     STS 3x10 3x10 3x10 3x10 3x10     FSU  6\" 3x10 6\" 3x10 6\" 2x15 6\" 2x15 6\" 2x15     LSU  6\" 3x10 6\" 3x10 6\" 2x15 6\" 2x15 6\" 2x15                 Gait Training  12/18         steps           SPC           Modalities                                                       "

## 2024-01-12 ENCOUNTER — APPOINTMENT (OUTPATIENT)
Dept: PHYSICAL THERAPY | Age: 64
End: 2024-01-12
Payer: COMMERCIAL

## 2024-01-16 ENCOUNTER — APPOINTMENT (OUTPATIENT)
Dept: PHYSICAL THERAPY | Age: 64
End: 2024-01-16
Payer: COMMERCIAL

## 2024-01-19 ENCOUNTER — APPOINTMENT (OUTPATIENT)
Dept: PHYSICAL THERAPY | Age: 64
End: 2024-01-19
Payer: COMMERCIAL

## 2024-01-23 ENCOUNTER — APPOINTMENT (OUTPATIENT)
Dept: PHYSICAL THERAPY | Age: 64
End: 2024-01-23
Payer: COMMERCIAL

## 2024-01-26 ENCOUNTER — APPOINTMENT (OUTPATIENT)
Dept: PHYSICAL THERAPY | Age: 64
End: 2024-01-26
Payer: COMMERCIAL

## 2024-01-30 ENCOUNTER — APPOINTMENT (OUTPATIENT)
Dept: PHYSICAL THERAPY | Age: 64
End: 2024-01-30
Payer: COMMERCIAL

## 2024-02-26 ENCOUNTER — OFFICE VISIT (OUTPATIENT)
Dept: RHEUMATOLOGY | Facility: CLINIC | Age: 64
End: 2024-02-26

## 2024-02-26 VITALS
BODY MASS INDEX: 39.72 KG/M2 | DIASTOLIC BLOOD PRESSURE: 80 MMHG | HEIGHT: 59 IN | WEIGHT: 197 LBS | SYSTOLIC BLOOD PRESSURE: 118 MMHG

## 2024-02-26 DIAGNOSIS — M15.0 PRIMARY GENERALIZED (OSTEO)ARTHRITIS: Primary | ICD-10-CM

## 2024-02-26 DIAGNOSIS — Z96.651 HISTORY OF TOTAL KNEE ARTHROPLASTY, RIGHT: ICD-10-CM

## 2024-02-26 NOTE — PATIENT INSTRUCTIONS
I don't have suspicion for an underlying autoimmune disease, your symptoms are most consistent with osteoarthritis

## 2024-02-26 NOTE — PROGRESS NOTES
Rheumatology Follow-up Visit  2/26/2024     CC: routine follow up     Permanent History: First seen by Dr. Negron in 2021 after having been previously dxd with UCTD on hydroxychloroquine as well as OA. Was restarted on hydroxychloroquine.    Assessment: 63 y.o. female here for/with the above.    I do not see evidence for a connective tissue disease based on her current symptoms or her symptoms in the past. Indeed, her symptoms are not consistent with an inflammatory arthritis either. I do not see any reason for her to be on hydroxychloroquine, or any reason that she should have been on it to begin with, based on the information available to me. Her symptoms are all very classic for OA. She agrees and was skeptical of the dx as well.    There is no evidence by labs, history, or exam for any active rheumatologic disease.    Encounter Diagnosis     ICD-10-CM    1. Primary generalized (osteo)arthritis  M15.0       2. History of total knee arthroplasty, right  Z96.651           Plan:  -STOP hydroxychloroquine  -RTC 3 mos; if symptoms evolve after stopping hydroxychloroquine then will reconsider, otherwise she will not need to follow up with me further    Dain Lockhart DO, PHILIP Lockhart DO, [facility]      Interval History: Looking back through the notes from when she was previously seen by a nonphysician provider, her main issue prior to starting hydroxychloroquine was joint pain with elevated CRP and negative autoimmune workup including DELIA and ENAs. Based on this she was dxd with UCTD.    Has been having knee pain for about 10 years. Had started working part time at [employer]; noticed worse on the R and progressively on the L as well from working on the concrete floors. Got steroid shots, gel shots, no help. Pain worse with use, no significant pain with rest.    Occasional pain in the ankles, wrists, hands. Worse with activity ex crocheting for a long time. No stiffness in the hands. Feet also  "hurt a little if she stands for too long.    Had R TKA recently. Pain is mostly resolved.    Does have some AM stiffness, a minute or two. No joint swelling other than a little in the L knee.    Occasional trouble swallowing. Seems random. Sometimes with liquids but never chokes or spits up. Has a bolus sensation, even when not eating.    Has been getting some indigestion. A little bit of occasional constipation.    Denies:  Fever  Rash  Oral/nasal/genital ulcers  Muscle weakness other than RLE post-surgery  Uveitis  Dactylitis  Odynophagia  CP  BOX  SOB at rest  Pleurisy  Hematochezia  Gross hematuria  Foamy urine  Raynaud's  Joint issues other than noted above    Past medical history, allergies, and family history reviewed. Active medications and social history as below.     No outpatient medications have been marked as taking for the 2/26/24 encounter (Office Visit) with Dain Lockhart DO.       Social History     Tobacco Use    Smoking status: Never     Passive exposure: Never    Smokeless tobacco: Never   Vaping Use    Vaping status: Never Used   Substance Use Topics    Alcohol use: Yes     Comment: rarely    Drug use: No       Review of Systems:  Pertinent findings documented in HPI  ___________________________________    Physical Exam:    /80   Ht 4' 11\" (1.499 m)   Wt 89.4 kg (197 lb)   LMP 10/27/2017 (Exact Date)   BMI 39.79 kg/m²     General: Well appearing, in no distress.   Eyes: Sclera non-icteric. EOMI  HENT: No oral ulcers. MMM.   Extremities: Warm, well perfused, no edema.   Neuro: Alert and oriented. No gross focal neurological deficits.   Skin: No rashes.  MSK exam: No tenderness to palpation or synovitis any joint  Muscle strength   Right   Left    Shoulder abduction 5/5  Shoulder abduction 5/5   Shoulder adduction 5/5  Shoulder adduction 5/5   Shoulder internal rotation 5/5  Shoulder internal rotation 5/5   Shoulder external rotation 5/5  Shoulder external rotation 5/5   Elbow " flexion 5/5  Elbow flexion 5/5   Elbow extension 5/5  Elbow extension 5/5   Hip flexion 5/5  Hip flexion 5/5   Hip extension 5/5  Hip extension 5/5   Hip abduction 5/5  Hip abduction 5/5   Hip adduction 5/5  Hip adduction 5/5   Knee flexion 5/5  Knee flexion 5/5   Knee extension 5/5  Knee extension 5/5   Dorsiflexion 5/5  Dorsiflexion 5/5   Plantarflexion 5/5  Plantarflexion 5/5          Neck   Flexion 5/5   Extension 5/5   R sidebending 5/5   L sidebending 5/5     ____________________________    Lab Results: relevant labs reviewed   Latest Reference Range & Units 01/27/17 10:26 02/28/18 11:09 05/25/21 14:22 06/16/23 16:30   ANTI-NUCLEAR ANTIBODY (DELIA) Negative  Negative Negative Negative    Anti-Centromere B Antibodies 0.0 - 0.9 AI   <0.2    CYCLIC CITRULLINATED PEPTIDE ANTIBODY See comment  7  0.8    ANTI DNA DOUBLE STRANDED 0 - 9 IU/mL    <1   HLA B27  Negative      RHEUMATOID FACTOR Negative  Negative Negative Negative    SCLERODERMA (SCL-70) AB 0.0 - 0.9 AI   <0.2    SSA (RO) ANTIBODY 0.0 - 0.9 AI <0.2 <0.2 <0.2    SSB (LA) ANTIBODY 0.0 - 0.9 AI <0.2 <0.2 <0.2    C3 Complement 90.0 - 180.0 mg/dL   132.0 131.0   C4, COMPLEMENT 10.0 - 40.0 mg/dL   34.0 36.0       Imaging Results: relevant images reviewed

## 2024-03-01 ENCOUNTER — ANNUAL EXAM (OUTPATIENT)
Dept: OBGYN CLINIC | Facility: CLINIC | Age: 64
End: 2024-03-01
Payer: COMMERCIAL

## 2024-03-01 VITALS
WEIGHT: 195 LBS | SYSTOLIC BLOOD PRESSURE: 138 MMHG | HEIGHT: 59 IN | DIASTOLIC BLOOD PRESSURE: 86 MMHG | BODY MASS INDEX: 39.31 KG/M2

## 2024-03-01 DIAGNOSIS — Z12.31 ENCOUNTER FOR SCREENING MAMMOGRAM FOR MALIGNANT NEOPLASM OF BREAST: ICD-10-CM

## 2024-03-01 DIAGNOSIS — Z01.419 PAP SMEAR, AS PART OF ROUTINE GYNECOLOGICAL EXAMINATION: ICD-10-CM

## 2024-03-01 DIAGNOSIS — Z01.419 ENCOUNTER FOR GYNECOLOGICAL EXAMINATION WITHOUT ABNORMAL FINDING: Primary | ICD-10-CM

## 2024-03-01 DIAGNOSIS — N95.2 ATROPHIC VAGINITIS: ICD-10-CM

## 2024-03-01 PROCEDURE — G0145 SCR C/V CYTO,THINLAYER,RESCR: HCPCS | Performed by: OBSTETRICS & GYNECOLOGY

## 2024-03-01 PROCEDURE — G0476 HPV COMBO ASSAY CA SCREEN: HCPCS | Performed by: OBSTETRICS & GYNECOLOGY

## 2024-03-01 PROCEDURE — S0612 ANNUAL GYNECOLOGICAL EXAMINA: HCPCS | Performed by: OBSTETRICS & GYNECOLOGY

## 2024-03-01 NOTE — PROGRESS NOTES
Assessment/Plan:    No problem-specific Assessment & Plan notes found for this encounter.       Diagnoses and all orders for this visit:    Encounter for gynecological examination without abnormal finding    Pap smear, as part of routine gynecological examination  -     Liquid-based pap, screening    Encounter for screening mammogram for malignant neoplasm of breast  -     Mammo screening bilateral w 3d & cad; Future    Atrophic vaginitis          Normal gynecological physical examination.  Self-breast examination stressed.  Mammogram ordered.  Discussed regular exercise, healthy diet, importance of vitamin D and calcium supplements.  Discussed importance of sun block use during periods of prolonged sun exposure.  Patient will be seen in 1 year for routine gynecologic and medical examination.  Patient will call office for any problems, concerns, or issues which may arise during the interim.     Subjective:          HPI    Patient ID: Amber Jackson is a 63 y.o. female who presents today for her annual gynecologic and medical examination    Menstrual status: It is postmenopausal and denies any vaginal bleeding at this time    Vasomotor symptoms: Denies any significant vasomotor symptoms    Patient reports normal appetite    Patient reports normal bowel and bladder habits    Patient denies any significant pelvic or abdominal pain    Patient denies any headaches, chest pain, shortness of breath fever shakes or chills    Patient denies any COVID 19 symptoms including cough or loss of taste or smell    COVID vaccine status: Aware of COVID vaccination protocols    Medical problems: No significant medical problems    Colonoscopy status: Due for colonoscopy in light of past history of a single polyp.    Mammogram status: Patient does regular self breast examinations and is up-to-date with screening mammography as well.  Appropriate arrangements for her annual screening mammogram were placed in the EMR system at today's  "visit.    The following portions of the patient's history were reviewed and updated as appropriate: allergies, current medications, past family history, past medical history, past social history, past surgical history and problem list.    Review of Systems   Constitutional: Negative.  Negative for appetite change, diaphoresis, fatigue, fever and unexpected weight change.   HENT: Negative.     Eyes: Negative.    Respiratory: Negative.     Cardiovascular: Negative.    Gastrointestinal: Negative.  Negative for abdominal pain, blood in stool, constipation, diarrhea, nausea and vomiting.   Endocrine: Negative.  Negative for cold intolerance and heat intolerance.   Genitourinary: Negative.  Negative for dysuria, frequency, hematuria, urgency, vaginal bleeding, vaginal discharge and vaginal pain.   Musculoskeletal: Negative.    Skin: Negative.    Allergic/Immunologic: Negative.    Neurological: Negative.    Hematological: Negative.  Negative for adenopathy.   Psychiatric/Behavioral: Negative.           Objective:      Ht 4' 11\" (1.499 m)   Wt 88.5 kg (195 lb)   LMP 10/27/2017 (Exact Date)   BMI 39.39 kg/m²          Physical Exam  Constitutional:       General: She is not in acute distress.     Appearance: Normal appearance. She is well-developed. She is not diaphoretic.   HENT:      Head: Normocephalic and atraumatic.   Eyes:      Pupils: Pupils are equal, round, and reactive to light.   Cardiovascular:      Rate and Rhythm: Normal rate and regular rhythm.      Heart sounds: Normal heart sounds. No murmur heard.     No friction rub. No gallop.   Pulmonary:      Effort: Pulmonary effort is normal.      Breath sounds: Normal breath sounds.   Chest:   Breasts:     Breasts are symmetrical.      Right: No inverted nipple, mass, nipple discharge, skin change or tenderness.      Left: No inverted nipple, mass, nipple discharge, skin change or tenderness.   Abdominal:      General: Bowel sounds are normal.      Palpations: " Abdomen is soft.   Genitourinary:     General: Normal vulva.      Exam position: Supine.      Labia:         Right: No rash or lesion.         Left: No rash or lesion.       Urethra: No urethral swelling or urethral lesion.      Vagina: Normal. No vaginal discharge, erythema, tenderness or bleeding.      Cervix: No discharge or friability.      Uterus: Not enlarged and not tender.       Adnexa:         Right: No mass, tenderness or fullness.          Left: No mass, tenderness or fullness.        Rectum: Normal. Guaiac result negative.      Comments: Pelvic exam revealed mild atrophic vaginitis  Good pelvic support confirmed  Musculoskeletal:         General: Normal range of motion.      Cervical back: Normal range of motion and neck supple.   Lymphadenopathy:      Cervical: No cervical adenopathy.      Upper Body:      Right upper body: No supraclavicular adenopathy.      Left upper body: No supraclavicular adenopathy.   Skin:     General: Skin is warm and dry.      Findings: No rash.   Neurological:      General: No focal deficit present.      Mental Status: She is alert and oriented to person, place, and time.   Psychiatric:         Mood and Affect: Mood normal.         Speech: Speech normal.         Behavior: Behavior normal.         Thought Content: Thought content normal.         Judgment: Judgment normal.

## 2024-03-04 LAB
HPV HR 12 DNA CVX QL NAA+PROBE: NEGATIVE
HPV16 DNA CVX QL NAA+PROBE: NEGATIVE
HPV18 DNA CVX QL NAA+PROBE: NEGATIVE

## 2024-03-08 LAB
LAB AP GYN PRIMARY INTERPRETATION: NORMAL
Lab: NORMAL

## 2024-03-14 DIAGNOSIS — Z96.651 AFTERCARE FOLLOWING RIGHT KNEE JOINT REPLACEMENT SURGERY: Primary | ICD-10-CM

## 2024-03-14 DIAGNOSIS — Z47.1 AFTERCARE FOLLOWING RIGHT KNEE JOINT REPLACEMENT SURGERY: Primary | ICD-10-CM

## 2024-04-04 ENCOUNTER — HOSPITAL ENCOUNTER (OUTPATIENT)
Dept: RADIOLOGY | Facility: HOSPITAL | Age: 64
Discharge: HOME | End: 2024-04-04
Attending: ORTHOPAEDIC SURGERY
Payer: COMMERCIAL

## 2024-04-04 ENCOUNTER — OFFICE VISIT (OUTPATIENT)
Dept: OBGYN CLINIC | Facility: HOSPITAL | Age: 64
End: 2024-04-04
Payer: COMMERCIAL

## 2024-04-04 VITALS
BODY MASS INDEX: 39.31 KG/M2 | SYSTOLIC BLOOD PRESSURE: 121 MMHG | HEART RATE: 82 BPM | DIASTOLIC BLOOD PRESSURE: 85 MMHG | HEIGHT: 59 IN | WEIGHT: 195 LBS

## 2024-04-04 DIAGNOSIS — Z96.651 AFTERCARE FOLLOWING RIGHT KNEE JOINT REPLACEMENT SURGERY: ICD-10-CM

## 2024-04-04 DIAGNOSIS — Z96.651 HISTORY OF TOTAL KNEE ARTHROPLASTY, RIGHT: ICD-10-CM

## 2024-04-04 DIAGNOSIS — M17.0 BILATERAL PRIMARY OSTEOARTHRITIS OF KNEE: ICD-10-CM

## 2024-04-04 DIAGNOSIS — M25.562 LEFT KNEE PAIN, UNSPECIFIED CHRONICITY: ICD-10-CM

## 2024-04-04 DIAGNOSIS — M25.562 CHRONIC PAIN OF BOTH KNEES: ICD-10-CM

## 2024-04-04 DIAGNOSIS — Z01.812 PRE-OPERATIVE LABORATORY EXAMINATION: ICD-10-CM

## 2024-04-04 DIAGNOSIS — Z01.810 PRE-OPERATIVE CARDIOVASCULAR EXAMINATION: ICD-10-CM

## 2024-04-04 DIAGNOSIS — M17.12 PRIMARY LOCALIZED OSTEOARTHRITIS OF LEFT KNEE: Primary | ICD-10-CM

## 2024-04-04 DIAGNOSIS — Z47.1 AFTERCARE FOLLOWING RIGHT KNEE JOINT REPLACEMENT SURGERY: ICD-10-CM

## 2024-04-04 DIAGNOSIS — G89.29 CHRONIC PAIN OF BOTH KNEES: ICD-10-CM

## 2024-04-04 DIAGNOSIS — M25.561 CHRONIC PAIN OF BOTH KNEES: ICD-10-CM

## 2024-04-04 PROBLEM — M17.11 PRIMARY OSTEOARTHRITIS OF RIGHT KNEE: Status: RESOLVED | Noted: 2023-09-07 | Resolved: 2024-04-04

## 2024-04-04 PROCEDURE — 99214 OFFICE O/P EST MOD 30 MIN: CPT | Performed by: ORTHOPAEDIC SURGERY

## 2024-04-04 PROCEDURE — 73560 X-RAY EXAM OF KNEE 1 OR 2: CPT

## 2024-04-04 PROCEDURE — 73562 X-RAY EXAM OF KNEE 3: CPT

## 2024-04-04 RX ORDER — ASCORBIC ACID 500 MG
500 TABLET ORAL 2 TIMES DAILY
Qty: 60 TABLET | Refills: 0 | Status: SHIPPED | OUTPATIENT
Start: 2024-04-04 | End: 2024-05-04

## 2024-04-04 RX ORDER — ASCORBIC ACID 500 MG
500 TABLET ORAL 2 TIMES DAILY
Qty: 60 TABLET | Refills: 0 | Status: SHIPPED | OUTPATIENT
Start: 2024-04-04

## 2024-04-04 RX ORDER — GABAPENTIN 300 MG/1
300 CAPSULE ORAL ONCE
OUTPATIENT
Start: 2024-04-04 | End: 2024-04-04

## 2024-04-04 RX ORDER — CHLORHEXIDINE GLUCONATE 4 G/100ML
SOLUTION TOPICAL DAILY PRN
OUTPATIENT
Start: 2024-04-04

## 2024-04-04 RX ORDER — FOLIC ACID 1 MG/1
1 TABLET ORAL DAILY
Qty: 30 TABLET | Refills: 0 | Status: SHIPPED | OUTPATIENT
Start: 2024-04-04

## 2024-04-04 RX ORDER — CEFAZOLIN SODIUM 2 G/50ML
2000 SOLUTION INTRAVENOUS ONCE
OUTPATIENT
Start: 2024-04-04 | End: 2024-04-04

## 2024-04-04 RX ORDER — CHLORHEXIDINE GLUCONATE ORAL RINSE 1.2 MG/ML
15 SOLUTION DENTAL ONCE
OUTPATIENT
Start: 2024-04-04 | End: 2024-04-04

## 2024-04-04 RX ORDER — ONDANSETRON 2 MG/ML
4 INJECTION INTRAMUSCULAR; INTRAVENOUS ONCE
OUTPATIENT
Start: 2024-04-04 | End: 2024-04-04

## 2024-04-04 RX ORDER — ACETAMINOPHEN 325 MG/1
975 TABLET ORAL ONCE
OUTPATIENT
Start: 2024-04-04 | End: 2024-04-04

## 2024-04-04 RX ORDER — FERROUS SULFATE 324(65)MG
324 TABLET, DELAYED RELEASE (ENTERIC COATED) ORAL
Qty: 60 TABLET | Refills: 0 | Status: SHIPPED | OUTPATIENT
Start: 2024-04-04

## 2024-04-04 RX ORDER — TRANEXAMIC ACID 10 MG/ML
1000 INJECTION, SOLUTION INTRAVENOUS ONCE
OUTPATIENT
Start: 2024-04-04 | End: 2024-04-04

## 2024-04-04 NOTE — PROGRESS NOTES
Assessment:   Diagnosis ICD-10-CM Associated Orders   1. Primary localized osteoarthritis of left knee  M17.12 XR knee 3 vw left non injury      2. History of total knee arthroplasty, right  Z96.651           Plan:  Doing well s/p R TKA and recovering well. She is interested on discussing treatment for her left knee.   Discussed conservative treatment options to include cortisone injections, oral NSAIDs, Tylenol, activity modifications, staying active with low impact exercises, and weight loss.  The patient has elected to proceed with LEFT TKA. Risks and benefits of surgery to include but not limited to bleeding, infection, damage to surrounding structures, hardware failure, instability, fracture, dislocation, need for further surgery, continued pain, stiffness, blood clots, stroke, and heart attack was discussed with the patient. Informed consent was signed today in the office. The patient will meet with our surgical schedulers and our preoperative joint replacement pathway will be initiated. All questions were answered. BMI is appropriate at 39.39.       To do next visit:  Post-op visit    The above stated was discussed in layman's terms and the patient expressed understanding.  All questions were answered to the patient's satisfaction.       Subjective:   Amber Jackson is a 63 y.o. female who presents for follow up. She is 6 months s/p Right TKA. She states that the right knee is doing very well, happy so far with the recovery and return of function. She does report that her Left knee has been giving her much trouble and pain and has been giving out in her. Pain in the left knee is up to 9/10 at times. Localized along the joint line medial>lateral and notes crepitance with motion. She is interested on discussing left knee replacement today.       Review of systems negative unless otherwise specified in HPI    Past Medical History:   Diagnosis Date    Arthritis 2013    Chest pain 07/01/2014    Disease of  thyroid gland     Elevated blood sugar 2014    Heart murmur     Functional, child    Hypercholesteremia     Hyperlipidemia     Obesity 2010    Osteoarthritis     Urinary tract infection     Recurring       Past Surgical History:   Procedure Laterality Date    BIOPSY CORE NEEDLE      Thyroid Using Percutaneous Core Needle     SECTION      x 2    FOOT SURGERY Bilateral     As a teenager - Bunionectomy - Dr. Vibha Nation - Last Assessed: 2016    AK ARTHRP KNE CONDYLE&PLATU MEDIAL&LAT COMPARTMENTS Right 10/9/2023    Procedure: ARTHROPLASTY RIGHT KNEE TOTAL W ROBOT;  Surgeon: Theodore Geiger MD;  Location: [facility];  Service: Orthopedics    THYROID LOBECTOMY Right 2015    Dr. Mae Salcido: Right Lobe - Last Assessed: 2016 Dr. Abdullahi Nation       Family History   Problem Relation Age of Onset    Graves' disease Mother     Hyperlipidemia Mother     Hypertension Mother     Osteoporosis Mother     Dementia Mother     Thyroid disease Mother     Arthritis Mother     Autoimmune disease Mother         Graves disease    Aortic stenosis Father     Hyperlipidemia Father     Hypertension Father     Coronary artery disease Father         Coronary Artery Bypass Graft (CABG)    Heart disease Father         Pacemaker Placement    Valvular heart disease Father         S/P Transcatheter Aortic Valve Replacement (TAVR)    Arthritis Father     Hearing loss Father     No Known Problems Son     No Known Problems Son     No Known Problems Daughter     No Known Problems Daughter     No Known Problems Daughter     No Known Problems Daughter     Heart attack Other         Myocardial Infarction    Stroke Other     Cancer Maternal Aunt     Cancer Paternal Aunt        Social History     Occupational History    Occupation: Employed - Cardiology Technician - 2016   Tobacco Use    Smoking status: Never     Passive exposure: Never    Smokeless tobacco: Never   Vaping Use    Vaping status: Never Used    Substance and Sexual Activity    Alcohol use: Yes     Comment: rarely    Drug use: No    Sexual activity: Yes     Partners: Male     Birth control/protection: Post-menopausal         Current Outpatient Medications:     ascorbic acid (VITAMIN C) 500 mg tablet, Take 1 tablet (500 mg total) by mouth 2 (two) times a day for 60 doses, Disp: 60 tablet, Rfl: 0    Cholecalciferol 125 MCG (5000 UT) capsule, Take 5,000 Units by mouth daily, Disp: , Rfl:     docusate sodium (COLACE) 100 mg capsule, Take 1 capsule (100 mg total) by mouth 2 (two) times a day as needed for constipation, Disp: , Rfl:     Multiple Vitamins-Minerals (MULTIVITAL-M) TABS, Take by mouth in the morning, Disp: , Rfl:     OMEGA-3 FATTY ACIDS PO, 500 mg in the morning, Disp: , Rfl:     Probiotic Product (PROBIOTIC-10) CAPS, Take by mouth in the morning, Disp: , Rfl:     Turmeric 500 MG TABS, Take 1 capsule by mouth in the morning, Disp: , Rfl:     Allergies   Allergen Reactions    Methocarbamol Dizziness and Confusion     Hypotension      Oxycodone Other (See Comments), Dizziness and Confusion     hypotension            Vitals:    04/04/24 1537   BP: 121/85   Pulse: 82       Objective:                    Right Knee Exam     Comments:  Well healed anterior scar  ROM from 0-110 degrees  Nontender to palpation  Stable to varus and valgus stress  No effusion  NVI distally      Left Knee Exam     Comments:  No scars, no wounds on the knee  ROM from 0-110 with pain and crepitance  TTP along the medial joint line  NVI distally  Stable to varus and valgus stress  Mild swelling and mild effusion            Diagnostics, reviewed and taken today if performed as documented:    The attending physician has personally reviewed the pertinent films in PACS and interpretation is as follows:    Right knee xrays from today 4/4/2024: s/p right knee replacement with components in excellent position. No signs of loosening    Left knee xrays from today 4/4/2024: severe  degenerative joint disease as evidenced by joint space narrowing, subchondral sclerosis, osteophyte formation with varus alignment     Procedures, if performed today:    Procedures    None performed

## 2024-04-05 DIAGNOSIS — Z96.651 HISTORY OF TOTAL KNEE ARTHROPLASTY, RIGHT: Primary | ICD-10-CM

## 2024-04-05 DIAGNOSIS — M17.12 PRIMARY LOCALIZED OSTEOARTHRITIS OF LEFT KNEE: Primary | ICD-10-CM

## 2024-04-05 RX ORDER — ENOXAPARIN SODIUM 100 MG/ML
40 INJECTION SUBCUTANEOUS DAILY
Qty: 11.2 ML | Refills: 0 | Status: SHIPPED | OUTPATIENT
Start: 2024-04-05 | End: 2024-05-03

## 2024-04-05 RX ORDER — CEPHALEXIN 500 MG/1
CAPSULE ORAL
Qty: 40 CAPSULE | Refills: 0 | Status: SHIPPED | OUTPATIENT
Start: 2024-04-05 | End: 2024-04-19

## 2024-05-02 DIAGNOSIS — Z01.810 PRE-OPERATIVE CARDIOVASCULAR EXAMINATION: ICD-10-CM

## 2024-05-02 DIAGNOSIS — Z01.812 PRE-OPERATIVE LABORATORY EXAMINATION: ICD-10-CM

## 2024-05-02 DIAGNOSIS — Z00.6 ENCOUNTER FOR EXAMINATION FOR NORMAL COMPARISON OR CONTROL IN CLINICAL RESEARCH PROGRAM: ICD-10-CM

## 2024-05-02 DIAGNOSIS — M17.12 PRIMARY LOCALIZED OSTEOARTHRITIS OF LEFT KNEE: ICD-10-CM

## 2024-05-03 RX ORDER — FERROUS SULFATE 324(65)MG
324 TABLET, DELAYED RELEASE (ENTERIC COATED) ORAL
Qty: 60 TABLET | Refills: 0 | Status: SHIPPED | OUTPATIENT
Start: 2024-05-03

## 2024-05-17 ENCOUNTER — HOSPITAL ENCOUNTER (OUTPATIENT)
Dept: MAMMOGRAPHY | Facility: HOSPITAL | Age: 64
Discharge: HOME | End: 2024-05-17
Payer: COMMERCIAL

## 2024-05-17 VITALS — WEIGHT: 195 LBS | BODY MASS INDEX: 39.31 KG/M2 | HEIGHT: 59 IN

## 2024-05-17 DIAGNOSIS — Z12.31 SCREENING MAMMOGRAM FOR BREAST CANCER: ICD-10-CM

## 2024-05-17 PROCEDURE — 77063 BREAST TOMOSYNTHESIS BI: CPT

## 2024-05-17 PROCEDURE — 77067 SCR MAMMO BI INCL CAD: CPT

## 2024-05-20 ENCOUNTER — TELEPHONE (OUTPATIENT)
Dept: OBGYN CLINIC | Facility: HOSPITAL | Age: 64
End: 2024-05-20

## 2024-05-20 NOTE — TELEPHONE ENCOUNTER
Caller: Patient     Doctor: Dr. Geiger    Reason for call: Patient returning Ortho Nurse [facility]igators call.  She can be reached at the number below.     Call back#: [phone]

## 2024-05-29 ENCOUNTER — OFFICE VISIT (OUTPATIENT)
Dept: RHEUMATOLOGY | Facility: CLINIC | Age: 64
End: 2024-05-29
Payer: COMMERCIAL

## 2024-05-29 VITALS
BODY MASS INDEX: 39.31 KG/M2 | HEIGHT: 59 IN | WEIGHT: 195 LBS | SYSTOLIC BLOOD PRESSURE: 128 MMHG | DIASTOLIC BLOOD PRESSURE: 70 MMHG

## 2024-05-29 DIAGNOSIS — R52 PAIN: Primary | ICD-10-CM

## 2024-05-29 DIAGNOSIS — R53.83 OTHER FATIGUE: ICD-10-CM

## 2024-05-29 PROCEDURE — 99213 OFFICE O/P EST LOW 20 MIN: CPT | Performed by: STUDENT IN AN ORGANIZED HEALTH CARE EDUCATION/TRAINING PROGRAM

## 2024-05-29 NOTE — PATIENT INSTRUCTIONS
We will reevaluate after you are healed from your joint replacement. I have a low suspicion for a rheumatologic disease

## 2024-05-29 NOTE — ASSESSMENT & PLAN NOTE
Do not feel that her pain or fatigue are related to an underlying connective tissue disease.    Discussed that her pain and fatigue may be more related to her osteoarthritis and her weight.  Feel that her elevated CRP in the past may also be related to these things.  She is actively trying to lose weight, and has started physical therapy in anticipation of getting her knee surgery so that the recovery goes more smoothly than the previous one.  I do think she is she is doing everything she can be doing at this point with the information we have.    Discussed again my near 0 suspicion for connective tissue disease, and that we will reevaluate once more after she is healed from her knee replacement surgery.

## 2024-05-29 NOTE — PROGRESS NOTES
"Ambulatory Visit  Name: Amber Jackson      : 1960      MRN: [id number]  Encounter Provider: Dain Lockhart DO  Encounter Date: 2024   Encounter department: [facility] RHEUMATOLOGY [facility] history: Was dxd with UCTD by a NPP based on joint pain and elevated CRP was started on hydroxychloroquine. Then saw Dr. Negron and was continued on hydroxychloroquine.    When I first saw her in 2024, had no symptoms consistent with a CTD or inflammatory arthritis and all symptoms were classic for OA. She was also skeptical of the diagnosis. Hydroxychloroquine was stopped.    Assessment & Plan   1. Pain  Assessment & Plan:  Do not feel that her pain or fatigue are related to an underlying connective tissue disease.    Discussed that her pain and fatigue may be more related to her osteoarthritis and her weight.  Feel that her elevated CRP in the past may also be related to these things.  She is actively trying to lose weight, and has started physical therapy in anticipation of getting her knee surgery so that the recovery goes more smoothly than the previous one.  I do think she is she is doing everything she can be doing at this point with the information we have.    Discussed again my near 0 suspicion for connective tissue disease, and that we will reevaluate once more after she is healed from her knee replacement surgery.  2. Other fatigue      History of Present Illness       Feels like she is a little more generally fatigued since stopping the hydroxychloroquine but no new symptoms that she notes    Not having significant stiffness    Getting L knee done at the end of next month    Rediscussed that I don't agree with the initial dx of UCTD based on the information I have available to me and that her blood work since that time has not been consistent with such, and neither are her symptoms at this time    Review of Systems    Objective     /70   Ht 4' 11\" (1.499 m)   Wt " 88.5 kg (195 lb)   LMP 10/27/2017 (Exact Date)   BMI 39.39 kg/m²      General: Well appearing, in no distress.   Eyes: Sclera non-icteric. EOMI  HENT: No oral ulcers. MMM.   Extremities: Warm, well perfused, no edema.   Neuro: Alert and oriented. No gross focal neurological deficits.   Skin: No rashes.  MSK exam: no significant tenderness to palpation or synovitis bilateral hands, knees      Physical Exam  Administrative Statements

## 2024-05-30 NOTE — PROGRESS NOTES
Internal Medicine Pre-Operative Evaluation:     Reason for Visit: Pre-operative Evaluation for Risk Stratification and Optimization    Patient ID: Amber Jackson is a 63 y.o. female.     Surgery: Arthroplasty of left knee  Referring Provider: Dr Geiger      Recommendations to Proceed withSurgery    Patient is considered to be Low risk for Medium risk procedure.     After evaluation and discussion with patient with emphasis that all surgery has some degree of inherent risk it is acknowledged by patient this risk is Acceptable.    Patient is optimized and may proceed with planned procedure.     Assessment    Pre-operative Medical Evaluation for planned surgery  Recommendations as listed in PLAN section below  Contact surgical nurse  navigator with any questions regarding preoperative plan or schedule.      Problem List Items Addressed This Visit          Endocrine    Impaired fasting glucose       Musculoskeletal and Integument    Primary localized osteoarthritis of left knee       Other    Morbid obesity (HCC)     Other Visit Diagnoses       Preoperative clearance    -  Primary                 Plan:     1. Further preoperative workup as follows:   - none no further testing required may proceed with surgery    2. Preoperative Medication Management Review performed by PAT nursing  YES    3. Patient requires further consultation with:   No Consults Required    4. Discharge Planning / Barriers to Discharge  none identified - patients has post discharge therapy plan in place, transportation arranged for discharge day, adequate family support at home to assist with discharge to home.        Subjective:           History of Present Illness:     Amber Jackson is a 63 y.o. female who presents to the office today for a preoperative consultation at the request of surgeon. The patient understands this is an elective procedure and not emergent. They are electing to undergo planned procedure with an understanding that  "all surgery has inherent risk. They have worked with their surgeon and failed conservative treatment measures. Today they present for preoperative risk assessment and recommendations for optimization in preparation for surgery.    Pt had previous knee replacement and had significant drop in BP with oxycodone post operatively. She is requesting tramadol instead. I instructed her to let her surgeon and his team know as well.     Pt seen in surgical optimization center for upcoming proposed surgery. They have failed previous conservative measures and have elected surgical intervention.     Pt meets presurgical lab and BMI optimization goals.    Upon interview questioning patient is able to perform greater than 4 METs workload in daily life without any reported cardio-pulmonary symptoms.          ROS: No TIA's or unusual headaches, no dysphagia.  No prolonged cough. No dyspnea or chest pain on exertion.  No abdominal pain, change in bowel habits, black or bloody stools.  No urinary tract or BPH symptoms.  Positive reported pain in arthritic joint. Positive difficulty with gait. No skin rashes or issues.      Objective:    /76   Pulse 74   Ht 4' 11\" (1.499 m)   Wt 87.1 kg (192 lb)   LMP 10/27/2017 (Exact Date)   BMI 38.78 kg/m²       General Appearance: no distress, conversive  HEENT: PERRLA, conjuctiva normal; oropharynx clear; mucous membranes moist;   Neck:  Supple, no lymphadenopathy or thyromegaly  Lungs: breath sounds normal, normal respiratory effort, no retractions, expiratory effort normal  CV: normal heart sounds S1/S2, PMI normal   ABD: soft non tender, no masses , no hepatic or splenomegaly  EXT: DP pulses intact, no lymphadenopathy, no edema  Skin: normal turgor, normal texture, no rash  Psych: affect normal, mood normal  Neuro: AAOx3        The following portions of the patient's history were reviewed and updated as appropriate: allergies, current medications, past family history, past medical " history, past social history, past surgical history and problem list.     Past History:       Past Medical History:   Diagnosis Date    Arthritis 2013    Chest pain 2014    Colon polyp     Disease of thyroid gland     Elevated blood sugar 2014    Heart murmur     Functional, child    Hypercholesteremia     Hyperlipidemia     Obesity 2010    Osteoarthritis     Urinary tract infection     Recurring    Past Surgical History:   Procedure Laterality Date    BIOPSY CORE NEEDLE      Thyroid Using Percutaneous Core Needle     SECTION      x 2    FOOT SURGERY Bilateral     As a teenager - Bunionectomy - Dr. Vibha Nation - Last Assessed: 2016    JOINT REPLACEMENT      [place] ARTHRP KNE CONDYLE&PLATU MEDIAL&LAT COMPARTMENTS Right 10/09/2023    Procedure: ARTHROPLASTY RIGHT KNEE TOTAL W ROBOT;  Surgeon: Theodore Geiger MD;  Location: [facility];  Service: Orthopedics    THYROID LOBECTOMY Right 2015    Dr. Mae Salcido: Right Lobe - Last Assessed: 2016 Dr. Abdullahi Nation          Social History     Tobacco Use    Smoking status: Never     Passive exposure: Never    Smokeless tobacco: Never   Vaping Use    Vaping status: Never Used   Substance Use Topics    Alcohol use: Yes     Comment: rarely    Drug use: No     Family History   Problem Relation Age of Onset    Graves' disease Mother     Hyperlipidemia Mother     Hypertension Mother     Osteoporosis Mother     Dementia Mother     Thyroid disease Mother     Arthritis Mother     Autoimmune disease Mother         Graves disease    Aortic stenosis Father     Hyperlipidemia Father     Hypertension Father     Coronary artery disease Father         Coronary Artery Bypass Graft (CABG)    Heart disease Father         Pacemaker Placement    Valvular heart disease Father         S/P Transcatheter Aortic Valve Replacement (TAVR)    Arthritis Father     Hearing loss Father     No Known Problems Sister     No Known Problems Daughter     No  "Known Problems Daughter     No Known Problems Daughter     No Known Problems Daughter     No Known Problems Maternal Grandmother     No Known Problems Maternal Grandfather     No Known Problems Paternal Grandmother     No Known Problems Paternal Grandfather     No Known Problems Son     No Known Problems Son     Cancer Maternal Aunt         stomach    Breast cancer Paternal Aunt     Cancer Paternal Aunt         nonhodgkins lymphoma          Allergies:     Allergies   Allergen Reactions    Methocarbamol Dizziness and Confusion     Hypotension      Oxycodone Other (See Comments), Dizziness and Confusion     hypotension        Current Medications:     Current Outpatient Medications   Medication Instructions    ascorbic acid (VITAMIN C) 500 mg, Oral, 2 times daily    Cholecalciferol 5,000 Units, Oral, Daily    docusate sodium (COLACE) 100 mg, Oral, 2 times daily PRN    enoxaparin (LOVENOX) 40 mg, Subcutaneous, Daily, To start Post-Op    ferrous sulfate 324 mg, Oral, 2 times daily before meals    folic acid (FOLVITE) 1 mg, Oral, Daily    Multiple Vitamins-Minerals (MULTIVITAL-M) TABS Oral, Daily    OMEGA-3 FATTY ACIDS  mg, Daily    Probiotic Product (PROBIOTIC-10) CAPS Daily    Turmeric 500 MG TABS 1 capsule, Daily           PRE-OP WORKSHEET DATA    Assessment of Pre-Operative Risks     MLJ Quality Hard Stops:    BMI (<40) : Estimated body mass index is 38.78 kg/m² as calculated from the following:    Height as of this encounter: 4' 11\" (1.499 m).    Weight as of this encounter: 87.1 kg (192 lb).    Hgb ( >11):   Lab Results   Component Value Date    HGB 14.7 05/31/2024    HGB 10.1 (L) 10/11/2023    HGB 11.9 10/10/2023       HbA1c (<7.5) :   Lab Results   Component Value Date    HGBA1C 5.5 05/31/2024       GFR (>60) (Less then 45 = Nephrology consult):    Lab Results   Component Value Date    EGFR 96 05/31/2024    EGFR 99 10/11/2023    EGFR 101 10/10/2023         Active Decompensated Chronic Conditions which would " delay surgery  No acutely decompensated medical issues such as recent CVA, MI, new onset arrhythmia, severe aortic stenosis, CHF, uncontrolled COPD       Functional capacity: PUSH MOWING LAWN, BRISK WALKING                                   5 METS  Pick the highest level patient can comfortably perform   (if less the 4 mets consider functional assessment via cardiac stress testing or consultation)    Assessment of intra and post operative respiratory, hemodynamic and thrombotic risks     Prior Anesthesia Reactions: No     Personal history of venous thromboembolic disease? No    History of steroid use > 5 mg for >2 weeks within last year? No      The patient's risk factors for cardiac complications include :  none    Amber Jackson has an IN HOSPITAL cardiac risk of RCI RISK CLASS I (0 risk factors, risk of major cardiac compl. appr. 0.5%) based on RCRI calculator    Cardiac Risk Estimation: per the Revised Cardiac Risk Index (Circ. 100:1043, 1999),        Pre-Op Data Reviewed:       Laboratory Results: I have personally reviewed the pertinent laboratory results/reports     EKG:I have personally reviewed pertinent reports.  . I personally reviewed and interpreted available tracings in the electronic medical record    Encounter Date: 05/31/24   ECG 12 lead   Result Value    Ventricular Rate 80    Atrial Rate 80    CT Interval 160    QRSD Interval 84    QT Interval 374    QTC Interval 431    P Axis 51    QRS Axis 46    T Wave Axis 34    Narrative    Normal sinus rhythm  Low voltage QRS  Borderline ECG  Confirmed by Nino Awan ([id number]) on 6/1/2024 11:14:33 PM       OLD RECORDS: reviewed old records in the chart review section if EHR on day of visit.    Previous cardiopulmonary studies within the past year:  Echocardiogram: no   Cardiac Catheterization: no  Stress Test: no      Time of visit including pre-visit chart review, visit and post-visit coordination of plan and care , review of pre-surgical lab work,  preparation and time spent documenting note in electronic medical record, time spent face-to-face in physical examination answering patient questions by care team 35 minutes             [facility]

## 2024-05-30 NOTE — TELEPHONE ENCOUNTER
reoperative Elective Admission Assessment     Who does pt live with: spouse  What kind of home: multi-level  How do they enter the home: garage  How many levels in home: 2   # of steps to enter home: 1  # of steps to second floor: 13  Are there handrails: Yes  Are there landings: No  Sleeping arrangement: second floor  Where is Bathroom: 1/2 bath on entry level  Where is the tub or shower: second floor, step in bath tub w/o grab bar, has a shower chair   Dogs or ther pets: 1 dog     First Floor Setup:   Is there a bathroom: Yes  Where would pt sleep: plans to stay on second floor     DME: RW   We discussed clearing pathways in the home and making sure there is accessibly to use the walker, for example, removing throw rugs.      Patient's Current Level of Function: Ambulates: Independently and ADLs: Independent     Post-op Caregiver: spouse  Caregiver Name and phone number for Inpatient discharge needs: Anton  Currently receive any HHC/aides/community supports: No     Post-op Transport: spouse  To/from hospital: spouse  To/from PT 2-3x/week: spouse  Uses community transport now: No     Outpatient Physical Therapy Site:  Site: [facility]  pre and post-op appts scheduled? Yes     Medication Management: self and out of bottle  Preferred Pharmacy for Post-op Medications: [facility] [address]  Blood Management Vitamin Regimen: Pt confirms taking as prescribed, Pt c/o stomach upset from iron, NN advised to take every other day  Post-op anticoagulant: prescribed preoperatively - patient has at home ready for after surgery use only     DC Plan: Pt plans to be discharged home     Barriers to DC identified preoperatively: none identified     BMI: 39.39     Patient Education:  Pt educated on post-op pain, early mobilization (POD0), LOS goals, OP PT goals, and preoperative bathing. Patient educated that our goal is to appropriately discharge patient based off their post-op function while striving to maintain maximal independence. The  goal is to discharge patient to home and for them to attend outpatient physical therapy.     Assigned to care team? Yes

## 2024-05-30 NOTE — PATIENT INSTRUCTIONS
BEFORE SURGERY    Contact surgical nurse  navigator with any questions regarding preoperative plan or schedule.  Stop all over the counter supplements, herbal, naturopathic  medications for 1 week prior to surgery UNLESS prescribed by your surgeon  Hold NSAIDS (i.e. advil, alleve, motrin, ibuprofen, celebrex) minimum 5 days prior to surgery  Follow presurgical medication instructions provided by preadmission nursing team reviewed during your presurgery phone call  Strategies for optimizing your surgery through breathing exercises, nutrition and physical activity can be found at [email or web address]/best  Call [phone] with any presurgical concerns or medications questions    AFTER SURGERY    Recommend using Tylenol ( acetaminophen ) 1000 mg every eight hours during the first week post discharge along with icing the area for 20 mins every 3-4 hours while awake can be helpful in reducing your need for post operative opioid use. This opioid sparing plan can be used along side your surgeons pain plan.  Use stool softener over the counter (colace) daily after surgery during the first 1-2 weeks to avoid post operative constipation issues  If no bowel movement within 3 days after surgery then use over the counter Miralax in addition to your stool softener   If cleared by your surgical team for activity then early and often walking is encouraged and can be important in prevention of post surgical blood clots. Additionally spend as much time out of bed as possible and allowed by your surgical team  Use your incentive spirometer twice per hour in the first seven days after surgery to help prevent post surgery lung complications and infections  Call [phone] with any post discharge concerns or medical issues

## 2024-05-31 ENCOUNTER — APPOINTMENT (OUTPATIENT)
Dept: LAB | Facility: AMBULARY SURGERY CENTER | Age: 64
End: 2024-05-31
Payer: COMMERCIAL

## 2024-05-31 ENCOUNTER — APPOINTMENT (OUTPATIENT)
Dept: RADIOLOGY | Age: 64
End: 2024-05-31
Payer: COMMERCIAL

## 2024-05-31 ENCOUNTER — LAB REQUISITION (OUTPATIENT)
Dept: LAB | Facility: HOSPITAL | Age: 64
End: 2024-05-31
Payer: COMMERCIAL

## 2024-05-31 ENCOUNTER — OFFICE VISIT (OUTPATIENT)
Dept: LAB | Age: 64
End: 2024-05-31
Payer: COMMERCIAL

## 2024-05-31 DIAGNOSIS — M17.12 PRIMARY LOCALIZED OSTEOARTHRITIS OF LEFT KNEE: ICD-10-CM

## 2024-05-31 DIAGNOSIS — Z00.6 ENCOUNTER FOR EXAMINATION FOR NORMAL COMPARISON AND CONTROL IN CLINICAL RESEARCH PROGRAM: ICD-10-CM

## 2024-05-31 DIAGNOSIS — Z01.812 ENCOUNTER FOR PREPROCEDURAL LABORATORY EXAMINATION: ICD-10-CM

## 2024-05-31 DIAGNOSIS — Z01.812 PRE-OPERATIVE LABORATORY EXAMINATION: ICD-10-CM

## 2024-05-31 DIAGNOSIS — Z00.6 ENCOUNTER FOR EXAMINATION FOR NORMAL COMPARISON OR CONTROL IN CLINICAL RESEARCH PROGRAM: ICD-10-CM

## 2024-05-31 DIAGNOSIS — Z01.810 PRE-OPERATIVE CARDIOVASCULAR EXAMINATION: ICD-10-CM

## 2024-05-31 DIAGNOSIS — Z01.810 ENCOUNTER FOR PREPROCEDURAL CARDIOVASCULAR EXAMINATION: ICD-10-CM

## 2024-05-31 DIAGNOSIS — M17.12 UNILATERAL PRIMARY OSTEOARTHRITIS, LEFT KNEE: ICD-10-CM

## 2024-05-31 LAB
ABO GROUP BLD: NORMAL
ALBUMIN SERPL BCP-MCNC: 4.2 G/DL (ref 3.5–5)
ALP SERPL-CCNC: 60 U/L (ref 34–104)
ALT SERPL W P-5'-P-CCNC: 13 U/L (ref 7–52)
ANION GAP SERPL CALCULATED.3IONS-SCNC: 10 MMOL/L (ref 4–13)
APTT PPP: 30 SECONDS (ref 23–37)
AST SERPL W P-5'-P-CCNC: 15 U/L (ref 13–39)
BASOPHILS # BLD AUTO: 0.02 THOUSANDS/ÂΜL (ref 0–0.1)
BASOPHILS NFR BLD AUTO: 1 % (ref 0–1)
BILIRUB SERPL-MCNC: 0.54 MG/DL (ref 0.2–1)
BLD GP AB SCN SERPL QL: NEGATIVE
BUN SERPL-MCNC: 14 MG/DL (ref 5–25)
CALCIUM SERPL-MCNC: 9.1 MG/DL (ref 8.4–10.2)
CHLORIDE SERPL-SCNC: 104 MMOL/L (ref 96–108)
CO2 SERPL-SCNC: 27 MMOL/L (ref 21–32)
CREAT SERPL-MCNC: 0.64 MG/DL (ref 0.6–1.3)
EOSINOPHIL # BLD AUTO: 0.15 THOUSAND/ÂΜL (ref 0–0.61)
EOSINOPHIL NFR BLD AUTO: 4 % (ref 0–6)
ERYTHROCYTE [DISTWIDTH] IN BLOOD BY AUTOMATED COUNT: 12.6 % (ref 11.6–15.1)
EST. AVERAGE GLUCOSE BLD GHB EST-MCNC: 111 MG/DL
GFR SERPL CREATININE-BSD FRML MDRD: 96 ML/MIN/1.73SQ M
GLUCOSE P FAST SERPL-MCNC: 96 MG/DL (ref 65–99)
HBA1C MFR BLD: 5.5 %
HCT VFR BLD AUTO: 45.5 % (ref 34.8–46.1)
HGB BLD-MCNC: 14.7 G/DL (ref 11.5–15.4)
IMM GRANULOCYTES # BLD AUTO: 0.01 THOUSAND/UL (ref 0–0.2)
IMM GRANULOCYTES NFR BLD AUTO: 0 % (ref 0–2)
INR PPP: 1.07 (ref 0.84–1.19)
LYMPHOCYTES # BLD AUTO: 1.07 THOUSANDS/ÂΜL (ref 0.6–4.47)
LYMPHOCYTES NFR BLD AUTO: 27 % (ref 14–44)
MCH RBC QN AUTO: 29.3 PG (ref 26.8–34.3)
MCHC RBC AUTO-ENTMCNC: 32.3 G/DL (ref 31.4–37.4)
MCV RBC AUTO: 91 FL (ref 82–98)
MONOCYTES # BLD AUTO: 0.42 THOUSAND/ÂΜL (ref 0.17–1.22)
MONOCYTES NFR BLD AUTO: 11 % (ref 4–12)
NEUTROPHILS # BLD AUTO: 2.32 THOUSANDS/ÂΜL (ref 1.85–7.62)
NEUTS SEG NFR BLD AUTO: 57 % (ref 43–75)
NRBC BLD AUTO-RTO: 0 /100 WBCS
PLATELET # BLD AUTO: 167 THOUSANDS/UL (ref 149–390)
PMV BLD AUTO: 10.1 FL (ref 8.9–12.7)
POTASSIUM SERPL-SCNC: 4.3 MMOL/L (ref 3.5–5.3)
PROT SERPL-MCNC: 7.3 G/DL (ref 6.4–8.4)
PROTHROMBIN TIME: 13.8 SECONDS (ref 11.6–14.5)
RBC # BLD AUTO: 5.01 MILLION/UL (ref 3.81–5.12)
RH BLD: POSITIVE
SODIUM SERPL-SCNC: 141 MMOL/L (ref 135–147)
SPECIMEN EXPIRATION DATE: NORMAL
WBC # BLD AUTO: 3.99 THOUSAND/UL (ref 4.31–10.16)

## 2024-05-31 PROCEDURE — 85730 THROMBOPLASTIN TIME PARTIAL: CPT

## 2024-05-31 PROCEDURE — 85610 PROTHROMBIN TIME: CPT

## 2024-05-31 PROCEDURE — 86850 RBC ANTIBODY SCREEN: CPT | Performed by: ORTHOPAEDIC SURGERY

## 2024-05-31 PROCEDURE — 86901 BLOOD TYPING SEROLOGIC RH(D): CPT | Performed by: ORTHOPAEDIC SURGERY

## 2024-05-31 PROCEDURE — 86900 BLOOD TYPING SEROLOGIC ABO: CPT | Performed by: ORTHOPAEDIC SURGERY

## 2024-05-31 PROCEDURE — 80053 COMPREHEN METABOLIC PANEL: CPT

## 2024-05-31 PROCEDURE — 85025 COMPLETE CBC W/AUTO DIFF WBC: CPT

## 2024-05-31 PROCEDURE — 83036 HEMOGLOBIN GLYCOSYLATED A1C: CPT

## 2024-05-31 PROCEDURE — 71046 X-RAY EXAM CHEST 2 VIEWS: CPT

## 2024-05-31 PROCEDURE — 36415 COLL VENOUS BLD VENIPUNCTURE: CPT

## 2024-05-31 PROCEDURE — 93005 ELECTROCARDIOGRAM TRACING: CPT

## 2024-06-01 LAB
ATRIAL RATE: 80 BPM
P AXIS: 51 DEGREES
PR INTERVAL: 160 MS
QRS AXIS: 46 DEGREES
QRSD INTERVAL: 84 MS
QT INTERVAL: 374 MS
QTC INTERVAL: 431 MS
T WAVE AXIS: 34 DEGREES
VENTRICULAR RATE: 80 BPM

## 2024-06-01 PROCEDURE — 93010 ELECTROCARDIOGRAM REPORT: CPT | Performed by: INTERNAL MEDICINE

## 2024-06-04 NOTE — PRE-PROCEDURE INSTRUCTIONS
Pre-Surgery Instructions:   Medication Instructions    ascorbic acid (VITAMIN C) 500 mg tablet Hold day of surgery.    docusate sodium (COLACE) 100 mg capsule Uses PRN- OK to take day of surgery    ferrous sulfate 324 (65 Fe) mg Hold day of surgery.    folic acid (FOLVITE) 1 mg tablet Hold day of surgery.    Multiple Vitamins-Minerals (MULTIVITAL-M) TABS Stop taking 7 days prior to surgery.    Medication instructions for day surgery reviewed. Please use only a sip of water to take your instructed medications. Avoid all over the counter vitamins, supplements and NSAIDS for one week prior to surgery per anesthesia guidelines. Tylenol is ok to take as needed.     You will receive a call one business day prior to surgery with an arrival time and hospital directions. If your surgery is scheduled on a Monday, the hospital will be calling you on the Friday prior to your surgery. If you have not heard from anyone by 8pm, please call the hospital supervisor through the hospital  at [phone]. ([place] [phone] or [place] [phone]).    Do not eat or drink anything after midnight the night before your surgery, including candy, mints, lifesavers, or chewing gum. Do not drink alcohol 24hrs before your surgery. Try not to smoke at least 24hrs before your surgery.       Follow the pre surgery showering instructions as listed in the “My Surgical Experience Booklet” or otherwise provided by your surgeon's office. Do not use a blade to shave the surgical area 1 week before surgery. It is okay to use a clean electric clippers up to 24 hours before surgery. Do not apply any lotions, creams, including makeup, cologne, deodorant, or perfumes after showering on the day of your surgery. Do not use dry shampoo, hair spray, hair gel, or any type of hair products.     No contact lenses, eye make-up, or artificial eyelashes. Remove nail polish, including gel polish, and any artificial, gel, or acrylic nails if possible.  Remove all jewelry including rings and body piercing jewelry.     Wear causal clothing that is easy to take on and off. Consider your type of surgery.    Keep any valuables, jewelry, piercings at home. Please bring any specially ordered equipment (sling, braces) if indicated.    Arrange for a responsible person to drive you to and from the hospital on the day of your surgery. Please confirm the visitor policy for the day of your procedure when you receive your phone call with an arrival time.     Call the surgeon's office with any new illnesses, exposures, or additional questions prior to surgery.    Please reference your “My Surgical Experience Booklet” for additional information to prepare for your upcoming surgery.

## 2024-06-05 ENCOUNTER — OFFICE VISIT (OUTPATIENT)
Age: 64
End: 2024-06-05
Payer: COMMERCIAL

## 2024-06-05 VITALS
HEIGHT: 59 IN | HEART RATE: 74 BPM | WEIGHT: 192 LBS | DIASTOLIC BLOOD PRESSURE: 76 MMHG | BODY MASS INDEX: 38.71 KG/M2 | SYSTOLIC BLOOD PRESSURE: 110 MMHG

## 2024-06-05 DIAGNOSIS — Z01.812 PRE-OPERATIVE LABORATORY EXAMINATION: ICD-10-CM

## 2024-06-05 DIAGNOSIS — Z01.818 PREOPERATIVE CLEARANCE: Primary | ICD-10-CM

## 2024-06-05 DIAGNOSIS — M17.12 PRIMARY LOCALIZED OSTEOARTHRITIS OF LEFT KNEE: ICD-10-CM

## 2024-06-05 DIAGNOSIS — Z01.810 PRE-OPERATIVE CARDIOVASCULAR EXAMINATION: ICD-10-CM

## 2024-06-05 DIAGNOSIS — R73.01 IMPAIRED FASTING GLUCOSE: ICD-10-CM

## 2024-06-05 DIAGNOSIS — E66.01 MORBID OBESITY (HCC): ICD-10-CM

## 2024-06-05 PROCEDURE — 99215 OFFICE O/P EST HI 40 MIN: CPT | Performed by: INTERNAL MEDICINE

## 2024-06-13 ENCOUNTER — ANESTHESIA (OUTPATIENT)
Age: 64
End: 2024-06-13

## 2024-06-13 ENCOUNTER — ANESTHESIA EVENT (OUTPATIENT)
Age: 64
End: 2024-06-13

## 2024-06-15 LAB
APOB+LDLR+PCSK9 GENE MUT ANL BLD/T: NOT DETECTED
BRCA1+BRCA2 DEL+DUP + FULL MUT ANL BLD/T: NOT DETECTED
MLH1+MSH2+MSH6+PMS2 GN DEL+DUP+FUL M: NOT DETECTED

## 2024-06-17 DIAGNOSIS — Z47.1 AFTERCARE FOLLOWING LEFT KNEE JOINT REPLACEMENT SURGERY: Primary | ICD-10-CM

## 2024-06-17 DIAGNOSIS — Z96.652 AFTERCARE FOLLOWING LEFT KNEE JOINT REPLACEMENT SURGERY: Primary | ICD-10-CM

## 2024-06-21 DIAGNOSIS — M17.12 PRIMARY LOCALIZED OSTEOARTHRITIS OF LEFT KNEE: Primary | ICD-10-CM

## 2024-06-21 RX ORDER — FERROUS SULFATE 324(65)MG
324 TABLET, DELAYED RELEASE (ENTERIC COATED) ORAL
Qty: 30 TABLET | Refills: 0 | Status: SHIPPED | OUTPATIENT
Start: 2024-06-21 | End: 2024-07-21

## 2024-06-21 RX ORDER — ASCORBATE CALCIUM 500 MG
500 TABLET ORAL 2 TIMES DAILY
Qty: 60 TABLET | Refills: 0 | Status: SHIPPED | OUTPATIENT
Start: 2024-06-21 | End: 2024-07-21

## 2024-06-21 RX ORDER — FOLIC ACID 1 MG/1
1 TABLET ORAL DAILY
Qty: 30 TABLET | Refills: 0 | Status: SHIPPED | OUTPATIENT
Start: 2024-06-21 | End: 2024-07-21

## 2024-06-28 ENCOUNTER — TELEPHONE (OUTPATIENT)
Dept: OBGYN CLINIC | Facility: HOSPITAL | Age: 64
End: 2024-06-28

## 2024-07-01 RX ORDER — AMOXICILLIN 500 MG/1
CAPSULE ORAL
COMMUNITY
Start: 2024-06-19 | End: 2024-07-08

## 2024-07-01 NOTE — PROGRESS NOTES
Internal Medicine Pre-Operative Evaluation:     Reason for Visit: Pre-operative Evaluation for Risk Stratification and Optimization    Patient ID: Amber Jackson is a 63 y.o. female.     Surgery: Arthroplasty of left knee  Referring Provider: Dr Geiger      Recommendations to Proceed withSurgery    Patient is considered to be Low risk for Medium risk procedure.     After evaluation and discussion with patient with emphasis that all surgery has some degree of inherent risk it is acknowledged by patient this risk is Acceptable.    Patient is optimized and may proceed with planned procedure.     Assessment    Pre-operative Medical Evaluation for planned surgery  Recommendations as listed in PLAN section below  Contact surgical nurse  navigator with any questions regarding preoperative plan or schedule.      Problem List Items Addressed This Visit          Endocrine    Impaired fasting glucose     Hgb A1c   Recommend following DM diet  Monitor FBS              Musculoskeletal and Integument    Primary localized osteoarthritis of left knee     Failed outpatient conservative measures  Electing to undergo arthroplasty              Surgery/Wound/Pain    History of total knee arthroplasty, right     Had issues post operatively with hypotension/oxycodone  Requesting tramadol post operatively            Other    Morbid obesity (HCC)     Recommend ongoing attempts at weight loss  Current BMI meets criteria of <40 per [facility] preoperative qualifications            Other Visit Diagnoses       Preoperative clearance    -  Primary                 Plan:     1. Further preoperative workup as follows:   - none no further testing required may proceed with surgery    2. Preoperative Medication Management Review performed by PAT nursing  YES no questions    3. Patient requires further consultation with:   No Consults Required    4. Discharge Planning / Barriers to Discharge  none identified - patients has post discharge therapy plan  "in place, transportation arranged for discharge day, adequate family support at home to assist with discharge to home.        Subjective:           History of Present Illness:     Amber Jackson is a 63 y.o. female who presents to the office today for a preoperative consultation at the request of surgeon. The patient understands this is an elective procedure and not emergent. They are electing to undergo planned procedure with an understanding that all surgery has inherent risk. They have worked with their surgeon and failed conservative treatment measures. Today they present for preoperative risk assessment and recommendations for optimization in preparation for surgery.      Pt seen in surgical optimization center for upcoming proposed surgery. They have failed previous conservative measures and have elected surgical intervention.     Pt meets presurgical lab and BMI optimization goals.    Upon interview questioning patient is able to perform greater than 4 METs workload in daily life without any reported cardio-pulmonary symptoms.    Patient was cleared previously by Dr. Adams on 6/5/2024.  Her surgery was then pushed back due to an infection of a tooth that she has since had pulled and completed a course of antibiotics. It should be noted that she had a right knee arthroplasty done several months ago and did have some issues with oxycodone as well as hypotension postoperatively.  She is requesting tramadol postoperatively for pain.  I did tell her she should pass this along to her surgeon as well.            ROS: No TIA's or unusual headaches, no dysphagia.  No prolonged cough. No dyspnea or chest pain on exertion.  No abdominal pain, change in bowel habits, black or bloody stools.  No urinary tract or BPH symptoms.  Positive reported pain in arthritic joint. Positive difficulty with gait. No skin rashes or issues.      Objective:    /70   Pulse 74   Ht 4' 11\" (1.499 m)   Wt 88.6 kg (195 lb 6.4 oz)  "  LMP 10/27/2017 (Exact Date)   SpO2 99%   BMI 39.47 kg/m²       General Appearance: no distress, conversive  HEENT: PERRLA, conjuctiva normal; oropharynx clear; mucous membranes moist;   Neck:  Supple, no lymphadenopathy or thyromegaly  Lungs: breath sounds normal, normal respiratory effort, no retractions, expiratory effort normal  CV: normal heart sounds S1/S2, PMI normal   ABD: soft non tender, +obese  EXT: DP pulses intact, no lymphadenopathy, no edema  Skin: normal turgor, normal texture, no rash  Psych: affect normal, mood normal  Neuro: AAOx3        The following portions of the patient's history were reviewed and updated as appropriate: allergies, current medications, past family history, past medical history, past social history, past surgical history and problem list.     Past History:       Past Medical History:   Diagnosis Date    Arthritis 2013    Chest pain 2014    Colon polyp     Disease of thyroid gland     Elevated blood sugar 2014    Heart murmur     Functional, child    Hypercholesteremia     Hyperlipidemia     Obesity 2010    Osteoarthritis     Urinary tract infection     Recurring    Past Surgical History:   Procedure Laterality Date    BIOPSY CORE NEEDLE      Thyroid Using Percutaneous Core Needle     SECTION      x 2    FOOT SURGERY Bilateral     As a teenager - Bunionectomy - Dr. Vibha Nation - Last Assessed: 2016    JOINT REPLACEMENT      IA ARTHRP KNE CONDYLE&PLATU MEDIAL&LAT COMPARTMENTS Right 10/09/2023    Procedure: ARTHROPLASTY RIGHT KNEE TOTAL W ROBOT;  Surgeon: Theodore Geiger MD;  Location: [facility];  Service: Orthopedics    THYROID LOBECTOMY Right 2015    Dr. Mae Salcido: Right Lobe - Last Assessed: 2016 Dr. Abdullahi Nation          Social History     Tobacco Use    Smoking status: Never     Passive exposure: Never    Smokeless tobacco: Never   Vaping Use    Vaping status: Never Used   Substance Use Topics    Alcohol use:  Yes     Comment: rarely    Drug use: No     Family History   Problem Relation Age of Onset    Graves' disease Mother     Hyperlipidemia Mother     Hypertension Mother     Osteoporosis Mother     Dementia Mother     Thyroid disease Mother     Arthritis Mother     Autoimmune disease Mother         Graves disease    Aortic stenosis Father     Hyperlipidemia Father     Hypertension Father     Coronary artery disease Father         Coronary Artery Bypass Graft (CABG)    Heart disease Father         Pacemaker Placement    Valvular heart disease Father         S/P Transcatheter Aortic Valve Replacement (TAVR)    Arthritis Father     Hearing loss Father     No Known Problems Sister     No Known Problems Daughter     No Known Problems Daughter     No Known Problems Daughter     No Known Problems Daughter     No Known Problems Maternal Grandmother     No Known Problems Maternal Grandfather     No Known Problems Paternal Grandmother     No Known Problems Paternal Grandfather     No Known Problems Son     No Known Problems Son     Cancer Maternal Aunt         stomach    Breast cancer Paternal Aunt     Cancer Paternal Aunt         nonhodgkins lymphoma          Allergies:     Allergies   Allergen Reactions    Methocarbamol Dizziness and Confusion     Hypotension      Oxycodone Other (See Comments), Dizziness and Confusion     hypotension        Current Medications:     Current Outpatient Medications   Medication Instructions    ascorbic acid (VITAMIN C) 500 mg, Oral, 2 times daily    Calcium Ascorbate (VITAMIN C) 500 mg, Oral, 2 times daily    Cholecalciferol 5,000 Units, Oral, Daily    docusate sodium (COLACE) 100 mg, Oral, 2 times daily PRN    enoxaparin (LOVENOX) 40 mg, Subcutaneous, Daily, To start Post-Op    ferrous sulfate 324 mg, Oral, Daily before breakfast    folic acid (KP FOLIC ACID) 1 mg, Oral, Daily    Multiple Vitamins-Minerals (MULTIVITAL-M) TABS Oral, Daily           PRE-OP WORKSHEET DATA    Assessment of  "Pre-Operative Risks     [facility] Quality Hard Stops:    BMI (<40) : Estimated body mass index is 39.47 kg/m² as calculated from the following:    Height as of this encounter: 4' 11\" (1.499 m).    Weight as of this encounter: 88.6 kg (195 lb 6.4 oz).    Hgb ( >11):   Lab Results   Component Value Date    HGB 14.7 05/31/2024    HGB 10.1 (L) 10/11/2023    HGB 11.9 10/10/2023       HbA1c (<7.5) :   Lab Results   Component Value Date    HGBA1C 5.5 05/31/2024       GFR (>60) (Less then 45 = Nephrology consult):    Lab Results   Component Value Date    EGFR 96 05/31/2024    EGFR 99 10/11/2023    EGFR 101 10/10/2023         Active Decompensated Chronic Conditions which would delay surgery  No acutely decompensated medical issues such as recent CVA, MI, new onset arrhythmia, severe aortic stenosis, CHF, uncontrolled COPD       Functional capacity: CLIMBING 2 FLIGHTS STAIRS, GARDENING                             4 METS  Pick the highest level patient can comfortably perform   (if less the 4 mets consider functional assessment via cardiac stress testing or consultation)    Assessment of intra and post operative respiratory, hemodynamic and thrombotic risks     Prior Anesthesia Reactions: No     Personal history of venous thromboembolic disease? No    History of steroid use > 5 mg for >2 weeks within last year? No      The patient's risk factors for cardiac complications include :  none    Amber Jackson has an IN HOSPITAL cardiac risk of RCI RISK CLASS I (0 risk factors, risk of major cardiac compl. appr. 0.5%) based on RCRI calculator    Cardiac Risk Estimation: per the Revised Cardiac Risk Index (Circ. 100:1043, 1999),        Pre-Op Data Reviewed:       Laboratory Results: I have personally reviewed the pertinent laboratory results/reports     EKG:I have personally reviewed pertinent reports.  . I personally reviewed and interpreted available tracings in the electronic medical record    Encounter Date: 05/31/24   ECG 12 lead "   Result Value    Ventricular Rate 80    Atrial Rate 80    OR Interval 160    QRSD Interval 84    QT Interval 374    QTC Interval 431    P Axis 51    QRS Axis 46    T Wave Axis 34    Narrative    Normal sinus rhythm  Low voltage QRS  Borderline ECG  Confirmed by Nino Awan ([id number]) on 6/1/2024 11:14:33 PM       OLD RECORDS: reviewed old records in the chart review section if EHR on day of visit.    Previous cardiopulmonary studies within the past year:  Echocardiogram: no   Cardiac Catheterization: no  Stress Test: no      Time of visit including pre-visit chart review, visit and post-visit coordination of plan and care , review of pre-surgical lab work, preparation and time spent documenting note in electronic medical record, time spent face-to-face in physical examination answering patient questions by care team 25 minutes             [facility]

## 2024-07-01 NOTE — PATIENT INSTRUCTIONS
BEFORE SURGERY    Contact surgical nurse  navigator with any questions regarding preoperative plan or schedule.  Stop all over the counter supplements, herbal, naturopathic  medications for 1 week prior to surgery UNLESS prescribed by your surgeon  Hold NSAIDS (i.e. advil, alleve, motrin, ibuprofen, celebrex) minimum 5 days prior to surgery  Follow presurgical medication instructions provided by preadmission nursing team reviewed during your presurgery phone call  Strategies for optimizing your surgery through breathing exercises, nutrition and physical activity can be found at [email or web address].org/best  Call [phone] with any presurgical concerns or medications questions    AFTER SURGERY    Recommend using Tylenol ( acetaminophen ) 1000 mg every eight hours during the first week post discharge along with icing the area for 20 mins every 3-4 hours while awake can be helpful in reducing your need for post operative opioid use. This opioid sparing plan can be used along side your surgeons pain plan.  Use stool softener over the counter (colace) daily after surgery during the first 1-2 weeks to avoid post operative constipation issues  If no bowel movement within 3 days after surgery then use over the counter Miralax in addition to your stool softener   If cleared by your surgical team for activity then early and often walking is encouraged and can be important in prevention of post surgical blood clots. Additionally spend as much time out of bed as possible and allowed by your surgical team  Use your incentive spirometer twice per hour in the first seven days after surgery to help prevent post surgery lung complications and infections  Call [phone] with any post discharge concerns or medical issues

## 2024-07-03 ENCOUNTER — LAB REQUISITION (OUTPATIENT)
Dept: LAB | Facility: HOSPITAL | Age: 64
End: 2024-07-03
Payer: COMMERCIAL

## 2024-07-03 ENCOUNTER — APPOINTMENT (OUTPATIENT)
Dept: LAB | Facility: AMBULARY SURGERY CENTER | Age: 64
End: 2024-07-03
Payer: COMMERCIAL

## 2024-07-03 DIAGNOSIS — M17.12 PRIMARY LOCALIZED OSTEOARTHRITIS OF LEFT KNEE: ICD-10-CM

## 2024-07-03 DIAGNOSIS — M17.12 UNILATERAL PRIMARY OSTEOARTHRITIS, LEFT KNEE: ICD-10-CM

## 2024-07-03 LAB
ABO GROUP BLD: NORMAL
BLD GP AB SCN SERPL QL: NEGATIVE
RH BLD: POSITIVE
SPECIMEN EXPIRATION DATE: NORMAL

## 2024-07-03 PROCEDURE — 86901 BLOOD TYPING SEROLOGIC RH(D): CPT | Performed by: ORTHOPAEDIC SURGERY

## 2024-07-03 PROCEDURE — 86900 BLOOD TYPING SEROLOGIC ABO: CPT | Performed by: ORTHOPAEDIC SURGERY

## 2024-07-03 PROCEDURE — 36415 COLL VENOUS BLD VENIPUNCTURE: CPT

## 2024-07-03 PROCEDURE — 86850 RBC ANTIBODY SCREEN: CPT | Performed by: ORTHOPAEDIC SURGERY

## 2024-07-08 ENCOUNTER — OFFICE VISIT (OUTPATIENT)
Age: 64
End: 2024-07-08
Payer: COMMERCIAL

## 2024-07-08 VITALS
BODY MASS INDEX: 39.39 KG/M2 | HEART RATE: 74 BPM | OXYGEN SATURATION: 99 % | SYSTOLIC BLOOD PRESSURE: 112 MMHG | HEIGHT: 59 IN | DIASTOLIC BLOOD PRESSURE: 70 MMHG | WEIGHT: 195.4 LBS

## 2024-07-08 DIAGNOSIS — Z96.651 HISTORY OF TOTAL KNEE ARTHROPLASTY, RIGHT: ICD-10-CM

## 2024-07-08 DIAGNOSIS — Z01.818 PREOPERATIVE CLEARANCE: Primary | ICD-10-CM

## 2024-07-08 DIAGNOSIS — E66.01 MORBID OBESITY (HCC): ICD-10-CM

## 2024-07-08 DIAGNOSIS — R73.01 IMPAIRED FASTING GLUCOSE: ICD-10-CM

## 2024-07-08 DIAGNOSIS — M17.12 PRIMARY LOCALIZED OSTEOARTHRITIS OF LEFT KNEE: ICD-10-CM

## 2024-07-08 PROCEDURE — 99214 OFFICE O/P EST MOD 30 MIN: CPT | Performed by: NURSE PRACTITIONER

## 2024-07-18 ENCOUNTER — ANESTHESIA EVENT (OUTPATIENT)
Age: 64
End: 2024-07-18
Payer: COMMERCIAL

## 2024-07-23 DIAGNOSIS — M17.12 PRIMARY LOCALIZED OSTEOARTHRITIS OF LEFT KNEE: ICD-10-CM

## 2024-07-23 RX ORDER — FERROUS SULFATE 324(65)MG
324 TABLET, DELAYED RELEASE (ENTERIC COATED) ORAL
Qty: 30 TABLET | Refills: 0 | Status: SHIPPED | OUTPATIENT
Start: 2024-07-23 | End: 2024-08-02

## 2024-07-25 DIAGNOSIS — Z47.1 AFTERCARE FOLLOWING LEFT KNEE JOINT REPLACEMENT SURGERY: Primary | ICD-10-CM

## 2024-07-25 DIAGNOSIS — Z96.652 AFTERCARE FOLLOWING LEFT KNEE JOINT REPLACEMENT SURGERY: Primary | ICD-10-CM

## 2024-07-29 NOTE — PRE-PROCEDURE INSTRUCTIONS
Pre-Surgery Instructions:   Medication Instructions    ascorbic acid (VITAMIN C) 500 mg tablet Instructions provided by MD    docusate sodium (COLACE) 100 mg capsule Hold day of surgery    ferrous sulfate 324 (65 Fe) mg Instructions provided by MD    folic acid ( Folic Acid) 1 mg tablet Instructions provided by MD    Multiple Vitamins-Minerals (MULTIVITAL-M) TABS Instructions provided by MD              Medication instructions for day surgery reviewed. Please use only a sip of water to take your instructed medications. Avoid all over the counter vitamins, supplements and NSAIDS for one week prior to surgery per anesthesia guidelines. Tylenol is ok to take as needed.   Continue vitamins prescribed by surgeon.  You will receive a call one business day prior to surgery with an arrival time and hospital directions. If your surgery is scheduled on a Monday, the hospital will be calling you on the Friday prior to your surgery. If you have not heard from anyone by 8pm, please call the hospital supervisor through the hospital  at [phone]. ([place] [phone] or [place] [phone]).    Do not eat or drink anything after midnight the night before your surgery, including candy, mints, lifesavers, or chewing gum. Do not drink alcohol 24hrs before your surgery. Try not to smoke at least 24hrs before your surgery.       Follow the pre surgery showering instructions as listed in the “My Surgical Experience Booklet” or otherwise provided by your surgeon's office. Do not use a blade to shave the surgical area 1 week before surgery. It is okay to use a clean electric clippers up to 24 hours before surgery. Do not apply any lotions, creams, including makeup, cologne, deodorant, or perfumes after showering on the day of your surgery. Do not use dry shampoo, hair spray, hair gel, or any type of hair products.     No contact lenses, eye make-up, or artificial eyelashes. Remove nail polish, including gel polish, and  "any artificial, gel, or acrylic nails if possible. Remove all jewelry including rings and body piercing jewelry.     Wear causal clothing that is easy to take on and off. Consider your type of surgery.    Keep any valuables, jewelry, piercings at home. Please bring any specially ordered equipment (sling, braces) if indicated.    Arrange for a responsible person to drive you to and from the hospital on the day of your surgery. Please confirm the visitor policy for the day of your procedure when you receive your phone call with an arrival time.     Call the surgeon's office with any new illnesses, exposures, or additional questions prior to surgery.    Please reference your “My Surgical Experience Booklet” for additional information to prepare for your upcoming surgery.    Reviewed with patient, in detail, instructions from \"My Surgical Experience\". Verified with patient that he received [facility] patient education from surgeon's office. Advised to call ortho office/surgery coordinator with any questions and/or concerns.   Confirmed that patient has hibiclens soap and CHG wipes. Incentive spirometer received.   Patient verbalized understanding of current visitor restrictions due to Covid and will clarify with nurse DOS. Instructed to avoid all ASA and OTC Vit/Supp 1 week prior to surgery and to avoid NSAIDs 7 days prior to surgery per anesthesia guidelines.Tylenol ok to take prn.   No alcohol 24 hours prior to surgery. Patient aware Lovenox or other Blood Thinner prescribed is for POST OP ONLY. Instructed to call surgeon's office in meantime with any new illness.  Patient verbalized an understanding of all instructions reviewed and offers no concerns at this time.      "

## 2024-07-31 NOTE — DISCHARGE INSTR - AVS FIRST PAGE
Discharge Instructions - Orthopedics  Amber Jackson 63 y.o. female MRN: [id number]  Unit/Bed#: AN PAT    Weight Bearing Status:                                           Weight Bearing as tolerated to the left lower extremity with assistive devices.     Pain Management/Medications  You may resume your usual medications.  You may stop pre-operative vitamins (Folic acid, Ferrous sulfate, and Vitamin C).   Please take the following medications:  Anti-coagulation (blood clot prevention) - Complete Lovenox injections for 28 days.   Pain medication:  Tramadol 50 m tablet every 6 hours as needed for severe pain  Gabapentin 100 m tablet three times a day for neuropathic pain   Celebrex 100 m tablet twice a day for anti-inflammatory properties   Tylenol 1000 mg: up to three times daily as needed for mild to moderate pain. Do not exceed 3000mg daily   Continue applying ice to your knee on and off for about 20 minutes as needed.  Continue to elevate your operative leg, with ankle above the level of your heart when possible.    If you have questions or pain concerns, please contact the office.   If you need refills of your medications prior to your next office visit, please contact the office.     Showering/Dressing Instructions:   Do not shower until first follow up appointment.  Keep surgical dressing clean and dry until follow up appointment.  You may adjust the ACE bandage as it slides down   No baths, swimming, or submerging your leg until cleared to do so.      Driving Instructions:  No driving until cleared by Orthopaedic Surgery.    PT/OT:  Continue PT/OT on outpatient basis as directed    Follow up instructions:   Follow up as scheduled on 2024 in [place].  If you need to change or cancel your appointment for any reason, please call the clinic at [phone]    Please contact the office if you experience any of the following:  Excessive bleeding (bleeding through your dressing)  Fever greater  than 101 degrees F after 48 hours (low grade fevers the day or two after surgery are normal)  Persistent nausea or vomiting  Decreased sensation or discoloration of the operative limb  Pain or swelling that is getting worse and not better with medication    Miscellaneous:  Advise against any dental cleanings or procedures for 3 months after surgery.   - If there is a dental emergency, please contact the office for further instructions.

## 2024-08-01 ENCOUNTER — ANESTHESIA (OUTPATIENT)
Age: 64
End: 2024-08-01
Payer: COMMERCIAL

## 2024-08-01 ENCOUNTER — HOSPITAL ENCOUNTER (OUTPATIENT)
Age: 64
Setting detail: OUTPATIENT SURGERY
Discharge: HOME | End: 2024-08-02
Attending: ORTHOPAEDIC SURGERY | Admitting: ORTHOPAEDIC SURGERY
Payer: COMMERCIAL

## 2024-08-01 DIAGNOSIS — M17.12 PRIMARY LOCALIZED OSTEOARTHRITIS OF LEFT KNEE: Primary | ICD-10-CM

## 2024-08-01 PROCEDURE — C1776 JOINT DEVICE (IMPLANTABLE): HCPCS | Performed by: ORTHOPAEDIC SURGERY

## 2024-08-01 PROCEDURE — 99024 POSTOP FOLLOW-UP VISIT: CPT | Performed by: PHYSICIAN ASSISTANT

## 2024-08-01 PROCEDURE — S2900 ROBOTIC SURGICAL SYSTEM: HCPCS | Performed by: ORTHOPAEDIC SURGERY

## 2024-08-01 PROCEDURE — 27447 TOTAL KNEE ARTHROPLASTY: CPT | Performed by: ORTHOPAEDIC SURGERY

## 2024-08-01 PROCEDURE — C1713 ANCHOR/SCREW BN/BN,TIS/BN: HCPCS | Performed by: ORTHOPAEDIC SURGERY

## 2024-08-01 PROCEDURE — 97163 PT EVAL HIGH COMPLEX 45 MIN: CPT

## 2024-08-01 PROCEDURE — 97530 THERAPEUTIC ACTIVITIES: CPT

## 2024-08-01 PROCEDURE — NC001 PR NO CHARGE: Performed by: ORTHOPAEDIC SURGERY

## 2024-08-01 PROCEDURE — C9290 INJ, BUPIVACAINE LIPOSOME: HCPCS | Performed by: STUDENT IN AN ORGANIZED HEALTH CARE EDUCATION/TRAINING PROGRAM

## 2024-08-01 DEVICE — SMARTSET HV HIGH VISCOSITY BONE CEMENT 40G
Type: IMPLANTABLE DEVICE | Site: KNEE | Status: FUNCTIONAL
Brand: SMARTSET

## 2024-08-01 DEVICE — ATTUNE PATELLA MEDIALIZED DOME 32MM CEMENTED AOX
Type: IMPLANTABLE DEVICE | Site: KNEE | Status: FUNCTIONAL
Brand: ATTUNE

## 2024-08-01 DEVICE — ATTUNE KNEE SYSTEM TIBIAL BASE FIXED BEARING SIZE 2 CEMENTED
Type: IMPLANTABLE DEVICE | Site: KNEE | Status: FUNCTIONAL
Brand: ATTUNE

## 2024-08-01 DEVICE — ATTUNE KNEE SYSTEM FEMORAL POSTERIOR STABILIZED NARROW SIZE 3N LEFT CEMENTED
Type: IMPLANTABLE DEVICE | Site: KNEE | Status: FUNCTIONAL
Brand: ATTUNE

## 2024-08-01 DEVICE — ATTUNE KNEE SYSTEM TIBIAL INSERT FIXED BEARING POSTERIOR STABILIZED 3 8MM AOX
Type: IMPLANTABLE DEVICE | Site: KNEE | Status: FUNCTIONAL
Brand: ATTUNE

## 2024-08-01 RX ORDER — HYDROCODONE BITARTRATE AND ACETAMINOPHEN 5; 325 MG/1; MG/1
1 TABLET ORAL EVERY 4 HOURS PRN
Status: DISCONTINUED | OUTPATIENT
Start: 2024-08-01 | End: 2024-08-01

## 2024-08-01 RX ORDER — HYDROCODONE BITARTRATE AND ACETAMINOPHEN 5; 325 MG/1; MG/1
2 TABLET ORAL EVERY 6 HOURS PRN
Status: DISCONTINUED | OUTPATIENT
Start: 2024-08-01 | End: 2024-08-01

## 2024-08-01 RX ORDER — ACETAMINOPHEN 325 MG/1
650 TABLET ORAL EVERY 6 HOURS PRN
Status: DISCONTINUED | OUTPATIENT
Start: 2024-08-01 | End: 2024-08-02 | Stop reason: HOSPADM

## 2024-08-01 RX ORDER — HYDROMORPHONE HCL/PF 1 MG/ML
0.5 SYRINGE (ML) INJECTION EVERY 2 HOUR PRN
Status: DISCONTINUED | OUTPATIENT
Start: 2024-08-01 | End: 2024-08-02 | Stop reason: HOSPADM

## 2024-08-01 RX ORDER — CHLORHEXIDINE GLUCONATE 40 MG/ML
SOLUTION TOPICAL DAILY PRN
Status: DISCONTINUED | OUTPATIENT
Start: 2024-08-01 | End: 2024-08-01 | Stop reason: HOSPADM

## 2024-08-01 RX ORDER — HYDROCODONE BITARTRATE AND ACETAMINOPHEN 5; 325 MG/1; MG/1
1 TABLET ORAL EVERY 6 HOURS PRN
Qty: 30 TABLET | Refills: 0 | Status: SHIPPED | OUTPATIENT
Start: 2024-08-01 | End: 2024-08-01

## 2024-08-01 RX ORDER — KETOROLAC TROMETHAMINE 30 MG/ML
15 INJECTION, SOLUTION INTRAMUSCULAR; INTRAVENOUS EVERY 6 HOURS PRN
Status: DISCONTINUED | OUTPATIENT
Start: 2024-08-01 | End: 2024-08-02 | Stop reason: HOSPADM

## 2024-08-01 RX ORDER — SODIUM CHLORIDE, SODIUM LACTATE, POTASSIUM CHLORIDE, CALCIUM CHLORIDE 600; 310; 30; 20 MG/100ML; MG/100ML; MG/100ML; MG/100ML
125 INJECTION, SOLUTION INTRAVENOUS CONTINUOUS
Status: DISCONTINUED | OUTPATIENT
Start: 2024-08-01 | End: 2024-08-02 | Stop reason: HOSPADM

## 2024-08-01 RX ORDER — PROPOFOL 10 MG/ML
INJECTION, EMULSION INTRAVENOUS CONTINUOUS PRN
Status: DISCONTINUED | OUTPATIENT
Start: 2024-08-01 | End: 2024-08-01

## 2024-08-01 RX ORDER — ACETAMINOPHEN 500 MG
1000 TABLET ORAL EVERY 6 HOURS PRN
Qty: 90 TABLET | Refills: 0 | Status: SHIPPED | OUTPATIENT
Start: 2024-08-01

## 2024-08-01 RX ORDER — ALBUMIN, HUMAN INJ 5% 5 %
25 SOLUTION INTRAVENOUS ONCE
Status: DISCONTINUED | OUTPATIENT
Start: 2024-08-01 | End: 2024-08-01

## 2024-08-01 RX ORDER — CYCLOBENZAPRINE HCL 10 MG
5 TABLET ORAL 3 TIMES DAILY
Status: DISCONTINUED | OUTPATIENT
Start: 2024-08-01 | End: 2024-08-01

## 2024-08-01 RX ORDER — CALCIUM CARBONATE 500 MG/1
1000 TABLET, CHEWABLE ORAL DAILY PRN
Status: DISCONTINUED | OUTPATIENT
Start: 2024-08-01 | End: 2024-08-02 | Stop reason: HOSPADM

## 2024-08-01 RX ORDER — ONDANSETRON 2 MG/ML
4 INJECTION INTRAMUSCULAR; INTRAVENOUS EVERY 6 HOURS PRN
Status: DISCONTINUED | OUTPATIENT
Start: 2024-08-01 | End: 2024-08-02 | Stop reason: HOSPADM

## 2024-08-01 RX ORDER — ONDANSETRON 2 MG/ML
4 INJECTION INTRAMUSCULAR; INTRAVENOUS ONCE AS NEEDED
Status: DISCONTINUED | OUTPATIENT
Start: 2024-08-01 | End: 2024-08-01 | Stop reason: HOSPADM

## 2024-08-01 RX ORDER — ONDANSETRON 2 MG/ML
4 INJECTION INTRAMUSCULAR; INTRAVENOUS ONCE
Status: DISCONTINUED | OUTPATIENT
Start: 2024-08-01 | End: 2024-08-01 | Stop reason: HOSPADM

## 2024-08-01 RX ORDER — SODIUM CHLORIDE 9 MG/ML
125 INJECTION, SOLUTION INTRAVENOUS CONTINUOUS
Status: DISCONTINUED | OUTPATIENT
Start: 2024-08-01 | End: 2024-08-01

## 2024-08-01 RX ORDER — MIDAZOLAM HYDROCHLORIDE 2 MG/2ML
INJECTION, SOLUTION INTRAMUSCULAR; INTRAVENOUS
Status: DISCONTINUED | OUTPATIENT
Start: 2024-08-01 | End: 2024-08-01

## 2024-08-01 RX ORDER — GABAPENTIN 100 MG/1
100 CAPSULE ORAL 3 TIMES DAILY
Qty: 90 CAPSULE | Refills: 0 | Status: SHIPPED | OUTPATIENT
Start: 2024-08-01 | End: 2024-08-31

## 2024-08-01 RX ORDER — CEFAZOLIN SODIUM 1 G/50ML
1000 SOLUTION INTRAVENOUS EVERY 8 HOURS
Status: COMPLETED | OUTPATIENT
Start: 2024-08-01 | End: 2024-08-02

## 2024-08-01 RX ORDER — CHLORHEXIDINE GLUCONATE ORAL RINSE 1.2 MG/ML
15 SOLUTION DENTAL ONCE
Status: COMPLETED | OUTPATIENT
Start: 2024-08-01 | End: 2024-08-01

## 2024-08-01 RX ORDER — ENOXAPARIN SODIUM 100 MG/ML
40 INJECTION SUBCUTANEOUS DAILY
Status: DISCONTINUED | OUTPATIENT
Start: 2024-08-01 | End: 2024-08-02 | Stop reason: HOSPADM

## 2024-08-01 RX ORDER — CEFAZOLIN SODIUM 2 G/50ML
2000 SOLUTION INTRAVENOUS ONCE
Status: COMPLETED | OUTPATIENT
Start: 2024-08-01 | End: 2024-08-01

## 2024-08-01 RX ORDER — TRAMADOL HYDROCHLORIDE 50 MG/1
50 TABLET ORAL EVERY 6 HOURS PRN
Qty: 20 TABLET | Refills: 0 | Status: SHIPPED | OUTPATIENT
Start: 2024-08-01 | End: 2024-08-11

## 2024-08-01 RX ORDER — TRAMADOL HYDROCHLORIDE 50 MG/1
50 TABLET ORAL EVERY 6 HOURS PRN
Status: DISCONTINUED | OUTPATIENT
Start: 2024-08-01 | End: 2024-08-02 | Stop reason: HOSPADM

## 2024-08-01 RX ORDER — ACETAMINOPHEN 325 MG/1
975 TABLET ORAL ONCE
Status: COMPLETED | OUTPATIENT
Start: 2024-08-01 | End: 2024-08-01

## 2024-08-01 RX ORDER — FENTANYL CITRATE/PF 50 MCG/ML
25 SYRINGE (ML) INJECTION
Status: DISCONTINUED | OUTPATIENT
Start: 2024-08-01 | End: 2024-08-01 | Stop reason: HOSPADM

## 2024-08-01 RX ORDER — HYDROMORPHONE HCL/PF 1 MG/ML
0.5 SYRINGE (ML) INJECTION
Status: DISCONTINUED | OUTPATIENT
Start: 2024-08-01 | End: 2024-08-01 | Stop reason: HOSPADM

## 2024-08-01 RX ORDER — MAGNESIUM HYDROXIDE 1200 MG/15ML
LIQUID ORAL AS NEEDED
Status: DISCONTINUED | OUTPATIENT
Start: 2024-08-01 | End: 2024-08-01 | Stop reason: HOSPADM

## 2024-08-01 RX ORDER — ALBUMIN, HUMAN INJ 5% 5 %
12.5 SOLUTION INTRAVENOUS ONCE
Status: COMPLETED | OUTPATIENT
Start: 2024-08-01 | End: 2024-08-01

## 2024-08-01 RX ORDER — CYCLOBENZAPRINE HCL 5 MG
5 TABLET ORAL 3 TIMES DAILY PRN
Qty: 60 TABLET | Refills: 0 | Status: SHIPPED | OUTPATIENT
Start: 2024-08-01 | End: 2024-08-01

## 2024-08-01 RX ORDER — BUPIVACAINE HYDROCHLORIDE 5 MG/ML
INJECTION, SOLUTION EPIDURAL; INTRACAUDAL
Status: DISCONTINUED | OUTPATIENT
Start: 2024-08-01 | End: 2024-08-01

## 2024-08-01 RX ORDER — ROPIVACAINE HYDROCHLORIDE 2 MG/ML
INJECTION, SOLUTION EPIDURAL; INFILTRATION; PERINEURAL
Status: DISCONTINUED | OUTPATIENT
Start: 2024-08-01 | End: 2024-08-01

## 2024-08-01 RX ORDER — DOCUSATE SODIUM 100 MG/1
100 CAPSULE, LIQUID FILLED ORAL 2 TIMES DAILY
Status: DISCONTINUED | OUTPATIENT
Start: 2024-08-01 | End: 2024-08-02 | Stop reason: HOSPADM

## 2024-08-01 RX ORDER — ONDANSETRON 2 MG/ML
INJECTION INTRAMUSCULAR; INTRAVENOUS AS NEEDED
Status: DISCONTINUED | OUTPATIENT
Start: 2024-08-01 | End: 2024-08-01

## 2024-08-01 RX ORDER — CELECOXIB 100 MG/1
100 CAPSULE ORAL 2 TIMES DAILY
Qty: 30 CAPSULE | Refills: 0 | Status: SHIPPED | OUTPATIENT
Start: 2024-08-01

## 2024-08-01 RX ORDER — TRANEXAMIC ACID 10 MG/ML
1000 INJECTION, SOLUTION INTRAVENOUS ONCE
Status: COMPLETED | OUTPATIENT
Start: 2024-08-01 | End: 2024-08-01

## 2024-08-01 RX ORDER — SODIUM CHLORIDE, SODIUM LACTATE, POTASSIUM CHLORIDE, CALCIUM CHLORIDE 600; 310; 30; 20 MG/100ML; MG/100ML; MG/100ML; MG/100ML
125 INJECTION, SOLUTION INTRAVENOUS CONTINUOUS
Status: DISCONTINUED | OUTPATIENT
Start: 2024-08-01 | End: 2024-08-01

## 2024-08-01 RX ORDER — KETOROLAC TROMETHAMINE 30 MG/ML
15 INJECTION, SOLUTION INTRAMUSCULAR; INTRAVENOUS EVERY 6 HOURS PRN
Status: COMPLETED | OUTPATIENT
Start: 2024-08-01 | End: 2024-08-01

## 2024-08-01 RX ORDER — GABAPENTIN 100 MG/1
100 CAPSULE ORAL EVERY 8 HOURS
Status: DISCONTINUED | OUTPATIENT
Start: 2024-08-01 | End: 2024-08-02 | Stop reason: HOSPADM

## 2024-08-01 RX ORDER — GABAPENTIN 300 MG/1
300 CAPSULE ORAL ONCE
Status: COMPLETED | OUTPATIENT
Start: 2024-08-01 | End: 2024-08-01

## 2024-08-01 RX ADMIN — GABAPENTIN 300 MG: 300 CAPSULE ORAL at 06:18

## 2024-08-01 RX ADMIN — ROPIVACAINE HYDROCHLORIDE 20 ML: 2 INJECTION, SOLUTION EPIDURAL; INFILTRATION at 07:26

## 2024-08-01 RX ADMIN — DOCUSATE SODIUM 100 MG: 100 CAPSULE, LIQUID FILLED ORAL at 11:18

## 2024-08-01 RX ADMIN — SODIUM CHLORIDE 125 ML/HR: 0.9 INJECTION, SOLUTION INTRAVENOUS at 13:43

## 2024-08-01 RX ADMIN — SODIUM CHLORIDE 1000 ML: 0.9 INJECTION, SOLUTION INTRAVENOUS at 12:00

## 2024-08-01 RX ADMIN — ENOXAPARIN SODIUM 40 MG: 40 INJECTION SUBCUTANEOUS at 20:09

## 2024-08-01 RX ADMIN — PROPOFOL 100 MCG/KG/MIN: 10 INJECTION, EMULSION INTRAVENOUS at 07:41

## 2024-08-01 RX ADMIN — KETOROLAC TROMETHAMINE 15 MG: 30 INJECTION, SOLUTION INTRAMUSCULAR at 22:07

## 2024-08-01 RX ADMIN — ONDANSETRON 4 MG: 2 INJECTION INTRAMUSCULAR; INTRAVENOUS at 07:49

## 2024-08-01 RX ADMIN — CEFAZOLIN SODIUM 1000 MG: 1 SOLUTION INTRAVENOUS at 23:45

## 2024-08-01 RX ADMIN — PROPOFOL 50 MG: 10 INJECTION, EMULSION INTRAVENOUS at 07:42

## 2024-08-01 RX ADMIN — CEFAZOLIN SODIUM 2000 MG: 2 SOLUTION INTRAVENOUS at 07:42

## 2024-08-01 RX ADMIN — TRAMADOL HYDROCHLORIDE 50 MG: 50 TABLET, COATED ORAL at 13:55

## 2024-08-01 RX ADMIN — SODIUM CHLORIDE, SODIUM LACTATE, POTASSIUM CHLORIDE, AND CALCIUM CHLORIDE 125 ML/HR: .6; .31; .03; .02 INJECTION, SOLUTION INTRAVENOUS at 16:52

## 2024-08-01 RX ADMIN — BUPIVACAINE 20 ML: 13.3 INJECTION, SUSPENSION, LIPOSOMAL INFILTRATION at 07:20

## 2024-08-01 RX ADMIN — CYCLOBENZAPRINE 5 MG: 10 TABLET, FILM COATED ORAL at 11:18

## 2024-08-01 RX ADMIN — CHLORHEXIDINE GLUCONATE 15 ML: 1.2 RINSE ORAL at 06:18

## 2024-08-01 RX ADMIN — GABAPENTIN 100 MG: 100 CAPSULE ORAL at 16:47

## 2024-08-01 RX ADMIN — KETOROLAC TROMETHAMINE 15 MG: 30 INJECTION, SOLUTION INTRAMUSCULAR at 12:17

## 2024-08-01 RX ADMIN — TRAMADOL HYDROCHLORIDE 50 MG: 50 TABLET, COATED ORAL at 20:09

## 2024-08-01 RX ADMIN — MIDAZOLAM 2 MG: 1 INJECTION INTRAMUSCULAR; INTRAVENOUS at 07:10

## 2024-08-01 RX ADMIN — SODIUM CHLORIDE, SODIUM LACTATE, POTASSIUM CHLORIDE, AND CALCIUM CHLORIDE 125 ML/HR: .6; .31; .03; .02 INJECTION, SOLUTION INTRAVENOUS at 11:20

## 2024-08-01 RX ADMIN — ACETAMINOPHEN 325MG 975 MG: 325 TABLET ORAL at 06:18

## 2024-08-01 RX ADMIN — DOCUSATE SODIUM 100 MG: 100 CAPSULE, LIQUID FILLED ORAL at 18:27

## 2024-08-01 RX ADMIN — CEFAZOLIN SODIUM 1000 MG: 1 SOLUTION INTRAVENOUS at 16:52

## 2024-08-01 RX ADMIN — TRANEXAMIC ACID 1000 MG: 10 INJECTION, SOLUTION INTRAVENOUS at 07:47

## 2024-08-01 RX ADMIN — ACETAMINOPHEN 325MG 650 MG: 325 TABLET ORAL at 11:18

## 2024-08-01 RX ADMIN — SODIUM CHLORIDE, SODIUM LACTATE, POTASSIUM CHLORIDE, AND CALCIUM CHLORIDE 125 ML/HR: .6; .31; .03; .02 INJECTION, SOLUTION INTRAVENOUS at 10:42

## 2024-08-01 RX ADMIN — ALBUMIN (HUMAN) 12.5 G: 12.5 INJECTION, SOLUTION INTRAVENOUS at 13:15

## 2024-08-01 RX ADMIN — SODIUM CHLORIDE, SODIUM LACTATE, POTASSIUM CHLORIDE, AND CALCIUM CHLORIDE 125 ML/HR: .6; .31; .03; .02 INJECTION, SOLUTION INTRAVENOUS at 06:37

## 2024-08-01 RX ADMIN — MEPIVACAINE HYDROCHLORIDE 3 ML: 15 INJECTION, SOLUTION EPIDURAL; INFILTRATION at 07:38

## 2024-08-01 RX ADMIN — BUPIVACAINE HYDROCHLORIDE 10 ML: 5 INJECTION, SOLUTION EPIDURAL; INTRACAUDAL; PERINEURAL at 07:20

## 2024-08-01 RX ADMIN — ACETAMINOPHEN 325MG 650 MG: 325 TABLET ORAL at 18:27

## 2024-08-01 NOTE — PROGRESS NOTES
Post Op Check Note -Surgery LUCY Jackson 63 y.o. female MRN: [id number]  Unit/Bed#: [facility] -01 Encounter: [id number]      Subjective:   64 yo s/p left total knee arthroplasty. Per [facility] signout and report from anesthesia attending, patient had hypotensive episode earlier today which was managed with NS bolus and albumin. She reports she feels very tired. She denies dizziness, headache, SOB, chest pain or N/V during this encounter. She reports her pain is controlled.     Objective:   Blood pressure 133/72, pulse 83, temperature (!) 97.2 °F (36.2 °C), resp. rate 16, SpO2 97%.,Body mass index is 38.78 kg/m².    Physical Exam:  General: Alert and oriented x 4  CV: RRR; no murmurs, gallops, or rubs  Resp: No SOB; lungs CTA - no wheezes, rales or rhonchi  Abd: BS x 4 quadrants, soft and nontender    Left lower extremity:  Dressings C/D/I  Toes warm and well perfused  Motor and sensation grossly intact distally  HV x 1 - 240 cc since surgery  2+ DP Pulse    Labs:  0   Lab Value Date/Time    HCT 45.5 05/31/2024 0910    HCT 30.9 (L) 10/11/2023 0442    HCT 36.3 10/10/2023 0443    HGB 14.7 05/31/2024 0910    HGB 10.1 (L) 10/11/2023 0442    HGB 11.9 10/10/2023 0443    INR 1.07 05/31/2024 0910    WBC 3.99 (L) 05/31/2024 0910    WBC 5.34 10/11/2023 0442    WBC 9.61 10/10/2023 0443    ESR 22 08/03/2021 1203    CRP 9.6 (H) 06/16/2023 1630     Meds:    Current Facility-Administered Medications:     acetaminophen (TYLENOL) tablet 650 mg, 650 mg, Oral, Q6H PRN, Celena Thompson PA-C, 650 mg at 08/01/24 1827    calcium carbonate (TUMS) chewable tablet 1,000 mg, 1,000 mg, Oral, Daily PRN, Celena Thompson PA-C    ceFAZolin (ANCEF) IVPB (premix in dextrose) 1,000 mg 50 mL, 1,000 mg, Intravenous, Q8H, Celena Thompson PA-C, Last Rate: 100 mL/hr at 08/01/24 1652, 1,000 mg at 08/01/24 1652    docusate sodium (COLACE) capsule 100 mg, 100 mg, Oral, BID, Celena Thompson PA-C, 100 mg at 08/01/24 1827    enoxaparin (LOVENOX) subcutaneous injection  40 mg, 40 mg, Subcutaneous, Daily, Celena Thompson PA-C    gabapentin (NEURONTIN) capsule 100 mg, 100 mg, Oral, Q8H, LUCY Molina-REINA, 100 mg at 08/01/24 1647    HYDROmorphone (DILAUDID) injection 0.5 mg, 0.5 mg, Intravenous, Q2H PRN, Celena Thompson PA-C    ketorolac (TORADOL) injection 15 mg, 15 mg, Intravenous, Q6H PRN, Celena Thompson PA-C    lactated ringers bolus 1,000 mL, 1,000 mL, Intravenous, Once PRN **AND** lactated ringers bolus 1,000 mL, 1,000 mL, Intravenous, Once PRN, Celena Thompson PA-C    lactated ringers infusion, 125 mL/hr, Intravenous, Continuous, Celena Thompson PA-C, Last Rate: 125 mL/hr at 08/01/24 1652, 125 mL/hr at 08/01/24 1652    ondansetron (ZOFRAN) injection 4 mg, 4 mg, Intravenous, Q6H PRN, Celena Thompson PA-C    povidone-iodine (BETADINE) 10 % 20 Application in sodium chloride 0.9 % 500 mL irrigation bottle, , Irrigation, Once, Theodore Geiger MD    sodium chloride 0.9 % bolus 1,000 mL, 1,000 mL, Intravenous, Once PRN **AND** sodium chloride 0.9 % bolus 1,000 mL, 1,000 mL, Intravenous, Once PRN, Celena Thompson PA-C, Last Rate: 1,000 mL/hr at 08/01/24 1200, 1,000 mL at 08/01/24 1200    traMADol (ULTRAM) tablet 50 mg, 50 mg, Oral, Q6H PRN, LUCY Molina-REINA, 50 mg at 08/01/24 1355    Assessment/Plan:   Assessment:     63 y.o.female post operative day 0 left total knee arthroplasty. Improving. Pain controlled.    Plan:  Incentive spirometry  Monitor BP  Avoid muscle relaxants  Weight Bearing as tolerated LLE  Up and out of bed with walker  DVT prophylaxis - SCDs and Lovenox  Drain: remove before discharge  Analgesics and ice  PT/OT  Will continue to assess for acute blood loss anemia  Discharge when cleared by PT  Point of care follow-up with PCP  Postop appointment with babita Flores PA-C

## 2024-08-01 NOTE — ANESTHESIA PREPROCEDURE EVALUATION
Amelia Lora is here today for Follow-up    Concerns/symptoms: none  Medications: medications verified and updated  Refills needed today? Yes, medications pended     Tobacco history: verified  Advanced Directives: No not on file, not interested.  BP greater than 140/90? No    Patient would like communication of their results via:    Cell Phone:   Telephone Information:   Mobile [phone]     Okay to leave a message containing results? Yes  Preferred language:  English.    Health Maintenance Due   Topic Date Due    Diabetes Foot Exam  08/23/2023      Patient is due for the topics as listed above and wishes to proceed with them. Orders placed for Diabetes Foot Exam..    Medicare HRA:                PHQ 2:  PHQ 2 Score Adult PHQ 2 Score Adult PHQ 2 Interpretation Little interest or pleasure in activity?   11/14/2023   5:28 PM 0 No further screening needed 0       PHQ 9:  PHQ 9 Score Adult PHQ 9 Score Adult PHQ 9 Interpretation   8/23/2022  11:34 AM 3 Minimal Depression        Procedure:  ARTHROPLASTY KNEE TOTAL W ROBOT (Left: Knee)    Relevant Problems   CARDIO   (+) Hyperlipidemia      MUSCULOSKELETAL   (+) Primary localized osteoarthritis of left knee        Physical Exam    Airway    Mallampati score: II  TM Distance: >3 FB  Neck ROM: full     Dental   No notable dental hx     Cardiovascular      Pulmonary      Other Findings  post-pubertal.      Anesthesia Plan  ASA Score- 3     Anesthesia Type- spinal with ASA Monitors.         Additional Monitors:     Airway Plan:            Plan Factors-Exercise tolerance (METS): >4 METS.    Chart reviewed. EKG reviewed.  Existing labs reviewed. Patient summary reviewed.    Patient is not a current smoker.      There is medical exclusion for perioperative obstructive sleep apnea risk education.        Induction- intravenous.    Postoperative Plan- Plan for postoperative opioid use.         Informed Consent- Anesthetic plan and risks discussed with patient.  I personally reviewed this patient with the CRNA. Discussed and agreed on the Anesthesia Plan with the CRNA..

## 2024-08-01 NOTE — H&P
H&P Exam - Orthopedics   Amber Jackson 63 y.o. female MRN: [id number]      Assessment/Plan   Assessment:  left Knee Osteoarthritis in this adult female who continues to have pain and dysfunction despite appropriate nonsurgical treatment    Plan:  left  Total Knee Arthroplasty.  Patient is to meet with risks, benefits, and alternatives.  I did remind this patient about the significant pain and swelling she had following right knee replacement several months back, to serve as a reminder that her recovery may not be easy    TOTAL KNEE REPLACEMENT INDICATIONS AND RISKS    We had a lengthy discussion with the patient regarding the potential options for treatment. The patient has had an extensive conservative management course up until this time and therefore I do not feel that any additional conservative management will provide additional relief. The patient's symptoms have progressively worsened to the point where they now limit the patient's daily activities and quality of life.     At this time, I feel that this patient would be an excellent candidate for a total knee replacement. The patient has failed non-operative care and continues to have unacceptable symptoms. We discussed the treatment options and alternatives and the risks and benefits of surgery in great detail.    While no guarantees can be made, total knee replacement has a very high success rate in terms of relieving a patient's knee pain and returning them to a more active, independent lifestyle for 10-15 years or more. All surgery carries some risk; for knee replacement, the complication rate is low but may include: death (very rare), infection, bleeding requiring transfusion, blood clots in legs traveling to lungs, nerve and/or blood vessel damage, bone fracture, persistent knee pain and/or stiffness, and repeat surgery(ies). The risk of a major complication is about 1-2 per 1000 cases. Total knee replacement should only be done if conservative  treatment has failed. The revision rate for total knee replacements is about 1-2% per year; in other words, 85-95% of knee replacements may last 10 years; 75-85% last 20 years; and so on, assuming no injury to the knee. Finally we discussed anesthesia related complications which will be discussed in greater detail with the anesthesia team before surgery.     The patient was shown total knee booklets, diagrams and/or models and all of their questions have been answered at the present time. The patient may call or come in if they have any other concerns or questions. The patient also understands the post-operative rehabilitation process and the need for their cooperation and participation, and that their results may be compromised by their lack of compliance. The patient would like to proceed. Patient is encouraged to seek additional opinions if they so desire. The patient voiced their understanding of the surgical plan and potential complications and wishes to proceed with surgery.      History of Present Illness   HPI:  Abmer Jackson is a 63 y.o. female who presents with pain in the left knee. Patient is no longer getting adequate relief from non-operative modalities. Patient is continuing to have debilitating pain from their knee, interfering with daily activities and sleep. Patient denies any concerns with infections, new neuropathies, or any acute injuries.     Historical Information  Review Of Systems:   Skin: Normal  Neuro: See HPI  Musculoskeletal: See HPI  14 point review of systems negative except as stated above     Past Medical History:   Past Medical History:   Diagnosis Date    Arthritis 2013    Chest pain 07/01/2014    Colon polyp     Disease of thyroid gland     Elevated blood sugar 07/01/2014    Heart murmur     Functional, child    Hypercholesteremia     Hyperlipidemia     Obesity 2010    Osteoarthritis     Urinary tract infection     Recurring       Past Surgical History:   Past Surgical  History:   Procedure Laterality Date    BIOPSY CORE NEEDLE      Thyroid Using Percutaneous Core Needle    BUNIONECTOMY Bilateral      SECTION      x 2    FOOT SURGERY Bilateral     As a teenager - Bunionectomy - Dr. Vibha Nation - Last Assessed: 2016    JOINT REPLACEMENT Right     oct 2023    IA ARTHRP KNE CONDYLE&PLATU MEDIAL&LAT COMPARTMENTS Right 10/09/2023    Procedure: ARTHROPLASTY RIGHT KNEE TOTAL W ROBOT;  Surgeon: Theodore Geiger MD;  Location: [facility];  Service: Orthopedics    THYROID LOBECTOMY Right 2015    Dr. Mae Salcido: Right Lobe - Last Assessed: 2016 Dr. Abdullahi Nation       Family History:  Family history reviewed and non-contributory  Family History   Problem Relation Age of Onset    Graves' disease Mother     Hyperlipidemia Mother     Hypertension Mother     Osteoporosis Mother     Dementia Mother     Thyroid disease Mother     Arthritis Mother     Autoimmune disease Mother         Graves disease    Aortic stenosis Father     Hyperlipidemia Father     Hypertension Father     Coronary artery disease Father         Coronary Artery Bypass Graft (CABG)    Heart disease Father         Pacemaker Placement    Valvular heart disease Father         S/P Transcatheter Aortic Valve Replacement (TAVR)    Arthritis Father     Hearing loss Father     No Known Problems Sister     No Known Problems Daughter     No Known Problems Daughter     No Known Problems Daughter     No Known Problems Daughter     No Known Problems Maternal Grandmother     No Known Problems Maternal Grandfather     No Known Problems Paternal Grandmother     No Known Problems Paternal Grandfather     No Known Problems Son     No Known Problems Son     Cancer Maternal Aunt         stomach    Breast cancer Paternal Aunt     Cancer Paternal Aunt         nonhodgkins lymphoma       Social History:  Social History     Socioeconomic History    Marital status: /Civil Union     Spouse name: None     Number of children: 6    Years of education: None    Highest education level: None   Occupational History    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   Tobacco Use    Smoking status: Never     Passive exposure: Never    Smokeless tobacco: Never   Vaping Use    Vaping status: Never Used   Substance and Sexual Activity    Alcohol use: Not Currently     Comment: rarely    Drug use: No    Sexual activity: Yes     Partners: Male     Birth control/protection: Post-menopausal   Other Topics Concern    None   Social History Narrative    Feels safe at home     Social Determinants of Health     Financial Resource Strain: Not on file   Food Insecurity: No Food Insecurity (10/10/2023)    Hunger Vital Sign     Worried About Running Out of Food in the Last Year: Never true     Ran Out of Food in the Last Year: Never true   Transportation Needs: No Transportation Needs (10/10/2023)    PRAPARE - Transportation     Lack of Transportation (Medical): No     Lack of Transportation (Non-Medical): No   Physical Activity: Not on file   Stress: Not on file   Social Connections: Not on file   Intimate Partner Violence: Not on file   Housing Stability: Low Risk  (10/10/2023)    Housing Stability Vital Sign     Unable to Pay for Housing in the Last Year: No     Number of Times Moved in the Last Year: 1     Homeless in the Last Year: No       Allergies:   Allergies   Allergen Reactions    Methocarbamol Dizziness and Confusion     Hypotension      Oxycodone Other (See Comments), Dizziness and Confusion     hypotension           Labs:  0   Lab Value Date/Time    HCT 45.5 05/31/2024 0910    HCT 30.9 (L) 10/11/2023 0442    HCT 36.3 10/10/2023 0443    HGB 14.7 05/31/2024 0910    HGB 10.1 (L) 10/11/2023 0442    HGB 11.9 10/10/2023 0443    INR 1.07 05/31/2024 0910    WBC 3.99 (L) 05/31/2024 0910    WBC 5.34 10/11/2023 0442    WBC 9.61 10/10/2023 0443    ESR 22 08/03/2021 1203    CRP 9.6 (H) 06/16/2023 1630       Meds:    Current  "Facility-Administered Medications:     ceFAZolin (ANCEF) IVPB (premix in dextrose) 2,000 mg 50 mL, 2,000 mg, Intravenous, Once, Francisco Acevedo MD    chlorhexidine gluconate (HIBICLENS) 4 % topical liquid, , Topical, Daily PRN, Francisco Acevedo MD    lactated ringers infusion, 125 mL/hr, Intravenous, Continuous, Eugene Neely MD, Last Rate: 125 mL/hr at 08/01/24 0637, 125 mL/hr at 08/01/24 0637    ondansetron (ZOFRAN) injection 4 mg, 4 mg, Intravenous, Once, Francisco Acevedo MD    tranexamic acid (CYKLOKAPRON) 1000-0.7 MG/100ML-% injection 1,000 mg, 1,000 mg, Intravenous, Once, Francisco Acevedo MD    Blood Culture:   No results found for: \"BLOODCX\"    Wound Culture:   No results found for: \"WOUNDCULT\"    Ins and Outs:  No intake/output data recorded.          Physical Exam  /90   Pulse 71   Temp 97.7 °F (36.5 °C) (Temporal)   Resp 22   Ht 4' 11\" (1.499 m)   Wt 87.1 kg (192 lb)   LMP 10/27/2017 (Exact Date)   SpO2 98%   BMI 38.78 kg/m²   /90   Pulse 71   Temp 97.7 °F (36.5 °C) (Temporal)   Resp 22   Ht 4' 11\" (1.499 m)   Wt 87.1 kg (192 lb)   LMP 10/27/2017 (Exact Date)   SpO2 98%   BMI 38.78 kg/m²   Gen: No acute distress, resting comfortably in bed  HEENT: Eyes clear, moist mucus membranes, hearing intact  Respiratory: No audible wheezing or stridor  Cardiovascular: Well Perfused peripherally, 2+ distal pulse  Abdomen: nondistended, no peritoneal signs  Ortho Exam: limited ROM due to pain and mechanical blocking  Neuro Exam: intact    Lab Results: Reviewed  Imaging: Reviewed   "

## 2024-08-01 NOTE — PLAN OF CARE
Problem: PAIN - ADULT  Goal: Verbalizes/displays adequate comfort level or baseline comfort level  Description: Interventions:  - Encourage patient to monitor pain and request assistance  - Assess pain using appropriate pain scale  - Administer analgesics based on type and severity of pain and evaluate response  - Implement non-pharmacological measures as appropriate and evaluate response  - Consider cultural and social influences on pain and pain management  - Notify physician/advanced practitioner if interventions unsuccessful or patient reports new pain  Outcome: Progressing     Problem: INFECTION - ADULT  Goal: Absence or prevention of progression during hospitalization  Description: INTERVENTIONS:  - Assess and monitor for signs and symptoms of infection  - Monitor lab/diagnostic results  - Monitor all insertion sites, i.e. indwelling lines, tubes, and drains  - Monitor endotracheal if appropriate and nasal secretions for changes in amount and color  - [place] appropriate cooling/warming therapies per order  - Administer medications as ordered  - Instruct and encourage patient and family to use good hand hygiene technique  - Identify and instruct in appropriate isolation precautions for identified infection/condition  Outcome: Progressing  Goal: Absence of fever/infection during neutropenic period  Description: INTERVENTIONS:  - Monitor WBC    Outcome: Progressing     Problem: SAFETY ADULT  Goal: Patient will remain free of falls  Description: INTERVENTIONS:  - Educate patient/family on patient safety including physical limitations  - Instruct patient to call for assistance with activity   - Consult OT/PT to assist with strengthening/mobility   - Keep Call bell within reach  - Keep bed low and locked with side rails adjusted as appropriate  - Keep care items and personal belongings within reach  - Initiate and maintain comfort rounds  - Make Fall Risk Sign visible to staff  - Offer Toileting every 2 Hours,  in advance of need  - Initiate/Maintain bed/chair alarm  - Obtain necessary fall risk management equipment: rolling walker  - Apply yellow socks and bracelet for high fall risk patients  - Consider moving patient to room near nurses station  Outcome: Progressing  Goal: Maintain or return to baseline ADL function  Description: INTERVENTIONS:  -  Assess patient's ability to carry out ADLs; assess patient's baseline for ADL function and identify physical deficits which impact ability to perform ADLs (bathing, care of mouth/teeth, toileting, grooming, dressing, etc.)  - Assess/evaluate cause of self-care deficits   - Assess range of motion  - Assess patient's mobility; develop plan if impaired  - Assess patient's need for assistive devices and provide as appropriate  - Encourage maximum independence but intervene and supervise when necessary  - Involve family in performance of ADLs  - Assess for home care needs following discharge   - Consider OT consult to assist with ADL evaluation and planning for discharge  - Provide patient education as appropriate  Outcome: Progressing  Goal: Maintains/Returns to pre admission functional level  Description: INTERVENTIONS:  - Perform AM-PAC 6 Click Basic Mobility/ Daily Activity assessment daily.  - Set and communicate daily mobility goal to care team and patient/family/caregiver.   - Collaborate with rehabilitation services on mobility goals if consulted  - Perform Range of Motion 3 times a day.  - Reposition patient every 2 hours.  - Dangle patient 3 times a day  - Stand patient 3 times a day  - Ambulate patient 3 times a day  - Out of bed to chair 3 times a day   - Out of bed for meals 3 times a day  - Out of bed for toileting  - Record patient progress and toleration of activity level   Outcome: Progressing     Problem: DISCHARGE PLANNING  Goal: Discharge to home or other facility with appropriate resources  Description: INTERVENTIONS:  - Identify barriers to discharge  w/patient and caregiver  - Arrange for needed discharge resources and transportation as appropriate  - Identify discharge learning needs (meds, wound care, etc.)  - Arrange for interpretive services to assist at discharge as needed  - Refer to Case Management Department for coordinating discharge planning if the patient needs post-hospital services based on physician/advanced practitioner order or complex needs related to functional status, cognitive ability, or social support system  Outcome: Progressing     Problem: Knowledge Deficit  Goal: Patient/family/caregiver demonstrates understanding of disease process, treatment plan, medications, and discharge instructions  Description: Complete learning assessment and assess knowledge base.  Interventions:  - Provide teaching at level of understanding  - Provide teaching via preferred learning methods  Outcome: Progressing

## 2024-08-01 NOTE — PHYSICAL THERAPY NOTE
PT EVALUATION 15:00-15:25  Treat: 15:25-15:43    63 y.o.    [id number]    Primary localized osteoarthritis of left knee [M17.12]    Past Medical History:   Diagnosis Date    Arthritis 2013    Chest pain 2014    Colon polyp     Disease of thyroid gland     Elevated blood sugar 2014    Heart murmur     Functional, child    Hypercholesteremia     Hyperlipidemia     Obesity 2010    Osteoarthritis     Urinary tract infection     Recurring         Past Surgical History:   Procedure Laterality Date    BIOPSY CORE NEEDLE      Thyroid Using Percutaneous Core Needle    BUNIONECTOMY Bilateral      SECTION      x 2    FOOT SURGERY Bilateral     As a teenager - Bunionectomy - Dr. Vibha Nation - Last Assessed: 2016    JOINT REPLACEMENT Right     oct 2023    [place] ARTHRP KNE CONDYLE&PLATU MEDIAL&LAT COMPARTMENTS Right 10/09/2023    Procedure: ARTHROPLASTY RIGHT KNEE TOTAL W ROBOT;  Surgeon: Theodore Geiger MD;  [facility]: [facility];  Service: Orthopedics    THYROID LOBECTOMY Right 2015    Dr. Mae Salcido: Right Lobe - Last Assessed: 2016 Dr. Abdullahi Nation      24 1500   PT Last Visit   PT Visit Date 24   Note Type   Note type Evaluation  (and treatment)   Pain Assessment   Pain Score 4   Pain Location/Orientation Orientation: Left   Restrictions/Precautions   Weight Bearing Precautions Per Order Yes   LLE Weight Bearing Per Order WBAT   Other Precautions Fall Risk;Multiple lines;Bed Alarm;Chair Alarm;WBS;Pain   Home Living   Type of Home House   Home Layout Two level;1/2 bath on main level;Able to live on main level with bedroom/bathroom  (Has two- 7 inch ELIZABETH with B/L HR)   Bathroom Shower/Tub Tub/shower unit   Bathroom Toilet Raised  (comfort height)   Bathroom Equipment Shower chair;Commode   Bathroom Accessibility Accessible   Home Equipment Grab bars;Cane;Other (Comment)  (transport chair, RW.  pull out bed.  step to enter bed with rails.)   Additional  Comments resides with spouse in 2 story home. Ability to stay on first floor with 1/2 bath and BSC use.  First week will have home 24*support prn between spouse and adult children.   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  Driving prior to admission.  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   General   Additional Pertinent History Amber is a 62 y/o female presents for L TKR. Hx of R TKR 10/2024.   Family/Caregiver Present Yes   Cognition   Overall Cognitive Status WFL   Arousal/Participation Alert   Orientation Level Oriented X4   Following Commands Follows all commands and directions without difficulty   Comments Pleasant.   Subjective   Subjective Still with much pain, but wants to get up to the toilet.  Worried about doing the stairs to go home.   RUE Assessment   RUE Assessment WFL   LUE Assessment   LUE Assessment WFL   RLE Assessment   RLE Assessment WFL   LLE Assessment   LLE Assessment X  (grossly <3-/5, + pain.  Ankle 4-/5)   Vision-Basic Assessment   Current Vision Wears glasses all the time   Light Touch   RLE Light Touch Grossly intact   LLE Light Touch Grossly intact   Bed Mobility   Supine to Sit 3  Moderate assistance   Additional items Assist x 1;Increased time required;Verbal cues;LE management;Bedrails;HOB elevated  (increased time, paced to reach EOB. A for operative leg placement.)   Additional Comments Increased time to complete transitions, pacing needed 2* pain.  A for operative leg.  BP /98.   Transfers   Sit to Stand 3  Moderate assistance   Additional items Assist x 1;Increased time required;Verbal cues  (increased time to complete. Cues  for hand placement and operative leg placement. Cues for UE WB and posture needed. 2nd person standby for safety.)   Stand to Sit 3  Moderate assistance  (2nd person standby for safety/line management.)   Additional items Assist x 1;Increased time required;Verbal cues;Armrests   Additional Comments Cues for hand and operative leg placement.  2nd person standby for safety.  BP p mobility 129/80.   Ambulation/Elevation   Gait pattern Improper Weight shift;Decreased foot clearance;Forward Flexion;Antalgic;Decreased L stance;Inconsistent deni;Foward flexed;Short stride;Excessively slow  (pivots on nonoperative leg to advance.  Little to no foot clearance. Cues for UE WB/offloading.)   Gait Assistance 3  Moderate assist   Additional items Assist x 1;Assist x 2;Tactile cues;Verbal cues  (2nd person standby for safety)   Assistive Device Rolling walker   Distance 3'x1 OOB to BSC.  Incresaed time.  Pivots on RLE to advance given limited tolerance to WB LLE and decreased UE use with flexed posture.   Stair Management Assistance Not tested   Balance   Static Sitting Good   Dynamic Sitting Fair   Static Standing Poor +   Dynamic Standing Poor   Ambulatory Poor -   Endurance Deficit   Endurance Deficit Yes   Endurance Deficit Description fatigue, weakness, pain.   Activity Tolerance   Activity Tolerance Patient limited by pain;Patient limited by fatigue;Treatment limited secondary to medical complications (Comment)   Medical Staff Made Aware NurseBernadette.   Nurse Made Aware yes, present.   Assessment   Prognosis Good   Problem List Decreased strength;Decreased range of motion;Decreased endurance;Impaired balance;Decreased mobility;Decreased coordination;Obesity;Decreased skin integrity;Orthopedic restrictions;Pain   Assessment Amber Jackson is a 64 y/o female with hx of R TKR 10/2023 who presents to [facility] for L TKR.  Pt with hx of arthritis, chest pain, heart murmur, hypercholesterolemia, hyperlipidemia, obesity, UTI.   PT consulted post operatively. Activity as tolerated and WBAT LLE. Prior to admission resides with spouse in 2 story home.  2 ELIZABETH with B/L rails and ability to stay on first floor with BSC and half bath.  Prior to procedure ambulates independently without AD, independent with all ADLS and IADLs and no hx of falls.  Supportive spouse at bedside for evaluation.  Currently presents with functional limitations related to impairments in posture, LLE knee ROM and strength, increased pain.  Decreased activity tolerance, balance, functional mobility and locomotion requiring more restrictive AD use of RW support with increased fall risk post operatively.  Requires modA for bed mobility with increased time, pacing and cues with operative leg management.  ModA for transfers sit to stand with increased time, cues needed for posture and UE WB for offloading LLE given pain.  Able to progress few feet OOB to BSC.  Poor RLE advancement given difficulty accepting weight to operative leg.  Cues for upright posture, UE WB to improve LE advancement and foot clearance needed. BP /98. With mobility 129/80.  Dizziness and weakness reported with mobility, improved with seated rest.  Given post operative impairments will require skilled PT in order to progress and achieve PT goals for transition to home with family support.  The patient's AM-PAC Basic Mobility Inpatient Short Form Raw Score is 12. A Raw score of less than or equal to 16 suggests the patient may benefit from discharge to post-acute rehabilitation services. Please also refer to the recommendation of the Physical Therapist for safe discharge planning.  At present requiring increased assistance with mobility given pain and fluctuations in vitals.  Plan is home with OPPT provided achieves functional goals.  PT will follow and progress per POC.  Please see treatment following evaluation to facilitate progress for discharge.   Barriers to Discharge Inaccessible home  environment   Barriers to Discharge Comments ELIZABETH   Goals   Patient Goals go home tomorrow if able.   STG Expiration Date 08/06/24   Short Term Goal #1 5 days: 1).  Pt will perform bed mobility with Mara demonstrating appropriate technique 100% of the time in order to improve function.2)  Perform all transfers with Mara demonstrating safe and appropriate technique 100% of the time in order to improve ability to negotiate safely in home environment.3) Amb with least restrictive AD > 80'x1 with mod I in order to demonstrate ability to negotiate in home environment.4)  Improve overall strength and balance 1/2 grade in order to optimize ability to perform functional tasks and reduce fall risk.5) Increase activity tolerance to 30 minutes in order to improve endurance to functional tasks.6)  Negotiate stairs using most appropriate technique and Curt in order to be able to negotiate safely in home environment. 7) PT for ongoing patient and family/caregiver education, DME needs and d/c planning in order to promote highest level of function in least restrictive environment.   PT Treatment Day 1   Plan   Treatment/Interventions Functional transfer training;LE strengthening/ROM;Elevations;Therapeutic exercise;Endurance training;Patient/family training;Equipment eval/education;Bed mobility;Gait training;Compensatory technique education;Continued evaluation;Spoke to nursing;OT   PT Frequency Twice a day   Discharge Recommendation   Rehab Resource Intensity Level, PT III (Minimum Resource Intensity)  (has OPPT scheduled for Monday 8/5.)   Equipment Recommended Other (Comment)  (has all DME from previous surgery 10/2023)   AM-PAC Basic Mobility Inpatient   Turning in Flat Bed Without Bedrails 3   Lying on Back to Sitting on Edge of Flat Bed Without Bedrails 2   Moving Bed to Chair 2   Standing Up From Chair Using Arms 2   Walk in Room 2   Climb 3-5 Stairs With Railing 1   Basic Mobility Inpatient Raw Score 12   Basic Mobility  Standardized Score 32.23   [facility] Highest Level Of Mobility   -[facility] Goal 4: Move to chair/commode   -[facility] Achieved 5: Stand (1 or more minutes)   Additional Treatment Session   Start Time 1525   End Time 1543   Treatment Assessment Treatment following evaluation to progress with functional mobility, review POC, goals and provide family/caregiver/patient education to promote outcomes.  Trasfers sit to stand from [facility] with modA. Increased time and cues needed.  Able to perform pericare in stance with single UE support on RW and Curt to steady. Stand tolerance approx 1 minute. Amb 3 steps back to return to seated in bedside recliner chair. Contiues to have difficulty with RLE advancement with little to no foot clearance to advance. Increased UE use needed for positioning in chair. Reviewed and performed AP ex x 10.  Ed on positional changes and to avoid placement of pillow under knee to promote extension.  Reviewed technique for stair navigation with spouse and patient as well as guarding for same.  Goals for d/c to home reviewed.  All questions addressed.  Continue progress per POC in order to acheive goals for safe d/c to home with OPPT.   Equipment Use RW   Additional Treatment Day 1   Exercises   Ankle Pumps Sitting;10 reps;AROM;Bilateral  (with LE elevation in recliner chair.  Encouraged AP ad giselle for DVT prophylaxis.)   Neuro re-ed Review importance of mobiltiy frequency to promote outcomes.  Positioning to optimize both flexion and extension of knee.   Balance training  Standing static and dynamic activities. Static standing balance fair, dynamic balance poor+.  Tolerance to stand approx 1 minute.   End of Consult   Patient Position at End of Consult Bedside chair;Bed/Chair alarm activated;All needs within reach   End of Consult Comments seated OOB in chair with spouse at bedside.  /78.   Hx/personal factors: ELIZABETH home environment, steps within home environment, difficulty performing ADLs, difficulty  performing IADLs, difficulty performing transfers/mobility, WBS, fall risk , increased reliance on DME , new use of AD for functional transfers/mobility, and Inability to perform current job functions , comorbidities    Examination:decreased strength , decreased LE ROM, joint integrity, decreased balance, decreased activity tolerance, gait deviations, limited functional reach, increased pain, Fluctuating vitals, increased body habitus , impaired coordination, impaired posture, WBS, and orthopedic restrictions.     Clinical: unpredictable Ongoing medical status, Risk for falls, bed/chair alarm, Continuous monitoring, Pain management, Decreased activity tolerance compared to baseline, More restrictive AD use than baseline, Decline from PLOF., and WBS.     Complexity: high             Sabrina Law, PT

## 2024-08-01 NOTE — ANESTHESIA PROCEDURE NOTES
Spinal Block    Patient location during procedure: OR  Start time: 8/1/2024 7:38 AM  Staffing  Performed by: Berna Dominguez CRNA  Authorized by: Eugene Neely MD    Preanesthetic Checklist  Completed: patient identified, IV checked, site marked, risks and benefits discussed, surgical consent, monitors and equipment checked, pre-op evaluation and timeout performed  Spinal Block  Patient position: sitting  Prep: ChloraPrep  Patient monitoring: heart rate, cardiac monitor, continuous pulse ox and frequent blood pressure checks  Approach: midline  Location: L3-4  Needle  Needle type: Pencan   Needle gauge: 24 G  Needle length: 4 in  Assessment  Sensory level: T10  Injection Assessment:  negative aspiration for heme, no paresthesia on injection and positive aspiration for clear CSF.

## 2024-08-01 NOTE — OP NOTE
OPERATIVE REPORT  PATIENT NAME: Amber Jackson  : 1960  MRN: [id number]  Pt Location:  [facility] OR ROOM 06    Surgery Date: 2024    Surgeons and Role:     * Theodore Geiger MD - Primary     * Celena Thompson PA-C - Assisting     Preop Diagnosis:  Primary localized osteoarthritis of left knee [M17.12]    Post-Op Diagnosis Codes:     * Primary localized osteoarthritis of left knee [M17.12]    Procedure(s):  Left - ARTHROPLASTY KNEE TOTAL W ROBOT    Specimens:  * No specimens in log *    Estimated Blood Loss:   Minimal    Drains:  Closed/Suction Drain Left Knee Accordion 10 Fr. (Active)   Number of days: 0       Urethral Catheter Non-latex 16 Fr. (Active)   Number of days: 0       Anesthesia Type:   General w/ Regional     Operative Indications:  Primary localized osteoarthritis of left knee [M17.12]    Operative Findings:  Depuy attune   Femur-3N   Poly-8   Tibia-2   Patella-32    Complications:   None    Knee Technique: Suture (direct) Repair  Knee Approach: Medial Parapatellar    Procedure and Technique:  Following duction medical level of spinal anesthesia, Terrazas catheter was sterilely introduced to this patient's bladder.  Antibiotics were ministered.  Left thigh was then fitted with a thigh-high tourniquet.  The left lower extremity was sterile prep drape.  Antibiotics administered.  Left lower extremity is examined to gravity, the tourniquet flighted to 300 mmHg.  A midline knee incision was greater than in flexion.  Full-thickness flaps were raised when accessed the extensor mechanism.  A medial arthrotomy was created to open up the knee joint.  2 half pins in place in proximal tibia, 2 half pins in place with distal femur.  In doing so, modules were created.  The arrays were then attached the modules.  Checkpoints made the proximal tibia distal femur.  The knee was then registered.  The surgery was planned out on the computer, the plan was finalized.  The robot was docked.  The first maneuver of  the distal femoral cut probably anterior posterior cuts.  The chamfer cuts completed the process.  Proximal tibia cuts made next.  Care was taken preserve the taken the medial condyle, lateral collateral, posterior structures during these maneuvers.  The box cut was made for the posterior stabilized unit, remnant medial and lateral meniscectomies and performed.  Trials inserted, but he was taken the range of motion, found to begin for full extension, good flexion, stable throughout.  The patella was then resurfaced while utilizing manufactures equipment, cemented be a size 32 mm button.  The trial components removed and the knee was prepared for insertion of cemented components.  The cemented tibia, cemented femur, trial poly-, cemented patella placed.  Excess cement was removed, and the knee was brought to extension.  The cement was allowed to cure.  The trial poly was taken out, the knee was packed out.  The tourniquet was deflated, hemostasis was secured.  The insert polyethylene was snapped into position.  The knee was taken their final range of motion, found to be capable full extension, good flexion, stable throughout, next patella tracking.  Satisfied with the extent of surgery, the wounds then flushed with saline and closed.  Betadine soak initiated.  A drain was placed deep brought via separate stab incision.  The arthrotomy was closed with number Vicryl suture.  The subcu tissue was closed with 2-0 Vicryl suture.  The skin was closed with staples.  Sterile dressings were applied.  She was then transferred to recovery in stable condition plans include physical therapy for weightbearing to tolerance.  She will require DVT prophylaxis with Lovenox.  Please note, there is no qualified orthopedic resident was available assist, the assistance of Errol Birmingham PA-C was instrumental in the safe excuse this patient surgery.  Started the patient position, patient prepped draped, IntraOp assistance, wound closure,  dressing application, patient transfers, all is performed under my direct supervision   I was present for the entire procedure.    Patient Disposition:  PACU             SIGNATURE: Theodore Geiger MD  DATE: August 1, 2024  TIME: 8:51 AM

## 2024-08-01 NOTE — ANESTHESIA PROCEDURE NOTES
Anesthesia Notable Event    Date/Time: 8/1/2024 8:00 AM    Patient location during procedure: OR procedure room  Performed by: Berna Dominguez CRNA  Authorized by: Eugene Neely MD    Anesthesia notable event Other: other  Nose bleed r/t nasal airway placement. Vss, suction through NA, seems to be resolved. Will remove airway when pt awake

## 2024-08-01 NOTE — PLAN OF CARE
Problem: PHYSICAL THERAPY ADULT  Goal: Performs mobility at highest level of function for planned discharge setting.  See evaluation for individualized goals.  Description: Treatment/Interventions: Functional transfer training, LE strengthening/ROM, Elevations, Therapeutic exercise, Endurance training, Patient/family training, Equipment eval/education, Bed mobility, Gait training, Compensatory technique education, Continued evaluation, Spoke to nursing, OT  Equipment Recommended: Other (Comment) (has all DME from previous surgery 10/2023)       See flowsheet documentation for full assessment, interventions and recommendations.  Outcome: Progressing  Note: Prognosis: Good  Problem List: Decreased strength, Decreased range of motion, Decreased endurance, Impaired balance, Decreased mobility, Decreased coordination, Obesity, Decreased skin integrity, Orthopedic restrictions, Pain  Assessment: Amber Jackson is a 64 y/o female with hx of R TKR 10/2023 who presents to [facility] for L TKR.  Pt with hx of arthritis, chest pain, heart murmur, hypercholesterolemia, hyperlipidemia, obesity, UTI.  PT consulted post operatively. Activity as tolerated and WBAT LLE. Prior to admission resides with spouse in 2 story home.  2 ELIZABETH with B/L rails and ability to stay on first floor with BSC and half bath.  Prior to procedure ambulates independently without AD, independent with all ADLS and IADLs and no hx of falls.  Supportive spouse at bedside for evaluation.  Currently presents with functional limitations related to impairments in posture, LLE knee ROM and strength, increased pain.  Decreased activity tolerance, balance, functional mobility and locomotion requiring more restrictive AD use of RW support with increased fall risk post operatively.  Requires modA for bed mobility with increased time, pacing and cues with operative leg management.  ModA for transfers sit to stand with increased time, cues needed for posture and UE WB for  offloading LLE given pain.  Able to progress few feet OOB to BSC.  Poor RLE advancement given difficulty accepting weight to operative leg.  Cues for upright posture, UE WB to improve LE advancement and foot clearance needed. BP /98. With mobility 129/80.  Dizziness and weakness reported with mobility, improved with seated rest.  Given post operative impairments will require skilled PT in order to progress and achieve PT goals for transition to home with family support.  The patient's AM-PAC Basic Mobility Inpatient Short Form Raw Score is 12. A Raw score of less than or equal to 16 suggests the patient may benefit from discharge to post-acute rehabilitation services. Please also refer to the recommendation of the Physical Therapist for safe discharge planning.  At present requiring increased assistance with mobility given pain and fluctuations in vitals.  Plan is home with OPPT provided achieves functional goals.  PT will follow and progress per POC.  Please see treatment following evaluation to facilitate progress for discharge.  Barriers to Discharge: Inaccessible home environment  Barriers to Discharge Comments: ELIZABETH  Rehab Resource Intensity Level, PT: III (Minimum Resource Intensity) (has OPPT scheduled for Monday 8/5.)    See flowsheet documentation for full assessment.

## 2024-08-01 NOTE — ANESTHESIA PROCEDURE NOTES
Peripheral Block    Patient location during procedure: holding area  Start time: 8/1/2024 7:10 AM  Reason for block: at surgeon's request and post-op pain management  Staffing  Performed by: Eugene Neely MD  Authorized by: Eugene Neely MD    Preanesthetic Checklist  Completed: patient identified, IV checked, site marked, risks and benefits discussed, surgical consent, monitors and equipment checked, pre-op evaluation and timeout performed  Peripheral Block  Patient position: supine  Prep: ChloraPrep  Patient monitoring: frequent blood pressure checks, continuous pulse oximetry and heart rate  Block type: Adductor Canal  Laterality: left  Injection technique: single-shot  Procedures: ultrasound guided, Ultrasound guidance required for the procedure to increase accuracy and safety of medication placement and decrease risk of complications.  Ultrasound permanent image saved  bupivacaine (PF) (MARCAINE) 0.5 % injection 20 mL - Perineural   10 mL - 8/1/2024 7:20:00 AM  bupivacaine liposomal (EXPAREL) 1.3 % injection 20 mL - Perineural   20 mL - 8/1/2024 7:20:00 AM  midazolam (VERSED) injection 0.5 mg - Intravenous   2 mg - 8/1/2024 7:10:00 AM  Needle  Needle type: Stimuplex   Needle gauge: 22 G  Needle length: 4 in  Needle localization: anatomical landmarks and ultrasound guidance  Needle insertion depth: 8 cm  Assessment  Injection assessment: incremental injection, frequent aspiration, injected with ease, negative aspiration, negative for heart rate change, no paresthesia on injection, no symptoms of intraneural/intravenous injection and needle tip visualized at all times  Paresthesia pain: none  Post-procedure:  site cleaned  patient tolerated the procedure well with no immediate complications

## 2024-08-01 NOTE — ANESTHESIA POSTPROCEDURE EVALUATION
Post-Op Assessment Note    CV Status:  Stable  Pain Score: 0    Pain management: adequate       Mental Status:  Alert and awake   Hydration Status:  Euvolemic   PONV Controlled:  Controlled   Airway Patency:  Patent     Post Op Vitals Reviewed: Yes    No anethesia notable event occurred.    Staff: CRNA               /81 (08/01/24 0912)    Temp 97.9 °F (36.6 °C) (08/01/24 0912)    Pulse 87 (08/01/24 0912)   Resp 17 (08/01/24 0912)    SpO2 93 % (08/01/24 0912)

## 2024-08-01 NOTE — ANESTHESIA PROCEDURE NOTES
Peripheral Block    Patient location during procedure: holding area  Start time: 8/1/2024 7:22 AM  Reason for block: at surgeon's request and post-op pain management  Staffing  Performed by: Eugene Neely MD  Authorized by: Eugene Neely MD    Preanesthetic Checklist  Completed: patient identified, IV checked, site marked, risks and benefits discussed, surgical consent, monitors and equipment checked, pre-op evaluation and timeout performed  Peripheral Block  Patient position: supine  Prep: ChloraPrep  Patient monitoring: frequent blood pressure checks, continuous pulse oximetry and heart rate  Block type: IPACK  Laterality: left  Injection technique: single-shot  Procedures: ultrasound guided, Ultrasound guidance required for the procedure to increase accuracy and safety of medication placement and decrease risk of complications.  Ultrasound permanent image saved  ropivacaine (NAROPIN) 0.2% injection 20 mL - Perineural   20 mL - 8/1/2024 7:26:00 AM  Needle  Needle type: Stimuplex   Needle gauge: 22 G  Needle length: 6 in  Needle localization: anatomical landmarks and ultrasound guidance  Needle insertion depth: 14 cm  Assessment  Injection assessment: incremental injection, frequent aspiration, injected with ease, negative aspiration, negative for heart rate change, no paresthesia on injection, no symptoms of intraneural/intravenous injection and needle tip visualized at all times  Paresthesia pain: none  Post-procedure:  site cleaned  patient tolerated the procedure well with no immediate complications

## 2024-08-02 VITALS
WEIGHT: 192 LBS | HEART RATE: 96 BPM | HEIGHT: 59 IN | TEMPERATURE: 98.8 F | DIASTOLIC BLOOD PRESSURE: 78 MMHG | BODY MASS INDEX: 38.71 KG/M2 | RESPIRATION RATE: 16 BRPM | SYSTOLIC BLOOD PRESSURE: 126 MMHG | OXYGEN SATURATION: 97 %

## 2024-08-02 LAB
ALBUMIN SERPL BCG-MCNC: 3.5 G/DL (ref 3.5–5)
ALP SERPL-CCNC: 44 U/L (ref 34–104)
ALT SERPL W P-5'-P-CCNC: 9 U/L (ref 7–52)
ANION GAP SERPL CALCULATED.3IONS-SCNC: 6 MMOL/L (ref 4–13)
AST SERPL W P-5'-P-CCNC: 12 U/L (ref 13–39)
BILIRUB SERPL-MCNC: 0.52 MG/DL (ref 0.2–1)
BUN SERPL-MCNC: 8 MG/DL (ref 5–25)
CALCIUM SERPL-MCNC: 8.3 MG/DL (ref 8.4–10.2)
CHLORIDE SERPL-SCNC: 105 MMOL/L (ref 96–108)
CO2 SERPL-SCNC: 26 MMOL/L (ref 21–32)
CREAT SERPL-MCNC: 0.58 MG/DL (ref 0.6–1.3)
ERYTHROCYTE [DISTWIDTH] IN BLOOD BY AUTOMATED COUNT: 12.2 % (ref 11.6–15.1)
GFR SERPL CREATININE-BSD FRML MDRD: 99 ML/MIN/1.73SQ M
GLUCOSE SERPL-MCNC: 156 MG/DL (ref 65–140)
HCT VFR BLD AUTO: 34 % (ref 34.8–46.1)
HGB BLD-MCNC: 11.5 G/DL (ref 11.5–15.4)
MCH RBC QN AUTO: 29.9 PG (ref 26.8–34.3)
MCHC RBC AUTO-ENTMCNC: 33.8 G/DL (ref 31.4–37.4)
MCV RBC AUTO: 88 FL (ref 82–98)
PLATELET # BLD AUTO: 130 THOUSANDS/UL (ref 149–390)
PMV BLD AUTO: 9.5 FL (ref 8.9–12.7)
POTASSIUM SERPL-SCNC: 3.6 MMOL/L (ref 3.5–5.3)
PROT SERPL-MCNC: 5.7 G/DL (ref 6.4–8.4)
RBC # BLD AUTO: 3.85 MILLION/UL (ref 3.81–5.12)
SODIUM SERPL-SCNC: 137 MMOL/L (ref 135–147)
WBC # BLD AUTO: 6 THOUSAND/UL (ref 4.31–10.16)

## 2024-08-02 PROCEDURE — 97535 SELF CARE MNGMENT TRAINING: CPT

## 2024-08-02 PROCEDURE — 97530 THERAPEUTIC ACTIVITIES: CPT | Performed by: PHYSICAL THERAPIST

## 2024-08-02 PROCEDURE — 97167 OT EVAL HIGH COMPLEX 60 MIN: CPT

## 2024-08-02 PROCEDURE — 85027 COMPLETE CBC AUTOMATED: CPT

## 2024-08-02 PROCEDURE — 80053 COMPREHEN METABOLIC PANEL: CPT

## 2024-08-02 PROCEDURE — NC001 PR NO CHARGE

## 2024-08-02 PROCEDURE — 97116 GAIT TRAINING THERAPY: CPT | Performed by: PHYSICAL THERAPIST

## 2024-08-02 PROCEDURE — 99024 POSTOP FOLLOW-UP VISIT: CPT

## 2024-08-02 RX ADMIN — GABAPENTIN 100 MG: 100 CAPSULE ORAL at 08:09

## 2024-08-02 RX ADMIN — DOCUSATE SODIUM 100 MG: 100 CAPSULE, LIQUID FILLED ORAL at 08:09

## 2024-08-02 RX ADMIN — TRAMADOL HYDROCHLORIDE 50 MG: 50 TABLET, COATED ORAL at 08:09

## 2024-08-02 RX ADMIN — GABAPENTIN 100 MG: 100 CAPSULE ORAL at 00:51

## 2024-08-02 RX ADMIN — ACETAMINOPHEN 325MG 650 MG: 325 TABLET ORAL at 06:45

## 2024-08-02 RX ADMIN — ACETAMINOPHEN 325MG 650 MG: 325 TABLET ORAL at 00:51

## 2024-08-02 NOTE — PLAN OF CARE
"  Problem: PHYSICAL THERAPY ADULT  Goal: Performs mobility at highest level of function for planned discharge setting.  See evaluation for individualized goals.  Description: Treatment/Interventions: Functional transfer training, LE strengthening/ROM, Therapeutic exercise, Elevations, Bed mobility, Gait training, Spoke to nursing, OT  Equipment Recommended: Walker (pt owns)       See flowsheet documentation for full assessment, interventions and recommendations.  Note: Prognosis: Good  Problem List: Decreased strength, Decreased range of motion, Decreased endurance, Impaired balance, Decreased mobility, Decreased coordination, Obesity, Decreased skin integrity, Orthopedic restrictions, Pain  Assessment: Patient was seen today per POC. Overall, pt demonstrated improved mobility and inc tolerance to activity. Pt was greeted in recliner chair at start of session. Pt was able to perform sit<>stand from recliner chair and toilet with RW and S. Patient was able to amb 12'x2, 50'x2 with RW and S. Pt able to perform 10 steps with BL rails and CGA. Pt needed cues for LE sequencing during stair training, but good carryover. BP taken following activity, 115/86 (-) symptoms. Gait deviations as mentioned above, no reports of dizziness t/o session. Nsg staff most recent vital signs as follows: /78   Pulse 96   Temp 98.8 °F (37.1 °C) (Oral)   Resp 16   Ht 4' 11\" (1.499 m)   Wt 87.1 kg (192 lb)   LMP 10/27/2017 (Exact Date)   SpO2 97%   BMI 38.78 kg/m² . Will continue to see pt per POC as tolerated.  From PT standpoint continued recommendation for home with OPPT at D/C when medically cleared based on nursing. Nsg staff to continue to mobilized pt (OOB in chair for all meals & ambulate in room/unit) as tolerated to prevent further decline in function. Nsg staff notified. Based on pt presentation and impaired function, pt would benefit from level III, (minimal resource intensity) at D/C.  Barriers to Discharge: " None  Barriers to Discharge Comments: ELIZABETH  Rehab Resource Intensity Level, PT: III (Minimum Resource Intensity)    See flowsheet documentation for full assessment.

## 2024-08-02 NOTE — DISCHARGE SUMMARY
Discharge Summary - Orthopedics   Amber Jackson 63 y.o. female MRN: [id number]  Unit/Bed#: [facility] 2 N -01    Attending Physician: Dr. Theodore Geiger    Admitting diagnosis: Primary localized osteoarthritis of left knee [M17.12]    Discharge diagnosis: Primary localized osteoarthritis of left knee [M17.12]    Date of admission: 8/1/2024    Date of discharge: 08/02/24    Procedure: Left total knee arthroplasty    HPI: 63 y.o. female with a history of Primary localized osteoarthritis of left knee [M17.12] who has been seen by Dr. Theodore Geiger in clinic.  Pt has failed previous non operative therapies and was scheduled for left total knee arthroplasty.  Prior to surgery the risks and benefits of surgery were explained and informed consent was obtained.    Hospital course: Pt was taken to the OR on 8/1/2024.  Surgery went without complications and pt was discharged to the PACU in a stable condition and was transferred to the floor.  On discharge date pt was cleared by PT and the orthopedic team and determined to be safe for discharge.  Daily discussion was had with the patient, nursing staff, orthopaedic team, and family members if present.  All questions were answered to the patients satisifaction.      0   Lab Value Date/Time    HGB 11.5 08/02/2024 0537    HGB 14.7 05/31/2024 0910    HGB 10.1 (L) 10/11/2023 0442    HGB 11.9 10/10/2023 0443    HGB 15.2 09/08/2023 1039    HGB 14.7 06/16/2023 1630    HGB 14.2 11/23/2022 1004    HGB 14.8 05/17/2022 1035    HGB 13.8 08/03/2021 1203    HGB 14.9 05/25/2021 1422    HGB 14.7 10/17/2019 1023    HGB 14.5 10/12/2018 1050    HGB 13.9 10/26/2017 1011    HGB 14.1 08/03/2017 1016    HGB 14.1 06/01/2017 1131    HGB 14.3 04/06/2017 1037    HGB 14.0 01/27/2017 1026    HGB 13.3 07/12/2016 1052        Discharge Instructions:   As per discharge instructions in epic  Keep dressings clean and dry at all times   Complete DVT prophylaxis as prescribed   Physical therapy  Follow-up as  scheduled, otherwise call for appt.     Discharge Medications:  For the complete list of discharge medications, please refer to the patient's medication reconciliation.

## 2024-08-02 NOTE — PLAN OF CARE
Problem: OCCUPATIONAL THERAPY ADULT  Goal: Performs self-care activities at highest level of function for planned discharge setting.  See evaluation for individualized goals.  Description: Treatment Interventions: ADL retraining, Functional transfer training, Endurance training, Patient/family training, Equipment evaluation/education, Compensatory technique education, Continued evaluation, Activityengagement, Energy conservation          See flowsheet documentation for full assessment, interventions and recommendations.   Note: Limitation: Decreased ADL status, Decreased endurance, Decreased self-care trans, Decreased high-level ADLs  Prognosis: Good  Assessment: Pt is a 63 y.o. female seen for OT evaluation s/p adm to [facility] on 8/1/2024 w/ Primary localized osteoarthritis of left knee . Comorbidities affecting pt’s functional performance include a significant PMH of arthritis, disease thyroid gland, heart murmur, hypercholesteremia, hyperlipidemia, osteoarthritis. Pt with active OT orders and activity orders for Activity beginning POD #0. Pt lives in a 2SH with spouse with 2 ELIZABETH bilateral handrails. Pt has 1/2 bath on main level with BSC and tub/shower upstairs with grab bars. At baseline, pt was independent with all ADLs/IADLs. Pt completed sit to stand with use of armrests and RW. Pt completed functional mobility from chair to bathroom with supervision and RW. Pt completed toilet transfer onto commode over standard toilet with supervision. Toileting completed with supervision, perineal hygiene in standing with RW for stability. See additional treatment session focusing on ADLs/IADLs and functional transfers. Upon evaluation, pt currently requires supervision for UB ADLs, Curt for LB ADLs, supervision for toileting, supervision for bed mobility, supervision for functional mobility, and supervision for transfers 2* the following deficits impacting occupational performance: weakness, decreased strength ,  decreased balance, decreased activity tolerance, increased pain, and orthopedic restrictions. These impairments, as well at pt’s personal factors of: ELIZABETH home environment, steps within home environment, difficulty performing ADLs, difficulty performing IADLs, difficulty performing transfers/mobility, WBS, fall risk , and new use of AD for functional transfers/mobility limit pt’s ability to safely engage in all baseline areas of occupation. Based on the aforementioned OT evaluation, functional performance deficits, and assessments, pt has been identified as a high complexity evaluation. Pt to continue to benefit from continued acute OT services during hospital stay to address defined deficits and to maximize level of functional independence in the following Occupational Performance areas: grooming, bathing/shower, toilet hygiene, dressing, health maintenance, functional mobility, community mobility, clothing management, household maintenance, and job performance/volunteering. From OT standpoint, recommend No post-acute rehabilitation needs upon D/C. OT will continue to follow pt 3-5x/wk.     Rehab Resource Intensity Level, OT: No post-acute rehabilitation needs

## 2024-08-02 NOTE — OCCUPATIONAL THERAPY NOTE
Occupational Therapy Evaluation and Treatment     Patient Name: Amber Jackson  Today's Date: 2024  Problem List  Principal Problem:    Primary localized osteoarthritis of left knee    Past Medical History  Past Medical History:   Diagnosis Date    Arthritis 2013    Chest pain 2014    Colon polyp     Disease of thyroid gland     Elevated blood sugar 2014    Heart murmur     Functional, child    Hypercholesteremia     Hyperlipidemia     Obesity 2010    Osteoarthritis     Urinary tract infection     Recurring     Past Surgical History  Past Surgical History:   Procedure Laterality Date    BIOPSY CORE NEEDLE      Thyroid Using Percutaneous Core Needle    BUNIONECTOMY Bilateral      SECTION      x 2    FOOT SURGERY Bilateral     As a teenager - Bunionectomy - Dr. Vibha Nation - Last Assessed: 2016    JOINT REPLACEMENT Right     oct 2023    SD ARTHRP KNE CONDYLE&PLATU MEDIAL&LAT COMPARTMENTS Right 10/09/2023    Procedure: ARTHROPLASTY RIGHT KNEE TOTAL W ROBOT;  Surgeon: Theodore Geiger MD;  [facility];  Service: Orthopedics    SD ARTHRP KNE CONDYLE&PLATU MEDIAL&LAT COMPARTMENTS Left 2024    Procedure: ARTHROPLASTY KNEE TOTAL W ROBOT;  Surgeon: Theodore Geiger MD;  Location: [facility];  Service: Orthopedics    THYROID LOBECTOMY Right 2015    Dr. Mae Salcido: Right Lobe - Last Assessed: 2016 Dr. Abdullahi Nation        24 0824   OT Last Visit   OT Visit Date 24   Note Type   Note type Evaluation and Treatment   Pain Assessment   Pain Assessment Tool 0-10   Pain Score 3   Pain Location/Orientation Orientation: Left;Location: Knee   Hospital Pain Intervention(s) Repositioned;Elevated;Emotional support;Rest;Cold applied;Ambulation/increased activity   Restrictions/Precautions   Weight Bearing Precautions Per Order Yes   LLE Weight Bearing Per Order WBAT   Other Precautions Fall Risk;Pain;WBS;Chair Alarm   Home Living   Type of  Home House   Home Layout Two level;1/2 bath on main level;Bed/bath upstairs  (2 ELIZABETH with bilateral handrails)   Bathroom Shower/Tub Tub/shower unit   Bathroom Toilet Raised  (comfort height)   Bathroom Equipment Shower chair;Commode   Bathroom Accessibility Accessible   Home Equipment Grab bars;Walker;Cane;Other (Comment)  (transport chair)   Additional Comments Pt lives with spouse in 2SH with 1/2 bath and BSC use if needed.   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   General   Family/Caregiver Present No   ADL   Where Assessed Chair   Eating Assistance 5  Supervision/Setup   Grooming Assistance 5  Supervision/Setup   UB Bathing Assistance 5  Supervision/Setup   LB Bathing Assistance 5  Supervision/Setup   UB Dressing Assistance 5  Supervision/Setup   LB Dressing Assistance 4  Minimal Assistance   Toileting Assistance  5  Supervision/Setup   Bed Mobility   Supine to Sit Unable to assess   Sit to Supine Unable to assess   Additional Comments Pt greeted OOB in chair at start of session and end of session   Transfers   Sit to Stand 5  Supervision   Additional items Armrests;Increased time required;Verbal cues  (RW)   Stand to Sit 5  Supervision   Additional items Armrests;Increased time required;Verbal cues  (RW)   Toilet transfer 5  Supervision   Additional items Armrests;Increased time required;Verbal cues;Standard toilet;Commode;Other  (commode over standard toilet with use of armrests)   Additional Comments verbal  cues for hand placement; VITALS: in chair after toiletin/86; At end of session in chair: 126/78.   Functional Mobility   Functional Mobility 5  Supervision   Additional Comments Pt completed functional mobility and functional distances with use of RW.   Additional items Rolling walker   Balance   Static Sitting Good   Dynamic Sitting Fair +   Static Standing Fair   Dynamic Standing Fair -   Ambulatory Fair -   Activity Tolerance   Activity Tolerance Patient tolerated treatment well   Medical Staff Made Aware RN Waldo/Lala; PT Ali; Pt seen for co-evaluation/treatment with skilled Physical Therapy due to pt's medical complexity, decreased endurance, overall functional level, overall safety, and post surgical day #0.   Nurse Made Aware yes   RUE Assessment   RUE Assessment WFL   LUE Assessment   LUE Assessment WFL   Hand Function   Gross Motor Coordination Functional   Fine Motor Coordination Functional   Cognition   Overall Cognitive Status WFL   Arousal/Participation Alert;Cooperative   Attention Within functional limits   Orientation Level Oriented X4   Memory Within functional limits   Following Commands Follows all commands and directions without difficulty   Comments Cooperative and pleasant   Assessment   Limitation Decreased ADL status;Decreased endurance;Decreased self-care trans;Decreased high-level ADLs   Prognosis Good   Assessment Pt is a 63 y.o. female seen for OT evaluation s/p adm to [facility] on 2024 w/ Primary localized osteoarthritis of left knee . Comorbidities affecting pt’s functional performance include a significant PMH of arthritis, disease thyroid gland, heart murmur, hypercholesteremia, hyperlipidemia, osteoarthritis. Pt with active OT orders and activity orders for Activity beginning POD #0. Pt lives in a 2SH with spouse with 2 ELIZABETH bilateral handrails. Pt has 1/2 bath on main level with BSC and tub/shower upstairs with grab bars. At baseline, pt was independent with all  ADLs/IADLs. Pt completed sit to stand with use of armrests and RW. Pt completed functional mobility from chair to bathroom with supervision and RW. Pt completed toilet transfer onto commode over standard toilet with supervision. Toileting completed with supervision, perineal hygiene in standing with RW for stability. See additional treatment session focusing on ADLs/IADLs and functional transfers. Upon evaluation, pt currently requires supervision for UB ADLs, Curt for LB ADLs, supervision for toileting, supervision for bed mobility, supervision for functional mobility, and supervision for transfers 2* the following deficits impacting occupational performance: weakness, decreased strength , decreased balance, decreased activity tolerance, increased pain, and orthopedic restrictions. These impairments, as well at pt’s personal factors of: ELIZABETH home environment, steps within home environment, difficulty performing ADLs, difficulty performing IADLs, difficulty performing transfers/mobility, WBS, fall risk , and new use of AD for functional transfers/mobility limit pt’s ability to safely engage in all baseline areas of occupation. Based on the aforementioned OT evaluation, functional performance deficits, and assessments, pt has been identified as a high complexity evaluation. Pt to continue to benefit from continued acute OT services during hospital stay to address defined deficits and to maximize level of functional independence in the following Occupational Performance areas: grooming, bathing/shower, toilet hygiene, dressing, health maintenance, functional mobility, community mobility, clothing management, household maintenance, and job performance/volunteering. From OT standpoint, recommend No post-acute rehabilitation needs upon D/C. OT will continue to follow pt 3-5x/wk.   Goals   Patient Goals to go home   STG Time Frame 1-3   Short Term Goal #1 Pt will improve activity tolerance to G for min 30 min treatment  sessions for increase engagement in functional tasks   Short Term Goal #2 Pt will complete bed mobility at a mod I level w/ G balance/safety demonstrated to decrease caregiver assistance required   Short Term Goal  Pt will complete LB dressing/self care w/ mod I using adaptive device and DME as needed   LTG Time Frame 3-7   Long Term Goal #1 Pt will complete toileting w/ mod I w/ G hygiene/thoroughness using DME as needed   Long Term Goal #2 Pt will improve functional transfers to mod I on/off all surfaces using DME as needed w/ G balance/safety   Long Term Goal Pt will improve functional mobility during ADL/IADL/leisure tasks to mod I using DME as needed w/ G balance/safety   Plan   Treatment Interventions ADL retraining;Functional transfer training;Endurance training;Patient/family training;Equipment evaluation/education;Compensatory technique education;Continued evaluation;Activityengagement;Energy conservation   Goal Expiration Date 08/09/24   OT Treatment Day 0   OT Frequency 3-5x/wk   Discharge Recommendation   Rehab Resource Intensity Level, OT No post-acute rehabilitation needs   Additional Comments  The patient's raw score on the AM-PAC Daily Activity Inpatient Short Form is 21 . A raw score of greater than or equal to 19 suggests the patient may benefit from discharge to home. Please refer to the recommendation of the Occupational Therapist for safe discharge planning.   AM-PAC Daily Activity Inpatient   Lower Body Dressing 3   Bathing 3   Toileting 3   Upper Body Dressing 4   Grooming 4   Eating 4   Daily Activity Raw Score 21   Daily Activity Standardized Score (Calc for Raw Score >=11) 44.27   AM-PAC Applied Cognition Inpatient   Following a Speech/Presentation 4   Understanding Ordinary Conversation 4   Taking Medications 4   Remembering Where Things Are Placed or Put Away 4   Remembering List of 4-5 Errands 4   Taking Care of Complicated Tasks 4   Applied Cognition Raw Score 24   Applied Cognition  Standardized Score 62.21   Additional Treatment Session   Start Time 0850   End Time 0910   Treatment Assessment Pt seen for OT treatment session focusing on ADLs/IADLs, functional mobility, functional standing tolerance, functional transfers, patient education, continued evaluation. Pt alert and cooperative throughout session. Pt with good sitting balance and fair - dynamic standing balance. Pt tolerated treatment well. Pt completed functional mobility functional household distances with use of RW and supervision. Pt completed functional mobility back to chair with supervision. RN present and notified about dressing, dressing reinforced. Pt completed LB dressing seated in chair, don underwear and pants with min A to thread LLE, then standing to pull over waist with use of RW. Pt completed don of bra and shirt seated in chair with supervision. Pt completed doff of socks with modA and don of socks with maxA. MaxA for don of shoes. Pt reports spouse will assist as needed at home. Pt educated on ADLs/IADLs for independence at home. Pt reports 3/10 pain and no dizziness. Pt's vitals include: stable.     Pt ended session seated in recliner. Call bell and phone within reach. All needs met and pt reports no further questions at this time. Continue to recommend No post-acute rehabilitation needs when medically cleared. OT will continue to follow pt on caseload.   Additional Treatment Day 1   End of Consult   Education Provided Yes   Patient Position at End of Consult Bedside chair;All needs within reach   Nurse Communication Nurse aware of consult   End of Consult Comments Pt seated OOB in chair at end of session. Call bell and phone within reach. All needs met and pt reports no further questions for OT at this time.   Chelo Reaves, OT

## 2024-08-02 NOTE — PHYSICAL THERAPY NOTE
PT PROGRESS NOTE    Name: Amber Jackson  AGE: 63 y.o.  MRN: [id number]  LENGTH OF STAY: 0        08/02/24 0902   PT Last Visit   PT Visit Date 08/02/24   Note Type   Note Type Treatment   Pain Assessment   Pain Assessment Tool 0-10   Pain Score 3   Pain Location/Orientation Orientation: Left;Location: Leg;Location: Knee   Hospital Pain Intervention(s) Repositioned;Ambulation/increased activity;Elevated;Emotional support;Rest   Restrictions/Precautions   Weight Bearing Precautions Per Order Yes   LLE Weight Bearing Per Order WBAT   Other Precautions WBS;Fall Risk;Pain;Chair Alarm;Bed Alarm   General   Chart Reviewed Yes   Additional Pertinent History Amber is a 64 y/o female presents for L TKR. Hx of R TKR 10/2024.   Response to Previous Treatment Patient with no complaints from previous session.   Family/Caregiver Present No   Cognition   Overall Cognitive Status WFL   Arousal/Participation Alert   Attention Within functional limits   Orientation Level Oriented X4   Following Commands Follows all commands and directions without difficulty   Comments pt pleasant and motivated   Bed Mobility   Supine to Sit Unable to assess   Sit to Supine Unable to assess   Additional Comments pt greed in recliner chair   Transfers   Sit to Stand 5  Supervision   Additional items Increased time required;Verbal cues  (RW)   Stand to Sit 5  Supervision   Additional items Increased time required;Verbal cues  (RW)   Toilet transfer 5  Supervision   Additional items Increased time required;Standard toilet;Commode;Verbal cues  (RW)   Additional Comments cues for hand placement   Ambulation/Elevation   Gait pattern Step to;Short stride;Decreased L stance;Antalgic;Improper Weight shift;Decreased heel strike;Decreased toe off;Excessively slow   Gait Assistance 5  Supervision   Additional items Verbal cues   Assistive Device Rolling walker   Distance 50'x1, 12'x2   Stair Management Assistance 5  Supervision  (CGA)   Additional items  "Verbal cues;Increased time required   Stair Management Technique Foreward;Two rails;Step to pattern   Number of Stairs 10   Ambulation/Elevation Additional Comments cues for LE sequencing during stairs   Balance   Static Sitting Good   Dynamic Sitting Fair +   Static Standing Fair   Dynamic Standing Fair -   Ambulatory Fair -   Endurance Deficit   Endurance Deficit Yes   Endurance Deficit Description fatigue and pain   Activity Tolerance   Activity Tolerance Patient tolerated treatment well   Medical Staff Made Aware RN DAYNA Haas   Nurse Made Aware yes   Assessment   Prognosis Good   Problem List Decreased strength;Decreased range of motion;Decreased endurance;Impaired balance;Decreased mobility;Decreased coordination;Obesity;Decreased skin integrity;Orthopedic restrictions;Pain   Assessment Patient was seen today per POC. Overall, pt demonstrated improved mobility and inc tolerance to activity. Pt was greeted in recliner chair at start of session. Pt was able to perform sit<>stand from recliner chair and toilet with RW and S. Patient was able to amb 12'x2, 50'x2 with RW and S. Pt able to perform 10 steps with BL rails and CGA. Pt needed cues for LE sequencing during stair training, but good carryover. BP taken following activity, 115/86 (-) symptoms. Gait deviations as mentioned above, no reports of dizziness t/o session. Nsg staff most recent vital signs as follows: /78   Pulse 96   Temp 98.8 °F (37.1 °C) (Oral)   Resp 16   Ht 4' 11\" (1.499 m)   Wt 87.1 kg (192 lb)   LMP 10/27/2017 (Exact Date)   SpO2 97%   BMI 38.78 kg/m² . Will continue to see pt per POC as tolerated.  From PT standpoint continued recommendation for home with OPPT at D/C when medically cleared based on nursing. Nsg staff to continue to mobilized pt (OOB in chair for all meals & ambulate in room/unit) as tolerated to prevent further decline in function. Nsg staff notified. Based on pt presentation and impaired function, pt " would benefit from level III, (minimal resource intensity) at D/C.   Barriers to Discharge None   Goals   Patient Goals to go home   STG Expiration Date 08/06/24   Short Term Goal #1 5 days: 1). Pt will perform bed mobility with Mara demonstrating appropriate technique 100% of the time in order to improve function.2) Perform all transfers with Mara demonstrating safe and appropriate technique 100% of the time in order to improve ability to negotiate safely in home environment.3) Amb with least restrictive AD > 80'x1 with mod I in order to demonstrate ability to negotiate in home environment.4) Improve overall strength and balance 1/2 grade in order to optimize ability to perform functional tasks and reduce fall risk.5) Increase activity tolerance to 30 minutes in order to improve endurance to functional tasks.6) Negotiate stairs using most appropriate technique and Curt in order to be able to negotiate safely in home environment. 7) PT for ongoing patient and family/caregiver education, DME needs and d/c planning in order to promote highest level of function in least restrictive environment.   PT Treatment Day 2   Plan   Treatment/Interventions Functional transfer training;LE strengthening/ROM;Therapeutic exercise;Elevations;Bed mobility;Gait training;Spoke to nursing;OT   Progress Progressing toward goals   PT Frequency Twice a day  (prn)   Discharge Recommendation   Rehab Resource Intensity Level, PT III (Minimum Resource Intensity)   Equipment Recommended Walker  (pt owns)   Additional Comments The patient's AM-PAC Basic Mobility Inpatient Short Form Raw Score is 19. A Raw score of greater than 16 suggests the patient may benefit from discharge to home. Please also refer to the recommendation of the Physical Therapist for safe discharge planning.   AM-PAC Basic Mobility Inpatient   Turning in Flat Bed Without Bedrails 4   Lying on Back to Sitting on Edge of Flat Bed Without Bedrails 3   Moving Bed to Chair 3    Standing Up From Chair Using Arms 3   Walk in Room 3   Climb 3-5 Stairs With Railing 3   Basic Mobility Inpatient Raw Score 19   Basic Mobility Standardized Score 42.48   [facility] Highest Level Of Mobility   -HLM Goal 6: Walk 10 steps or more   -HLM Achieved 7: Walk 25 feet or more   Education   Education Provided Mobility training;Home exercise program;Assistive device   Patient Demonstrates acceptance/verbal understanding   End of Consult   Patient Position at End of Consult Bedside chair;Bed/Chair alarm activated;All needs within reach   End of Consult Comments Pt stable, left in recliner chair at end of session with OT. RN updated       Sera Jalloh, PT

## 2024-08-02 NOTE — PLAN OF CARE
Problem: PHYSICAL THERAPY ADULT  Goal: Performs mobility at highest level of function for planned discharge setting.  See evaluation for individualized goals.  Description: Treatment/Interventions: Functional transfer training, LE strengthening/ROM, Elevations, Therapeutic exercise, Endurance training, Patient/family training, Equipment eval/education, Bed mobility, Gait training, Compensatory technique education, Continued evaluation, Spoke to nursing, OT  Equipment Recommended: Other (Comment) (has all DME from previous surgery 10/2023)       See flowsheet documentation for full assessment, interventions and recommendations.  Outcome: Completed     Problem: PAIN - ADULT  Goal: Verbalizes/displays adequate comfort level or baseline comfort level  Description: Interventions:  - Encourage patient to monitor pain and request assistance  - Assess pain using appropriate pain scale  - Administer analgesics based on type and severity of pain and evaluate response  - Implement non-pharmacological measures as appropriate and evaluate response  - Consider cultural and social influences on pain and pain management  - Notify physician/advanced practitioner if interventions unsuccessful or patient reports new pain  8/2/2024 0929 by Waldo Rai RN  Outcome: Completed  8/2/2024 0929 by Waldo Rai RN  Outcome: Adequate for Discharge     Problem: INFECTION - ADULT  Goal: Absence or prevention of progression during hospitalization  Description: INTERVENTIONS:  - Assess and monitor for signs and symptoms of infection  - Monitor lab/diagnostic results  - Monitor all insertion sites, i.e. indwelling lines, tubes, and drains  - Monitor endotracheal if appropriate and nasal secretions for changes in amount and color  - [place] appropriate cooling/warming therapies per order  - Administer medications as ordered  - Instruct and encourage patient and family to use good hand hygiene technique  - Identify and instruct in  appropriate isolation precautions for identified infection/condition  8/2/2024 0929 by Waldo Rai RN  Outcome: Completed  8/2/2024 0929 by Waldo Rai RN  Outcome: Adequate for Discharge  Goal: Absence of fever/infection during neutropenic period  Description: INTERVENTIONS:  - Monitor WBC    8/2/2024 0929 by Waldo Rai RN  Outcome: Completed  8/2/2024 0929 by Waldo Rai RN  Outcome: Adequate for Discharge     Problem: SAFETY ADULT  Goal: Patient will remain free of falls  Description: INTERVENTIONS:  - Educate patient/family on patient safety including physical limitations  - Instruct patient to call for assistance with activity   - Consult OT/PT to assist with strengthening/mobility   - Keep Call bell within reach  - Keep bed low and locked with side rails adjusted as appropriate  - Keep care items and personal belongings within reach  - Initiate and maintain comfort rounds  - Make Fall Risk Sign visible to staff  - Offer Toileting every 2 Hours, in advance of need  - Initiate/Maintain bed/chair alarm  - Obtain necessary fall risk management equipment: walker, alarm on  - Apply yellow socks and bracelet for high fall risk patients  - Consider moving patient to room near nurses station  8/2/2024 0929 by Waldo Rai RN  Outcome: Completed  8/2/2024 0929 by Waldo Rai RN  Outcome: Adequate for Discharge  Goal: Maintain or return to baseline ADL function  Description: INTERVENTIONS:  -  Assess patient's ability to carry out ADLs; assess patient's baseline for ADL function and identify physical deficits which impact ability to perform ADLs (bathing, care of mouth/teeth, toileting, grooming, dressing, etc.)  - Assess/evaluate cause of self-care deficits   - Assess range of motion  - Assess patient's mobility; develop plan if impaired  - Assess patient's need for assistive devices and provide as appropriate  - Encourage maximum independence but intervene and supervise when necessary  - Involve  family in performance of ADLs  - Assess for home care needs following discharge   - Consider OT consult to assist with ADL evaluation and planning for discharge  - Provide patient education as appropriate  8/2/2024 0929 by Waldo Rai RN  Outcome: Completed  8/2/2024 0929 by Waldo Rai RN  Outcome: Adequate for Discharge  Goal: Maintains/Returns to pre admission functional level  Description: INTERVENTIONS:  - Perform AM-PAC 6 Click Basic Mobility/ Daily Activity assessment daily.  - Set and communicate daily mobility goal to care team and patient/family/caregiver.   - Collaborate with rehabilitation services on mobility goals if consulted  - Perform Range of Motion 3 times a day.  - Reposition patient every 2 hours.  - Dangle patient 3 times a day  - Stand patient 3 times a day  - Ambulate patient 3 times a day  - Out of bed to chair 3 times a day   - Out of bed for meals 3 times a day  - Out of bed for toileting  - Record patient progress and toleration of activity level   8/2/2024 0929 by Waldo Rai RN  Outcome: Completed  8/2/2024 0929 by Waldo Rai RN  Outcome: Adequate for Discharge     Problem: DISCHARGE PLANNING  Goal: Discharge to home or other facility with appropriate resources  Description: INTERVENTIONS:  - Identify barriers to discharge w/patient and caregiver  - Arrange for needed discharge resources and transportation as appropriate  - Identify discharge learning needs (meds, wound care, etc.)  - Arrange for interpretive services to assist at discharge as needed  - Refer to Case Management Department for coordinating discharge planning if the patient needs post-hospital services based on physician/advanced practitioner order or complex needs related to functional status, cognitive ability, or social support system  8/2/2024 0929 by Waldo Rai RN  Outcome: Completed  8/2/2024 0929 by Waldo Rai RN  Outcome: Adequate for Discharge     Problem: Knowledge Deficit  Goal:  Patient/family/caregiver demonstrates understanding of disease process, treatment plan, medications, and discharge instructions  Description: Complete learning assessment and assess knowledge base.  Interventions:  - Provide teaching at level of understanding  - Provide teaching via preferred learning methods  8/2/2024 0929 by Waldo Rai RN  Outcome: Completed  8/2/2024 0929 by Waldo Rai RN  Outcome: Adequate for Discharge

## 2024-08-02 NOTE — PROGRESS NOTES
Orthopedics     Amber Jackson 63 y.o. female MRN: [id number]  Unit/Bed#: WE 2 N -01 Encounter: [id number]      Subjective:  63 y.o.female post operative day 1 from left total knee arthroplasty with Dr. Geiger.  Patient seen and examined at bedside.  Patient reports expected postoperative pain at this time.  She said she did walk to the bathroom yesterday and feels stable during this.  She is eager to work with physical therapy today.  She admits to a little bit of numbness and tingling down the leg that is new prior to the surgery.  She is eager to be discharged today.    Labs:  0   Lab Value Date/Time    HCT 34.0 (L) 08/02/2024 0537    HCT 45.5 05/31/2024 0910    HCT 30.9 (L) 10/11/2023 0442    HGB 11.5 08/02/2024 0537    HGB 14.7 05/31/2024 0910    HGB 10.1 (L) 10/11/2023 0442    INR 1.07 05/31/2024 0910    WBC 6.00 08/02/2024 0537    WBC 3.99 (L) 05/31/2024 0910    WBC 5.34 10/11/2023 0442    ESR 22 08/03/2021 1203    CRP 9.6 (H) 06/16/2023 1630       Meds:    Current Facility-Administered Medications:     acetaminophen (TYLENOL) tablet 650 mg, 650 mg, Oral, Q6H PRN, Celena Thompson PA-C, 650 mg at 08/02/24 0645    calcium carbonate (TUMS) chewable tablet 1,000 mg, 1,000 mg, Oral, Daily PRN, Celena Thompson PA-C    docusate sodium (COLACE) capsule 100 mg, 100 mg, Oral, BID, Celena Thompson PA-C, 100 mg at 08/01/24 1827    enoxaparin (LOVENOX) subcutaneous injection 40 mg, 40 mg, Subcutaneous, Daily, LUCY Molina-REINA, 40 mg at 08/01/24 2009    gabapentin (NEURONTIN) capsule 100 mg, 100 mg, Oral, Q8H, Celena Thompson PA-C, 100 mg at 08/02/24 0051    HYDROmorphone (DILAUDID) injection 0.5 mg, 0.5 mg, Intravenous, Q2H PRN, Celena Thompson PA-C    ketorolac (TORADOL) injection 15 mg, 15 mg, Intravenous, Q6H PRN, Celena Thompson PA-C, 15 mg at 08/01/24 2207    lactated ringers bolus 1,000 mL, 1,000 mL, Intravenous, Once PRN **AND** lactated ringers bolus 1,000 mL, 1,000 mL, Intravenous, Once PRN, Celena Thompson PA-C    lactated  "ringers infusion, 125 mL/hr, Intravenous, Continuous, Celena Thompson PA-C, Stopped at 08/02/24 0634    ondansetron (ZOFRAN) injection 4 mg, 4 mg, Intravenous, Q6H PRN, Celena Thompson PA-C    povidone-iodine (BETADINE) 10 % 20 Application in sodium chloride 0.9 % 500 mL irrigation bottle, , Irrigation, Once, Theodore Geiger MD    sodium chloride 0.9 % bolus 1,000 mL, 1,000 mL, Intravenous, Once PRN **AND** sodium chloride 0.9 % bolus 1,000 mL, 1,000 mL, Intravenous, Once PRN, Celena Thompson PA-C, Last Rate: 1,000 mL/hr at 08/01/24 1200, 1,000 mL at 08/01/24 1200    traMADol (ULTRAM) tablet 50 mg, 50 mg, Oral, Q6H PRN, Celena Thompson PA-C, 50 mg at 08/01/24 2009    Blood Culture:   No results found for: \"BLOODCX\"    Wound Culture:   No results found for: \"WOUNDCULT\"    Ins and Outs:  I/O last 24 hours:  In: 1800 [P.O.:800; I.V.:1000]  Out: 2815 [Urine:2550; Drains:265]          Physical exam:  Vitals:    08/02/24 0331   BP:    Pulse: 84   Resp:    Temp: 98.6 °F (37 °C)   SpO2: 97%     left lower extremity  Dressing clean dry intact  TTP around julio-incisional site  Upper and lower compartments soft and compressible  Motor function intact distally  Sensation intact L2-S1  Palpable pedal pulse    Assessment: 63 y.o.female post operative day 1 from left total knee arthroplasty with Dr. Geiger    Plan:  Up and out of bed  Weightbearing as tolerated left lower extremity  Drain removed today  PT/OT  DVT prophylaxis  Analgesics  Dispo: Ok for discharge from ortho perspective.  Will discharge once cleared by PT  Patient noted to have acute blood loss anemia due to a drop in Hbg of > 2.0g from preop levels, will monitor vital signs and resuscitate with IV fluids as needed.  Hemoglobin 11.5 today    Eugene Mccabe PA-C         "

## 2024-08-05 ENCOUNTER — TELEPHONE (OUTPATIENT)
Dept: OBGYN CLINIC | Facility: HOSPITAL | Age: 64
End: 2024-08-05

## 2024-08-05 NOTE — TELEPHONE ENCOUNTER
"Patient contacted for a postoperative follow up assessment. Patient reports doing  \"okay.\" She states she is having trouble with medications, as tylenol gave her \"stomach pains.\" She reports she is icing, and states her leg feels \"a  little more swollen today\" as she \"tried to walk a lot yesterday.\" She is ambulating with the RW, and she has OP PT today.   She states her dressing is clean, dry and intact.     We reviewed patients AVS medication list. Patient is taking Tramadol 50mg every 6 hours, and states she is very sensitive to medications and pain is controlled with Tramadol. She is taking the Lovenox daily, and colace 100mg BID (she had a BM yesterday).     She is no longer taking the  Gabapentin (due to dizziness), celebrex (concerns with side effects), and Tylenol (due to abdominal discomfort).     Patient denies nausea, vomiting, abdominal pain, chest pain, shortness of breath, fever, dizziness (only with gabapentin administration), and calf pain. Patient does not have any other questions or concerns at this time. Pt was encouraged to call with any questions, concerns or issues.    "

## 2024-08-08 ENCOUNTER — HOSPITAL ENCOUNTER (OUTPATIENT)
Dept: RADIOLOGY | Facility: HOSPITAL | Age: 64
Discharge: HOME | End: 2024-08-08
Attending: ORTHOPAEDIC SURGERY
Payer: COMMERCIAL

## 2024-08-08 ENCOUNTER — OFFICE VISIT (OUTPATIENT)
Dept: OBGYN CLINIC | Facility: HOSPITAL | Age: 64
End: 2024-08-08

## 2024-08-08 VITALS
BODY MASS INDEX: 38.78 KG/M2 | SYSTOLIC BLOOD PRESSURE: 121 MMHG | DIASTOLIC BLOOD PRESSURE: 80 MMHG | HEIGHT: 59 IN | HEART RATE: 102 BPM

## 2024-08-08 DIAGNOSIS — Z96.652 AFTERCARE FOLLOWING LEFT KNEE JOINT REPLACEMENT SURGERY: ICD-10-CM

## 2024-08-08 DIAGNOSIS — Z47.1 AFTERCARE FOLLOWING LEFT KNEE JOINT REPLACEMENT SURGERY: Primary | ICD-10-CM

## 2024-08-08 DIAGNOSIS — Z96.652 AFTERCARE FOLLOWING LEFT KNEE JOINT REPLACEMENT SURGERY: Primary | ICD-10-CM

## 2024-08-08 DIAGNOSIS — Z47.1 AFTERCARE FOLLOWING LEFT KNEE JOINT REPLACEMENT SURGERY: ICD-10-CM

## 2024-08-08 DIAGNOSIS — M17.12 PRIMARY LOCALIZED OSTEOARTHRITIS OF LEFT KNEE: ICD-10-CM

## 2024-08-08 PROCEDURE — 73560 X-RAY EXAM OF KNEE 1 OR 2: CPT

## 2024-08-08 PROCEDURE — 99024 POSTOP FOLLOW-UP VISIT: CPT | Performed by: ORTHOPAEDIC SURGERY

## 2024-08-08 NOTE — TELEPHONE ENCOUNTER
Reason for call:   [x] Refill   [] Prior Auth  [] Other:     Office:   [] PCP/Provider -   [x] Specialty/Provider - Ortho: Dr. Juanjo MD    Medication: traMADol (Ultram) 50 mg     Dose/Frequency: Take 1 tablet (50 mg total) by mouth every 6 (six) hours as needed     Quantity: 20    Pharmacy: [id number]/pharmacy #[id number] - [place], [place] - [address]     Does the patient have enough for 3 days?   [] Yes   [x] No - Send as HP to POD

## 2024-08-08 NOTE — PROGRESS NOTES
Assessment/Plan:  1. Aftercare following left knee joint replacement surgery      No orders of the defined types were placed in this encounter.    Patient is 1 week status post left TKA.   Continue with outpatient PT  Pain control prn- try OTC medications as needed.  Discussed with patient that she may try to initiate tramadol and Tylenol as needed for pain control.  Continue with medication for DVT prophylaxis  May shower and let water run over incision, do not submerge incision  Patient should call ahead for abx prior to dental appts.   Patient will follow-up in 1 week for staple removal.    Return in about 1 week (around 8/15/2024).    I answered all of the patient's questions during the visit and provided education of the patient's condition during the visit.  The patient verbalized understanding of the information given and agrees with the plan.  This note was dictated using [employer] software.  It may contain errors including improperly dictated words.  Please contact physician directly for any questions.    Subjective   Chief Complaint:   Chief Complaint   Patient presents with    Left Knee - Post-op       Amber Jackson is a 63 y.o. female who presents for 1 week  follow up s/p left TKA.  Patient states today she is having pain within her left knee.  Patient states she has been taking tramadol only for pain control as she states gabapentin is making her dizzy and Tylenol is upsetting her stomach.  Patient states she has been going to PT and has gone 2 times so far working on range of motion.  Patient states she feels she is progressed better compared to her right TKA in the past.      Review of Systems  ROS:    See HPI for musculoskeletal review.   All other systems reviewed are negative     History:  Past Medical History:   Diagnosis Date    Arthritis 2013    Chest pain 07/01/2014    Colon polyp     Disease of thyroid gland     Elevated blood sugar 07/01/2014    Heart murmur     Functional, child     Hypercholesteremia     Hyperlipidemia     Obesity 2010    Osteoarthritis     Urinary tract infection     Recurring     Past Surgical History:   Procedure Laterality Date    BIOPSY CORE NEEDLE      Thyroid Using Percutaneous Core Needle    BUNIONECTOMY Bilateral      SECTION      x 2    FOOT SURGERY Bilateral     As a teenager - Bunionectomy - Dr. Vibha Nation - Last Assessed: 2016    JOINT REPLACEMENT Right     oct 2023    OR ARTHRP KNE CONDYLE&PLATU MEDIAL&LAT COMPARTMENTS Right 10/09/2023    Procedure: ARTHROPLASTY RIGHT KNEE TOTAL W ROBOT;  Surgeon: Theodore Geiger MD;  [facility];  Service: Orthopedics    OR ARTHRP KNE CONDYLE&PLATU MEDIAL&LAT COMPARTMENTS Left 2024    Procedure: ARTHROPLASTY KNEE TOTAL W ROBOT;  Surgeon: Theodore Geiger MD;  [facility];  Service: Orthopedics    THYROID LOBECTOMY Right 2015    Dr. Mae Salcido: Right Lobe - Last Assessed: 2016 Dr. Abdullahi Nation     Social History   Social History     Substance and Sexual Activity   Alcohol Use Not Currently    Comment: rarely     Social History     Substance and Sexual Activity   Drug Use Not Currently     Social History     Tobacco Use   Smoking Status Never    Passive exposure: Never   Smokeless Tobacco Never     Family History:   Family History   Problem Relation Age of Onset    Graves' disease Mother     Hyperlipidemia Mother     Hypertension Mother     Osteoporosis Mother     Dementia Mother     Thyroid disease Mother     Arthritis Mother     Autoimmune disease Mother         Graves disease    Aortic stenosis Father     Hyperlipidemia Father     Hypertension Father     Coronary artery disease Father         Coronary Artery Bypass Graft (CABG)    Heart disease Father         Pacemaker Placement    Valvular heart disease Father         S/P Transcatheter Aortic Valve Replacement (TAVR)    Arthritis Father     Hearing loss Father     No Known Problems Sister     No Known  "Problems Daughter     No Known Problems Daughter     No Known Problems Daughter     No Known Problems Daughter     No Known Problems Maternal Grandmother     No Known Problems Maternal Grandfather     No Known Problems Paternal Grandmother     No Known Problems Paternal Grandfather     No Known Problems Son     No Known Problems Son     Cancer Maternal Aunt         stomach    Breast cancer Paternal Aunt     Cancer Paternal Aunt         nonhodgkins lymphoma       Current Outpatient Medications on File Prior to Visit   Medication Sig Dispense Refill    acetaminophen (TYLENOL) 500 mg tablet Take 2 tablets (1,000 mg total) by mouth every 6 (six) hours as needed for mild pain 90 tablet 0    celecoxib (CeleBREX) 100 mg capsule Take 1 capsule (100 mg total) by mouth 2 (two) times a day 30 capsule 0    Cholecalciferol 125 MCG (5000 UT) capsule Take 5,000 Units by mouth daily Not taking      docusate sodium (COLACE) 100 mg capsule Take 1 capsule (100 mg total) by mouth 2 (two) times a day as needed for constipation      enoxaparin (LOVENOX) 40 mg/0.4 mL Inject 0.4 mL (40 mg total) under the skin daily for 28 days To start Post-Op 11.2 mL 0    gabapentin (Neurontin) 100 mg capsule Take 1 capsule (100 mg total) by mouth 3 (three) times a day 90 capsule 0    traMADol (Ultram) 50 mg tablet Take 1 tablet (50 mg total) by mouth every 6 (six) hours as needed for moderate pain for up to 10 days 20 tablet 0     No current facility-administered medications on file prior to visit.     Allergies   Allergen Reactions    Methocarbamol Dizziness and Confusion     Hypotension      Oxycodone Other (See Comments), Dizziness and Confusion     hypotension        Objective     /80   Pulse 102   Ht 4' 11\" (1.499 m)   LMP 10/27/2017 (Exact Date)   BMI 38.78 kg/m²      PE:  AAOx 3  WDWN  Hearing intact, no drainage from eyes  no audible wheezing  no abdominal distension  LE compartments soft, AT/GS intact    Ortho Exam:  left Knee:   INC: " C/D/I, No erythema, moderate swelling  PROM:0 - 70 degrees    Imaging Studies: I have personally reviewed pertinent films in PACS  XR left knee:  S/p TKA, adequate alignment      Scribe Attestation      I,:  Baron Valles am acting as a scribe while in the presence of the attending physician.:       I,:  Yue Zamudio, DO personally performed the services described in this documentation    as scribed in my presence.:

## 2024-08-09 DIAGNOSIS — M17.12 PRIMARY LOCALIZED OSTEOARTHRITIS OF LEFT KNEE: ICD-10-CM

## 2024-08-09 RX ORDER — TRAMADOL HYDROCHLORIDE 50 MG/1
50 TABLET ORAL EVERY 6 HOURS PRN
Qty: 20 TABLET | Refills: 0 | OUTPATIENT
Start: 2024-08-09 | End: 2024-08-19

## 2024-08-09 RX ORDER — TRAMADOL HYDROCHLORIDE 50 MG/1
50 TABLET ORAL EVERY 6 HOURS PRN
Qty: 20 TABLET | Refills: 0 | Status: SHIPPED | OUTPATIENT
Start: 2024-08-09 | End: 2024-08-19

## 2024-08-09 NOTE — TELEPHONE ENCOUNTER
Patients  Anton  called the RX Refill Line. Message is being forwarded to the office.     Patients  is requesting an update for the patients Tramadol.  She as of today is completely out.  I advised it was put in as a high priority and in under review.

## 2024-08-15 ENCOUNTER — OFFICE VISIT (OUTPATIENT)
Dept: OBGYN CLINIC | Facility: HOSPITAL | Age: 64
End: 2024-08-15

## 2024-08-15 VITALS
SYSTOLIC BLOOD PRESSURE: 116 MMHG | HEIGHT: 59 IN | DIASTOLIC BLOOD PRESSURE: 82 MMHG | HEART RATE: 96 BPM | BODY MASS INDEX: 38.78 KG/M2

## 2024-08-15 DIAGNOSIS — Z96.652 AFTERCARE FOLLOWING LEFT KNEE JOINT REPLACEMENT SURGERY: Primary | ICD-10-CM

## 2024-08-15 DIAGNOSIS — Z47.1 AFTERCARE FOLLOWING LEFT KNEE JOINT REPLACEMENT SURGERY: Primary | ICD-10-CM

## 2024-08-15 PROCEDURE — 99024 POSTOP FOLLOW-UP VISIT: CPT | Performed by: ORTHOPAEDIC SURGERY

## 2024-08-15 NOTE — PROGRESS NOTES
Assessment:  1. Aftercare following left knee joint replacement surgery            Plan:  2 weeks s/p left TKA with robot, 2024.  The patient is doing well   She should continue daily Lovenox, pain medications as needed and current physical therapy regimen.    The patient can shower letting soapy water over incision, yet should not to submerge the incision, and then pat dry.    The patient should follow up in 4 weeks      To do next visit:  Return in about 4 weeks (around 2024).    The above stated was discussed in layman's terms and the patient expressed understanding.  All questions were answered to the patient's satisfaction.       Scribe Attestation      I,:  Julian Galarza MA am acting as a scribe while in the presence of the attending physician.:       I,:  Theodore Geiger MD personally performed the services described in this documentation    as scribed in my presence.:               Subjective:   Amber Jackson is a 63 y.o. female who presents 2 weeks s/p left TKA with robot, 2024. The patient is doing well.  Today she complains of generalized left pain.  She does participate in physical therapy.  She does use Lovenox.  She does use Tramadol, Tylenol and Advil for pain control.  She denies fever, chills or shortness of breath.        Review of systems negative unless otherwise specified in HPI    Past Medical History:   Diagnosis Date    Arthritis 2013    Chest pain 2014    Colon polyp     Disease of thyroid gland     Elevated blood sugar 2014    Heart murmur     Functional, child    Hypercholesteremia     Hyperlipidemia     Obesity 2010    Osteoarthritis     Urinary tract infection     Recurring       Past Surgical History:   Procedure Laterality Date    BIOPSY CORE NEEDLE      Thyroid Using Percutaneous Core Needle    BUNIONECTOMY Bilateral      SECTION      x 2    FOOT SURGERY Bilateral     As a teenager - [place] - Dr. Vibha Nation - Last Assessed: July  5, 2016    JOINT REPLACEMENT Right     oct 2023    NE ARTHRP KNE CONDYLE&PLATU MEDIAL&LAT COMPARTMENTS Right 10/09/2023    Procedure: ARTHROPLASTY RIGHT KNEE TOTAL W ROBOT;  Surgeon: Theodore Geiger MD;  [facility];  Service: Orthopedics    NE ARTHRP KNE CONDYLE&PLATU MEDIAL&LAT COMPARTMENTS Left 8/1/2024    Procedure: ARTHROPLASTY KNEE TOTAL W ROBOT;  Surgeon: Theodore Geiger MD;  [facility];  Service: Orthopedics    THYROID LOBECTOMY Right 09/01/2015    Dr. Mae Salcido: Right Lobe - Last Assessed: July 5, 2016 Dr. Abdullahi Nation       Family History   Problem Relation Age of Onset    Graves' disease Mother     Hyperlipidemia Mother     Hypertension Mother     Osteoporosis Mother     Dementia Mother     Thyroid disease Mother     Arthritis Mother     Autoimmune disease Mother         Graves disease    Aortic stenosis Father     Hyperlipidemia Father     Hypertension Father     Coronary artery disease Father         Coronary Artery Bypass Graft (CABG)    Heart disease Father         Pacemaker Placement    Valvular heart disease Father         S/P Transcatheter Aortic Valve Replacement (TAVR)    Arthritis Father     Hearing loss Father     No Known Problems Sister     No Known Problems Daughter     No Known Problems Daughter     No Known Problems Daughter     No Known Problems Daughter     No Known Problems Maternal Grandmother     No Known Problems Maternal Grandfather     No Known Problems Paternal Grandmother     No Known Problems Paternal Grandfather     No Known Problems Son     No Known Problems Son     Cancer Maternal Aunt         stomach    Breast cancer Paternal Aunt     Cancer Paternal Aunt         nonhodgkins lymphoma       Social History     Occupational History    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   Tobacco Use    Smoking status: Never     Passive exposure: Never    Smokeless tobacco: Never   Vaping Use    Vaping status: Never Used   Substance and  Sexual Activity    Alcohol use: Not Currently     Comment: rarely    Drug use: Not Currently    Sexual activity: Yes     Partners: Male     Birth control/protection: Post-menopausal         Current Outpatient Medications:     acetaminophen (TYLENOL) 500 mg tablet, Take 2 tablets (1,000 mg total) by mouth every 6 (six) hours as needed for mild pain, Disp: 90 tablet, Rfl: 0    celecoxib (CeleBREX) 100 mg capsule, Take 1 capsule (100 mg total) by mouth 2 (two) times a day, Disp: 30 capsule, Rfl: 0    Cholecalciferol 125 MCG (5000 UT) capsule, Take 5,000 Units by mouth daily Not taking, Disp: , Rfl:     docusate sodium (COLACE) 100 mg capsule, Take 1 capsule (100 mg total) by mouth 2 (two) times a day as needed for constipation, Disp: , Rfl:     enoxaparin (LOVENOX) 40 mg/0.4 mL, Inject 0.4 mL (40 mg total) under the skin daily for 28 days To start Post-Op, Disp: 11.2 mL, Rfl: 0    gabapentin (Neurontin) 100 mg capsule, Take 1 capsule (100 mg total) by mouth 3 (three) times a day, Disp: 90 capsule, Rfl: 0    traMADol (Ultram) 50 mg tablet, Take 1 tablet (50 mg total) by mouth every 6 (six) hours as needed for moderate pain for up to 10 days, Disp: 20 tablet, Rfl: 0    Allergies   Allergen Reactions    Methocarbamol Dizziness and Confusion     Hypotension      Oxycodone Other (See Comments), Dizziness and Confusion     hypotension            Vitals:    08/15/24 1421   BP: 116/82   Pulse: 96       Objective:  Physical exam  General: Awake, Alert, Oriented  Eyes: Pupils equal, round and reactive to light  Heart: regular rate and rhythm  Lungs: No audible wheezing  Abdomen: soft                    Ortho Exam  Left knee:  Incision clean dry and intact  Staples well approximated and removed today   No erythema and no ecchymosis  Appropriate swelling of lower limb  Appropriate warmth of knee  Extensor mechanism intact  Extension 10  Flexion 80  Calf compartments soft and supple  Sensation intact  Toes are warm sensate and  "mobile      Diagnostics, reviewed and taken today if performed as documented:    None performed     Procedures, if performed today:    Procedures    None performed      Portions of the record may have been created with voice recognition software.  Occasional wrong word or \"sound a like\" substitutions may have occurred due to the inherent limitations of voice recognition software.  Read the chart carefully and recognize, using context, where substitutions have occurred.    "

## 2024-09-12 ENCOUNTER — OFFICE VISIT (OUTPATIENT)
Dept: OBGYN CLINIC | Facility: HOSPITAL | Age: 64
End: 2024-09-12

## 2024-09-12 VITALS
HEART RATE: 71 BPM | BODY MASS INDEX: 39.51 KG/M2 | HEIGHT: 59 IN | WEIGHT: 196 LBS | SYSTOLIC BLOOD PRESSURE: 135 MMHG | DIASTOLIC BLOOD PRESSURE: 89 MMHG

## 2024-09-12 DIAGNOSIS — Z96.652 AFTERCARE FOLLOWING LEFT KNEE JOINT REPLACEMENT SURGERY: Primary | ICD-10-CM

## 2024-09-12 DIAGNOSIS — Z47.1 AFTERCARE FOLLOWING LEFT KNEE JOINT REPLACEMENT SURGERY: Primary | ICD-10-CM

## 2024-09-12 PROCEDURE — 99024 POSTOP FOLLOW-UP VISIT: CPT | Performed by: ORTHOPAEDIC SURGERY

## 2024-09-12 NOTE — PROGRESS NOTES
Assessment:  1. Aftercare following left knee joint replacement surgery            Plan:  6 weeks s/p left TKA with robot, 2024   She is doing well.    She should continue physical therapy.    She should follow up in 6 weeks      To do next visit:  Return in about 6 weeks (around 10/24/2024) for re-check with x-rays.    The above stated was discussed in layman's terms and the patient expressed understanding.  All questions were answered to the patient's satisfaction.       Scribe Attestation      I,:  Julian Galarza MA am acting as a scribe while in the presence of the attending physician.:       I,:  Theodore Geiger MD personally performed the services described in this documentation    as scribed in my presence.:               Subjective:   Amber Jackson is a 63 y.o. female who presents 6 weeks s/p left TKA with robot, 2024.  She is doing well.  Today she complains of occasional left knee soreness.  She continues physical therapy with benefit.  She does use Tylenol and IBU for pain.  He denies fever, chills or shortness of breath.        Review of systems negative unless otherwise specified in HPI    Past Medical History:   Diagnosis Date    Arthritis 2013    Chest pain 2014    Colon polyp     Disease of thyroid gland     Elevated blood sugar 2014    Heart murmur     Functional, child    Hypercholesteremia     Hyperlipidemia     Obesity 2010    Osteoarthritis     Urinary tract infection     Recurring       Past Surgical History:   Procedure Laterality Date    BIOPSY CORE NEEDLE      Thyroid Using Percutaneous Core Needle    BUNIONECTOMY Bilateral      SECTION      x 2    FOOT SURGERY Bilateral     As a teenager - [place] - Dr. Vibha Nation - Last Assessed: 2016    JOINT REPLACEMENT Right     oct 2023    [place] ARTHRP KNE CONDYLE&PLATU MEDIAL&LAT COMPARTMENTS Right 10/09/2023    Procedure: ARTHROPLASTY RIGHT KNEE TOTAL W ROBOT;  Surgeon: Theodore Geiger MD;   [facility];  Service: Orthopedics    [place] ARTHRP KNE CONDYLE&PLATU MEDIAL&LAT COMPARTMENTS Left 8/1/2024    Procedure: ARTHROPLASTY KNEE TOTAL W ROBOT;  Surgeon: Theodore Geiger MD;  [facility];  Service: Orthopedics    THYROID LOBECTOMY Right 09/01/2015    Dr. Mae Salcido: Right Lobe - Last Assessed: July 5, 2016 Dr. Abdullahi Nation       Family History   Problem Relation Age of Onset    Graves' disease Mother     Hyperlipidemia Mother     Hypertension Mother     Osteoporosis Mother     Dementia Mother     Thyroid disease Mother     Arthritis Mother     Autoimmune disease Mother         Graves disease    Aortic stenosis Father     Hyperlipidemia Father     Hypertension Father     Coronary artery disease Father         Coronary Artery Bypass Graft (CABG)    Heart disease Father         Pacemaker Placement    Valvular heart disease Father         S/P Transcatheter Aortic Valve Replacement (TAVR)    Arthritis Father     Hearing loss Father     No Known Problems Sister     No Known Problems Daughter     No Known Problems Daughter     No Known Problems Daughter     No Known Problems Daughter     No Known Problems Maternal Grandmother     No Known Problems Maternal Grandfather     No Known Problems Paternal Grandmother     No Known Problems Paternal Grandfather     No Known Problems Son     No Known Problems Son     Cancer Maternal Aunt         stomach    Breast cancer Paternal Aunt     Cancer Paternal Aunt         nonhodgkins lymphoma       Social History     Occupational History    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   Tobacco Use    Smoking status: Never     Passive exposure: Never    Smokeless tobacco: Never   Vaping Use    Vaping status: Never Used   Substance and Sexual Activity    Alcohol use: Not Currently     Comment: rarely    Drug use: Not Currently    Sexual activity: Yes     Partners: Male     Birth control/protection: Post-menopausal         Current Outpatient  "Medications:     acetaminophen (TYLENOL) 500 mg tablet, Take 2 tablets (1,000 mg total) by mouth every 6 (six) hours as needed for mild pain, Disp: 90 tablet, Rfl: 0    celecoxib (CeleBREX) 100 mg capsule, Take 1 capsule (100 mg total) by mouth 2 (two) times a day, Disp: 30 capsule, Rfl: 0    Cholecalciferol 125 MCG (5000 UT) capsule, Take 5,000 Units by mouth daily Not taking, Disp: , Rfl:     docusate sodium (COLACE) 100 mg capsule, Take 1 capsule (100 mg total) by mouth 2 (two) times a day as needed for constipation, Disp: , Rfl:     enoxaparin (LOVENOX) 40 mg/0.4 mL, Inject 0.4 mL (40 mg total) under the skin daily for 28 days To start Post-Op, Disp: 11.2 mL, Rfl: 0    gabapentin (Neurontin) 100 mg capsule, Take 1 capsule (100 mg total) by mouth 3 (three) times a day, Disp: 90 capsule, Rfl: 0    Allergies   Allergen Reactions    Methocarbamol Dizziness and Confusion     Hypotension      Oxycodone Other (See Comments), Dizziness and Confusion     hypotension            Vitals:    09/12/24 1345   BP: 135/89   Pulse: 71       Objective:  Physical exam  General: Awake, Alert, Oriented  Eyes: Pupils equal, round and reactive to light  Heart: regular rate and rhythm  Lungs: No audible wheezing  Abdomen: soft                    Ortho Exam  Left knee:  Well healed anterior incision  No erythema and no ecchymosis  Appropriate swelling of lower limb  Appropriate warmth of knee  Extensor mechanism intact  Extension 10  Flexion 115  Calf compartments soft and supple  Sensation intact  Toes are warm sensate and mobile      Diagnostics, reviewed and taken today if performed as documented:    None performed     Procedures, if performed today:    Procedures    None performed      Portions of the record may have been created with voice recognition software.  Occasional wrong word or \"sound a like\" substitutions may have occurred due to the inherent limitations of voice recognition software.  Read the chart carefully and " recognize, using context, where substitutions have occurred.

## 2024-10-11 ENCOUNTER — TELEPHONE (OUTPATIENT)
Dept: RHEUMATOLOGY | Facility: CLINIC | Age: 64
End: 2024-10-11

## 2024-10-14 NOTE — PLAN OF CARE
Problem: PHYSICAL THERAPY ADULT  Goal: Performs mobility at highest level of function for planned discharge setting. See evaluation for individualized goals. Description: Treatment/Interventions: ADL retraining, Functional transfer training, LE strengthening/ROM, Elevations, Endurance training, Therapeutic exercise, Patient/family training, Equipment eval/education, Bed mobility, Gait training, Spoke to nursing, Spoke to case management, OT  Equipment Recommended: [place] Jamison       See flowsheet documentation for full assessment, interventions and recommendations. Outcome: Progressing  Note: Prognosis: Good  Problem List: Decreased strength, Decreased endurance, Decreased range of motion, Impaired balance, Decreased mobility, Pain, Orthopedic restrictions  Assessment: Pt continues to require extensive time to perform all transfers & household ambulation due to RLE pain & weakness s/p surgery, compounded by ongoing hypotension in standing, requiring seated rests to recover. While pt is able to perform all tasks appropriately, anticipate she will perform much better with further medical optimization to reduce risks of orthostatic hypotension while performing these tasks to reduce risk for complications or falls at home. Pt & spouse verbalize confidence in her mobilty depsite this, an feel that she will be able to manage once medically cleared to d/c. From mobility perspective, pt has demosntrated sufficient progress to d/c home with first floor set up & family support prn initially, with OPPT follow up to progress to PLOF. Will continue to follow and treat to maximize endurance & ensure safe transition to community at d/c.        PT Discharge Recommendation: Home with outpatient rehabilitation    See flowsheet documentation for full assessment. Basal ganglia hemorrhage

## 2024-10-15 ENCOUNTER — TELEMEDICINE (OUTPATIENT)
Dept: RHEUMATOLOGY | Facility: CLINIC | Age: 64
End: 2024-10-15
Payer: COMMERCIAL

## 2024-10-15 DIAGNOSIS — M15.0 PRIMARY GENERALIZED (OSTEO)ARTHRITIS: Primary | ICD-10-CM

## 2024-10-15 PROCEDURE — 99213 OFFICE O/P EST LOW 20 MIN: CPT | Performed by: STUDENT IN AN ORGANIZED HEALTH CARE EDUCATION/TRAINING PROGRAM

## 2024-10-15 NOTE — PROGRESS NOTES
Ambulatory Visit  Name: Amber Jackson      : 1960      MRN: [id number]  Encounter Provider: Dain Lockhart DO  Encounter Date: 10/15/2024   Encounter department: [facility] [place]    Assessment & Plan  Primary generalized (osteo)arthritis  Continues to have no signs or symptoms concerning for a rheumatologic disease       RTC 1 year, if no CTD symptoms by that point then no further follow up needed    History of Present Illness       L knee replacement went better than the previous one, healing pretty well from it, able to be more active than she was previously. Able to go for walks and do more things.    Things haven't really changed otherwise since last time, other joints seem fine, just occasional pain here and there. Still gets a lot of fatigue, but just had two surgeries in this past year.    Denies:  Fever  Rash  Oral/nasal/genital ulcers  Uveitis  Dactylitis  Dysphagia/odynophagia  CP  BOX  SOB at rest  Pleurisy  Hematochezia  Gross hematuria  Foamy urine  Raynaud's  Joint issues other than noted above    Permanent history: Was dxd with UCTD by a NPP based on joint pain and elevated CRP was started on hydroxychloroquine. Then saw Dr. Negron and was continued on hydroxychloroquine.     When I first saw her in 2024, had no symptoms consistent with a CTD or inflammatory arthritis and all symptoms were classic for OA. She was also skeptical of the diagnosis. Hydroxychloroquine was stopped.      Objective     LMP 10/27/2017 (Exact Date)     General: well-appearing, NAD

## 2024-10-16 DIAGNOSIS — Z96.652 AFTERCARE FOLLOWING LEFT KNEE JOINT REPLACEMENT SURGERY: Primary | ICD-10-CM

## 2024-10-16 DIAGNOSIS — Z47.1 AFTERCARE FOLLOWING LEFT KNEE JOINT REPLACEMENT SURGERY: Primary | ICD-10-CM

## 2024-10-29 ENCOUNTER — HOSPITAL ENCOUNTER (OUTPATIENT)
Dept: RADIOLOGY | Facility: HOSPITAL | Age: 64
Discharge: HOME | End: 2024-10-29
Attending: ORTHOPAEDIC SURGERY
Payer: COMMERCIAL

## 2024-10-29 ENCOUNTER — OFFICE VISIT (OUTPATIENT)
Dept: OBGYN CLINIC | Facility: HOSPITAL | Age: 64
End: 2024-10-29
Payer: COMMERCIAL

## 2024-10-29 VITALS — HEIGHT: 59 IN | BODY MASS INDEX: 39.59 KG/M2

## 2024-10-29 DIAGNOSIS — Z47.1 AFTERCARE FOLLOWING RIGHT KNEE JOINT REPLACEMENT SURGERY: ICD-10-CM

## 2024-10-29 DIAGNOSIS — Z96.651 AFTERCARE FOLLOWING RIGHT KNEE JOINT REPLACEMENT SURGERY: ICD-10-CM

## 2024-10-29 DIAGNOSIS — Z47.1 AFTERCARE FOLLOWING LEFT KNEE JOINT REPLACEMENT SURGERY: Primary | ICD-10-CM

## 2024-10-29 DIAGNOSIS — Z47.1 AFTERCARE FOLLOWING LEFT KNEE JOINT REPLACEMENT SURGERY: ICD-10-CM

## 2024-10-29 DIAGNOSIS — Z96.652 AFTERCARE FOLLOWING LEFT KNEE JOINT REPLACEMENT SURGERY: ICD-10-CM

## 2024-10-29 DIAGNOSIS — Z96.652 AFTERCARE FOLLOWING LEFT KNEE JOINT REPLACEMENT SURGERY: Primary | ICD-10-CM

## 2024-10-29 PROCEDURE — 73560 X-RAY EXAM OF KNEE 1 OR 2: CPT

## 2024-10-29 PROCEDURE — 99213 OFFICE O/P EST LOW 20 MIN: CPT | Performed by: ORTHOPAEDIC SURGERY

## 2024-10-29 PROCEDURE — 99024 POSTOP FOLLOW-UP VISIT: CPT | Performed by: ORTHOPAEDIC SURGERY

## 2024-10-29 NOTE — PROGRESS NOTES
Assessment:   Diagnosis ICD-10-CM Associated Orders   1. Aftercare following left knee joint replacement surgery  Z47.1     Z96.652       2. Aftercare following right knee joint replacement surgery  Z47.1 XR knee 1 or 2 vw right    Z96.651           Plan:  X-rays taken today reviewed, physical exam performed.  Doing well 1 year status post right total knee arthroplasty and 3 months status post left total knee arthroplasty.  Total knee precautions with antibiotics as discussed until the 2-year postop boyd of her most recent knee arthroplasty, August 2026.  Maintain ongoing strengthening and stretching exercises to maximize recovery  Weightbearing and activity as tolerated.    To do next visit:  Return in about 3 months (around 1/29/2025) for re-check with x-rays upon arrival.    The above stated was discussed in layman's terms and the patient expressed understanding.  All questions were answered to the patient's satisfaction.       Scribe Attestation      I,:  Omar Mroe am acting as a scribe while in the presence of the attending physician.:       I,:  Theodore Geiger MD personally performed the services described in this documentation    as scribed in my presence.:               Subjective:   Amber Jackson is a 63 y.o. female who presents today for repeat evaluation 3 months status post left total knee arthroplasty.  She is also 1 year status post right total knee arthroplasty and doing well.  She feels that she is further along in her recovery with her left knee than she was at this time in her recovery of her right knee last year.  Overall she is doing well.  She notes occasional stiffness and soreness but no significant amount of pain.  He denies any calf or thigh pain.  Denies any fevers or chills.  She is attending physical therapy at an outside facility progressing well.      Review of systems negative unless otherwise specified in HPI  Review of Systems    Past Medical History:   Diagnosis Date     Arthritis 2013    Chest pain 2014    Colon polyp     Disease of thyroid gland     Elevated blood sugar 2014    Heart murmur     Functional, child    Hypercholesteremia     Hyperlipidemia     Obesity 2010    Osteoarthritis     Urinary tract infection     Recurring       Past Surgical History:   Procedure Laterality Date    BIOPSY CORE NEEDLE      Thyroid Using Percutaneous Core Needle    BUNIONECTOMY Bilateral      SECTION      x 2    FOOT SURGERY Bilateral     As a teenager - Bunionectomy - Dr. Vibha Nation - Last Assessed: 2016    JOINT REPLACEMENT Right     oct 2023    MI ARTHRP KNE CONDYLE&PLATU MEDIAL&LAT COMPARTMENTS Right 10/09/2023    Procedure: ARTHROPLASTY RIGHT KNEE TOTAL W ROBOT;  Surgeon: Theodore Geiger MD;  [facility];  Service: Orthopedics    MI ARTHRP KNE CONDYLE&PLATU MEDIAL&LAT COMPARTMENTS Left 2024    Procedure: ARTHROPLASTY KNEE TOTAL W ROBOT;  Surgeon: Theodore Geiger MD;  Location: [facility];  Service: Orthopedics    THYROID LOBECTOMY Right 2015    Dr. Mae Salcido: Right Lobe - Last Assessed: 2016 Dr. Abdullahi Nation       Family History   Problem Relation Age of Onset    Graves' disease Mother     Hyperlipidemia Mother     Hypertension Mother     Osteoporosis Mother     Dementia Mother     Thyroid disease Mother     Arthritis Mother     Autoimmune disease Mother         Graves disease    Aortic stenosis Father     Hyperlipidemia Father     Hypertension Father     Coronary artery disease Father         Coronary Artery Bypass Graft (CABG)    Heart disease Father         Pacemaker Placement    Valvular heart disease Father         S/P Transcatheter Aortic Valve Replacement (TAVR)    Arthritis Father     Hearing loss Father     No Known Problems Sister     No Known Problems Daughter     No Known Problems Daughter     No Known Problems Daughter     No Known Problems Daughter     No Known Problems Maternal Grandmother      No Known Problems Maternal Grandfather     No Known Problems Paternal Grandmother     No Known Problems Paternal Grandfather     No Known Problems Son     No Known Problems Son     Cancer Maternal Aunt         stomach    Breast cancer Paternal Aunt     Cancer Paternal Aunt         nonhodgkins lymphoma       Social History     Occupational History    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   Tobacco Use    Smoking status: Never     Passive exposure: Never    Smokeless tobacco: Never   Vaping Use    Vaping status: Never Used   Substance and Sexual Activity    Alcohol use: Not Currently     Comment: rarely    Drug use: Not Currently    Sexual activity: Yes     Partners: Male     Birth control/protection: Post-menopausal         Current Outpatient Medications:     acetaminophen (TYLENOL) 500 mg tablet, Take 2 tablets (1,000 mg total) by mouth every 6 (six) hours as needed for mild pain, Disp: 90 tablet, Rfl: 0    Cholecalciferol 125 MCG (5000 UT) capsule, Take 5,000 Units by mouth daily Not taking, Disp: , Rfl:     docusate sodium (COLACE) 100 mg capsule, Take 1 capsule (100 mg total) by mouth 2 (two) times a day as needed for constipation, Disp: , Rfl:     Allergies   Allergen Reactions    Methocarbamol Dizziness and Confusion     Hypotension      Oxycodone Other (See Comments), Dizziness and Confusion     hypotension            Vitals:       Body mass index is 39.59 kg/m².  Wt Readings from Last 3 Encounters:   09/12/24 88.9 kg (196 lb)   08/01/24 87.1 kg (192 lb)   07/08/24 88.6 kg (195 lb 6.4 oz)       Objective:                    Right Knee Exam     Range of Motion   Extension:  0   Flexion:  120 (Patella tracks well)     Other   Erythema: absent  Sensation: normal  Swelling: mild  Effusion: no effusion present    Comments:    Well-healed anterior incision      Left Knee Exam     Range of Motion   Extension:  0   Flexion:  120 (Patella tracks well)     Other   Erythema: absent  Sensation:  "normal  Swelling: mild  Effusion: no effusion present    Comments:    Adequately healed anterior incision.  Minimal warmth.  No signs of infection    Both calves and thighs are soft and nontender without signs of DVT            Diagnostics, reviewed and taken today if performed as documented:    The attending physician has personally reviewed the pertinent films in PACS and interpretation is as follows:    Right and left knee x-rays taken and reviewed in the office today and show: Both knees have well aligned, positioned, bonded total knee prostheses without signs of loosening or stress shielding      Procedures, if performed today:    Procedures    None performed      Portions of the record may have been created with voice recognition software.  Occasional wrong word or \"sound a like\" substitutions may have occurred due to the inherent limitations of voice recognition software.  Read the chart carefully and recognize, using context, where substitutions have occurred.  "

## 2024-11-09 NOTE — ANESTHESIA PROCEDURE NOTES
Peripheral Block    Patient location during procedure: holding area  Start time: 10/9/2023 11:35 AM  Reason for block: at surgeon's request and post-op pain management  Staffing  Performed by: Carl Aden MD  Authorized by: Carl Aden MD    Preanesthetic Checklist  Completed: patient identified, IV checked, site marked, risks and benefits discussed, surgical consent, monitors and equipment checked, pre-op evaluation and timeout performed  Peripheral Block  Patient position: supine  Prep: ChloraPrep  Patient monitoring: frequent blood pressure checks, continuous pulse oximetry and heart rate  Block type: Adductor Canal  Laterality: right  Injection technique: single-shot  Procedures: ultrasound guided, Ultrasound guidance required for the procedure to increase accuracy and safety of medication placement and decrease risk of complications.   Ultrasound permanent image savedbupivacaine (PF) (MARCAINE) 0.5 % injection 20 mL - Perineural   10 mL - 10/9/2023 11:40:00 AM  bupivacaine liposomal (EXPAREL) 1.3 % injection 20 mL - Perineural   10 mL - 10/9/2023 11:40:00 AM  Needle  Needle type: Stimuplex   Needle length: 4 in  Needle localization: anatomical landmarks and ultrasound guidance  Needle insertion depth: 9 cm  Assessment  Injection assessment: incremental injection, frequent aspiration, injected with ease, negative aspiration, negative for heart rate change, no paresthesia on injection, no symptoms of intraneural/intravenous injection and needle tip visualized at all times  Paresthesia pain: none  Post-procedure:  site cleaned  patient tolerated the procedure well with no immediate complications Coagulation: Bleeding disorder either through use of anticoagulants or underlying clinical condition(s)

## 2024-12-10 ENCOUNTER — APPOINTMENT (OUTPATIENT)
Dept: LAB | Facility: AMBULARY SURGERY CENTER | Age: 64
End: 2024-12-10
Payer: COMMERCIAL

## 2024-12-10 DIAGNOSIS — E78.2 MIXED HYPERLIPIDEMIA: ICD-10-CM

## 2024-12-10 LAB
ALBUMIN SERPL BCG-MCNC: 4 G/DL (ref 3.5–5)
ALP SERPL-CCNC: 68 U/L (ref 34–104)
ALT SERPL W P-5'-P-CCNC: 15 U/L (ref 7–52)
ANION GAP SERPL CALCULATED.3IONS-SCNC: 8 MMOL/L (ref 4–13)
AST SERPL W P-5'-P-CCNC: 15 U/L (ref 13–39)
BASOPHILS # BLD AUTO: 0.02 THOUSANDS/ÂΜL (ref 0–0.1)
BASOPHILS NFR BLD AUTO: 0 % (ref 0–1)
BILIRUB SERPL-MCNC: 0.48 MG/DL (ref 0.2–1)
BUN SERPL-MCNC: 13 MG/DL (ref 5–25)
CALCIUM SERPL-MCNC: 9.1 MG/DL (ref 8.4–10.2)
CHLORIDE SERPL-SCNC: 106 MMOL/L (ref 96–108)
CHOLEST SERPL-MCNC: 241 MG/DL (ref ?–200)
CO2 SERPL-SCNC: 27 MMOL/L (ref 21–32)
CREAT SERPL-MCNC: 0.74 MG/DL (ref 0.6–1.3)
EOSINOPHIL # BLD AUTO: 0.12 THOUSAND/ÂΜL (ref 0–0.61)
EOSINOPHIL NFR BLD AUTO: 3 % (ref 0–6)
ERYTHROCYTE [DISTWIDTH] IN BLOOD BY AUTOMATED COUNT: 13.1 % (ref 11.6–15.1)
GFR SERPL CREATININE-BSD FRML MDRD: 87 ML/MIN/1.73SQ M
GLUCOSE P FAST SERPL-MCNC: 106 MG/DL (ref 65–99)
HCT VFR BLD AUTO: 45.1 % (ref 34.8–46.1)
HDLC SERPL-MCNC: 55 MG/DL
HGB BLD-MCNC: 14.3 G/DL (ref 11.5–15.4)
IMM GRANULOCYTES # BLD AUTO: 0.02 THOUSAND/UL (ref 0–0.2)
IMM GRANULOCYTES NFR BLD AUTO: 0 % (ref 0–2)
LDLC SERPL CALC-MCNC: 164 MG/DL (ref 0–100)
LYMPHOCYTES # BLD AUTO: 1.32 THOUSANDS/ÂΜL (ref 0.6–4.47)
LYMPHOCYTES NFR BLD AUTO: 30 % (ref 14–44)
MCH RBC QN AUTO: 28.5 PG (ref 26.8–34.3)
MCHC RBC AUTO-ENTMCNC: 31.7 G/DL (ref 31.4–37.4)
MCV RBC AUTO: 90 FL (ref 82–98)
MONOCYTES # BLD AUTO: 0.49 THOUSAND/ÂΜL (ref 0.17–1.22)
MONOCYTES NFR BLD AUTO: 11 % (ref 4–12)
NEUTROPHILS # BLD AUTO: 2.49 THOUSANDS/ÂΜL (ref 1.85–7.62)
NEUTS SEG NFR BLD AUTO: 56 % (ref 43–75)
NONHDLC SERPL-MCNC: 186 MG/DL
NRBC BLD AUTO-RTO: 0 /100 WBCS
PLATELET # BLD AUTO: 181 THOUSANDS/UL (ref 149–390)
PMV BLD AUTO: 10.1 FL (ref 8.9–12.7)
POTASSIUM SERPL-SCNC: 4.6 MMOL/L (ref 3.5–5.3)
PROT SERPL-MCNC: 7.1 G/DL (ref 6.4–8.4)
RBC # BLD AUTO: 5.02 MILLION/UL (ref 3.81–5.12)
SODIUM SERPL-SCNC: 141 MMOL/L (ref 135–147)
TRIGL SERPL-MCNC: 111 MG/DL (ref ?–150)
WBC # BLD AUTO: 4.46 THOUSAND/UL (ref 4.31–10.16)

## 2024-12-10 PROCEDURE — 36415 COLL VENOUS BLD VENIPUNCTURE: CPT

## 2024-12-10 PROCEDURE — 80061 LIPID PANEL: CPT

## 2024-12-10 PROCEDURE — 80053 COMPREHEN METABOLIC PANEL: CPT

## 2024-12-10 PROCEDURE — 85025 COMPLETE CBC W/AUTO DIFF WBC: CPT

## 2024-12-30 ENCOUNTER — OFFICE VISIT (OUTPATIENT)
Dept: INTERNAL MEDICINE CLINIC | Facility: CLINIC | Age: 64
End: 2024-12-30
Payer: COMMERCIAL

## 2024-12-30 VITALS
DIASTOLIC BLOOD PRESSURE: 68 MMHG | SYSTOLIC BLOOD PRESSURE: 112 MMHG | TEMPERATURE: 98.4 F | OXYGEN SATURATION: 98 % | BODY MASS INDEX: 39.92 KG/M2 | HEIGHT: 59 IN | WEIGHT: 198 LBS | HEART RATE: 86 BPM

## 2024-12-30 DIAGNOSIS — Z00.00 ANNUAL PHYSICAL EXAM: Primary | ICD-10-CM

## 2024-12-30 DIAGNOSIS — M35.9 UNDIFFERENTIATED CONNECTIVE TISSUE DISEASE (HCC): ICD-10-CM

## 2024-12-30 DIAGNOSIS — E78.2 MIXED HYPERLIPIDEMIA: ICD-10-CM

## 2024-12-30 DIAGNOSIS — Z12.11 SCREEN FOR COLON CANCER: ICD-10-CM

## 2024-12-30 DIAGNOSIS — Z12.31 SCREENING MAMMOGRAM FOR BREAST CANCER: ICD-10-CM

## 2024-12-30 DIAGNOSIS — E66.01 MORBID OBESITY (HCC): ICD-10-CM

## 2024-12-30 PROBLEM — G89.18 ACUTE POST-OPERATIVE PAIN: Status: RESOLVED | Noted: 2023-10-10 | Resolved: 2024-12-30

## 2024-12-30 PROBLEM — L03.011 CELLULITIS OF FINGER, RIGHT: Status: RESOLVED | Noted: 2023-03-15 | Resolved: 2024-12-30

## 2024-12-30 PROCEDURE — 99396 PREV VISIT EST AGE 40-64: CPT | Performed by: INTERNAL MEDICINE

## 2024-12-30 NOTE — ASSESSMENT & PLAN NOTE
Orders:  •  CBC and differential; Future  •  Comprehensive metabolic panel; Future  •  Lipid panel; Future

## 2024-12-30 NOTE — ASSESSMENT & PLAN NOTE
Prior Authorization Clinical Questions for Weight Management Pharmacotherapy    1. Does the patient have a contrainidcation to medication prescribed for weight management?: No  2. Does the patient have a diagnosis of obesity, confirmed by a BMI greater than or equal to 30 kg/m^2?: Yes  3. Does the patient have a BMI of greater than or equal to 27 kg/m^2 with at least one weight-related comorbidity/risk factor/complication (e.g. diabetes, dyslipidemia, coronary artery disease)?: Yes  4. Weight-related co-morbidities/risk factors: dyslipidemia  5. Has the patient been on a weight loss regimen of low-calorie diet, increased physical activity, and lifestyle modifications for a minimum of 6 months?: Yes  6. Has the patient completed a comprehensive weight loss program (ie, Weight Watchers, Noom, Bariatrics, other rolando on phone)? If so, what?: Yes  7. Does the patient have a history of type 2 diabetes?: No  8. Has the member tried and failed other weight loss medication within the past 12 months?: No  9. Will the member use requested medication in combination with another GLP agonist or weight loss drug?: No  10. Is the medication a controlled substance?: No     Baseline weight (in pounds): 198 lbs       Considering GLP-1 agonist

## 2024-12-30 NOTE — PATIENT INSTRUCTIONS
"Patient Education     Routine physical for adults   The Basics   Written by the doctors and editors at [facility]   What is a physical? -- A physical is a routine visit, or \"check-up,\" with your doctor. You might also hear it called a \"wellness visit\" or \"preventive visit.\"  During each visit, the doctor will:   Ask about your physical and mental health   Ask about your habits, behaviors, and lifestyle   Do an exam   Give you vaccines if needed   Talk to you about any medicines you take   Give advice about your health   Answer your questions  Getting regular check-ups is an important part of taking care of your health. It can help your doctor find and treat any problems you have. But it's also important for preventing health problems.  A routine physical is different from a \"sick visit.\" A sick visit is when you see a doctor because of a health concern or problem. Since physicals are scheduled ahead of time, you can think about what you want to ask the doctor.  How often should I get a physical? -- It depends on your age and health. In general, for people age 21 years and older:   If you are younger than 50 years, you might be able to get a physical every 3 years.   If you are 50 years or older, your doctor might recommend a physical every year.  If you have an ongoing health condition, like diabetes or high blood pressure, your doctor will probably want to see you more often.  What happens during a physical? -- In general, each visit will include:   Physical exam - The doctor or nurse will check your height, weight, heart rate, and blood pressure. They will also look at your eyes and ears. They will ask about how you are feeling and whether you have any symptoms that bother you.   Medicines - It's a good idea to bring a list of all the medicines you take to each doctor visit. Your doctor will talk to you about your medicines and answer any questions. Tell them if you are having any side effects that bother you. You " "should also tell them if you are having trouble paying for any of your medicines.   Habits and behaviors - This includes:   Your diet   Your exercise habits   Whether you smoke, drink alcohol, or use drugs   Whether you are sexually active   Whether you feel safe at home  Your doctor will talk to you about things you can do to improve your health and lower your risk of health problems. They will also offer help and support. For example, if you want to quit smoking, they can give you advice and might prescribe medicines. If you want to improve your diet or get more physical activity, they can help you with this, too.   Lab tests, if needed - The tests you get will depend on your age and situation. For example, your doctor might want to check your:   Cholesterol   Blood sugar   Iron level   Vaccines - The recommended vaccines will depend on your age, health, and what vaccines you already had. Vaccines are very important because they can prevent certain serious or deadly infections.   Discussion of screening - \"Screening\" means checking for diseases or other health problems before they cause symptoms. Your doctor can recommend screening based on your age, risk, and preferences. This might include tests to check for:   Cancer, such as breast, prostate, cervical, ovarian, colorectal, prostate, lung, or skin cancer   Sexually transmitted infections, such as chlamydia and gonorrhea   Mental health conditions like depression and anxiety  Your doctor will talk to you about the different types of screening tests. They can help you decide which screenings to have. They can also explain what the results might mean.   Answering questions - The physical is a good time to ask the doctor or nurse questions about your health. If needed, they can refer you to other doctors or specialists, too.  Adults older than 65 years often need other care, too. As you get older, your doctor will talk to you about:   How to prevent falling at " home   Hearing or vision tests   Memory testing   How to take your medicines safely   Making sure that you have the help and support you need at home  All topics are updated as new evidence becomes available and our peer review process is complete.  This topic retrieved from [employer] on: May 02, 2024.  Topic 224581 Version 1.0  Release: 32.4.3 - C32.122  © 2024 UpToDate, Inc. and/or its affiliates. All rights reserved.  Consumer Information Use and Disclaimer   Disclaimer: This generalized information is a limited summary of diagnosis, treatment, and/or medication information. It is not meant to be comprehensive and should be used as a tool to help the user understand and/or assess potential diagnostic and treatment options. It does NOT include all information about conditions, treatments, medications, side effects, or risks that may apply to a specific patient. It is not intended to be medical advice or a substitute for the medical advice, diagnosis, or treatment of a health care provider based on the health care provider's examination and assessment of a patient's specific and unique circumstances. Patients must speak with a health care provider for complete information about their health, medical questions, and treatment options, including any risks or benefits regarding use of medications. This information does not endorse any treatments or medications as safe, effective, or approved for treating a specific patient. UpToDate, Inc. and its affiliates disclaim any warranty or liability relating to this information or the use thereof.The use of this information is governed by the Terms of Use, available at https://www.[employer].com/en/know/clinical-effectiveness-terms. 2024© UpToDate, Inc. and its affiliates and/or licensors. All rights reserved.  Copyright   © 2024 UpToDate, Inc. and/or its affiliates. All rights reserved.

## 2024-12-30 NOTE — PROGRESS NOTES
Adult Annual Physical  Name: Amber Jackson      : 1960      MRN: [id number]  Encounter Provider: Lea Reyes, MD  Encounter Date: 2024   Encounter department: [facility]    Assessment & Plan  Annual physical exam         Morbid obesity (HCC)  Prior Authorization Clinical Questions for Weight Management Pharmacotherapy    1. Does the patient have a contrainidcation to medication prescribed for weight management?: No  2. Does the patient have a diagnosis of obesity, confirmed by a BMI greater than or equal to 30 kg/m^2?: Yes  3. Does the patient have a BMI of greater than or equal to 27 kg/m^2 with at least one weight-related comorbidity/risk factor/complication (e.g. diabetes, dyslipidemia, coronary artery disease)?: Yes  4. Weight-related co-morbidities/risk factors: dyslipidemia  5. Has the patient been on a weight loss regimen of low-calorie diet, increased physical activity, and lifestyle modifications for a minimum of 6 months?: Yes  6. Has the patient completed a comprehensive weight loss program (ie, Weight Watchers, Noom, Bariatrics, other rolando on phone)? If so, what?: Yes  7. Does the patient have a history of type 2 diabetes?: No  8. Has the member tried and failed other weight loss medication within the past 12 months?: No  9. Will the member use requested medication in combination with another GLP agonist or weight loss drug?: No  10. Is the medication a controlled substance?: No     Baseline weight (in pounds): 198 lbs       Considering GLP-1 agonist       Undifferentiated connective tissue disease (HCC)  She saw a rheumatology and rheumatologic condition not suspected  She is off hydroxychloroquine with no  change in her symptoms       Screening mammogram for breast cancer    Orders:  •  Mammo screening bilateral w 3d and cad; Future    Screen for colon cancer    Orders:  •  Ambulatory Referral to Colorectal Surgery; Future    Mixed hyperlipidemia    Orders:  •   "CBC and differential; Future  •  Comprehensive metabolic panel; Future  •  Lipid panel; Future      Immunizations and preventive care screenings were discussed with patient today. Appropriate education was printed on patient's after visit summary.    Counseling:  Exercise: the importance of regular exercise/physical activity was discussed. Recommend exercise 3-5 times per week for at least 30 minutes.          History of Present Illness     Adult Annual Physical:  Patient presents for annual physical.     Diet and Physical Activity:  - Diet/Nutrition:. Poor over the holiday  - Exercise:. just finished PT for her knee    Depression Screening:  - PHQ-2 Score: 0    General Health:  - Sleep: sleeps poorly.  - Hearing: normal hearing bilateral ears.  - Vision: wears glasses and goes for regular eye exams.  - Dental: regular dental visits.    /GYN Health:  - Follows with GYN: yes.   - Menopause: postmenopausal.     Review of Systems   Constitutional:  Positive for unexpected weight change.   Respiratory:  Negative for shortness of breath.    Cardiovascular:  Negative for chest pain and palpitations.   Gastrointestinal:  Negative for abdominal pain, constipation and diarrhea.   Genitourinary:  Negative for difficulty urinating.   Musculoskeletal:  Positive for arthralgias.   Neurological:  Negative for dizziness and headaches.         Objective   /68 (BP Location: Left arm, Patient Position: Sitting, Cuff Size: Large)   Pulse 86   Temp 98.4 °F (36.9 °C) (Tympanic)   Ht 4' 11\" (1.499 m)   Wt 89.8 kg (198 lb)   LMP 10/27/2017 (Exact Date)   SpO2 98%   BMI 39.99 kg/m²     Physical Exam  Constitutional:       General: She is not in acute distress.     Appearance: She is well-developed. She is obese. She is not ill-appearing, toxic-appearing or diaphoretic.   HENT:      Right Ear: External ear normal. There is no impacted cerumen.      Left Ear: External ear normal. There is no impacted cerumen.   Eyes:      " Conjunctiva/sclera: Conjunctivae normal.   Cardiovascular:      Rate and Rhythm: Normal rate and regular rhythm.      Heart sounds: Normal heart sounds. No murmur heard.  Pulmonary:      Effort: Pulmonary effort is normal. No respiratory distress.      Breath sounds: Normal breath sounds. No stridor. No wheezing or rales.   Abdominal:      General: There is no distension.      Palpations: Abdomen is soft. There is no mass.      Tenderness: There is no abdominal tenderness. There is no guarding or rebound.   Musculoskeletal:      Cervical back: Neck supple.      Right lower leg: No edema.      Left lower leg: No edema.   Neurological:      Mental Status: She is alert and oriented to person, place, and time.   Psychiatric:         Mood and Affect: Mood normal.         Behavior: Behavior normal.         Thought Content: Thought content normal.         Judgment: Judgment normal.

## 2024-12-30 NOTE — ASSESSMENT & PLAN NOTE
She saw a rheumatology and rheumatologic condition not suspected  She is off hydroxychloroquine with no  change in her symptoms

## 2025-01-15 DIAGNOSIS — Z47.1 AFTERCARE FOLLOWING LEFT KNEE JOINT REPLACEMENT SURGERY: Primary | ICD-10-CM

## 2025-01-15 DIAGNOSIS — Z96.652 AFTERCARE FOLLOWING LEFT KNEE JOINT REPLACEMENT SURGERY: Primary | ICD-10-CM

## 2025-01-17 DIAGNOSIS — E66.01 CLASS 2 SEVERE OBESITY DUE TO EXCESS CALORIES WITH SERIOUS COMORBIDITY AND BODY MASS INDEX (BMI) OF 39.0 TO 39.9 IN ADULT (HCC): Primary | ICD-10-CM

## 2025-01-17 DIAGNOSIS — E78.2 MIXED HYPERLIPIDEMIA: ICD-10-CM

## 2025-01-17 DIAGNOSIS — E66.812 CLASS 2 SEVERE OBESITY DUE TO EXCESS CALORIES WITH SERIOUS COMORBIDITY AND BODY MASS INDEX (BMI) OF 39.0 TO 39.9 IN ADULT (HCC): Primary | ICD-10-CM

## 2025-01-17 DIAGNOSIS — R73.01 IMPAIRED FASTING GLUCOSE: ICD-10-CM

## 2025-01-17 RX ORDER — TIRZEPATIDE 2.5 MG/.5ML
2.5 INJECTION, SOLUTION SUBCUTANEOUS WEEKLY
Qty: 2 ML | Refills: 0 | Status: SHIPPED | OUTPATIENT
Start: 2025-01-17 | End: 2025-02-14

## 2025-01-20 ENCOUNTER — TELEPHONE (OUTPATIENT)
Dept: INTERNAL MEDICINE CLINIC | Facility: CLINIC | Age: 65
End: 2025-01-20

## 2025-01-20 NOTE — TELEPHONE ENCOUNTER
Patient needs a prior authorization for:    Zepbound 2.5 mg/0.5 ml Auto Injectors      [facility] [phone]

## 2025-01-21 NOTE — TELEPHONE ENCOUNTER
PA for Zepbound 2.5 mg/0.5 ml SUBMITTED to [facility]    via    []CMM-KEY:   [x]Surescripts-Case ID #[id number]    []Availity-Auth ID # NDC #   []Faxed to plan   []Other website   []Phone call Case ID #     [x]PA sent as URGENT    All office notes, labs and other pertaining documents and studies sent. Clinical questions answered. Awaiting determination from insurance company.     Turnaround time for your insurance to make a decision on your Prior Authorization can take 7-21 business days.

## 2025-01-21 NOTE — TELEPHONE ENCOUNTER
PA for zepbound 2.5 mg/0.5 ml  APPROVED     Date(s) approved 1/21/25-8/21/25    Case #[id number]     Patient advised by          []MyChart Message  [x]Phone call   []LMOM  []L/M to call office as no active Communication consent on file  []Unable to leave detailed message as VM not approved on Communication consent       Pharmacy advised by    [x]Fax  []Phone call    Approval letter scanned into Media Yes

## 2025-01-29 ENCOUNTER — OFFICE VISIT (OUTPATIENT)
Dept: OBGYN CLINIC | Facility: HOSPITAL | Age: 65
End: 2025-01-29
Payer: COMMERCIAL

## 2025-01-29 ENCOUNTER — HOSPITAL ENCOUNTER (OUTPATIENT)
Dept: RADIOLOGY | Facility: HOSPITAL | Age: 65
Discharge: HOME | End: 2025-01-29
Attending: ORTHOPAEDIC SURGERY
Payer: COMMERCIAL

## 2025-01-29 VITALS — BODY MASS INDEX: 39.99 KG/M2 | HEIGHT: 59 IN

## 2025-01-29 DIAGNOSIS — Z47.1 AFTERCARE FOLLOWING LEFT KNEE JOINT REPLACEMENT SURGERY: Primary | ICD-10-CM

## 2025-01-29 DIAGNOSIS — Z47.1 AFTERCARE FOLLOWING LEFT KNEE JOINT REPLACEMENT SURGERY: ICD-10-CM

## 2025-01-29 DIAGNOSIS — Z96.652 AFTERCARE FOLLOWING LEFT KNEE JOINT REPLACEMENT SURGERY: ICD-10-CM

## 2025-01-29 DIAGNOSIS — Z96.652 AFTERCARE FOLLOWING LEFT KNEE JOINT REPLACEMENT SURGERY: Primary | ICD-10-CM

## 2025-01-29 PROCEDURE — 99213 OFFICE O/P EST LOW 20 MIN: CPT | Performed by: ORTHOPAEDIC SURGERY

## 2025-01-29 PROCEDURE — 73560 X-RAY EXAM OF KNEE 1 OR 2: CPT

## 2025-01-29 NOTE — PROGRESS NOTES
Assessment:  1. Aftercare following left knee joint replacement surgery            Plan:  6 months s/p left TKA with robot, 2024.   She is doing well.    She is encouraged to remain active including HEP.    The patient is counseled on prophylactic antibiotic use prior to dental visits.    She should follow up in 6 months      To do next visit:  Return in about 6 months (around 2025) for re-check with x-rays.    The above stated was discussed in layman's terms and the patient expressed understanding.  All questions were answered to the patient's satisfaction.       Scribe Attestation      I,:  Julian Galarza MA am acting as a scribe while in the presence of the attending physician.:       I,:  Theodore Geiger MD personally performed the services described in this documentation    as scribed in my presence.:               Subjective:   Amber Jackson is a 64 y.o. female who presents 6 months s/p left TKA with robot, 2024.  She is doing well.  Today she has no left knee pain complaints.  She is active without repercussion.  She admits to not doing her home exercises yet does walk and bike. She denies medications for this issue.  He denies fever, chills or shortness of breath.        Review of systems negative unless otherwise specified in HPI    Past Medical History:   Diagnosis Date    Acute post-operative pain 10/10/2023    Arthritis 2013    Cellulitis of finger, right 03/15/2023    Chest pain 2014    Colon polyp     Disease of thyroid gland     Elevated blood sugar 2014    Heart murmur     Functional, child    Hypercholesteremia     Hyperlipidemia     Obesity 2010    Osteoarthritis     Urinary tract infection     Recurring       Past Surgical History:   Procedure Laterality Date    BIOPSY CORE NEEDLE      Thyroid Using Percutaneous Core Needle    BUNIONECTOMY Bilateral      SECTION      x 2    FOOT SURGERY Bilateral     As a teenager - [facility] - Dr. Vibha Nation -  Last Assessed: July 5, 2016    JOINT REPLACEMENT Right     oct 2023    AL ARTHRP KNE CONDYLE&PLATU MEDIAL&LAT COMPARTMENTS Right 10/09/2023    Procedure: ARTHROPLASTY RIGHT KNEE TOTAL W ROBOT;  Surgeon: Theodore Geiger MD;  Location: [facility];  Service: Orthopedics    AL ARTHRP KNE CONDYLE&PLATU MEDIAL&LAT COMPARTMENTS Left 8/1/2024    Procedure: ARTHROPLASTY KNEE TOTAL W ROBOT;  Surgeon: Theodore Geiger MD;  Location: [facility];  Service: Orthopedics    THYROID LOBECTOMY Right 09/01/2015    Dr. Mae Salcido: Right Lobe - Last Assessed: July 5, 2016 Dr. Abdullahi Ntaion       Family History   Problem Relation Age of Onset    Graves' disease Mother     Hyperlipidemia Mother     Hypertension Mother     Osteoporosis Mother     Dementia Mother     Thyroid disease Mother     Arthritis Mother     Autoimmune disease Mother         Graves disease    Aortic stenosis Father     Hyperlipidemia Father     Hypertension Father     Coronary artery disease Father         Coronary Artery Bypass Graft (CABG)    Heart disease Father         Pacemaker Placement    Valvular heart disease Father         S/P Transcatheter Aortic Valve Replacement (TAVR)    Arthritis Father     Hearing loss Father     No Known Problems Sister     No Known Problems Daughter     No Known Problems Daughter     No Known Problems Daughter     No Known Problems Daughter     No Known Problems Maternal Grandmother     No Known Problems Maternal Grandfather     No Known Problems Paternal Grandmother     No Known Problems Paternal Grandfather     No Known Problems Son     No Known Problems Son     Cancer Maternal Aunt     Cancer Maternal Aunt         stomach    Breast cancer Paternal Aunt     Cancer Paternal Aunt         nonhodgkins lymphoma       Social History     Occupational History    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   Tobacco Use    Smoking status: Never     Passive exposure: Never    Smokeless tobacco: Never   Vaping Use     Vaping status: Never Used   Substance and Sexual Activity    Alcohol use: Not Currently     Comment: rarely    Drug use: Not Currently    Sexual activity: Yes     Partners: Male     Birth control/protection: Post-menopausal         Current Outpatient Medications:     acetaminophen (TYLENOL) 500 mg tablet, Take 2 tablets (1,000 mg total) by mouth every 6 (six) hours as needed for mild pain, Disp: 90 tablet, Rfl: 0    Cholecalciferol 125 MCG (5000 UT) capsule, Take 5,000 Units by mouth daily Not taking (Patient not taking: Reported on 12/30/2024), Disp: , Rfl:     docusate sodium (COLACE) 100 mg capsule, Take 1 capsule (100 mg total) by mouth 2 (two) times a day as needed for constipation (Patient not taking: Reported on 12/30/2024), Disp: , Rfl:     tirzepatide (Zepbound) 2.5 mg/0.5 mL auto-injector, Inject 0.5 mL (2.5 mg total) under the skin once a week for 28 days, Disp: 2 mL, Rfl: 0    Allergies   Allergen Reactions    Methocarbamol Dizziness and Confusion     Hypotension      Oxycodone Other (See Comments), Dizziness and Confusion     hypotension          There were no vitals filed for this visit.    Objective:  Physical exam  General: Awake, Alert, Oriented  Eyes: Pupils equal, round and reactive to light  Heart: regular rate and rhythm  Lungs: No audible wheezing  Abdomen: soft                    Ortho Exam  Left knee:  Well healed anterior incision  No erythema and no ecchymosis  Stable to varus and valgus stress  Extensor mechanism intact  Extension 0  Flexion 120  Calf compartments soft and supple  Sensation intact  Toes are warm sensate and mobile      Diagnostics, reviewed and taken today if performed as documented:    The attending physician has personally reviewed the pertinent films in PACS and interpretation is as follows:  Left knee x-ray:  Well aligned prosthesis with no acute changes.      Procedures, if performed today:    Procedures    None performed      Portions of the record may have been  "created with voice recognition software.  Occasional wrong word or \"sound a like\" substitutions may have occurred due to the inherent limitations of voice recognition software.  Read the chart carefully and recognize, using context, where substitutions have occurred.    "

## 2025-02-14 DIAGNOSIS — E66.01 MORBID OBESITY (HCC): Primary | ICD-10-CM

## 2025-02-14 RX ORDER — TIRZEPATIDE 5 MG/.5ML
5 INJECTION, SOLUTION SUBCUTANEOUS WEEKLY
Qty: 2 ML | Refills: 1 | Status: SHIPPED | OUTPATIENT
Start: 2025-02-14

## 2025-03-07 ENCOUNTER — ANNUAL EXAM (OUTPATIENT)
Dept: OBGYN CLINIC | Facility: CLINIC | Age: 65
End: 2025-03-07
Payer: COMMERCIAL

## 2025-03-07 VITALS — SYSTOLIC BLOOD PRESSURE: 120 MMHG | WEIGHT: 197.8 LBS | BODY MASS INDEX: 39.95 KG/M2 | DIASTOLIC BLOOD PRESSURE: 70 MMHG

## 2025-03-07 DIAGNOSIS — Z01.419 ENCOUNTER FOR GYNECOLOGICAL EXAMINATION WITHOUT ABNORMAL FINDING: Primary | ICD-10-CM

## 2025-03-07 DIAGNOSIS — Z12.31 ENCOUNTER FOR SCREENING MAMMOGRAM FOR MALIGNANT NEOPLASM OF BREAST: ICD-10-CM

## 2025-03-07 DIAGNOSIS — N95.2 ATROPHIC VAGINITIS: ICD-10-CM

## 2025-03-07 DIAGNOSIS — Z01.419 PAP SMEAR, AS PART OF ROUTINE GYNECOLOGICAL EXAMINATION: ICD-10-CM

## 2025-03-07 PROCEDURE — G0143 SCR C/V CYTO,THINLAYER,RESCR: HCPCS | Performed by: OBSTETRICS & GYNECOLOGY

## 2025-03-07 PROCEDURE — G0476 HPV COMBO ASSAY CA SCREEN: HCPCS | Performed by: OBSTETRICS & GYNECOLOGY

## 2025-03-07 PROCEDURE — S0612 ANNUAL GYNECOLOGICAL EXAMINA: HCPCS | Performed by: OBSTETRICS & GYNECOLOGY

## 2025-03-07 NOTE — PROGRESS NOTES
Assessment/Plan:    No problem-specific Assessment & Plan notes found for this encounter.       Diagnoses and all orders for this visit:    Encounter for gynecological examination without abnormal finding    Pap smear, as part of routine gynecological examination    Encounter for screening mammogram for malignant neoplasm of breast    Atrophic vaginitis          Normal gynecological physical examination.  Self-breast examination stressed.  Mammogram ordered.  Discussed regular exercise, healthy diet, importance of vitamin D and calcium supplements.  Discussed importance of sun block use during periods of prolonged sun exposure.  Patient will be seen in 1 year for routine gynecologic and medical examination.  Patient will call office for any problems, concerns, or issues which may arise during the interim.     Subjective:          HPI    Patient ID: Amber Jackson is a 64 y.o. female who presents today for her annual gynecologic and medical examination    Menstrual status: Patient is postmenopausal and denies any vaginal bleeding    Vasomotor symptoms: Denies    Patient reports normal appetite    Patient reports normal bowel and bladder habits    Patient denies any significant pelvic or abdominal pain    Patient denies any headaches, chest pain, shortness of breath fever shakes or chills    Patient denies any COVID 19 symptoms including cough or loss of taste or smell    COVID vaccine status: Aware COVID vaccination protocols    Medical problems: No significant medical problems    Colonoscopy status: Up-to-date with screening colonoscopy    Mammogram status: Does regular self breast exams and is up-to-date with screening mammography.  Appropriate arrangements for her annual screening mammogram were placed into the EMR system at today's visit.    The following portions of the patient's history were reviewed and updated as appropriate: allergies, current medications, past family history, past medical history, past  social history, past surgical history and problem list.    Review of Systems   Constitutional: Negative.  Negative for appetite change, diaphoresis, fatigue, fever and unexpected weight change.   HENT: Negative.     Eyes: Negative.    Respiratory: Negative.     Cardiovascular: Negative.    Gastrointestinal: Negative.  Negative for abdominal pain, blood in stool, constipation, diarrhea, nausea and vomiting.   Endocrine: Negative.  Negative for cold intolerance and heat intolerance.   Genitourinary: Negative.  Negative for dysuria, frequency, hematuria, urgency, vaginal bleeding, vaginal discharge and vaginal pain.   Musculoskeletal: Negative.    Skin: Negative.    Allergic/Immunologic: Negative.    Neurological: Negative.    Hematological: Negative.  Negative for adenopathy.   Psychiatric/Behavioral: Negative.           Objective:      /70   Wt 89.7 kg (197 lb 12.8 oz)   LMP 10/27/2017 (Exact Date)   BMI 39.95 kg/m²          Physical Exam  Constitutional:       General: She is not in acute distress.     Appearance: Normal appearance. She is well-developed. She is not diaphoretic.   HENT:      Head: Normocephalic and atraumatic.   Eyes:      Pupils: Pupils are equal, round, and reactive to light.   Cardiovascular:      Rate and Rhythm: Normal rate and regular rhythm.      Heart sounds: Normal heart sounds. No murmur heard.     No friction rub. No gallop.   Pulmonary:      Effort: Pulmonary effort is normal.      Breath sounds: Normal breath sounds.   Chest:   Breasts:     Breasts are symmetrical.      Right: No inverted nipple, mass, nipple discharge, skin change or tenderness.      Left: No inverted nipple, mass, nipple discharge, skin change or tenderness.   Abdominal:      General: Bowel sounds are normal.      Palpations: Abdomen is soft.   Genitourinary:     General: Normal vulva.      Exam position: Supine.      Labia:         Right: No rash or lesion.         Left: No rash or lesion.       Urethra: No  urethral swelling or urethral lesion.      Vagina: Normal. No vaginal discharge, erythema, tenderness or bleeding.      Cervix: No discharge or friability.      Uterus: Not enlarged and not tender.       Adnexa:         Right: No mass, tenderness or fullness.          Left: No mass, tenderness or fullness.        Rectum: Normal. Guaiac result negative.      Comments: Pelvic exam revealed mild atrophic vaginitis  Good pelvic support confirmed  Musculoskeletal:         General: Normal range of motion.      Cervical back: Normal range of motion and neck supple.   Lymphadenopathy:      Cervical: No cervical adenopathy.      Upper Body:      Right upper body: No supraclavicular adenopathy.      Left upper body: No supraclavicular adenopathy.   Skin:     General: Skin is warm and dry.      Findings: No rash.   Neurological:      General: No focal deficit present.      Mental Status: She is alert and oriented to person, place, and time.   Psychiatric:         Mood and Affect: Mood normal.         Speech: Speech normal.         Behavior: Behavior normal.         Thought Content: Thought content normal.         Judgment: Judgment normal.

## 2025-03-13 LAB
LAB AP GYN PRIMARY INTERPRETATION: NORMAL
Lab: NORMAL

## 2025-03-14 DIAGNOSIS — E66.01 MORBID OBESITY (HCC): ICD-10-CM

## 2025-03-14 RX ORDER — TIRZEPATIDE 5 MG/.5ML
5 INJECTION, SOLUTION SUBCUTANEOUS WEEKLY
Qty: 2 ML | Refills: 1 | Status: SHIPPED | OUTPATIENT
Start: 2025-03-14

## 2025-03-20 ENCOUNTER — TELEPHONE (OUTPATIENT)
Age: 65
End: 2025-03-20

## 2025-03-20 NOTE — LETTER
Amber Jackson  [address] [place] [address]        Colonoscopy Preparation: DULCOLAX & MIRALAX    [facility] - [address], [place] [address]    Performing Physician: Dr. Jamarcus Hughes    DATE OF PROCEDURE: September 26, 2025    The OR/GI Lab will contact you the evening prior to your procedure with your exact arrival time.    We kindly ask that you immediately notify us of any need to cancel or reschedule your procedure.  _________________________________________________________________    WEEK BEFORE YOUR PROCEDURE:  Do not take Iron tablets for one week.  5 days prior to the appointment AVOID vegetables and fruits with skins or seeds, nuts, corn, popcorn, and whole grain breads.  Purchase: One (1) 238-gram container of Miralax (polyethylene glycol 3350), four (4) 5 mg Dulcolax (bisacodyl) tablets, and 64-ounces of Gatorade (sports drink) - no red, orange, or purple. These may be purchased at any pharmacy without a prescription. Generic products are permissible.  Arrange responsible transportation for day of the procedure.  DAY BEFORE THE PROCEDURE:  Clear liquids only for entire day prior. Nothing red, orange or purple.  You MAY have:  Soda  Water  Broth Gatorade  Jell-O  Popsicles Coffee/Tea without milk/creamer   YOU MAY NOT HAVE:  Solid Foods  Milk and milk products  Juice with pulp  BOWEL PREPARATION: Includes: One (1) 238-gram container of Miralax (polyethylene glycol 3350), four (4) 5 mg Dulcolax (bisacodyl) tablets, and 64-ounces of Gatorade (sports drink). Preparation may be refrigerated. Entire bowel prep should be completed.            Colonoscopy Preparation: DULCOLAX & MIRALAX    Afternoon before the procedure (2:00 pm - 5:00 pm):  Take two (2) 5 mg Dulcolax laxative tablets.    Evening before procedure (6:00 pm):  Mix entire container of Miralax with 64-ounces of Gatorade and shake until all medication is dissolved.  Begin drinking solution. Drink an  eight (8) ounce cup every 10-15 minutes until you have consumed half (32 ounces) of the solution. Refrigerate remaining solution.    Night before the procedure (8:00 pm):  Take two (2) 5 mg Dulcolax laxative tablets.    Beginning 5 hours before your procedure:  Drink the remaining amount of prepared solution (32 ounces). Drink an eight (8) ounce cup every 10-15 minutes until you have consumed the remaining solution.    Bowel prep should be completed 4 hours prior to your procedure time.    NOTHING TO EAT OR DRINK AFTER MIDNIGHT - EXCEPT YOUR BOWEL PREP    DAY OF PROCEDURE:  You may brush your teeth.  Leave all jewelry at home. Take out any facial piercings, if able.  Please arrive for your procedure as indicated by the OR / GI Lab / Endoscopy Unit. The hospital will contact you the day before with your exact arrival time.  Make sure you have arranged ahead of time for a responsible adult (18 or older) to accompany and drive you home after the procedure. Please discuss any transportation concerns with our staff prior to your procedure.  The effects of the anesthesia can persist for 24 hours. After receiving the sedation, you must exercise caution before engaging in any activity that could harm yourself and others (such as driving a car). Do not make any important decisions or do not drink any alcoholic beverages during this time period.        Colonoscopy Preparation: DULCOLAX & MIRALAX    After your procedure, you may have anything you'd like to eat or drink. You will probably want to start with something light. Please include plenty of fluids. Avoid items that cause gas such as sodas and salads.  SPECIAL INSTRUCTIONS:    For patients currently taking blood thinners and/or antiplatelet therapy our office will contact the prescribing provider. Our office will contact you with any required changes to your medication regimen.    Blood thinner (i.e. - Coumadin, Pradaxa, Lovenox, Xarelto, Eliquis)  Continue (Do Not  Stop)  Stop_______________for_______________days prior to the procedure.  Antiplatelet (i.e. - Plavix, Aggrenox, Effient, Brilinta)  Continue (Do Not Stop)  Stop_______________for_______________days prior to the procedure.  Diabetic/Weight loss medication (i.e.- Insulin, GLP1 agonist)  Medication:_______________Hold:_______________days prior to the procedure      NOTHING TO EAT OR DRINK (INCLUDING CHEWING GUM) AFTER 12 MIDNIGHT PRIOR TO YOUR PROCEDURE EXCEPT YOUR BOWEL PREP.    For any questions or concerns related to your bowel preparation or pre-procedure instructions, please contact our office.  Thank you for choosing [facility] or [facility]!    [phone] Gastroenterology  [phone] Colon & Rectal Surgery                                                                                      Medicine Instructions for Adults with Diabetes who Need a Bowel Prep       Follow these instructions when a BOWEL PREP is required for your procedure or surgery!    NOTE:   GLP-1 Agonists taken weekly: do not take in the 7 days before your procedure   SGLT-2 Inhibitors: do not take in the 4 days before your procedure     On the Day Before Surgery/Procedure  If you are having a procedure (e.g. Colonoscopy) or surgery that requires a bowel prep and you may have at least a clear liquid diet, follow the directions below based on the type of medicine you take for your diabetes.     Type of Medicine You Take Examples What to do   Pre-Mixed Insulin - Intermediate Acting Humalog® 75/25, Humulin® 70/30, Novolog® 70/30, Regular Insulin Take ½ your regular dose the evening before your procedure   Rapid/Fast Acting Insulin Humalog® U200, NovoLog®, Apidra®, Fiasp® Take ½ your regular dose the evening before your procedure.   Long-Acting Insulin Lantus®, Levemir®, Tresiba®, Toujeo®, Basaglar® Take your FULL regular dose the day before procedure   Oral Sulfonylurea Glipizide/Glimepiride/Glucotrol®  Take ½ your regular dose the evening before your procedure   Other Oral Diabetes Medicines Metformin®, Glucophage®, Glucophage XR®, Riomet®, Glumetza®), Actose®, Avandia®, Glyset®, Prandin® Take your regular dose with dinner in the evening before your procedure   GLP-1 Agonists AdlyxinÒ, ByettaÒ, BydureonÒ, OzempicÒ, SoliquaÒ, TanzeumÒ, TrulicityÒ, VictozaÒ, Saxenda®, Rybelsus® If taken daily, take as normal    If taken weekly, do not take this medicine for 7 days before your procedure including the day of the procedure (resume taking after the procedure)   SGLT-2 Inhibitors Jardiance®, Invokana®, Farxiga®,   Steglatro®, Brenzavvy®, Qtern®, Segluromet®, Glyxambi®, Synjardy®, Synjardy XR®, Invokamet®, Invokamet XR®, Trijary XR®, Xigduo XR®, Steglujan® Do not take for 4 days before your procedure including the day of the procedure (resume taking after the procedure)                More information continued on back                    Medicine Instructions for Adults with Diabetes who Need a Bowel Prep  Page 2      On the Day of Surgery/Procedure  Follow the directions below based on the type of medicine you take for your diabetes.     Type of Medicine You Take Examples What to do   Long-Acting Insulin Lantus®, Levemir®, Tresiba®, Toujeo®, Basaglar®, Semglee®   If you usually take your Long-Acting Insulin in the morning, take the full dose as scheduled.   GLP-1 Agonists AdlyxinÒ, ByettaÒ, BydureonÒ, OzempicÒ, SoliquaÒ, TanzeumÒ, TrulicityÒ, VictozaÒ, Saxenda®, Rybelsus® Do NOT take this medicine on the day of your procedure (resume taking after the procedure)       On the Day of Surgery/Procedure (continued)  Except for the morning Long-Acting Insulin, DO NOT take ANY diabetic medicine on the day of your procedure unless you were instructed by the doctor who manages your diabetes medicines.    Continue to check your blood sugars.  If you have an insulin pump, ask your endocrinologist for instructions at least 3 days  before your procedure. NOTE: If you are not able to ask your endocrinologist in advance, on the day of the procedure set your insulin pump to your basal rate only. Bring your insulin pump supplies to the hospital.     If you have any questions about taking your diabetes medicines prior to your procedure, please contact the doctor who manages your diabetes medicines.

## 2025-04-02 ENCOUNTER — OFFICE VISIT (OUTPATIENT)
Dept: INTERNAL MEDICINE CLINIC | Facility: CLINIC | Age: 65
End: 2025-04-02
Payer: COMMERCIAL

## 2025-04-02 VITALS
SYSTOLIC BLOOD PRESSURE: 118 MMHG | HEIGHT: 59 IN | BODY MASS INDEX: 39.55 KG/M2 | DIASTOLIC BLOOD PRESSURE: 74 MMHG | WEIGHT: 196.2 LBS | HEART RATE: 85 BPM | OXYGEN SATURATION: 98 %

## 2025-04-02 DIAGNOSIS — E66.01 MORBID OBESITY (HCC): Primary | ICD-10-CM

## 2025-04-02 DIAGNOSIS — R73.01 IMPAIRED FASTING GLUCOSE: ICD-10-CM

## 2025-04-02 DIAGNOSIS — M35.9 UNDIFFERENTIATED CONNECTIVE TISSUE DISEASE (HCC): ICD-10-CM

## 2025-04-02 DIAGNOSIS — E78.2 MIXED HYPERLIPIDEMIA: ICD-10-CM

## 2025-04-02 PROCEDURE — 99213 OFFICE O/P EST LOW 20 MIN: CPT | Performed by: INTERNAL MEDICINE

## 2025-04-02 RX ORDER — TIRZEPATIDE 7.5 MG/.5ML
7.5 INJECTION, SOLUTION SUBCUTANEOUS WEEKLY
Qty: 2 ML | Refills: 1 | Status: SHIPPED | OUTPATIENT
Start: 2025-04-02

## 2025-04-02 NOTE — ASSESSMENT & PLAN NOTE
Prior Authorization Clinical Questions for Weight Management Pharmacotherapy    1. Does the patient have a contrainidcation to medication prescribed for weight management?: No  2. Does the patient have a diagnosis of obesity, confirmed by a BMI greater than or equal to 30 kg/m^2?: Yes  3. Does the patient have a BMI of greater than or equal to 27 kg/m^2 with at least one weight-related comorbidity/risk factor/complication (e.g. diabetes, dyslipidemia, coronary artery disease)?: Yes  4. Weight-related co-morbidities/risk factors: prediabetes, dyslipidemia  7. Has the patient been on a weight loss regimen of low-calorie diet, increased physical activity, and lifestyle modifications for a minimum of 6 months?: Yes  8. Has the patient completed a comprehensive weight loss program (ie, Weight Watchers, Noom, Bariatrics, other [place]lando on phone)? If so, what?: Yes  9. Does the patient have a history of type 2 diabetes?: No  10. Has the member tried and failed other weight loss medication within the past 12 months?: No  11. Will the member use requested medication in combination with another GLP agonist or weight loss drug?: No  12. Is the medication a controlled substance?: No     Baseline weight (in pounds): 198 lbs  Current weight (in pounds): 196 lbs  Weight loss percentage: -1.01%       Increase Zepbound 7.5 mg.  Instructed her to call the office if she would like to try the next higher dose after 3 to 4 weeks of the Zepbound 7.5 mg.  Discussed that the dose can be raised up to 50 mg  Mild constipation and instructed to take stool softener regularly  Orders:  •  tirzepatide (Zepbound) 7.5 mg/0.5 mL auto-injector; Inject 0.5 mL (7.5 mg total) under the skin once a week

## 2025-04-02 NOTE — PROGRESS NOTES
Name: Amber Jackson      : 1960      MRN: [id number]  Encounter Provider: Lea Reyes, MD  Encounter Date: 2025   Encounter department: [facility]    Assessment & Plan  Morbid obesity (HCC)  Prior Authorization Clinical Questions for Weight Management Pharmacotherapy    1. Does the patient have a contrainidcation to medication prescribed for weight management?: No  2. Does the patient have a diagnosis of obesity, confirmed by a BMI greater than or equal to 30 kg/m^2?: Yes  3. Does the patient have a BMI of greater than or equal to 27 kg/m^2 with at least one weight-related comorbidity/risk factor/complication (e.g. diabetes, dyslipidemia, coronary artery disease)?: Yes  4. Weight-related co-morbidities/risk factors: prediabetes, dyslipidemia  7. Has the patient been on a weight loss regimen of low-calorie diet, increased physical activity, and lifestyle modifications for a minimum of 6 months?: Yes  8. Has the patient completed a comprehensive weight loss program (ie, Weight Watchers, Noom, Bariatrics, other [place] on phone)? If so, what?: Yes  9. Does the patient have a history of type 2 diabetes?: No  10. Has the member tried and failed other weight loss medication within the past 12 months?: No  11. Will the member use requested medication in combination with another GLP agonist or weight loss drug?: No  12. Is the medication a controlled substance?: No     Baseline weight (in pounds): 198 lbs  Current weight (in pounds): 196 lbs  Weight loss percentage: -1.01%       Increase Zepbound 7.5 mg.  Instructed her to call the office if she would like to try the next higher dose after 3 to 4 weeks of the Zepbound 7.5 mg.  Discussed that the dose can be raised up to 50 mg  Mild constipation and instructed to take stool softener regularly  Orders:  •  tirzepatide (Zepbound) 7.5 mg/0.5 mL auto-injector; Inject 0.5 mL (7.5 mg total) under the skin once a week    Undifferentiated connective  tissue disease (HCC)         Impaired fasting glucose    Orders:  •  CBC and differential; Future  •  Comprehensive metabolic panel; Future  •  Hemoglobin A1C; Future    Mixed hyperlipidemia    Orders:  •  CBC and differential; Future  •  Comprehensive metabolic panel; Future  •  Lipid panel; Future         History of Present Illness     Started Simponi the end of January, no significant weight loss noted  She does feel some bloating and mild constipation      Review of Systems   Constitutional:  Negative for unexpected weight change.   Respiratory:  Negative for shortness of breath.    Cardiovascular:  Negative for chest pain and palpitations.   Gastrointestinal:  Positive for abdominal distention and constipation.     Past Medical History:   Diagnosis Date   • Acute post-operative pain 10/10/2023   • Arthritis    • Cellulitis of finger, right 03/15/2023   • Chest pain 2014   • Colon polyp    • Disease of thyroid gland    • Elevated blood sugar 2014   • Heart murmur     Functional, child   • Hypercholesteremia    • Hyperlipidemia    • Miscarriage    • Obesity    • Osteoarthritis    • Urinary tract infection     Recurring     Past Surgical History:   Procedure Laterality Date   • BIOPSY CORE NEEDLE      Thyroid Using Percutaneous Core Needle   • BUNIONECTOMY Bilateral    •  SECTION      x 2   • FOOT SURGERY Bilateral     As a teenager - Bunionectomy - Dr. Vibha Nation - Last Assessed: 2016   • JOINT REPLACEMENT Right     oct 2023   • GA ARTHRP KNE CONDYLE&PLATU MEDIAL&LAT COMPARTMENTS Right 10/09/2023    Procedure: ARTHROPLASTY RIGHT KNEE TOTAL W ROBOT;  Surgeon: Theodore Geiger MD;  Location: [facility];  Service: Orthopedics   • GA ARTHRP KNE CONDYLE&PLATU MEDIAL&LAT COMPARTMENTS Left 2024    Procedure: ARTHROPLASTY KNEE TOTAL W ROBOT;  Surgeon: Theodore Geiger MD;  Location: [facility];  Service: Orthopedics   • THYROID LOBECTOMY Right 2015      Mae Salcido: Right Lobe - Last Assessed: July 5, 2016 Dr. Abdullahi Nation     Family History   Problem Relation Age of Onset   • Graves' disease Mother    • Hyperlipidemia Mother    • Hypertension Mother    • Osteoporosis Mother    • Dementia Mother    • Thyroid disease Mother    • Arthritis Mother    • Autoimmune disease Mother         Graves disease   • Osteoarthritis Mother    • Aortic stenosis Father    • Hyperlipidemia Father    • Hypertension Father    • Coronary artery disease Father         Coronary Artery Bypass Graft (CABG)   • Heart disease Father         Pacemaker Placement   • Valvular heart disease Father         S/P Transcatheter Aortic Valve Replacement (TAVR)   • Arthritis Father    • Hearing loss Father    • Osteoarthritis Father    • No Known Problems Sister    • No Known Problems Daughter    • No Known Problems Daughter    • No Known Problems Daughter    • No Known Problems Daughter    • No Known Problems Maternal Grandmother    • No Known Problems Maternal Grandfather    • No Known Problems Paternal Grandmother    • No Known Problems Paternal Grandfather    • No Known Problems Son    • No Known Problems Son    • Cancer Maternal Aunt    • Cancer Maternal Aunt         stomach   • Breast cancer Paternal Aunt    • Cancer Paternal Aunt         nonhodgkins lymphoma     Social History     Tobacco Use   • Smoking status: Never     Passive exposure: Never   • Smokeless tobacco: Never   Vaping Use   • Vaping status: Never Used   Substance and Sexual Activity   • Alcohol use: Not Currently     Comment: rarely   • Drug use: Not Currently   • Sexual activity: Yes     Partners: Male     Birth control/protection: Post-menopausal     Current Outpatient Medications on File Prior to Visit   Medication Sig   • [DISCONTINUED] tirzepatide (Zepbound) 5 mg/0.5 mL auto-injector Inject 0.5 mL (5 mg total) under the skin once a week   • Cholecalciferol 125 MCG (5000 UT) capsule Take 5,000 Units by mouth daily Not taking  "(Patient not taking: Reported on 3/7/2025)   • docusate sodium (COLACE) 100 mg capsule Take 1 capsule (100 mg total) by mouth 2 (two) times a day as needed for constipation (Patient not taking: Reported on 3/7/2025)     Allergies   Allergen Reactions   • Methocarbamol Dizziness and Confusion     Hypotension     • Oxycodone Other (See Comments), Dizziness and Confusion     hypotension     Immunization History   Administered Date(s) Administered   • COVID-19 MODERNA VACC 0.5 ML IM 01/29/2021, 02/26/2021, 10/29/2021   • COVID-19 Moderna Vac BIVALENT 12 Yr+ IM 0.5 ML 10/28/2022   • INFLUENZA 12/09/2010, 10/02/2017, 10/01/2018, 10/12/2021, 09/27/2023   • Influenza Injectable, MDCK, Preservative Free, Quadrivalent, 0.5 mL 10/09/2020   • Influenza Quadrivalent Preservative Free 3 years and older IM 10/12/2022   • Influenza, Quadrivalent (nasal) 10/02/2017, 10/01/2018, 10/15/2019   • Influenza, seasonal, injectable, preservative free 10/23/2024   • Respiratory Syncytial Virus Vaccine (Recombinant) 12/27/2023   • Tdap 05/12/2020   • Zoster Vaccine Recombinant 03/27/2019, 08/16/2019     Objective   /74 (BP Location: Right arm, Patient Position: Sitting, Cuff Size: Large)   Pulse 85   Ht 4' 11\" (1.499 m)   Wt 89 kg (196 lb 3.2 oz)   LMP 10/27/2017 (Exact Date)   SpO2 98%   BMI 39.63 kg/m²     Physical Exam  Constitutional:       General: She is not in acute distress.     Appearance: She is well-developed. She is obese. She is not ill-appearing, toxic-appearing or diaphoretic.   Eyes:      Conjunctiva/sclera: Conjunctivae normal.   Cardiovascular:      Rate and Rhythm: Normal rate and regular rhythm.      Heart sounds: Normal heart sounds. No murmur heard.  Pulmonary:      Effort: Pulmonary effort is normal. No respiratory distress.      Breath sounds: Normal breath sounds. No stridor. No wheezing or rales.   Abdominal:      General: There is no distension.      Palpations: Abdomen is soft. There is no mass.      " Tenderness: There is no abdominal tenderness. There is no guarding or rebound.   Musculoskeletal:      Cervical back: Neck supple.      Right lower leg: No edema.      Left lower leg: No edema.   Neurological:      Mental Status: She is alert and oriented to person, place, and time.   Psychiatric:         Mood and Affect: Mood normal.         Behavior: Behavior normal.         Thought Content: Thought content normal.         Judgment: Judgment normal.

## 2025-04-02 NOTE — ASSESSMENT & PLAN NOTE
Orders:  •  CBC and differential; Future  •  Comprehensive metabolic panel; Future  •  Hemoglobin A1C; Future

## 2025-05-06 DIAGNOSIS — Z96.651 HISTORY OF TOTAL KNEE ARTHROPLASTY, RIGHT: ICD-10-CM

## 2025-05-06 RX ORDER — CEPHALEXIN 500 MG/1
CAPSULE ORAL
Qty: 40 CAPSULE | Refills: 0 | Status: SHIPPED | OUTPATIENT
Start: 2025-05-06 | End: 2025-05-20

## 2025-05-06 NOTE — TELEPHONE ENCOUNTER
Patient called to request a refill for their Cephalexin 500 mg advised a refill was requested on 5/6/25 and is pending approval. Patient verbalized understanding and is in agreement.     Does the patient have enough for 3 days?   [x] Yes   [] No - Send as HP to POD      Please place a refill on prescription as patient will be having bridge work done and is unsure how many appointments will be needed.

## 2025-05-14 DIAGNOSIS — E66.01 MORBID OBESITY (HCC): ICD-10-CM

## 2025-05-14 RX ORDER — TIRZEPATIDE 7.5 MG/.5ML
7.5 INJECTION, SOLUTION SUBCUTANEOUS WEEKLY
Qty: 2 ML | Refills: 3 | Status: SHIPPED | OUTPATIENT
Start: 2025-05-14 | End: 2025-05-20

## 2025-05-20 DIAGNOSIS — E66.01 MORBID OBESITY (HCC): Primary | ICD-10-CM

## 2025-05-20 RX ORDER — SEMAGLUTIDE 1.7 MG/.75ML
INJECTION, SOLUTION SUBCUTANEOUS
Qty: 3 ML | Refills: 2 | Status: SHIPPED | OUTPATIENT
Start: 2025-05-20

## 2025-05-21 ENCOUNTER — TELEPHONE (OUTPATIENT)
Age: 65
End: 2025-05-21

## 2025-05-21 ENCOUNTER — TELEPHONE (OUTPATIENT)
Dept: INTERNAL MEDICINE CLINIC | Facility: CLINIC | Age: 65
End: 2025-05-21

## 2025-05-21 NOTE — TELEPHONE ENCOUNTER
PA for wegovy 1.7 mg/0.75 ml   APPROVED     Date(s) approved May 21, 2025 to December 21, 2025     Case #[id number]     Patient advised by          []MyChart Message  [x]Phone call   []LMOM  []L/M to call office as no active Communication consent on file  []Unable to leave detailed message as VM not approved on Communication consent       Pharmacy advised by    [x]Fax  []Phone call  []Secure Chat    Specialty Pharmacy    []     Approval letter scanned into Media Yes

## 2025-05-21 NOTE — TELEPHONE ENCOUNTER
PA for Wegovy 1.7 mg/0.75 ml SUBMITTED to [facility]     via    []CMM-KEY:   [x]Surescripts-Case ID # [id number]   []Availity-Auth ID # NDC #   []Faxed to plan   []Other website   []Phone call Case ID #     []PA sent as URGENT    All office notes, labs and other pertaining documents and studies sent. Clinical questions answered. Awaiting determination from insurance company.     Turnaround time for your insurance to make a decision on your Prior Authorization can take 7-21 business days.

## 2025-06-12 ENCOUNTER — HOSPITAL ENCOUNTER (OUTPATIENT)
Dept: MAMMOGRAPHY | Facility: HOSPITAL | Age: 65
Discharge: HOME | End: 2025-06-12
Payer: COMMERCIAL

## 2025-06-12 VITALS — WEIGHT: 196.21 LBS | HEIGHT: 59 IN | BODY MASS INDEX: 39.56 KG/M2

## 2025-06-12 DIAGNOSIS — Z12.31 SCREENING MAMMOGRAM FOR BREAST CANCER: ICD-10-CM

## 2025-06-12 PROCEDURE — 77063 BREAST TOMOSYNTHESIS BI: CPT

## 2025-06-12 PROCEDURE — 77067 SCR MAMMO BI INCL CAD: CPT

## 2025-07-16 DIAGNOSIS — Z96.652 AFTERCARE FOLLOWING LEFT KNEE JOINT REPLACEMENT SURGERY: Primary | ICD-10-CM

## 2025-07-16 DIAGNOSIS — Z47.1 AFTERCARE FOLLOWING LEFT KNEE JOINT REPLACEMENT SURGERY: Primary | ICD-10-CM

## 2025-07-24 DIAGNOSIS — E66.01 MORBID OBESITY (HCC): ICD-10-CM

## 2025-07-25 RX ORDER — SEMAGLUTIDE 1.7 MG/.75ML
INJECTION, SOLUTION SUBCUTANEOUS
Qty: 3 ML | Refills: 2 | Status: SHIPPED | OUTPATIENT
Start: 2025-07-25

## 2025-07-29 ENCOUNTER — HOSPITAL ENCOUNTER (OUTPATIENT)
Dept: RADIOLOGY | Facility: HOSPITAL | Age: 65
Discharge: HOME | End: 2025-07-29
Attending: ORTHOPAEDIC SURGERY
Payer: COMMERCIAL

## 2025-07-29 ENCOUNTER — OFFICE VISIT (OUTPATIENT)
Dept: OBGYN CLINIC | Facility: HOSPITAL | Age: 65
End: 2025-07-29
Payer: COMMERCIAL

## 2025-07-29 VITALS — BODY MASS INDEX: 39.63 KG/M2 | HEIGHT: 59 IN

## 2025-07-29 DIAGNOSIS — Z96.652 AFTERCARE FOLLOWING LEFT KNEE JOINT REPLACEMENT SURGERY: Primary | ICD-10-CM

## 2025-07-29 DIAGNOSIS — Z47.1 AFTERCARE FOLLOWING LEFT KNEE JOINT REPLACEMENT SURGERY: Primary | ICD-10-CM

## 2025-07-29 DIAGNOSIS — Z96.652 AFTERCARE FOLLOWING LEFT KNEE JOINT REPLACEMENT SURGERY: ICD-10-CM

## 2025-07-29 DIAGNOSIS — Z47.1 AFTERCARE FOLLOWING LEFT KNEE JOINT REPLACEMENT SURGERY: ICD-10-CM

## 2025-07-29 PROCEDURE — 99213 OFFICE O/P EST LOW 20 MIN: CPT | Performed by: ORTHOPAEDIC SURGERY

## 2025-07-29 PROCEDURE — 73560 X-RAY EXAM OF KNEE 1 OR 2: CPT

## 2025-08-07 ENCOUNTER — APPOINTMENT (OUTPATIENT)
Dept: LAB | Age: 65
End: 2025-08-07
Attending: INTERNAL MEDICINE
Payer: COMMERCIAL

## (undated) DEVICE — PAD CAST 6 IN COTTON NON STERILE

## (undated) DEVICE — 3 BONE CEMENT MIXER: Brand: MIXEVAC

## (undated) DEVICE — GLOVE SRG BIOGEL 6.5

## (undated) DEVICE — PLUMEPEN PRO 10FT

## (undated) DEVICE — VELYS ROBOT DRAPE

## (undated) DEVICE — SUT VICRYL PLUS 1 CTB-1 36 IN VCPB947H

## (undated) DEVICE — COOL TEMP PAD

## (undated) DEVICE — DRAPE EQUIPMENT RF WAND

## (undated) DEVICE — 3M™ IOBAN™ 2 ANTIMICROBIAL INCISE DRAPE 6650EZ: Brand: IOBAN™ 2

## (undated) DEVICE — STIRRUP STRAP ADULT DISP

## (undated) DEVICE — VELYS ROBOTIC-ASSISTED SOLUTION ARRAY SET - KNEE: Brand: VELYS

## (undated) DEVICE — TRAY FOLEY 16FR URIMETER SURESTEP

## (undated) DEVICE — ABDOMINAL PAD: Brand: DERMACEA

## (undated) DEVICE — CAPIT KNEE ATTUNE FB W/DOM AOX

## (undated) DEVICE — SAW BLADE RECIPROCATING 179

## (undated) DEVICE — VELYS SATEL DRAPE

## (undated) DEVICE — ACE WRAP 6 IN UNSTERILE

## (undated) DEVICE — DRAPE SHEET THREE QUARTER

## (undated) DEVICE — NO-SCRATCH ™ SMALL WHITNEY CURETTE ™ IS A SINGLE-USE, PLASTIC CURETTE FOR QUICKLY APPLYING, MANIPULATING AND REMOVING BONE CEMENT DURING HIP AND KNEE REPLACEMENT SURGERY. THE PLASTIC IS SOFTER THAN STEEL INSTRUMENTS, REDUCING THE RISK OF DAMAGING THE PROSTHESIS WITH METAL INSTRUMENTS.  THE CURETTE’S 6MM TIP REMOVES EXCESS CEMENT FROM REPLACEMENT HIPS AND KNEES. EASY-TO-MANEUVER, THE SMALL BLUE CURETTE LETS YOU REMOVE CEMENT FROM ALL EDGES OF THE PROSTHESIS.NO-SCRATCH WHITNEY SMALL CURETTE FEATURES:SAFER THAN STEEL- MADE OF PLASTIC - STURDY YET SOFTER THAN SURGICAL STEEL.HANDIER- EACH TOOL HAS A MOLDED-IN THUMB INDENTATION INSTANTLY ORIENTING THE TOOL.- EASIER TO MANEUVER IN HARD TO SEE PLACES.- COLOR-CODED FOR EASY IDENTIFICATION.FASTER- COMES INDIVIDUALLY PACKAGED IN STERILE, PEEL OPEN POUCH, READY TO GO.- APPLIES, MANIPULATES, OR REMOVES CEMENT WITH FINGERTIP PRECISION.ECONOMICAL- THE COST OF A SINGLE REVISION DWARFS THE COST OF A SINGLE-USE CURETTE. - DISPOSABLE – THERE’S NO NEED TO WASTE TIME REMOVING HARDENED CEMENT OR RE-STERILIZING TOOLS.- LESS EXPENSIVE TO BUY AND INVENTORY - ORDER ONLY THE TOOL YOU USE.- PACKAGED 25 INDIVIDUALLY WRAPPED TOOLS TO A CARTON FOR CONVENIENT SHELF STORAGE.: Brand: WHITNEY NO-SCRATCH CURETTE (SMALL)

## (undated) DEVICE — BETHLEHEM UNIV TOTAL KNEE, KIT: Brand: CARDINAL HEALTH

## (undated) DEVICE — GLOVE INDICATOR PI UNDERGLOVE SZ 7.5 BLUE

## (undated) DEVICE — GLOVE SRG BIOGEL 8

## (undated) DEVICE — GAUZE SPONGES,16 PLY: Brand: CURITY

## (undated) DEVICE — HEAVY DUTY TABLE COVER: Brand: CONVERTORS

## (undated) DEVICE — GLOVE SRG BIOGEL 7.5

## (undated) DEVICE — HANDPIECE SET WITH RETRACTABLE COAXIAL FAN SPRAY TIP AND SUCTION TUBE: Brand: INTERPULSE

## (undated) DEVICE — ACE WRAP 6 IN STERILE

## (undated) DEVICE — DUAL CUT SAGITTAL BLADE

## (undated) DEVICE — DRAPE SHEET X-LG

## (undated) DEVICE — JP 3-SPRING RES W/10FR PVC DRAIN/TR: Brand: CARDINAL HEALTH

## (undated) DEVICE — ASTOUND STANDARD SURGICAL GOWN, XL: Brand: CONVERTORS

## (undated) DEVICE — SILVER-COATED ANTIMICROBIAL BARRIER DRESSING: Brand: ACTICOAT   4" X 8"

## (undated) DEVICE — VELYS ROBOT-ASSISTED SOLUTION ARRAY DRILL PIN 125 MM X 4 MM: Brand: VELYS

## (undated) DEVICE — RECIPROCATING BLADE, DOUBLE SIDED, OFFSET  (70.0 X 0.64 X 12.6MM)

## (undated) DEVICE — PADDING CAST 4 IN  COTTON STRL

## (undated) DEVICE — WET SKIN PREP TRAY: Brand: MEDLINE INDUSTRIES, INC.

## (undated) DEVICE — PROXIMATE PLUS MD MULTI-DIRECTIONAL RELEASE SKIN STAPLERS CONTAINS 35 STAINLESS STEEL STAPLES APPROXIMATE CLOSED DIMENSIONS: 6.9MM X 3.9MM WIDE: Brand: PROXIMATE

## (undated) DEVICE — VELYS BLADE SAW OSC 85 X 19 X 2MM

## (undated) DEVICE — SILVER-COATED ANTIBACTERIAL BARRIER DRESSING: Brand: ACTICOAT SURGIC 10X20CM 5PK US

## (undated) DEVICE — SUT VICRYL PLUS 2-0 CTB-1 27 IN VCPB259H

## (undated) DEVICE — STERILE PITCHER PACK: Brand: CARDINAL HEALTH

## (undated) DEVICE — SKN PREP SPNG STKS PVP PNT STR: Brand: MEDLINE INDUSTRIES, INC.

## (undated) DEVICE — GLOVE INDICATOR PI UNDERGLOVE SZ 7 BLUE

## (undated) DEVICE — STOCKINETTE REGULAR

## (undated) DEVICE — GLOVE INDICATOR PI UNDERGLOVE SZ 8.5 BLUE

## (undated) DEVICE — HOOD: Brand: T7PLUS

## (undated) DEVICE — TRAY FOLEY 16FR URIMETER SILICONE SURESTEP

## (undated) DEVICE — HOOD WITH PEEL AWAY FACE SHIELD: Brand: T7PLUS

## (undated) DEVICE — SAW BLADE OSCILLATING BRAZOL 167

## (undated) DEVICE — BAG POLY CLEAR 12 X 8 X 30